# Patient Record
Sex: MALE | Race: WHITE | NOT HISPANIC OR LATINO | Employment: OTHER | URBAN - METROPOLITAN AREA
[De-identification: names, ages, dates, MRNs, and addresses within clinical notes are randomized per-mention and may not be internally consistent; named-entity substitution may affect disease eponyms.]

---

## 2017-02-03 ENCOUNTER — OFFICE VISIT (OUTPATIENT)
Dept: UROLOGY | Facility: CLINIC | Age: 62
End: 2017-02-03
Payer: MEDICARE

## 2017-02-03 ENCOUNTER — LAB VISIT (OUTPATIENT)
Dept: LAB | Facility: HOSPITAL | Age: 62
End: 2017-02-03
Attending: UROLOGY
Payer: MEDICARE

## 2017-02-03 VITALS — HEART RATE: 61 BPM | DIASTOLIC BLOOD PRESSURE: 78 MMHG | SYSTOLIC BLOOD PRESSURE: 122 MMHG

## 2017-02-03 DIAGNOSIS — N40.1 BENIGN PROSTATIC HYPERTROPHY WITH URINARY RETENTION: ICD-10-CM

## 2017-02-03 DIAGNOSIS — R33.8 BENIGN PROSTATIC HYPERTROPHY WITH URINARY RETENTION: ICD-10-CM

## 2017-02-03 DIAGNOSIS — N40.1 BENIGN NON-NODULAR PROSTATIC HYPERPLASIA WITH LOWER URINARY TRACT SYMPTOMS: ICD-10-CM

## 2017-02-03 DIAGNOSIS — N40.1 BENIGN NON-NODULAR PROSTATIC HYPERPLASIA WITH LOWER URINARY TRACT SYMPTOMS: Primary | ICD-10-CM

## 2017-02-03 LAB — COMPLEXED PSA SERPL-MCNC: 1.5 NG/ML

## 2017-02-03 PROCEDURE — 99212 OFFICE O/P EST SF 10 MIN: CPT | Mod: PBBFAC,PO | Performed by: UROLOGY

## 2017-02-03 PROCEDURE — 99999 PR PBB SHADOW E&M-EST. PATIENT-LVL II: CPT | Mod: PBBFAC,,, | Performed by: UROLOGY

## 2017-02-03 PROCEDURE — 84153 ASSAY OF PSA TOTAL: CPT

## 2017-02-03 PROCEDURE — 99213 OFFICE O/P EST LOW 20 MIN: CPT | Mod: S$PBB,,, | Performed by: UROLOGY

## 2017-02-03 PROCEDURE — 36415 COLL VENOUS BLD VENIPUNCTURE: CPT

## 2017-02-03 RX ORDER — ASPIRIN 325 MG
81 TABLET ORAL
COMMUNITY
End: 2017-03-21 | Stop reason: ALTCHOICE

## 2017-02-03 NOTE — MR AVS SNAPSHOT
[facility]   [address]  [place] [address]  Phone: [phone]                  Enmanuel Armenta   2/3/2017 9:15 AM   Office Visit    Description:  Male : 1955   Provider:  Hernan Lake MD   Department:  [facility]           Reason for Visit     Urinary Retention           Diagnoses this Visit        Comments    Benign non-nodular prostatic hyperplasia with lower urinary tract symptoms    -  Primary     Benign prostatic hypertrophy with urinary retention                To Do List           Future Appointments        Provider Department Dept Phone    2/3/2017 10:30 AM [facility] [phone]      Goals (5 Years of Data)     None      [facility]     [facility] Nurse Care [facility] -  Assistance  Registered nurses in the [facility] provide clinical advisement, health education, appointment booking, and other advisory services.  Call for this free service at [phone].             Medications           Message regarding Medications     Verify the changes and/or additions to your medication regime listed below are the same as discussed with your clinician today.  If any of these changes or additions are incorrect, please notify your healthcare provider.        STOP taking these medications     tamsulosin (FLOMAX) 0.4 mg Cp24 Take 1 capsule (0.4 mg total) by mouth once daily.           Verify that the below list of medications is an accurate representation of the medications you are currently taking.  If none reported, the list may be blank. If incorrect, please contact your healthcare provider. Carry this list with you in case of emergency.           Current Medications     aspirin 325 MG tablet Take 81 mg by mouth.    multivitamin (THERAGRAN) per tablet Take 1 tablet by mouth once daily.            Clinical Reference Information           Your Vitals Were     BP Pulse                122/78 61           Blood Pressure          Most Recent Value    BP  122/78      Allergies as of 2/3/2017     No Known Allergies      Immunizations Administered on Date of Encounter - 2/3/2017     None      Orders Placed During Today's Visit     Future Labs/Procedures Expected by Expires    PROSTATE SPECIFIC ANTIGEN, DIAGNOSTIC  2/3/2017 4/4/2018      Smoking Cessation     If you would like to quit smoking:   You may be eligible for free services if you are a [place] resident and started smoking cigarettes before September 1, 1988.  Call the Smoking Cessation Trust ([facility]) toll free at (355) 595-8069 or (349) 631-9024.   Call -960-QUIT-NOW if you do not meet the above criteria.            Language Assistance Services     ATTENTION: Language assistance services are available, free of charge. Please call [phone].      ATENCIÓN: Si julienne velasquez, tiene a ball disposición servicios gratuitos de asistencia lingüística. Llame al [phone].     CHÚ Ý: N?u b?n nói Ti?ng Vi?t, có các d?ch v? h? tr? ngôn ng? mi?n phí dành cho b?n. G?i s? [phone].         [facility] complies with applicable Federal civil rights laws and does not discriminate on the basis of race, color, national origin, age, disability, or sex.

## 2017-02-03 NOTE — PROGRESS NOTES
OFFICE VISIT NOTE    CHIEF COMPLAINT:  BPH, urinary retention.    HISTORY OF PRESENT ILLNESS:  This 61-year-old male returns for routine recheck.    He has a history of BPH with significant voiding difficulty and has failed all   alpha blockers and he underwent cystoscopy and was found to have an enlarged   prostate of approximately 123 mL and the plan was for him to undergo a TURP SLV   which we initially had planned on November 2016.  Subsequently, the patient   required cholecystectomy and also suffered a right broken arm and wrist for   which we had to cancel the surgery and he continues to have an indwelling Samson   catheter that he changed on a regular basis every three to four weeks.  He is   returning now to the office.  He states he is now ready to have the surgery   scheduled for his prostate.    MEDICAL HISTORY UPDATE:  As described above.    PHYSICAL EXAMINATION:  ABDOMEN:  Soft, benign, and nontender.  No masses.  No hernias or organomegaly.  EXTERNAL GENITAL:  Normal phallus with adequate meatus with indwelling Samson   catheter draining clear urine to a leg bag.  RECTAL EXAMINATION:  Enlarged, over 40 g, smooth, firm prostate.    His most recent PSA was 2.21 on 09/11/2015 for which he is due another PSA.    FINAL IMPRESSION:  Benign prostatic hypertrophy with urinary retention.    RECOMMENDATION:  We will obtain a PSA and subsequently contact the patient and   plan for TURP SLV.  This was explained to the patient he stated he understood   and agreed.      RAJINDER  dd: 02/03/2017 09:57:12 (CST)  td: 02/03/2017 12:36:24 (CST)  Doc ID   #[id number]  Job ID #[id number]    CC:

## 2017-02-14 ENCOUNTER — OFFICE VISIT (OUTPATIENT)
Dept: UROLOGY | Facility: CLINIC | Age: 62
End: 2017-02-14
Payer: MEDICARE

## 2017-02-14 VITALS — SYSTOLIC BLOOD PRESSURE: 132 MMHG | TEMPERATURE: 98 F | HEART RATE: 70 BPM | DIASTOLIC BLOOD PRESSURE: 82 MMHG

## 2017-02-14 DIAGNOSIS — N40.1 URINARY RETENTION DUE TO BENIGN PROSTATIC HYPERPLASIA: ICD-10-CM

## 2017-02-14 DIAGNOSIS — N40.1 BENIGN NON-NODULAR PROSTATIC HYPERPLASIA WITH LOWER URINARY TRACT SYMPTOMS: Primary | ICD-10-CM

## 2017-02-14 DIAGNOSIS — R33.8 URINARY RETENTION DUE TO BENIGN PROSTATIC HYPERPLASIA: ICD-10-CM

## 2017-02-14 PROCEDURE — 99999 PR PBB SHADOW E&M-EST. PATIENT-LVL II: CPT | Mod: PBBFAC,,, | Performed by: UROLOGY

## 2017-02-14 PROCEDURE — 99213 OFFICE O/P EST LOW 20 MIN: CPT | Mod: S$PBB,,, | Performed by: UROLOGY

## 2017-02-14 PROCEDURE — 99212 OFFICE O/P EST SF 10 MIN: CPT | Mod: PBBFAC,PO | Performed by: UROLOGY

## 2017-02-14 NOTE — PROGRESS NOTES
OFFICE NOTE    CHIEF COMPLAINT:  BPH with urinary retention and bladder outlet obstruction.    HISTORY OF PRESENT ILLNESS:  This 61-year-old male returns for a routine   recheck.  He has a history of BPH, which has resulted in difficulty voiding and   urinary retention and he has failed all alpha blockers and continues with indwelling Samson catheter and he is coming for his preop orders and registration to be scheduled for TURP and SLV that we are   planning on doing on 03/06/2017.  He was also noted on the prostate ultrasound   to have a prostatic volume of 123 mL, but it was decided that we will proceed   with the above-mentioned transurethral procedure.    The procedure with the risks, side effects, complications and expectation were   again discussed with the patient.  He said he understood and agreed.    His last PSA was 1.5 most recently on 02/03/2017.    FINAL IMPRESSION:  BPH with urinary retention and bladder outlet obstruction.    RECOMMENDATIONS:  TURP and SLV scheduled for 03/06/2017.      JONES  dd: 02/14/2017 11:47:03 (CST)  td: 02/14/2017 15:38:28 (CST)  Doc ID   #[id number]  Job ID #[id number]    CC:

## 2017-02-21 NOTE — H&P
Subjective:       Patient ID: Enmanuel Armenta is a 61 y.o. male.    Chief Complaint:  Urinary retention due to bladder outlet obstruction from BPH with a prostatic volume of approximately 123 mls.  Alpha blockers have been unsuccessful and patient has required an indwelling Samson catheter. His last PSA was 1.5 on 2/3/2017.    Past Medical History   Diagnosis Date    BPH (benign prostatic hyperplasia)     COPD (chronic obstructive pulmonary disease)     Elevated PSA     Hypokalemia     Muscular dystrophy     Urine retention     Wears dentures      upper     Past Surgical History   Procedure Laterality Date    Appendectomy      Cataracts       bilateral     Tonsillectomy      Cyst removal       from spine     Cystoscopy      Vasectomy      Cholecystectomy       History reviewed. No pertinent family history.  Social History     Social History    Marital status: Single     Spouse name: N/A    Number of children: N/A    Years of education: N/A     Social History Main Topics    Smoking status: Current Every Day Smoker     Packs/day: 1.00     Years: 48.00     Types: Cigarettes    Smokeless tobacco: Never Used    Alcohol use No    Drug use: No    Sexual activity: Not Asked     Other Topics Concern    None     Social History Narrative       Current Outpatient Prescriptions   Medication Sig Dispense Refill    aspirin 325 MG tablet Take 81 mg by mouth.       No current facility-administered medications for this visit.      Review of patient's allergies indicates:  No Known Allergies    Review of Systems   All other systems reviewed and are negative.      Objective:      Vitals:    02/14/17 0947   BP: 132/82   Pulse: 70   Temp: 97.5 °F (36.4 °C)   TempSrc: Oral     Physical Exam   Constitutional: He appears well-developed.   HENT:   Head: Normocephalic.   Eyes: Pupils are equal, round, and reactive to light.   Cardiovascular: Normal rate.    Pulmonary/Chest: Effort normal.   Abdominal: Soft.    Genitourinary:   Genitourinary Comments: Indwelling Samson catheter  Enlarged smooth firm prostate          Assessment:       1. Benign non-nodular prostatic hyperplasia with lower urinary tract symptoms    2. Urinary retention due to benign prostatic hyperplasia        Plan:       TURP/SLV

## 2017-03-02 ENCOUNTER — HOSPITAL ENCOUNTER (OUTPATIENT)
Dept: PREADMISSION TESTING | Facility: HOSPITAL | Age: 62
Discharge: HOME | End: 2017-03-02
Attending: UROLOGY
Payer: MEDICARE

## 2017-03-02 VITALS — WEIGHT: 140 LBS | BODY MASS INDEX: 21.97 KG/M2 | HEIGHT: 67 IN

## 2017-03-02 DIAGNOSIS — N40.2 PROSTATIC NODULE: ICD-10-CM

## 2017-03-02 DIAGNOSIS — N40.1 BPH (BENIGN PROSTATIC HYPERTROPHY) WITH URINARY RETENTION: ICD-10-CM

## 2017-03-02 DIAGNOSIS — R33.8 BPH (BENIGN PROSTATIC HYPERTROPHY) WITH URINARY RETENTION: ICD-10-CM

## 2017-03-02 DIAGNOSIS — R33.9 URINARY RETENTION: ICD-10-CM

## 2017-03-02 DIAGNOSIS — R31.0 GROSS HEMATURIA: ICD-10-CM

## 2017-03-02 PROCEDURE — 99900103 DSU ONLY-NO CHARGE-INITIAL HR (STAT)

## 2017-03-03 ENCOUNTER — ANESTHESIA EVENT (OUTPATIENT)
Dept: SURGERY | Facility: HOSPITAL | Age: 62
End: 2017-03-03
Payer: MEDICARE

## 2017-03-06 ENCOUNTER — ANESTHESIA (OUTPATIENT)
Dept: SURGERY | Facility: HOSPITAL | Age: 62
End: 2017-03-06
Payer: MEDICARE

## 2017-03-06 ENCOUNTER — HOSPITAL ENCOUNTER (OUTPATIENT)
Facility: HOSPITAL | Age: 62
Discharge: HOME | End: 2017-03-07
Attending: UROLOGY | Admitting: UROLOGY
Payer: MEDICARE

## 2017-03-06 DIAGNOSIS — N40.1 BENIGN NON-NODULAR PROSTATIC HYPERPLASIA WITH LOWER URINARY TRACT SYMPTOMS: ICD-10-CM

## 2017-03-06 DIAGNOSIS — N40.2 PROSTATE NODULE: ICD-10-CM

## 2017-03-06 DIAGNOSIS — Z90.79 S/P TURP (STATUS POST TRANSURETHRAL RESECTION OF PROSTATE): ICD-10-CM

## 2017-03-06 DIAGNOSIS — J44.9 CHRONIC OBSTRUCTIVE PULMONARY DISEASE, UNSPECIFIED COPD TYPE: ICD-10-CM

## 2017-03-06 DIAGNOSIS — N40.1 URINARY RETENTION DUE TO BENIGN PROSTATIC HYPERPLASIA: ICD-10-CM

## 2017-03-06 DIAGNOSIS — R33.8 URINARY RETENTION DUE TO BENIGN PROSTATIC HYPERPLASIA: ICD-10-CM

## 2017-03-06 DIAGNOSIS — R97.20 ELEVATED PSA: ICD-10-CM

## 2017-03-06 LAB
BASOPHILS # BLD AUTO: 0 K/UL
BASOPHILS NFR BLD: 0.5 %
DIFFERENTIAL METHOD: ABNORMAL
EOSINOPHIL # BLD AUTO: 0.1 K/UL
EOSINOPHIL NFR BLD: 1 %
ERYTHROCYTE [DISTWIDTH] IN BLOOD BY AUTOMATED COUNT: 14.4 %
HCT VFR BLD AUTO: 43 %
HGB BLD-MCNC: 14.2 G/DL
LYMPHOCYTES # BLD AUTO: 3 K/UL
LYMPHOCYTES NFR BLD: 41.9 %
MCH RBC QN AUTO: 30.9 PG
MCHC RBC AUTO-ENTMCNC: 33.1 %
MCV RBC AUTO: 93 FL
MONOCYTES # BLD AUTO: 0.4 K/UL
MONOCYTES NFR BLD: 5.3 %
NEUTROPHILS # BLD AUTO: 3.7 K/UL
NEUTROPHILS NFR BLD: 51.3 %
PLATELET # BLD AUTO: 251 K/UL
PMV BLD AUTO: 8.7 FL
RBC # BLD AUTO: 4.61 M/UL
WBC # BLD AUTO: 7.2 K/UL

## 2017-03-06 PROCEDURE — 27201423 OPTIME MED/SURG SUP & DEVICES STERILE SUPPLY: Performed by: UROLOGY

## 2017-03-06 PROCEDURE — 82370 X-RAY ASSAY CALCULUS: CPT

## 2017-03-06 PROCEDURE — D9220A PRA ANESTHESIA: Mod: CRNA,,, | Performed by: NURSE ANESTHETIST, CERTIFIED REGISTERED

## 2017-03-06 PROCEDURE — 25000003 PHARM REV CODE 250: Performed by: UROLOGY

## 2017-03-06 PROCEDURE — 36000706: Performed by: UROLOGY

## 2017-03-06 PROCEDURE — 37000008 HC ANESTHESIA 1ST 15 MINUTES: Performed by: UROLOGY

## 2017-03-06 PROCEDURE — 63600175 PHARM REV CODE 636 W HCPCS: Performed by: NURSE ANESTHETIST, CERTIFIED REGISTERED

## 2017-03-06 PROCEDURE — 63600175 PHARM REV CODE 636 W HCPCS: Performed by: ANESTHESIOLOGY

## 2017-03-06 PROCEDURE — D9220A PRA ANESTHESIA: Mod: ANES,,, | Performed by: ANESTHESIOLOGY

## 2017-03-06 PROCEDURE — 85025 COMPLETE CBC W/AUTO DIFF WBC: CPT

## 2017-03-06 PROCEDURE — 99900103 DSU ONLY-NO CHARGE-INITIAL HR (STAT): Performed by: UROLOGY

## 2017-03-06 PROCEDURE — 99900104 DSU ONLY-NO CHARGE-EA ADD'L HR (STAT): Performed by: UROLOGY

## 2017-03-06 PROCEDURE — 99219 PR INITIAL OBSERVATION CARE,LEVL II: CPT | Mod: ,,, | Performed by: INTERNAL MEDICINE

## 2017-03-06 PROCEDURE — 52318 REMOVE BLADDER STONE: CPT | Mod: 51,,, | Performed by: UROLOGY

## 2017-03-06 PROCEDURE — 63600175 PHARM REV CODE 636 W HCPCS: Performed by: UROLOGY

## 2017-03-06 PROCEDURE — 99900035 HC TECH TIME PER 15 MIN (STAT)

## 2017-03-06 PROCEDURE — 88305 TISSUE EXAM BY PATHOLOGIST: CPT | Performed by: PATHOLOGY

## 2017-03-06 PROCEDURE — 71000033 HC RECOVERY, INTIAL HOUR: Performed by: UROLOGY

## 2017-03-06 PROCEDURE — 25000003 PHARM REV CODE 250: Performed by: ANESTHESIOLOGY

## 2017-03-06 PROCEDURE — 37000009 HC ANESTHESIA EA ADD 15 MINS: Performed by: UROLOGY

## 2017-03-06 PROCEDURE — 36000707: Performed by: UROLOGY

## 2017-03-06 PROCEDURE — 52601 PROSTATECTOMY (TURP): CPT | Mod: ,,, | Performed by: UROLOGY

## 2017-03-06 PROCEDURE — 71000039 HC RECOVERY, EACH ADD'L HOUR: Performed by: UROLOGY

## 2017-03-06 PROCEDURE — 27200651 HC AIRWAY, LMA: Performed by: NURSE ANESTHETIST, CERTIFIED REGISTERED

## 2017-03-06 PROCEDURE — 27000221 HC OXYGEN, UP TO 24 HOURS

## 2017-03-06 PROCEDURE — 25000003 PHARM REV CODE 250: Performed by: NURSE ANESTHETIST, CERTIFIED REGISTERED

## 2017-03-06 PROCEDURE — 36415 COLL VENOUS BLD VENIPUNCTURE: CPT

## 2017-03-06 RX ORDER — ATROPA BELLADONNA AND OPIUM 16.2; 6 MG/1; MG/1
60 SUPPOSITORY RECTAL EVERY 12 HOURS PRN
Status: DISCONTINUED | OUTPATIENT
Start: 2017-03-06 | End: 2017-03-07 | Stop reason: HOSPADM

## 2017-03-06 RX ORDER — LIDOCAINE HCL/PF 100 MG/5ML
SYRINGE (ML) INTRAVENOUS
Status: DISCONTINUED | OUTPATIENT
Start: 2017-03-06 | End: 2017-03-06

## 2017-03-06 RX ORDER — CEFAZOLIN SODIUM 2 G/50ML
2 SOLUTION INTRAVENOUS
Status: COMPLETED | OUTPATIENT
Start: 2017-03-06 | End: 2017-03-07

## 2017-03-06 RX ORDER — PROPOFOL 10 MG/ML
VIAL (ML) INTRAVENOUS
Status: DISCONTINUED | OUTPATIENT
Start: 2017-03-06 | End: 2017-03-06

## 2017-03-06 RX ORDER — HYDROCODONE BITARTRATE AND ACETAMINOPHEN 5; 325 MG/1; MG/1
1 TABLET ORAL EVERY 4 HOURS PRN
Status: DISCONTINUED | OUTPATIENT
Start: 2017-03-06 | End: 2017-03-07 | Stop reason: HOSPADM

## 2017-03-06 RX ORDER — ONDANSETRON 2 MG/ML
4 INJECTION INTRAMUSCULAR; INTRAVENOUS EVERY 12 HOURS PRN
Status: DISCONTINUED | OUTPATIENT
Start: 2017-03-06 | End: 2017-03-07 | Stop reason: HOSPADM

## 2017-03-06 RX ORDER — DEXTROSE MONOHYDRATE AND SODIUM CHLORIDE 5; .45 G/100ML; G/100ML
INJECTION, SOLUTION INTRAVENOUS CONTINUOUS
Status: DISCONTINUED | OUTPATIENT
Start: 2017-03-06 | End: 2017-03-07 | Stop reason: HOSPADM

## 2017-03-06 RX ORDER — SODIUM CHLORIDE, SODIUM LACTATE, POTASSIUM CHLORIDE, CALCIUM CHLORIDE 600; 310; 30; 20 MG/100ML; MG/100ML; MG/100ML; MG/100ML
INJECTION, SOLUTION INTRAVENOUS CONTINUOUS
Status: DISCONTINUED | OUTPATIENT
Start: 2017-03-06 | End: 2017-03-06

## 2017-03-06 RX ORDER — FENTANYL CITRATE 50 UG/ML
25 INJECTION, SOLUTION INTRAMUSCULAR; INTRAVENOUS EVERY 5 MIN PRN
Status: DISCONTINUED | OUTPATIENT
Start: 2017-03-06 | End: 2017-03-06 | Stop reason: HOSPADM

## 2017-03-06 RX ORDER — CEFAZOLIN SODIUM 2 G/50ML
2 SOLUTION INTRAVENOUS
Status: COMPLETED | OUTPATIENT
Start: 2017-03-06 | End: 2017-03-06

## 2017-03-06 RX ORDER — IPRATROPIUM BROMIDE AND ALBUTEROL SULFATE 2.5; .5 MG/3ML; MG/3ML
3 SOLUTION RESPIRATORY (INHALATION) EVERY 6 HOURS PRN
Status: DISCONTINUED | OUTPATIENT
Start: 2017-03-06 | End: 2017-03-06 | Stop reason: SDUPTHER

## 2017-03-06 RX ORDER — FENTANYL CITRATE 50 UG/ML
INJECTION, SOLUTION INTRAMUSCULAR; INTRAVENOUS
Status: DISCONTINUED | OUTPATIENT
Start: 2017-03-06 | End: 2017-03-06

## 2017-03-06 RX ORDER — LIDOCAINE HYDROCHLORIDE 10 MG/ML
1 INJECTION, SOLUTION EPIDURAL; INFILTRATION; INTRACAUDAL; PERINEURAL ONCE
Status: COMPLETED | OUTPATIENT
Start: 2017-03-06 | End: 2017-03-06

## 2017-03-06 RX ORDER — ONDANSETRON 2 MG/ML
INJECTION INTRAMUSCULAR; INTRAVENOUS
Status: DISCONTINUED | OUTPATIENT
Start: 2017-03-06 | End: 2017-03-06

## 2017-03-06 RX ORDER — IPRATROPIUM BROMIDE AND ALBUTEROL SULFATE 2.5; .5 MG/3ML; MG/3ML
3 SOLUTION RESPIRATORY (INHALATION) EVERY 6 HOURS PRN
Status: DISCONTINUED | OUTPATIENT
Start: 2017-03-06 | End: 2017-03-07 | Stop reason: HOSPADM

## 2017-03-06 RX ORDER — SODIUM CHLORIDE 0.9 % (FLUSH) 0.9 %
3 SYRINGE (ML) INJECTION
Status: DISCONTINUED | OUTPATIENT
Start: 2017-03-06 | End: 2017-03-06 | Stop reason: HOSPADM

## 2017-03-06 RX ORDER — MIDAZOLAM HYDROCHLORIDE 1 MG/ML
INJECTION, SOLUTION INTRAMUSCULAR; INTRAVENOUS
Status: DISCONTINUED | OUTPATIENT
Start: 2017-03-06 | End: 2017-03-06

## 2017-03-06 RX ORDER — PHENAZOPYRIDINE HYDROCHLORIDE 200 MG/1
200 TABLET, FILM COATED ORAL
Status: DISCONTINUED | OUTPATIENT
Start: 2017-03-06 | End: 2017-03-07 | Stop reason: HOSPADM

## 2017-03-06 RX ORDER — GENTAMICIN SULFATE 80 MG/100ML
80 INJECTION, SOLUTION INTRAVENOUS
Status: COMPLETED | OUTPATIENT
Start: 2017-03-06 | End: 2017-03-06

## 2017-03-06 RX ORDER — LIDOCAINE HYDROCHLORIDE 20 MG/ML
JELLY TOPICAL
Status: DISCONTINUED | OUTPATIENT
Start: 2017-03-06 | End: 2017-03-06 | Stop reason: HOSPADM

## 2017-03-06 RX ADMIN — EPHEDRINE SULFATE 30 MG: 50 INJECTION, SOLUTION INTRAMUSCULAR; INTRAVENOUS; SUBCUTANEOUS at 08:03

## 2017-03-06 RX ADMIN — SODIUM CHLORIDE, SODIUM LACTATE, POTASSIUM CHLORIDE, AND CALCIUM CHLORIDE: .6; .31; .03; .02 INJECTION, SOLUTION INTRAVENOUS at 06:03

## 2017-03-06 RX ADMIN — PHENAZOPYRIDINE HYDROCHLORIDE 200 MG: 200 TABLET ORAL at 06:03

## 2017-03-06 RX ADMIN — LIDOCAINE HYDROCHLORIDE 10 MG: 10 INJECTION, SOLUTION EPIDURAL; INFILTRATION; INTRACAUDAL; PERINEURAL at 06:03

## 2017-03-06 RX ADMIN — CEFAZOLIN SODIUM 2 G: 2 SOLUTION INTRAVENOUS at 11:03

## 2017-03-06 RX ADMIN — ONDANSETRON 4 MG: 2 INJECTION, SOLUTION INTRAMUSCULAR; INTRAVENOUS at 08:03

## 2017-03-06 RX ADMIN — LIDOCAINE HYDROCHLORIDE 100 MG: 20 INJECTION, SOLUTION INTRAVENOUS at 08:03

## 2017-03-06 RX ADMIN — EPHEDRINE SULFATE 20 MG: 50 INJECTION, SOLUTION INTRAMUSCULAR; INTRAVENOUS; SUBCUTANEOUS at 08:03

## 2017-03-06 RX ADMIN — DEXTROSE AND SODIUM CHLORIDE: 5; .45 INJECTION, SOLUTION INTRAVENOUS at 11:03

## 2017-03-06 RX ADMIN — ATROPA BELLADONNA AND OPIUM 60 MG: 16.2; 6 SUPPOSITORY RECTAL at 10:03

## 2017-03-06 RX ADMIN — FENTANYL CITRATE 25 MCG: 50 INJECTION INTRAMUSCULAR; INTRAVENOUS at 10:03

## 2017-03-06 RX ADMIN — CEFAZOLIN SODIUM 2 G: 2 SOLUTION INTRAVENOUS at 07:03

## 2017-03-06 RX ADMIN — GENTAMICIN SULFATE 80 MG: 80 INJECTION, SOLUTION INTRAVENOUS at 06:03

## 2017-03-06 RX ADMIN — CEFAZOLIN SODIUM 2 G: 2 SOLUTION INTRAVENOUS at 03:03

## 2017-03-06 RX ADMIN — FENTANYL CITRATE 100 MCG: 50 INJECTION INTRAMUSCULAR; INTRAVENOUS at 08:03

## 2017-03-06 RX ADMIN — MIDAZOLAM 2 MG: 1 INJECTION INTRAMUSCULAR; INTRAVENOUS at 07:03

## 2017-03-06 RX ADMIN — PROPOFOL 130 MG: 10 INJECTION, EMULSION INTRAVENOUS at 08:03

## 2017-03-06 NOTE — TRANSFER OF CARE
"Anesthesia Transfer of Care Note    Patient: Enmanuel Armenta    Procedure(s) Performed: Procedure(s) (LRB):  PROSTATECTOMY-TRANSURETHRAL (N/A)  PROSTATECTOMY-TRANSURETHRAL WITH GREENLIGHT LASER (N/A)  CYSTOLITHOLOPAXY (REMOVE BLADDER STONE)    Patient location: PACU    Anesthesia Type: general    Transport from OR: Transported from OR on 2-3 L/min O2 by NC with adequate spontaneous ventilation    Post pain: adequate analgesia    Post assessment: no apparent anesthetic complications and tolerated procedure well    Post vital signs: stable    Level of consciousness: awake    Nausea/Vomiting: no nausea/vomiting    Complications: none          Last vitals:   Visit Vitals    /61 (BP Location: Left arm, Patient Position: Lying, BP Method: Automatic)    Pulse (!) 58    Temp 36.6 °C (97.8 °F) (Oral)    Resp 16    Ht 5' 7" (1.702 m)    Wt 63.5 kg (140 lb)    SpO2 96%    BMI 21.93 kg/m2     "

## 2017-03-06 NOTE — PROGRESS NOTES
Spoke with Dr. Thompson concerning pt's low grade temps. Forced air warmer applied throughout recovery process. Per Dr. Thompson pt meets criteria with an axillary temp of 94.1 to transfer to the floor.

## 2017-03-06 NOTE — IP AVS SNAPSHOT
[address] [place] [address]  Phone: [phone]           Patient Discharge Instructions     Our goal is to set you up for success. This packet includes information on your condition, medications, and your home care. It will help you to care for yourself so you don't get sicker and need to go back to the hospital.     Please ask your nurse if you have any questions.        There are many details to remember when preparing to leave the hospital. Here is what you will need to do:    1. Take your medicine. If you are prescribed medications, review your Medication List in the following pages. You may have new medications to  at the pharmacy and others that you'll need to stop taking. Review the instructions for how and when to take your medications. Talk with your doctor or nurses if you are unsure of what to do.     2. Go to your follow-up appointments. Specific follow-up information is listed in the following pages. Your may be contacted by a transition nurse or clinical provider about future appointments. Be sure we have all of the phone numbers to reach you, if needed. Please contact your provider's office if you are unable to make an appointment.     3. Watch for warning signs. Your doctor or nurse will give you detailed warning signs to watch for and when to call for assistance. These instructions may also include educational information about your condition. If you experience any of warning signs to your health, call your doctor.               [facility]  Unless otherwise directed by your provider, please contact [facility], our nurse care line that is available for 24/7 assistance.     [phone] (toll-free)    Registered nurses in the [facility] provide clinical advisement, health education, appointment booking, and other advisory services.                    ** Verify the list of medication(s) below is accurate and up to date.  Carry this with you in case of emergency. If your medications have changed, please notify your healthcare provider.             Medication List      START taking these medications        Additional Info                      phenazopyridine 200 MG tablet   Commonly known as:  PYRIDIUM   Quantity:  20 tablet   Refills:  0   Dose:  200 mg    Last time this was given:  200 mg on 3/7/2017 11:26 AM   Instructions:  Take 1 tablet (200 mg total) by mouth every 6 (six) hours as needed for Pain.     Begin Date    AM    Noon    PM    Bedtime       sulfamethoxazole-trimethoprim 800-160mg 800-160 mg Tab   Commonly known as:  BACTRIM DS   Quantity:  20 tablet   Refills:  0   Dose:  1 tablet    Instructions:  Take 1 tablet by mouth 2 (two) times daily.     Begin Date    AM    Noon    PM    Bedtime         CONTINUE taking these medications        Additional Info                      aspirin 325 MG tablet   Refills:  0   Dose:  81 mg    Instructions:  Take 81 mg by mouth.     Begin Date    AM    Noon    PM    Bedtime            Where to Get Your Medications      These medications were sent to [facility] [place], [place] - [address]  [address], [place] [address]     Phone:  [phone]     phenazopyridine 200 MG tablet    sulfamethoxazole-trimethoprim 800-160mg 800-160 mg Tab                  Please bring to all follow up appointments:    1. A copy of your discharge instructions.  2. All medicines you are currently taking in their original bottles.  3. Identification and insurance card.    Please arrive 15 minutes ahead of scheduled appointment time.    Please call 24 hours in advance if you must reschedule your appointment and/or time.        Your Scheduled Appointments     Mar 21, 2017  9:30 AM CDT   Post OP with MD [facility] ([facility])    [address]  [place] [id number] [phone]              Follow-up Information     Follow up In 1 week.        Follow  "up In 1 week.        Discharge Instructions     Future Orders    Diet general     Questions:    Total calories:      Fat restriction, if any:      Protein restriction, if any:      Na restriction, if any:      Fluid restriction:      Additional restrictions:        Discharge References/Attachments     URINARY SYMPTOMS, PROSTATE PROBLEMS AND RELATED (ENGLISH)    URINARY RETENTION, MALE (ENGLISH)        Primary Diagnosis     Your primary diagnosis was:  History Of Removal Of Prostate Tissue Through Scope      Admission Information     Date & Time Provider Department CSN    3/6/2017  5:53 AM Hernan Lake MD [facility] [id number]      Care Providers     Provider Role Specialty Primary office phone    Hernan Lake MD Attending Provider Urology [phone]    Hernan Lake MD Surgeon  Urology [phone]    Stephanie Tena MD Consulting Physician  Internal Medicine [phone]      Your Vitals Were     BP Pulse Temp Resp Height Weight    146/65 77 98.4 °F (36.9 °C) (Oral) 20 5' 7" (1.702 m) 63.5 kg (140 lb)    SpO2 BMI             93% 21.93 kg/m2         Recent Lab Values     No lab values to display.      Pending Labs     Order Current Status    Urinary Stone Analysis In process      Allergies as of 3/7/2017     No Known Allergies      Advance Directives     An advance directive is a document which, in the event you are no longer able to make decisions for yourself, tells your healthcare team what kind of treatment you do or do not want to receive, or who you would like to make those decisions for you.  If you do not currently have an advance directive, [facility] encourages you to create one.  For more information call:  [phone] ([phone]), [phone] ([phone]),  or log on to [email or web address].        Smoking Cessation     If you would like to quit smoking:   You may be eligible for free services if you are a [place] resident and started smoking cigarettes before " September 1, 1988.  Call the Smoking Cessation Trust ([facility]) toll free at (644) 269-7176 or (682) 046-9945.   Call 1-800-QUIT-NOW if you do not meet the above criteria.            Language Assistance Services     ATTENTION: Language assistance services are available, free of charge. Please call [phone].      ATENCIÓN: Si habla español, tiene a ball disposición servicios gratuitos de asistencia lingüística. Llame al [phone].     CHÚ Ý: N?u b?n nói Ti?ng Vi?t, có các d?ch v? h? tr? ngôn ng? mi?n phí dành cho b?n. G?i s? [phone].         [facility] complies with applicable Federal civil rights laws and does not discriminate on the basis of race, color, national origin, age, disability, or sex.

## 2017-03-06 NOTE — ANESTHESIA PREPROCEDURE EVALUATION
03/06/2017  Enmanuel Armenta is a 61 y.o., male.    [facility] Anesthesia Evaluation    I have reviewed the Patient Summary Reports.    I have reviewed the Nursing Notes.      Review of Systems  Anesthesia Hx:  No problems with previous Anesthesia    Social:  Smoker    Hematology/Oncology:  Hematology Normal   Oncology Normal     EENT/Dental:   Upper dentures   Cardiovascular:  Cardiovascular Normal     Pulmonary:   COPD, moderate    Renal/:   BPH    Musculoskeletal:  Musculoskeletal Normal    Neurological:   Neuromuscular Disease,    Dermatological:  Skin Normal    Psych:  Psychiatric Normal           Physical Exam  General:  Well nourished    Airway/Jaw/Neck:  Airway Findings: Mouth Opening: Normal Tongue: Normal  General Airway Assessment: Adult  Mallampati: I  TM Distance: Normal, at least 6 cm        Eyes/Ears/Nose:  EYES/EARS/NOSE FINDINGS: Normal   Dental:  DENTAL FINDINGS: Normal   Chest/Lungs:  Chest/Lungs Findings: Clear to auscultation     Heart/Vascular:  Heart Findings: Rate: Normal  Rhythm: Regular Rhythm  Sounds: Normal  Heart murmur: negative Vascular Findings: Normal    Abdomen:  Abdomen Findings: Normal    Musculoskeletal:  Musculoskeletal Findings: Normal   Skin:  Skin Findings: Normal    Mental Status:  Mental Status Findings: Normal        Anesthesia Plan  Type of Anesthesia, risks & benefits discussed:  Anesthesia Type:  general  Patient's Preference:   Intra-op Monitoring Plan:   Intra-op Monitoring Plan Comments:   Post Op Pain Control Plan:   Post Op Pain Control Plan Comments:   Induction:   IV  Beta Blocker:  Patient is not currently on a Beta-Blocker (No further documentation required).       Informed Consent: Patient understands risks and agrees with Anesthesia plan.  Questions answered. Anesthesia consent signed with patient.  ASA Score: 3     Day of Surgery Review of History &  Physical:    H&P update referred to the surgeon.         Ready For Surgery From Anesthesia Perspective.

## 2017-03-06 NOTE — PROGRESS NOTES
Postop check;  Pt resting comfortably but having some bladder spasms  Samson draining well, urine clearing.  CBC stable  Will order Pyridium for bladder relief

## 2017-03-06 NOTE — CONSULTS
PCP: Carissa Ko MD    Medical Consult    Reason for consult: Medical Management    History of Present Illness:  Patient is a 61 y.o. male s/p TURP + Cystolithopaxy for BPH with urinary retention and bladder outlet obstruction by Dr. Lake. Patient has PMH significant for BPH with urinary retention and bladder outlet obstruction, COPD and history of elevated PSA. Post-operatively, patient is doing well. Patient denied chest pain, shortness of breath, nausea, vomiting, headache, vision changes, focal neuro-deficits, cough or fever.    Past Medical History:   Diagnosis Date    BPH (benign prostatic hyperplasia)     COPD (chronic obstructive pulmonary disease)     Elevated PSA     Fx     RIGHT WRIST, LEFT FOOT    Hypokalemia     Muscular dystrophy     Urine retention     Wears dentures     upper     Past Surgical History:   Procedure Laterality Date    APPENDECTOMY      cataracts      bilateral     CHOLECYSTECTOMY      CYST REMOVAL      from spine     CYSTOSCOPY      EYE SURGERY      cataract surgery bilateral    PROSTATE BIOPSY      TONSILLECTOMY      VASECTOMY       History reviewed. No pertinent family history.  Social History   Substance Use Topics    Smoking status: Current Every Day Smoker     Packs/day: 1.00     Years: 48.00     Types: Cigarettes    Smokeless tobacco: Never Used    Alcohol use No      Review of patient's allergies indicates:  No Known Allergies  PTA Medications   Medication Sig    aspirin 325 MG tablet Take 81 mg by mouth.       Review of Systems:  Constitutional: no fever or chills  Eyes: no visual changes  Ears, nose, mouth, throat, and face: no nasal congestion or sore throat  Respiratory: no cough or shorness of breath  Cardiovascular: no chest pain or palpitations  Gastrointestinal: no nausea or vomiting, no abdominal pain or change in bowel habits  Genitourinary: see HPI  Integument/breast: no rash or pruritis  Hematologic/lymphatic: no easy bruising or  lymphadenopathy  Musculoskeletal: no arthralgias or myalgias  Neurological: no seizures or tremors.  Behavioral/Psych: no auditory or visual hallucinations  Endocrine: no heat or cold intolerance     OBJECTIVE:     Vital Signs (Most Recent)  Temp: (!) 94.5 °F (34.7 °C) (03/06/17 1150)  Pulse: 65 (03/06/17 1150)  Resp: 14 (03/06/17 1150)  BP: (!) 115/56 (03/06/17 1150)  SpO2: 96 % (03/06/17 1150)    Physical Exam:  General appearance: well developed, appears stated age  Head: normocephalic, atraumatic  Eyes:  conjunctivae/corneas clear. PERRL.  Nose: Nares normal. Septum midline.  Throat: lips, mucosa, and tongue normal; teeth and gums normal, no throat erythema.  Neck: supple, symmetrical, trachea midline, no JVD and thyroid not enlarged, symmetric, no tenderness/mass/nodules  Lungs:  clear to auscultation bilaterally and normal respiratory effort  Chest wall: no tenderness  Heart: regular rate and rhythm, S1, S2 normal, no murmur, click, rub or gallop  Abdomen: soft, non-tender non-distented; bowel sounds normal; no masses,  no organomegaly  Extremities: no cyanosis, clubbing or edema. Samson draining slight hematuria urine.  Pulses: 2+ and symmetric  Skin: Skin color, texture, turgor normal. No rashes or lesions.  Lymph nodes: Cervical, supraclavicular, and axillary nodes normal.  Neurologic: Normal strength and tone. No focal numbness or weakness. CNII-XII intact.      Laboratory:   CBC:   Recent Labs  Lab 03/06/17  1034   WBC 7.20   RBC 4.61   HGB 14.2   HCT 43.0      MCV 93   MCH 30.9   MCHC 33.1     CMP:   Recent Labs  Lab 03/02/17  0909   GLU 97   CALCIUM 10.0   ALBUMIN 3.4*   PROT 6.6      K 3.6   CO2 27      BUN 13   CREATININE 0.8   ALKPHOS 198*   ALT 45*   AST 33   BILITOT 0.3     Coagulation:   Recent Labs  Lab 03/02/17  0909   INR 0.9   APTT 25.9     Diagnostic Results:  None    ASSESSMENT/PLAN:     Active Hospital Problems    Diagnosis  POA    *S/P TURP (status post transurethral  resection of prostate) [Z90.79]  Not Applicable    Benign non-nodular prostatic hyperplasia with lower urinary tract symptoms [N40.1]  Yes    COPD (chronic obstructive pulmonary disease) [J44.9]  Yes    Elevated PSA [R97.20]  Yes    Prostate nodule [N40.2]  Yes      Resolved Hospital Problems    Diagnosis Date Resolved POA   No resolved problems to display.       Plan:   Tele-monitoring    Continue to follow urology recommendations.  Needs aggressive incentive spirometry.  Follow hemoglobin and hematocrit closely.  Pain control with IV narcotics and antiemetics as needed.  Continue IVF hydration.  Continue antiemetics as needed.  Use prn oxygen to keep PO2 above 92%.  Use beta-2 agonists breathing treatments as needed.  Gastric ulcer prophylaxis with gastric acid suppressant.   Deep venous thrombosis prophylaxis with SCD and TEDs. Anticoagulants are being avoided due to potential for worsening hematuria.   See Orders.    Thank you for allowing me to participate in the care of your patient. Will follow with you.

## 2017-03-06 NOTE — PLAN OF CARE
03/06/17 1423   Patient Assessment/Suction   All Lung Fields Breath Sounds clear   Cough Type none   PRE-TX-O2-ETCO2   O2 Device (Oxygen Therapy) nasal cannula   $ Is the patient on Oxygen? Yes   Flow (L/min) 2   Oxygen Concentration (%) 28   SpO2 96 %   Pulse Oximetry Type Intermittent   Pulse 65   Resp 16   Aerosol Therapy   $ Aerosol Therapy Charges PRN treatment not required       Aerosol treatments PRN. No treatment indicated at this time.

## 2017-03-06 NOTE — PLAN OF CARE
Pt awake alert and oriented, vs stable, see previous note about pt's temp, pain controlled with PRN pain meds, pt resting/sleeping comfortably in between care, tolerated clear liquids, armas draining freely with 550 ml output, armas to traction per Dr. Lake's order. Ok per Dr. Thompson for pt to be transferred to the floor. Pt transferred from PACU to room 404 via stretcher. Attempted to call pt's friend, Leonardo to give an update on pt's status but no answer. Report given to KARLENE Sánchez.

## 2017-03-06 NOTE — INTERVAL H&P NOTE
The patient has been examined and the H&P has been reviewed:    I concur with the findings and no changes have occurred since H&P was written.    Anesthesia/Surgery risks, benefits and alternative options discussed and understood by patient/family.          Active Hospital Problems    Diagnosis  POA    Benign non-nodular prostatic hyperplasia with lower urinary tract symptoms [N40.1]  Yes      Resolved Hospital Problems    Diagnosis Date Resolved POA   No resolved problems to display.

## 2017-03-06 NOTE — BRIEF OP NOTE
Operative Note     SUMMARY     Surgery Date: 3/6/2017     Surgeon(s) and Role:     * Hernan Lake MD - Primary    Pre-op Diagnosis:  Urinary retention due to benign prostatic hyperplasia [N28.89, R33.8]  Benign non-nodular prostatic hyperplasia with lower urinary tract symptoms [N40.1]    Post-op Diagnosis:  Urinary retention due to benign prostatic hyperplasia [N28.89, R33.8]  Benign non-nodular prostatic hyperplasia with lower urinary tract symptoms [N40.1]    Procedure(s) (LRB):  PROSTATECTOMY-TRANSURETHRAL (N/A)  PROSTATECTOMY-TRANSURETHRAL WITH GREENLIGHT LASER (N/A)  CYSTOLITHOLOPAXY (REMOVE BLADDER STONE)    Anesthesia: General    Findings/Key Components:  See Op Note      Estimated Blood Loss: * No values recorded between 3/6/2017  8:14 AM and 3/6/2017  9:51 AM *         Specimens     Start     Ordered    03/06/17 0938  Specimen to Pathology - Surgery  Once      03/06/17 0938    03/06/17 0906  Specimen to Pathology - Surgery  Once,   Status:  Canceled      03/06/17 0906            Discharge Note      SUMMARY     Admit Date: 3/6/2017    Attending Physician: Hernan Lake MD     Discharge Physician: Hernan Lake MD    Discharge Date: 3/6/2017     Final Diagnosis: Urinary retention due to benign prostatic hyperplasia [N28.89, R33.8]  Benign non-nodular prostatic hyperplasia with lower urinary tract symptoms [N40.1]    Hospital Course: uneventful, going home same day    Disposition: Home or Self Care    Patient Instructions:   Current Discharge Medication List      CONTINUE these medications which have NOT CHANGED    Details   aspirin 325 MG tablet Take 81 mg by mouth.             Discharge Procedure Orders (must include Diet, Follow-up, Activity)  Resume previous diet.  Follow up with PCP as needed.

## 2017-03-06 NOTE — ANESTHESIA POSTPROCEDURE EVALUATION
"Anesthesia Post Evaluation    Patient: Enmanuel Armenta    Procedure(s) Performed: Procedure(s) (LRB):  PROSTATECTOMY-TRANSURETHRAL (N/A)  PROSTATECTOMY-TRANSURETHRAL WITH GREENLIGHT LASER (N/A)  CYSTOLITHOLOPAXY (REMOVE BLADDER STONE)    Final Anesthesia Type: general  Patient location during evaluation: PACU  Patient participation: Yes- Able to Participate  Level of consciousness: awake and alert  Post-procedure vital signs: reviewed and stable  Pain management: adequate  Airway patency: patent  PONV status at discharge: No PONV  Anesthetic complications: no      Cardiovascular status: hemodynamically stable  Respiratory status: unassisted and room air  Hydration status: euvolemic  Follow-up not needed.        Visit Vitals    BP (!) 115/56 (BP Location: Left arm, Patient Position: Lying, BP Method: Automatic)    Pulse 65    Temp (!) 34.7 °C (94.5 °F) (Axillary)    Resp 14    Ht 5' 7" (1.702 m)    Wt 63.5 kg (140 lb)    SpO2 96%    BMI 21.93 kg/m2       Pain/Pedro Score: Pain Assessment Performed: Yes (3/6/2017 11:20 AM)  Presence of Pain: non-verbal indicators absent (pt sleeping/snoring, no distress noted) (3/6/2017 11:20 AM)  Pain Rating Prior to Med Admin: 8 (3/6/2017 10:45 AM)  Pedro Score: 9 (3/6/2017 11:20 AM)      "

## 2017-03-06 NOTE — OP NOTE
Transurethral Resection of the Prostate and Transurethral Greenlight Vaporization of the Prostate and Cystolitholopaxy Procedure Note    Indications: The patient has bladder outlet obstruction secondary to BPH and has elected to proceed with TURP as definitive management.     Pre-operative Diagnosis: BPH    Post-operative Diagnosis: same, bladder calculus    Date: 3/6/2017    Surgeon: LINDA ESPITIA     Assistants:     Anesthesia: General endotracheal anesthesia      Procedure Details   The patient was seen in the holding room. The risks, benefits, complications, treatment options, and expected outcomes were discussed with the patient. The possibilities of reaction to medication, pulmonary aspiration, perforation of viscus, bleeding, recurrent infection, the need for additional procedures, failure to diagnose a condition, and creating a complication requiring transfusion or operation were discussed with the patient. The patient concurred with the proposed plan, giving informed consent.   Prostate.     After the induction of satisfactory anesthesia the patient was placed in the dorsal lithotomy position and prepped and draped in the usual sterile fashion. Lubricating jelly was injected into the urethral lumen which was then calibrated to 26 Anguillan using [place] sounds. The 22 Anguillan cystoscope sheath with 30 degree lens was inserted into the and advanced to the prostate where the enlarged obstructing trilobar hyperplasia was encountered. Also within the bladder cavity a large calculus was encountered. An alligator forceps was used to fragment the stone into smaller pieces that were evacuated with the Ellik. The trigone and ureteral orifices were identified. The 26 Angu[place]an Greenlight laser cystoscope was into the urethra and bladder. The Greenlight laser fiber was introduced through the cystoscope. With an initial setting of 80 lu that was increased to 120 lu laser vaporization the lateral and median lobes of  the prostate were vaporized beginning proximally at the bladder neck and extended distally to the level of the verumontanum using sterile saline as the irrigant. Total energy used was 89.966 joules with a lasing time of 14 min 3 sec.Subsequently the 26 Welsh  resectoscope was introduced into the bladder under direct vision. The ureteral orifices were identified. The Saba working element was used for the resection and glycine was the irrigating fluid. The resection was begun with the median lobe tissue followed by the anterior and then lateral lobe tissue. The floor of the prostatic fossa and apical tissue were removed to complete the resection. Capsular fibers were identified and there were no capsular perforations. Hemostasis was achieved with coagulation current. The chips were recovered from the bladder using the Ellick evacuator. The ureteral orifices were noted to be uninjured after the resection. A 24 Welsh  Samson catheter was inserted at the conclusion of the procedure and placed on moderate traction.  Having tolerated the procedure well he was transferred to the recovery room in stable condition.  Findings:  Trilobar BPH with bladder outlet obstruction. Bladder calculus.    Estimate Blood Loss:  200 mL           Drains: 24 F Samson           Total IV Fluids:ml           Specimens: Specimen: prostate tissue. Bladder calculus.                      Complications:  None; patient tolerated the procedure well.           Disposition: PACU - hemodynamically stable.           Condition: stable

## 2017-03-07 ENCOUNTER — TELEPHONE (OUTPATIENT)
Dept: UROLOGY | Facility: CLINIC | Age: 62
End: 2017-03-07

## 2017-03-07 VITALS
TEMPERATURE: 98 F | HEART RATE: 77 BPM | HEIGHT: 67 IN | SYSTOLIC BLOOD PRESSURE: 146 MMHG | BODY MASS INDEX: 21.97 KG/M2 | DIASTOLIC BLOOD PRESSURE: 65 MMHG | OXYGEN SATURATION: 93 % | WEIGHT: 140 LBS | RESPIRATION RATE: 20 BRPM

## 2017-03-07 LAB
ANION GAP SERPL CALC-SCNC: 5 MMOL/L
ANION GAP SERPL CALC-SCNC: 6 MMOL/L
BASOPHILS # BLD AUTO: 0.1 K/UL
BASOPHILS NFR BLD: 0.5 %
BUN SERPL-MCNC: 8 MG/DL
BUN SERPL-MCNC: 8 MG/DL
CALCIUM SERPL-MCNC: 9 MG/DL
CALCIUM SERPL-MCNC: 9.3 MG/DL
CHLORIDE SERPL-SCNC: 108 MMOL/L
CHLORIDE SERPL-SCNC: 109 MMOL/L
CO2 SERPL-SCNC: 30 MMOL/L
CO2 SERPL-SCNC: 30 MMOL/L
CREAT SERPL-MCNC: 0.8 MG/DL
CREAT SERPL-MCNC: 0.8 MG/DL
DIFFERENTIAL METHOD: ABNORMAL
EOSINOPHIL # BLD AUTO: 0.1 K/UL
EOSINOPHIL NFR BLD: 0.5 %
ERYTHROCYTE [DISTWIDTH] IN BLOOD BY AUTOMATED COUNT: 14.2 %
EST. GFR  (AFRICAN AMERICAN): >60 ML/MIN/1.73 M^2
EST. GFR  (AFRICAN AMERICAN): >60 ML/MIN/1.73 M^2
EST. GFR  (NON AFRICAN AMERICAN): >60 ML/MIN/1.73 M^2
EST. GFR  (NON AFRICAN AMERICAN): >60 ML/MIN/1.73 M^2
GLUCOSE SERPL-MCNC: 114 MG/DL
GLUCOSE SERPL-MCNC: 115 MG/DL
HCT VFR BLD AUTO: 41.6 %
HGB BLD-MCNC: 13.7 G/DL
LYMPHOCYTES # BLD AUTO: 1.9 K/UL
LYMPHOCYTES NFR BLD: 14.9 %
MCH RBC QN AUTO: 30.7 PG
MCHC RBC AUTO-ENTMCNC: 32.8 %
MCV RBC AUTO: 94 FL
MONOCYTES # BLD AUTO: 0.8 K/UL
MONOCYTES NFR BLD: 6.4 %
NEUTROPHILS # BLD AUTO: 9.7 K/UL
NEUTROPHILS NFR BLD: 77.7 %
PLATELET # BLD AUTO: 238 K/UL
PMV BLD AUTO: 8.8 FL
POTASSIUM SERPL-SCNC: 3.9 MMOL/L
POTASSIUM SERPL-SCNC: 4 MMOL/L
RBC # BLD AUTO: 4.45 M/UL
SODIUM SERPL-SCNC: 143 MMOL/L
SODIUM SERPL-SCNC: 145 MMOL/L
WBC # BLD AUTO: 12.5 K/UL

## 2017-03-07 PROCEDURE — 85025 COMPLETE CBC W/AUTO DIFF WBC: CPT

## 2017-03-07 PROCEDURE — 27000221 HC OXYGEN, UP TO 24 HOURS

## 2017-03-07 PROCEDURE — 90472 IMMUNIZATION ADMIN EACH ADD: CPT | Performed by: INTERNAL MEDICINE

## 2017-03-07 PROCEDURE — G0009 ADMIN PNEUMOCOCCAL VACCINE: HCPCS | Performed by: INTERNAL MEDICINE

## 2017-03-07 PROCEDURE — 80048 BASIC METABOLIC PNL TOTAL CA: CPT | Mod: 91

## 2017-03-07 PROCEDURE — 99225 PR SUBSEQUENT OBSERVATION CARE,LEVEL II: CPT | Mod: ,,, | Performed by: INTERNAL MEDICINE

## 2017-03-07 PROCEDURE — 90471 IMMUNIZATION ADMIN: CPT | Performed by: INTERNAL MEDICINE

## 2017-03-07 PROCEDURE — 90670 PCV13 VACCINE IM: CPT | Performed by: INTERNAL MEDICINE

## 2017-03-07 PROCEDURE — 25000003 PHARM REV CODE 250: Performed by: UROLOGY

## 2017-03-07 PROCEDURE — 63600175 PHARM REV CODE 636 W HCPCS: Performed by: INTERNAL MEDICINE

## 2017-03-07 PROCEDURE — 36415 COLL VENOUS BLD VENIPUNCTURE: CPT

## 2017-03-07 PROCEDURE — 94761 N-INVAS EAR/PLS OXIMETRY MLT: CPT

## 2017-03-07 PROCEDURE — 25000003 PHARM REV CODE 250: Performed by: INTERNAL MEDICINE

## 2017-03-07 PROCEDURE — 99900035 HC TECH TIME PER 15 MIN (STAT)

## 2017-03-07 PROCEDURE — 90686 IIV4 VACC NO PRSV 0.5 ML IM: CPT | Performed by: INTERNAL MEDICINE

## 2017-03-07 PROCEDURE — G0008 ADMIN INFLUENZA VIRUS VAC: HCPCS | Performed by: INTERNAL MEDICINE

## 2017-03-07 PROCEDURE — 80048 BASIC METABOLIC PNL TOTAL CA: CPT

## 2017-03-07 RX ORDER — SULFAMETHOXAZOLE AND TRIMETHOPRIM 800; 160 MG/1; MG/1
1 TABLET ORAL 2 TIMES DAILY
Qty: 20 TABLET | Refills: 0 | Status: SHIPPED | OUTPATIENT
Start: 2017-03-07 | End: 2017-03-17

## 2017-03-07 RX ORDER — PHENAZOPYRIDINE HYDROCHLORIDE 200 MG/1
200 TABLET, FILM COATED ORAL EVERY 6 HOURS PRN
Qty: 20 TABLET | Refills: 0 | Status: SHIPPED | OUTPATIENT
Start: 2017-03-07 | End: 2017-03-21 | Stop reason: ALTCHOICE

## 2017-03-07 RX ORDER — DOCUSATE SODIUM 100 MG/1
100 CAPSULE, LIQUID FILLED ORAL 2 TIMES DAILY
Status: DISCONTINUED | OUTPATIENT
Start: 2017-03-07 | End: 2017-03-07 | Stop reason: HOSPADM

## 2017-03-07 RX ADMIN — DEXTROSE AND SODIUM CHLORIDE: 5; .45 INJECTION, SOLUTION INTRAVENOUS at 08:03

## 2017-03-07 RX ADMIN — PHENAZOPYRIDINE HYDROCHLORIDE 200 MG: 200 TABLET ORAL at 11:03

## 2017-03-07 RX ADMIN — PHENAZOPYRIDINE HYDROCHLORIDE 200 MG: 200 TABLET ORAL at 08:03

## 2017-03-07 RX ADMIN — PNEUMOCOCCAL 13-VALENT CONJUGATE VACCINE 0.5 ML: 2.2; 2.2; 2.2; 2.2; 2.2; 4.4; 2.2; 2.2; 2.2; 2.2; 2.2; 2.2; 2.2 INJECTION, SUSPENSION INTRAMUSCULAR at 02:03

## 2017-03-07 RX ADMIN — DOCUSATE SODIUM 100 MG: 100 CAPSULE, LIQUID FILLED ORAL at 11:03

## 2017-03-07 RX ADMIN — HYDROCODONE BITARTRATE AND ACETAMINOPHEN 1 TABLET: 5; 325 TABLET ORAL at 08:03

## 2017-03-07 RX ADMIN — INFLUENZA A VIRUS A/CALIFORNIA/7/2009 X-179A (H1N1) ANTIGEN (FORMALDEHYDE INACTIVATED), INFLUENZA A VIRUS A/HONG KONG/4801/2014 X-263B (H3N2) ANTIGEN (FORMALDEHYDE INACTIVATED), INFLUENZA B VIRUS B/PHUKET/3073/2013 ANTIGEN (FORMALDEHYDE INACTIVATED), AND INFLUENZA B VIRUS B/BRISBANE/60/2008 ANTIGEN (FORMALDEHYDE INACTIVATED) 0.5 ML: 15; 15; 15; 15 INJECTION, SUSPENSION INTRAMUSCULAR at 02:03

## 2017-03-07 RX ADMIN — ATROPA BELLADONNA AND OPIUM 60 MG: 16.2; 6 SUPPOSITORY RECTAL at 02:03

## 2017-03-07 RX ADMIN — HYDROCODONE BITARTRATE AND ACETAMINOPHEN 1 TABLET: 5; 325 TABLET ORAL at 12:03

## 2017-03-07 NOTE — PLAN OF CARE
"SW verified info w/ pt @ bedside.  Pt states he lives alone but has his friend Louis Moody assist him and will provide transportation @ d/c.  Pt does not have DME or HH.       03/07/17 1241   Discharge Assessment   Assessment Type Discharge Planning Assessment   Confirmed/corrected address and phone number on facesheet? Yes   Assessment information obtained from? Patient   Type of Healthcare Directive Received (None)   Prior to hospitilization cognitive status: Alert/Oriented   Prior to hospitalization functional status: Independent   Current cognitive status: Alert/Oriented   Current Functional Status: Independent   Arrived From home or self-care   Lives With alone   Able to Return to Prior Arrangements yes   Is patient able to care for self after discharge? Yes   How many people do you have in your home that can help with your care after discharge? 0   Who are your caregiver(s) and their phone number(s)? Friend:  Louis Moody  [phone]   Patient's perception of discharge disposition home or selfcare   Readmission Within The Last 30 Days no previous admission in last 30 days   Patient currently being followed by outpatient case management? No   Patient currently receives home health services? No   Does the patient currently use HME? No   Patient currently receives private duty nursing? No   Patient currently receives any other outside agency services? No   Equipment Currently Used at Home cane, straight   Do you have any problems affording any of your prescribed medications? No   Is the patient taking medications as prescribed? yes   Do you have any financial concerns preventing you from receiving the healthcare you need? No   Does the patient have transportation to healthcare appointments? Yes   Transportation Available family or friend will provide  (Pt does not drive, states he "pays people for rides")   On Dialysis? No   Does the patient receive services at the Coumadin Clinic? No   Are there any open " cases? No   Discharge Plan A Home   Discharge Plan B Home   Patient/Family In Agreement With Plan yes

## 2017-03-07 NOTE — PROGRESS NOTES
Progress Note  Hospital Medicine  Patient Name:Enmanuel Armenta  MRN:  [id number]  Patient Class: OP- Outpatient Recovery  Admit Date: 3/6/2017  Length of Stay: 0 days  Expected Discharge Date:   Attending Physician: Hernan Lake MD  Primary Care Provider:  Carissa Ko MD    SUBJECTIVE:     Principal Problem: S/P TURP (status post transurethral resection of prostate)  Initial history of present illness: Patient is a 61 y.o. male s/p TURP + Cystolithopaxy for BPH with urinary retention and bladder outlet obstruction by Dr. Lake. Patient has PMH significant for BPH with urinary retention and bladder outlet obstruction, COPD and history of elevated PSA. Post-operatively, patient is doing well. Patient denied chest pain, shortness of breath, nausea, vomiting, headache, vision changes, focal neuro-deficits, cough or fever.    PMH/PSH/SH/FH/Meds: reviewed.    Symptoms/Review of Systems: At bases line, poor historian, no acute complaints. Hematuria clearing. No shortness of breath, cough, chest pain or headache, fever or abdominal pain.     Diet:  Adequate intake.    Activity level: Normal.    Pain:  Patient reports no pain.       OBJECTIVE:   Vital Signs (Most Recent):      Temp: 97.8 °F (36.6 °C) (03/07/17 0745)  Pulse: 82 (03/07/17 0745)  Resp: 20 (03/07/17 0745)  BP: (!) 161/77 (03/07/17 0745)  SpO2: 95 % (03/07/17 0745)       Vital Signs Range (Last 24H):  Temp:  [94 °F (34.4 °C)-98.7 °F (37.1 °C)]   Pulse:  [54-96]   Resp:  [12-20]   BP: (113-161)/(56-77)   SpO2:  [94 %-98 %]     Weight: 63.5 kg (140 lb)  Body mass index is 21.93 kg/(m^2).    Intake/Output Summary (Last 24 hours) at 03/07/17 0838  Last data filed at 03/07/17 0500   Gross per 24 hour   Intake          4727.09 ml   Output             3050 ml   Net          1677.09 ml     Physical Examination:  General appearance: well developed, appears stated age  Head: normocephalic, atraumatic  Eyes: conjunctivae/corneas clear. PERRL.  Nose: Nares  normal. Septum midline.  Throat: lips, mucosa, and tongue normal; teeth and gums normal, no throat erythema.  Neck: supple, symmetrical, trachea midline, no JVD and thyroid not enlarged, symmetric, no tenderness/mass/nodules  Lungs: clear to auscultation bilaterally and normal respiratory effort  Chest wall: no tenderness  Heart: regular rate and rhythm, S1, S2 normal, no murmur, click, rub or gallop  Abdomen: soft, non-tender non-distented; bowel sounds normal; no masses, no organomegaly  Extremities: no cyanosis, clubbing or edema. Samson draining slight hematuria urine.  Pulses: 2+ and symmetric  Skin: Skin color, texture, turgor normal. No rashes or lesions.  Lymph nodes: Cervical, supraclavicular, and axillary nodes normal.  Neurologic: Normal strength and tone. No focal numbness or weakness. CNII-XII intact.     CBC:    Recent Labs  Lab 03/02/17  0909 03/06/17  1034 03/07/17  0549   WBC 9.80 7.20 12.50   RBC 5.10 4.61 4.45*   HGB 15.7 14.2 13.7*   HCT 48.1 43.0 41.6    251 238   MCV 94 93 94   MCH 30.9 30.9 30.7   MCHC 32.7 33.1 32.8   BMP    Recent Labs  Lab 03/02/17  0909 03/07/17  0549   GLU 97 114*  115*    145  143   K 3.6 3.9  4.0    109  108   CO2 27 30*  30*   BUN 13 8  8   CREATININE 0.8 0.8  0.8   CALCIUM 10.0 9.0  9.3      Diagnostic Results:  Microbiology Results (last 7 days)     ** No results found for the last 168 hours. **         Assessment/Plan:     Active Hospital Problems    Diagnosis  POA    *S/P TURP (status post transurethral resection of prostate) [Z90.79]  Not Applicable    Benign non-nodular prostatic hyperplasia with lower urinary tract symptoms [N40.1]  Yes    COPD (chronic obstructive pulmonary disease) [J44.9]  Yes    Elevated PSA [R97.20]  Yes    Prostate nodule [N40.2]  Yes      Resolved Hospital Problems    Diagnosis Date Resolved POA   No resolved problems to display.   Tele-monitoring   Continue to follow urology recommendations.  Needs  aggressive incentive spirometry.  Follow hemoglobin and hematocrit closely.  Pain control with PO narcotics and antiemetics as needed.  Continue IVF hydration.  Continue antiemetics as needed.  Use prn oxygen to keep PO2 above 92%.  Use beta-2 agonists breathing treatments as needed.  Gastric ulcer prophylaxis with gastric acid suppressant.   Deep venous thrombosis prophylaxis with SCD and TEDs. Anticoagulants are being avoided due to potential for worsening hematuria.   See Orders.  VTE Risk Mitigation         Ordered     Place RINA hose  Until discontinued      03/06/17 0478        Stephanie Tena MD  Department of Hospital Medicine   [facility]

## 2017-03-07 NOTE — PLAN OF CARE
Problem: Patient Care Overview  Goal: Plan of Care Review  Outcome: Ongoing (interventions implemented as appropriate)  Pt aaox4. Able to use call light for assistance. Patient up to restroom with assistance. 1 large formed stool. Samson to gravity and in traction. Draining blood tinged urine. Patient free from falls will continue to monitor.

## 2017-03-07 NOTE — PROGRESS NOTES
03/07/17 0700   Patient Assessment/Suction   Level of Consciousness (AVPU) alert   All Lung Fields Breath Sounds clear   PRE-TX-O2-ETCO2   O2 Device (Oxygen Therapy) nasal cannula   $ Is the patient on Oxygen? Yes   Flow (L/min) 2   SpO2 97 %   Pulse Oximetry Type Intermittent   $ Pulse Oximetry - Multiple Charge Pulse Oximetry - Multiple   Aerosol Therapy   $ Aerosol Therapy Charges PRN treatment not required

## 2017-03-07 NOTE — TELEPHONE ENCOUNTER
----- Message from Sanaz Ribeiro sent at 3/7/2017  2:33 PM CST -----  Contact: Suzan [phone]  Sanaz wants to speak with a nurse regarding the Rx for PYRIDIUM. Please call back at [phone]

## 2017-03-13 ENCOUNTER — TELEPHONE (OUTPATIENT)
Dept: UROLOGY | Facility: CLINIC | Age: 62
End: 2017-03-13

## 2017-03-13 NOTE — TELEPHONE ENCOUNTER
Spoke with Dr Lake who gave orders to assess urine and follow back up with him since patient is a PO patient. Patient seen in clinic leg bag noted to have old dried up blood in bag , bag also was noted to be hanging upside down and strapped incorrectly.Leg  Bag changed educated that urine must flow to gravity. Verbalized understanding. New bag placed with dark orange urine draining to gravity. Noted patient is currently taking Pyridium. Patient informed that he is to report to clinic on tomorrow to have armas taken out for 0830. Pt and family friend verbalized understanding.

## 2017-03-13 NOTE — TELEPHONE ENCOUNTER
----- Message from Mel García sent at 3/13/2017  9:40 AM CDT -----  Contact: Hernán friend   Calling to see what patient needs to do. Passing blood in catheter, hard time walking, and in sever pain. After surgery they were advised to bring him in on Monday to remove catheter. Needing to know where to take him? [phone] is a good contact number.

## 2017-03-14 ENCOUNTER — CLINICAL SUPPORT (OUTPATIENT)
Dept: UROLOGY | Facility: CLINIC | Age: 62
End: 2017-03-14
Payer: MEDICARE

## 2017-03-14 NOTE — PROGRESS NOTES
Patient arrived stating the MD told him to come have his catheter removed. Patient spoke to the MD who indeed ordered to remove it. Patient put on the nurse visit. Draped, 400 ml orange colored urine to leg bag. Deflated 30 ml balloon and removed 24 fr armas catheter without difficulty , patient tolerated well. Discharge teaching done with RTC for no urination by 4pm, patient verbally understood.

## 2017-03-21 ENCOUNTER — OFFICE VISIT (OUTPATIENT)
Dept: UROLOGY | Facility: CLINIC | Age: 62
End: 2017-03-21
Payer: MEDICARE

## 2017-03-21 VITALS
DIASTOLIC BLOOD PRESSURE: 64 MMHG | WEIGHT: 142 LBS | RESPIRATION RATE: 18 BRPM | HEART RATE: 78 BPM | TEMPERATURE: 98 F | SYSTOLIC BLOOD PRESSURE: 106 MMHG | BODY MASS INDEX: 22.29 KG/M2 | HEIGHT: 67 IN

## 2017-03-21 DIAGNOSIS — N40.1 BPH (BENIGN PROSTATIC HYPERTROPHY) WITH URINARY RETENTION: Primary | ICD-10-CM

## 2017-03-21 DIAGNOSIS — R33.8 BPH (BENIGN PROSTATIC HYPERTROPHY) WITH URINARY RETENTION: Primary | ICD-10-CM

## 2017-03-21 DIAGNOSIS — Z09 POSTOP CHECK: ICD-10-CM

## 2017-03-21 LAB — URINARY STONE ANALYSIS: NORMAL

## 2017-03-21 PROCEDURE — 99213 OFFICE O/P EST LOW 20 MIN: CPT | Mod: PBBFAC,PO | Performed by: UROLOGY

## 2017-03-21 PROCEDURE — 99024 POSTOP FOLLOW-UP VISIT: CPT | Mod: ,,, | Performed by: UROLOGY

## 2017-03-21 PROCEDURE — 99999 PR PBB SHADOW E&M-EST. PATIENT-LVL III: CPT | Mod: PBBFAC,,, | Performed by: UROLOGY

## 2017-03-21 RX ORDER — PHENAZOPYRIDINE HYDROCHLORIDE 200 MG/1
200 TABLET, FILM COATED ORAL EVERY 6 HOURS PRN
Qty: 20 TABLET | Refills: 0 | Status: SHIPPED | OUTPATIENT
Start: 2017-03-21 | End: 2017-11-21 | Stop reason: ALTCHOICE

## 2017-03-21 RX ORDER — TRAMADOL HYDROCHLORIDE 50 MG/1
50 TABLET ORAL EVERY 6 HOURS PRN
Qty: 20 TABLET | Refills: 0 | Status: SHIPPED | OUTPATIENT
Start: 2017-03-21 | End: 2017-03-31

## 2017-03-21 NOTE — PROGRESS NOTES
POST OPERATIVE UROLOGY NOTE    PATIENT NAME: Enmanuel Armenta  : 1955    PROCEDURE/DATE OF PROCEDURE:  TURP/SLV for BPH and 3/6/2017    COMPLAINTS/COMMENTS: Samson catheter was removed on 3/14/2017 but had to be replaced later the same day as the patient was unable to void    PHYSICAL EXAM: Samson catheter in place with clear urine    RECOMMENDATIONS: Repeat voiding trial next week

## 2017-03-21 NOTE — MR AVS SNAPSHOT
[facility]   [address]  [place] [address]  Phone: [phone]                  Enmanuel Armenta   3/21/2017 9:30 AM   Office Visit    Description:  Male : 1955   Provider:  Hernan Lake MD   Department:  [facility]           Reason for Visit     Post-op Evaluation                To Do List           Future Appointments        Provider Department Dept Phone    3/28/2017 9:15 AM UROLOGY, NURSE [facility] [phone]      Goals (5 Years of Data)     None       These Medications        Disp Refills Start End    phenazopyridine (PYRIDIUM) 200 MG tablet 20 tablet 0 3/21/2017     Take 1 tablet (200 mg total) by mouth every 6 (six) hours as needed for Pain. - Oral    Pharmacy: [facility] [address] [place] [address] #: [phone]       tramadol (ULTRAM) 50 mg tablet 20 tablet 0 3/21/2017 3/31/2017    Take 1 tablet (50 mg total) by mouth every 6 (six) hours as needed for Pain. - Oral    Pharmacy: [address] Ph #: [phone]         [facility] On Call     [facility] On Call Nurse Care Line -  Assistance  Registered nurses in the [facility] provide clinical advisement, health education, appointment booking, and other advisory services.  Call for this free service at [phone].             Medications           Message regarding Medications     Verify the changes and/or additions to your medication regime listed below are the same as discussed with your clinician today.  If any of these changes or additions are incorrect, please notify your healthcare provider.        START taking these NEW medications        Refills    tramadol (ULTRAM) 50 mg tablet 0    Sig: Take 1 tablet (50 mg total) by mouth every 6 (six) hours as needed for Pain.    Class: Print    Route: Oral      STOP taking these medications     aspirin 325 MG tablet Take 81 mg by mouth.          "  Verify that the below list of medications is an accurate representation of the medications you are currently taking.  If none reported, the list may be blank. If incorrect, please contact your healthcare provider. Carry this list with you in case of emergency.           Current Medications     phenazopyridine (PYRIDIUM) 200 MG tablet Take 1 tablet (200 mg total) by mouth every 6 (six) hours as needed for Pain.    tramadol (ULTRAM) 50 mg tablet Take 1 tablet (50 mg total) by mouth every 6 (six) hours as needed for Pain.           Clinical Reference Information           Your Vitals Were     BP Pulse Temp Resp Height Weight    106/64 78 98 °F (36.7 °C) (Oral) 18 5' 7" (1.702 m) 64.4 kg (141 lb 15.6 oz)    BMI                22.24 kg/m2          Blood Pressure          Most Recent Value    BP  106/64      Allergies as of 3/21/2017     No Known Allergies      Immunizations Administered on Date of Encounter - 3/21/2017     None      Smoking Cessation     If you would like to quit smoking:   You may be eligible for free services if you are a [place] resident and started smoking cigarettes before September 1, 1988.  Call the Smoking Cessation Trust ([facility] [[email or web address]]) toll free at (242) 212-0822 or (524) 548-6603.   Call 1-093-QUIT-NOW if you do not meet the above criteria.            Language Assistance Services     ATTENTION: Language assistance services are available, free of charge. Please call [phone].      ATENCIÓN: Si habla español, tiene a ball disposición servicios gratuitos de asistencia lingüística. Llame al [phone].     CHÚ Ý: N?u b?n nói Ti?ng Vi?t, có các d?ch v? h? tr? ngôn ng? mi?n phí dành cho b?n. G?i s? [phone].         [facility] complies with applicable Federal civil rights laws and does not discriminate on the basis of race, color, national origin, age, disability, or sex.        "

## 2017-03-28 ENCOUNTER — CLINICAL SUPPORT (OUTPATIENT)
Dept: UROLOGY | Facility: CLINIC | Age: 62
End: 2017-03-28
Payer: MEDICARE

## 2017-03-28 NOTE — PROGRESS NOTES
Patient ambulated into clinic for indwelling armas change. Identified patient using name and date of birth. 18 fr / 10ml coude  Armas removed without difficulty. 300 ml of yellow urine noted to leg bag. Instructed patient if he does not void by 3 p.m to return to clinic

## 2017-10-18 ENCOUNTER — TELEPHONE (OUTPATIENT)
Dept: UROLOGY | Facility: CLINIC | Age: 62
End: 2017-10-18

## 2017-10-18 NOTE — TELEPHONE ENCOUNTER
----- Message from Janna Zuluaga MD sent at 10/18/2017  1:15 PM CDT -----  Pt needs to f/u from outpt with

## 2017-10-20 ENCOUNTER — TELEPHONE (OUTPATIENT)
Dept: UROLOGY | Facility: CLINIC | Age: 62
End: 2017-10-20

## 2017-10-20 NOTE — TELEPHONE ENCOUNTER
Returned call and spoke to Judith. She states that the patient has been going to the ER at [facility] to have his catheter changed, and they would inform patient that he cannot keep doing this and he needs to follow up with his Urologist.Last week the patient was admitted to the hospital for a active heart attack, catheter changed 10/17/17. On discharge they gave orders for patient to follow up with us. She states that she knows he has a transportation issue and has to do things when able to get a ride. As for as an order to have his catheter change, informed her that he really needs to come in  and see the doctor, last seen 3/21/17, and then the catheter change orders can be done. She will consult with patient and care giver and call back to schedule appt. She verbally understood.

## 2017-10-20 NOTE — TELEPHONE ENCOUNTER
----- Message from Maricarmen Damon sent at 10/20/2017 11:12 AM CDT -----  Contact: Judith with [facility]  Judith with [facility] calling regarding orders to change catheter. Judith states he has been going to ER to have it changed. Please fax to [phone]. Please call Judith Juarez at [phone].

## 2017-10-25 ENCOUNTER — TELEPHONE (OUTPATIENT)
Dept: UROLOGY | Facility: CLINIC | Age: 62
End: 2017-10-25

## 2017-10-25 NOTE — TELEPHONE ENCOUNTER
----- Message from Kenisha Underwood sent at 10/25/2017 12:41 PM CDT -----  Contact: friend Hernán Hernadez [phone]  Patient needs to have a hospital follow in 1 week. He was D/C from [facility] on Oct 20 ,2017

## 2017-11-21 ENCOUNTER — OFFICE VISIT (OUTPATIENT)
Dept: UROLOGY | Facility: CLINIC | Age: 62
End: 2017-11-21
Payer: MEDICARE

## 2017-11-21 VITALS
HEART RATE: 79 BPM | SYSTOLIC BLOOD PRESSURE: 113 MMHG | HEIGHT: 67 IN | RESPIRATION RATE: 18 BRPM | TEMPERATURE: 98 F | DIASTOLIC BLOOD PRESSURE: 78 MMHG | WEIGHT: 140 LBS | BODY MASS INDEX: 21.97 KG/M2

## 2017-11-21 DIAGNOSIS — N40.1 BENIGN PROSTATIC HYPERPLASIA WITH URINARY RETENTION: Primary | ICD-10-CM

## 2017-11-21 DIAGNOSIS — R33.8 BENIGN PROSTATIC HYPERPLASIA WITH URINARY RETENTION: Primary | ICD-10-CM

## 2017-11-21 PROCEDURE — 99999 PR PBB SHADOW E&M-EST. PATIENT-LVL III: CPT | Mod: PBBFAC,,, | Performed by: UROLOGY

## 2017-11-21 PROCEDURE — 99213 OFFICE O/P EST LOW 20 MIN: CPT | Mod: PBBFAC,PN | Performed by: UROLOGY

## 2017-11-21 PROCEDURE — 99214 OFFICE O/P EST MOD 30 MIN: CPT | Mod: S$PBB,,, | Performed by: UROLOGY

## 2017-11-21 RX ORDER — POLYETHYLENE GLYCOL 3350 17 G/17G
17 POWDER, FOR SOLUTION ORAL DAILY
COMMUNITY

## 2017-11-21 RX ORDER — ASPIRIN 81 MG/1
TABLET ORAL
Refills: 2 | COMMUNITY
Start: 2017-10-18

## 2017-11-21 RX ORDER — CLOPIDOGREL BISULFATE 75 MG/1
TABLET ORAL
Refills: 3 | COMMUNITY
Start: 2017-10-13

## 2017-11-21 RX ORDER — LISINOPRIL 2.5 MG/1
2.5 TABLET ORAL DAILY
COMMUNITY
Start: 2017-11-10

## 2017-11-21 RX ORDER — AMOXICILLIN AND CLAVULANATE POTASSIUM 875; 125 MG/1; MG/1
TABLET, FILM COATED ORAL
Refills: 0 | COMMUNITY
Start: 2017-10-18 | End: 2018-08-03 | Stop reason: ALTCHOICE

## 2017-11-21 RX ORDER — METOPROLOL SUCCINATE 25 MG/1
25 TABLET, EXTENDED RELEASE ORAL DAILY
COMMUNITY
Start: 2017-11-10

## 2017-11-21 RX ORDER — ATORVASTATIN CALCIUM 20 MG/1
TABLET, FILM COATED ORAL
Refills: 3 | COMMUNITY
Start: 2017-10-13 | End: 2018-08-03 | Stop reason: ALTCHOICE

## 2017-11-21 NOTE — PROGRESS NOTES
OFFICE NOTE    CHIEF COMPLAINT:  BPH with urinary retention.    HISTORY OF PRESENT ILLNESS:  This 62-year-old male returns for routine recheck.    He had undergone a TURP SLV for BPH on 03/06/2017, but had to have the catheter   replaced due to problem with urinary retention and inability to void, but this   is his first followup appointment with us since we last saw him on 03/21/2017.    Most recently, he was hospitalized at [facility] a few weeks ago   with a myocardial infarction and he is currently under the care of Dr. Monroe,   and he continues to have the indwelling Samson catheter, which is changed every   three to four weeks by the home health nurse.    He continues on Plavix    His last PSA was 1.5 on 03/03/2017.    FINAL IMPRESSION:  BPH, urinary retention, status post myocardial infarction.    RECOMMENDATIONS:  Continue with the indwelling Samson catheter, which will be   changed by the home health nurse every three to four weeks.  It must be noted   that he does have a 16-Japanese catheter in at this time.  Otherwise, routine   recheck in three months.      RAJINDER  dd: 11/21/2017 13:59:55 (CST)  td: 11/22/2017 02:52:14 (CST)  Doc ID   #[id number]  Job ID #[id number]    CC:

## 2017-11-22 ENCOUNTER — TELEPHONE (OUTPATIENT)
Dept: UROLOGY | Facility: CLINIC | Age: 62
End: 2017-11-22

## 2017-11-22 NOTE — TELEPHONE ENCOUNTER
Patient's friend returned call, he states that the patient's pharmacy has medicine for the patient but they were not sure who ordered the med. Informed him that our MD did not order any meds for the patient at the office visit. He is glad that he got that cleared up for the patient and verbally understood.

## 2017-11-22 NOTE — TELEPHONE ENCOUNTER
----- Message from Janna Flores sent at 11/22/2017 12:15 PM CST -----  Contact: Mr Jim   Call placed to pod.  There is some confusion on which medication needs to be filled. One was called in from Dr Lake and and one was called in from [facility] and the pharmacy is not sure which one to fill . Please call back at

## 2017-11-22 NOTE — TELEPHONE ENCOUNTER
Returned call and spoke with patient, he has no idea what medication is being talked about, he states that I need to talk to Hernán, who is not there now. He will have him call us back when he gets home, patient verbally understood.

## 2017-11-22 NOTE — TELEPHONE ENCOUNTER
----- Message from Maricarmen Damon sent at 11/22/2017  2:15 PM CST -----  Contact: friend,Louis Moody  Patient's friend,Louis Moody is returning call regarding medication.  Please call patient's friend at [phone].  Thanks!

## 2018-03-01 NOTE — PROGRESS NOTES
"[facility] Note  Staff: MARIBELL Akbar    PCP: Carissa Ko    Chief Complaint: "Discuss armas catheter?"    Subjective:        HPI: Enmanuel Armenta is a 62 y.o. male presents today for follow-up related to chronic incomplete bladder emptying/urinary retention issues.     [facility] (Pt was unable to tell me his place of residence, I had to find out from transportation that brought him here today)    Patient was last seen by Dr. Lake on 11/21/2017 with BPH/urinary retention issues.  He had undergone a TURP SLV for BPH on 03/06/2017, but had to have the catheter replaced due to problem with urinary retention and inability to void.      Pt has history of being hospitalized at [facility] a few weeks prior to last office visit in November with a myocardial infarction and he is currently under the care of Dr. Monroe, and he continues to have the indwelling Armas catheter, which is changed every three to four weeks by the rehab/nursing home facility.     He is still on Plavix daily.     Last PSA Screening:   Lab Results   Component Value Date    PSADIAG 1.5 02/03/2017     REVIEW OF SYSTEMS:  Review of Systems   Constitutional: Negative for chills, diaphoresis, fever and weight loss.   HENT: Negative for congestion, hearing loss, nosebleeds and sore throat.    Eyes: Negative for blurred vision and pain.   Respiratory: Negative for cough and wheezing.         COPD   Cardiovascular: Negative for chest pain, palpitations and leg swelling.        Hx of MI--October/November 2017 @[facility].  On Plavix daily   Gastrointestinal: Negative for abdominal pain, heartburn, nausea and vomiting.   Genitourinary: Negative for dysuria, flank pain, frequency, hematuria and urgency.        Hx of BPH with TURP.  Chronic Urinary Retention-has indwelling armas catheter.   Musculoskeletal: Negative for back pain, joint pain, myalgias " and neck pain.   Skin: Negative for itching and rash.   Neurological: Negative for dizziness, tremors, sensory change, seizures, loss of consciousness, weakness and headaches.   Endo/Heme/Allergies: Does not bruise/bleed easily.   Psychiatric/Behavioral: Positive for memory loss. Negative for depression and suicidal ideas. The patient is not nervous/anxious.      Physical Exam    PMHx:  Past Medical History:   Diagnosis Date    BPH (benign prostatic hyperplasia)     COPD (chronic obstructive pulmonary disease)     Elevated PSA     Fx     RIGHT WRIST, LEFT FOOT    Hypokalemia     Muscular dystrophy     Urine retention     Wears dentures     upper     PSHx:  Past Surgical History:   Procedure Laterality Date    APPENDECTOMY      cataracts      bilateral     CHOLECYSTECTOMY      CYST REMOVAL      from spine     CYSTOSCOPY      EYE SURGERY      cataract surgery bilateral    PROSTATE BIOPSY      PROSTATE SURGERY      TONSILLECTOMY      VASECTOMY       Allergies:  Patient has no known allergies.    Medications: reviewed   Objective:     Vitals:    03/02/18 0913   BP: 97/63   Pulse: 61   Temp: 97.4 °F (36.3 °C)     General:WDWN in NAD  Eyes: PERRLA, normal conjunctiva  Respiratory: no increased work on breathing, clear to auscultation  Cardiovascular: regular rate and rhythm. No obvious extremity edema.  GI: palpation of masses. No tenderness. No hepatosplenomegaly to palpation.  Musculoskeletal: normal range of motion of bilateral upper extremities. Normal muscle strength and tone.  Skin: no obvious rashes or lesions. No tightening of skin noted.  Neurologic: CN grossly normal. Normal sensation.   Psychiatric: awake, alert and oriented x 3. Mood and affect normal. Cooperative.    LABS REVIEW:  Cr:   Lab Results   Component Value Date    CREATININE 0.8 03/07/2017    CREATININE 0.8 03/07/2017     Assessment:       1. Urinary retention with incomplete bladder emptying    2. Chronic indwelling Samson catheter           Plan:   I have spoken with Dr. Lake in regards to this patient today and we discussed only further POC options for pt would be either to continue the chronic changing out of armas catheter as is or scheduling pt to have suprapubic catheter procedure in replace of chronic armas.  The pt has chosen to think about the options and get back with us.    Princess Benavides, FNP-C

## 2018-03-02 ENCOUNTER — OFFICE VISIT (OUTPATIENT)
Dept: UROLOGY | Facility: CLINIC | Age: 63
End: 2018-03-02
Payer: MEDICARE

## 2018-03-02 VITALS
BODY MASS INDEX: 23.7 KG/M2 | DIASTOLIC BLOOD PRESSURE: 63 MMHG | TEMPERATURE: 97 F | SYSTOLIC BLOOD PRESSURE: 97 MMHG | HEART RATE: 61 BPM | HEIGHT: 67 IN | WEIGHT: 151 LBS

## 2018-03-02 DIAGNOSIS — Z97.8 CHRONIC INDWELLING FOLEY CATHETER: ICD-10-CM

## 2018-03-02 DIAGNOSIS — R33.9 URINARY RETENTION WITH INCOMPLETE BLADDER EMPTYING: Primary | ICD-10-CM

## 2018-03-02 PROCEDURE — 99999 PR PBB SHADOW E&M-EST. PATIENT-LVL III: CPT | Mod: PBBFAC,,, | Performed by: NURSE PRACTITIONER

## 2018-03-02 PROCEDURE — 99213 OFFICE O/P EST LOW 20 MIN: CPT | Mod: PBBFAC,PN | Performed by: NURSE PRACTITIONER

## 2018-03-02 PROCEDURE — 99213 OFFICE O/P EST LOW 20 MIN: CPT | Mod: S$PBB,,, | Performed by: NURSE PRACTITIONER

## 2018-07-30 ENCOUNTER — TELEPHONE (OUTPATIENT)
Dept: UROLOGY | Facility: CLINIC | Age: 63
End: 2018-07-30

## 2018-07-30 NOTE — TELEPHONE ENCOUNTER
----- Message from Kurtis Leonardo sent at 7/30/2018  3:50 PM CDT -----  Contact: Kellen Lang/[facility]   Kellen called in regarding the attached patient.  Kellen wanted to see if Dr. Chun would consider putting patient in hospital for 72 hours so patient after that can be put in nursing home.  Caregiver for patient is stating that patient is really hard to handle and she cannot take care of him anymore.  Patient has an appt with Dr. Chun, in [facility] on Friday 8/3/18 and Kellen was hoping once Dr. Chun sees the patient he would agree to this.    Kellen's call back number is [phone]
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
## 2018-07-31 NOTE — TELEPHONE ENCOUNTER
----- Message from Jonnie Pearce sent at 7/31/2018 10:53 AM CDT -----  Type: Needs Medical Advice    Who Called:  [facility]      Best Call Back Number: [phone]  Additional Information:  Caller has questions about patient's care.

## 2018-07-31 NOTE — TELEPHONE ENCOUNTER
Spoke with Kellen and she states that the caregiver is wanting the pt put in a rehab facility to see if he can get better. If not then they will see about putting him in a permanent living facility. Informed her that I will forward this to Dr. Chun.

## 2018-08-01 ENCOUNTER — TELEPHONE (OUTPATIENT)
Dept: UROLOGY | Facility: CLINIC | Age: 63
End: 2018-08-01

## 2018-08-01 NOTE — TELEPHONE ENCOUNTER
Spoke with Kellen and informed her that Dr. Chun states that he will have to go through is PCP to be admitted into a rehab facility. Kellen states that he was actually in the hospital and they were able to get him into a swing bed facility. States he will be at his appt on Friday.

## 2018-08-03 ENCOUNTER — OFFICE VISIT (OUTPATIENT)
Dept: UROLOGY | Facility: CLINIC | Age: 63
End: 2018-08-03
Payer: MEDICARE

## 2018-08-03 VITALS
WEIGHT: 150 LBS | SYSTOLIC BLOOD PRESSURE: 94 MMHG | BODY MASS INDEX: 23.54 KG/M2 | HEIGHT: 67 IN | DIASTOLIC BLOOD PRESSURE: 64 MMHG | HEART RATE: 72 BPM

## 2018-08-03 DIAGNOSIS — R33.9 URINE RETENTION: ICD-10-CM

## 2018-08-03 PROCEDURE — 99213 OFFICE O/P EST LOW 20 MIN: CPT | Mod: S$PBB,25,, | Performed by: UROLOGY

## 2018-08-03 PROCEDURE — 99212 OFFICE O/P EST SF 10 MIN: CPT | Mod: 25,PBBFAC | Performed by: UROLOGY

## 2018-08-03 PROCEDURE — 99999 PR PBB SHADOW E&M-EST. PATIENT-LVL II: CPT | Mod: 25,PBBFAC,, | Performed by: UROLOGY

## 2018-08-03 PROCEDURE — 51703 INSERT BLADDER CATH COMPLEX: CPT | Mod: PBBFAC | Performed by: UROLOGY

## 2018-08-03 PROCEDURE — 51702 INSERT TEMP BLADDER CATH: CPT | Mod: S$PBB,,, | Performed by: UROLOGY

## 2018-08-03 RX ORDER — CIPROFLOXACIN 500 MG/1
500 TABLET ORAL 2 TIMES DAILY
COMMUNITY
End: 2018-12-17 | Stop reason: CLARIF

## 2018-08-03 RX ORDER — FINASTERIDE 5 MG/1
5 TABLET, FILM COATED ORAL DAILY
COMMUNITY
End: 2018-12-17 | Stop reason: CLARIF

## 2018-08-03 RX ORDER — TAMSULOSIN HYDROCHLORIDE 0.4 MG/1
0.4 CAPSULE ORAL DAILY
COMMUNITY
End: 2018-08-06 | Stop reason: ALTCHOICE

## 2018-08-06 PROBLEM — R33.9 URINE RETENTION: Status: ACTIVE | Noted: 2018-08-06

## 2018-08-06 NOTE — PROGRESS NOTES
OFFICE NOTE    CHIEF COMPLAINT:  Urine retention.    HISTORY OF PRESENT ILLNESS:  Mr. Armenta is a 63-year-old male, nursing  home patient with muscular dystrophy and severe arteriosclerotic  cardiovascular disease.  The patient in the past has been seen by Dr. Lake for elevated PSA, BPH and underwent a transurethral resection of  the prostate in December 2017.  Despite that procedure, the patient still  is having significant difficulty emptying his bladder.  Since his general  medical condition is very significant, it has been decided that the patient  will have a permanent Samson catheter.  The patient was here today and the  Samson catheter has been replaced.    I went ahead and removed his 16-[place] Samson catheter and replaced it for a  16 coude catheter and I suggested that the patient should stop taking  Flomax since this is unnecessary medication at this point.  At the same  time, I suggested to the patient that he should have the Samson catheter  replaced every four weeks.  The patient has a very little communication  with me today.  The Samson catheter replacement was done properly and with  no difficulty and I am instructing the nursing home to perform this in  four-week intervals.  We are going to follow up the patient every 6 to 12  months depending on how his Samson catheter drainage have problems or not.   I spent approximately 30 minutes with Mr. Armenta.  The Samson catheter was  removed and he left the office in satisfactory condition.        EOR/IN dd: 08/06/2018 10:14:53 (CDT)   td: 08/06/2018 13:26:18 (CDT)  Doc ID #[id number]   Job ID #[id number]    CC:

## 2018-12-17 ENCOUNTER — ANESTHESIA EVENT (OUTPATIENT)
Dept: ENDOSCOPY | Facility: HOSPITAL | Age: 63
DRG: 870 | End: 2018-12-17
Payer: MEDICARE

## 2018-12-17 ENCOUNTER — ANESTHESIA (OUTPATIENT)
Dept: ENDOSCOPY | Facility: HOSPITAL | Age: 63
DRG: 870 | End: 2018-12-17
Payer: MEDICARE

## 2018-12-17 ENCOUNTER — HOSPITAL ENCOUNTER (INPATIENT)
Facility: HOSPITAL | Age: 63
LOS: 25 days | Discharge: NURSING, REHAB OR LONG-TERM CARE | DRG: 870 | End: 2019-01-11
Attending: EMERGENCY MEDICINE | Admitting: INTERNAL MEDICINE
Payer: MEDICARE

## 2018-12-17 DIAGNOSIS — J44.0 CHRONIC OBSTRUCTIVE PULMONARY DISEASE WITH ACUTE LOWER RESPIRATORY INFECTION: ICD-10-CM

## 2018-12-17 DIAGNOSIS — K92.0 HEMATEMESIS, PRESENCE OF NAUSEA NOT SPECIFIED: Primary | ICD-10-CM

## 2018-12-17 DIAGNOSIS — I48.91 NEW ONSET A-FIB: ICD-10-CM

## 2018-12-17 DIAGNOSIS — K92.0 HEMATEMESIS WITHOUT NAUSEA: ICD-10-CM

## 2018-12-17 DIAGNOSIS — F17.200 SMOKER: ICD-10-CM

## 2018-12-17 DIAGNOSIS — J96.01 ACUTE RESPIRATORY FAILURE WITH HYPOXIA AND HYPERCARBIA: ICD-10-CM

## 2018-12-17 DIAGNOSIS — R13.10 DYSPHAGIA, UNSPECIFIED TYPE: ICD-10-CM

## 2018-12-17 DIAGNOSIS — R63.30 FEEDING DIFFICULTY: ICD-10-CM

## 2018-12-17 DIAGNOSIS — J69.0 ASPIRATION PNEUMONIA OF BOTH LOWER LOBES, UNSPECIFIED ASPIRATION PNEUMONIA TYPE: ICD-10-CM

## 2018-12-17 DIAGNOSIS — J69.0 ASPIRATION PNEUMONIA OF BOTH LOWER LOBES: ICD-10-CM

## 2018-12-17 DIAGNOSIS — J69.0 ASPIRATION PNEUMONIA: ICD-10-CM

## 2018-12-17 DIAGNOSIS — N40.1 BENIGN NON-NODULAR PROSTATIC HYPERPLASIA WITH LOWER URINARY TRACT SYMPTOMS: ICD-10-CM

## 2018-12-17 DIAGNOSIS — E44.0 MALNUTRITION OF MODERATE DEGREE: ICD-10-CM

## 2018-12-17 DIAGNOSIS — J96.02 ACUTE RESPIRATORY FAILURE WITH HYPOXIA AND HYPERCARBIA: ICD-10-CM

## 2018-12-17 DIAGNOSIS — G71.00 MUSCULAR DYSTROPHY: ICD-10-CM

## 2018-12-17 DIAGNOSIS — K92.2 UPPER GI BLEEDING: ICD-10-CM

## 2018-12-17 DIAGNOSIS — R06.02 SOB (SHORTNESS OF BREATH): ICD-10-CM

## 2018-12-17 DIAGNOSIS — A41.9 SEPTIC SHOCK: ICD-10-CM

## 2018-12-17 DIAGNOSIS — A41.9 SEPSIS, DUE TO UNSPECIFIED ORGANISM: ICD-10-CM

## 2018-12-17 DIAGNOSIS — R65.21 SEPTIC SHOCK: ICD-10-CM

## 2018-12-17 LAB
ABO + RH BLD: NORMAL
ALBUMIN SERPL BCP-MCNC: 3.4 G/DL
ALP SERPL-CCNC: 297 U/L
ALT SERPL W/O P-5'-P-CCNC: 70 U/L
AMORPH CRY URNS QL MICRO: ABNORMAL
ANION GAP SERPL CALC-SCNC: 10 MMOL/L
AST SERPL-CCNC: 63 U/L
BACTERIA #/AREA URNS HPF: ABNORMAL /HPF
BASOPHILS # BLD AUTO: 0 K/UL
BASOPHILS NFR BLD: 0.2 %
BILIRUB SERPL-MCNC: 0.5 MG/DL
BILIRUB UR QL STRIP: NEGATIVE
BLD GP AB SCN CELLS X3 SERPL QL: NORMAL
BUN SERPL-MCNC: 11 MG/DL
CALCIUM SERPL-MCNC: 10.2 MG/DL
CHLORIDE SERPL-SCNC: 110 MMOL/L
CLARITY UR: ABNORMAL
CO2 SERPL-SCNC: 27 MMOL/L
COLOR UR: YELLOW
CREAT SERPL-MCNC: 0.8 MG/DL
DIFFERENTIAL METHOD: ABNORMAL
EOSINOPHIL # BLD AUTO: 0 K/UL
EOSINOPHIL NFR BLD: 0 %
ERYTHROCYTE [DISTWIDTH] IN BLOOD BY AUTOMATED COUNT: 14.6 %
EST. GFR  (AFRICAN AMERICAN): >60 ML/MIN/1.73 M^2
EST. GFR  (NON AFRICAN AMERICAN): >60 ML/MIN/1.73 M^2
GLUCOSE SERPL-MCNC: 126 MG/DL
GLUCOSE UR QL STRIP: NEGATIVE
HCT VFR BLD AUTO: 42.6 %
HGB BLD-MCNC: 13.7 G/DL
HGB UR QL STRIP: ABNORMAL
INR PPP: 0.9
KETONES UR QL STRIP: NEGATIVE
LACTATE SERPL-SCNC: 1.5 MMOL/L
LACTATE SERPL-SCNC: 1.8 MMOL/L
LEUKOCYTE ESTERASE UR QL STRIP: ABNORMAL
LYMPHOCYTES # BLD AUTO: 2.3 K/UL
LYMPHOCYTES NFR BLD: 12.3 %
MCH RBC QN AUTO: 29.5 PG
MCHC RBC AUTO-ENTMCNC: 32 G/DL
MCV RBC AUTO: 92 FL
MICROSCOPIC COMMENT: ABNORMAL
MONOCYTES # BLD AUTO: 1.1 K/UL
MONOCYTES NFR BLD: 5.8 %
NEUTROPHILS # BLD AUTO: 15.4 K/UL
NEUTROPHILS NFR BLD: 81.7 %
NITRITE UR QL STRIP: NEGATIVE
PH UR STRIP: 6 [PH] (ref 5–8)
PLATELET # BLD AUTO: 324 K/UL
PMV BLD AUTO: 8.5 FL
POTASSIUM SERPL-SCNC: 3.9 MMOL/L
PROT SERPL-MCNC: 7.1 G/DL
PROT UR QL STRIP: ABNORMAL
PROTHROMBIN TIME: 9.6 SEC
RBC # BLD AUTO: 4.62 M/UL
RBC #/AREA URNS HPF: 20 /HPF (ref 0–4)
SODIUM SERPL-SCNC: 147 MMOL/L
SP GR UR STRIP: 1.01 (ref 1–1.03)
SQUAMOUS #/AREA URNS HPF: 3 /HPF
URN SPEC COLLECT METH UR: ABNORMAL
UROBILINOGEN UR STRIP-ACNC: NEGATIVE EU/DL
WBC # BLD AUTO: 18.9 K/UL
WBC #/AREA URNS HPF: 25 /HPF (ref 0–5)

## 2018-12-17 PROCEDURE — 63600175 PHARM REV CODE 636 W HCPCS: Performed by: INTERNAL MEDICINE

## 2018-12-17 PROCEDURE — 94761 N-INVAS EAR/PLS OXIMETRY MLT: CPT

## 2018-12-17 PROCEDURE — 25000003 PHARM REV CODE 250: Performed by: INTERNAL MEDICINE

## 2018-12-17 PROCEDURE — 80074 ACUTE HEPATITIS PANEL: CPT

## 2018-12-17 PROCEDURE — 63600175 PHARM REV CODE 636 W HCPCS: Performed by: EMERGENCY MEDICINE

## 2018-12-17 PROCEDURE — 85610 PROTHROMBIN TIME: CPT

## 2018-12-17 PROCEDURE — 31720 CLEARANCE OF AIRWAYS: CPT

## 2018-12-17 PROCEDURE — 25000003 PHARM REV CODE 250: Performed by: EMERGENCY MEDICINE

## 2018-12-17 PROCEDURE — 86850 RBC ANTIBODY SCREEN: CPT

## 2018-12-17 PROCEDURE — 81000 URINALYSIS NONAUTO W/SCOPE: CPT

## 2018-12-17 PROCEDURE — D9220A PRA ANESTHESIA: Mod: CRNA,,, | Performed by: NURSE ANESTHETIST, CERTIFIED REGISTERED

## 2018-12-17 PROCEDURE — 94640 AIRWAY INHALATION TREATMENT: CPT

## 2018-12-17 PROCEDURE — 25500020 PHARM REV CODE 255

## 2018-12-17 PROCEDURE — 99900035 HC TECH TIME PER 15 MIN (STAT)

## 2018-12-17 PROCEDURE — 93005 ELECTROCARDIOGRAM TRACING: CPT

## 2018-12-17 PROCEDURE — 87040 BLOOD CULTURE FOR BACTERIA: CPT

## 2018-12-17 PROCEDURE — D9220A PRA ANESTHESIA: Mod: ANES,,, | Performed by: ANESTHESIOLOGY

## 2018-12-17 PROCEDURE — C9113 INJ PANTOPRAZOLE SODIUM, VIA: HCPCS | Performed by: INTERNAL MEDICINE

## 2018-12-17 PROCEDURE — 80053 COMPREHEN METABOLIC PANEL: CPT

## 2018-12-17 PROCEDURE — 99223 1ST HOSP IP/OBS HIGH 75: CPT | Mod: AI,,, | Performed by: INTERNAL MEDICINE

## 2018-12-17 PROCEDURE — C9113 INJ PANTOPRAZOLE SODIUM, VIA: HCPCS | Performed by: EMERGENCY MEDICINE

## 2018-12-17 PROCEDURE — 96361 HYDRATE IV INFUSION ADD-ON: CPT

## 2018-12-17 PROCEDURE — 87086 URINE CULTURE/COLONY COUNT: CPT

## 2018-12-17 PROCEDURE — 99223 PR INITIAL HOSPITAL CARE,LEVL III: ICD-10-PCS | Mod: AI,,, | Performed by: INTERNAL MEDICINE

## 2018-12-17 PROCEDURE — 99291 CRITICAL CARE FIRST HOUR: CPT | Mod: 25

## 2018-12-17 PROCEDURE — 25000242 PHARM REV CODE 250 ALT 637 W/ HCPCS: Performed by: EMERGENCY MEDICINE

## 2018-12-17 PROCEDURE — 37000008 HC ANESTHESIA 1ST 15 MINUTES: Performed by: INTERNAL MEDICINE

## 2018-12-17 PROCEDURE — 83605 ASSAY OF LACTIC ACID: CPT | Mod: 91

## 2018-12-17 PROCEDURE — 20000000 HC ICU ROOM

## 2018-12-17 PROCEDURE — 99222 PR INITIAL HOSPITAL CARE,LEVL II: ICD-10-PCS | Mod: 25,ICN,, | Performed by: INTERNAL MEDICINE

## 2018-12-17 PROCEDURE — 36415 COLL VENOUS BLD VENIPUNCTURE: CPT

## 2018-12-17 PROCEDURE — 27000221 HC OXYGEN, UP TO 24 HOURS

## 2018-12-17 PROCEDURE — 43235 EGD DIAGNOSTIC BRUSH WASH: CPT | Performed by: INTERNAL MEDICINE

## 2018-12-17 PROCEDURE — 96365 THER/PROPH/DIAG IV INF INIT: CPT

## 2018-12-17 PROCEDURE — 85025 COMPLETE CBC W/AUTO DIFF WBC: CPT

## 2018-12-17 PROCEDURE — 43235 EGD DIAGNOSTIC BRUSH WASH: CPT | Mod: ,,, | Performed by: INTERNAL MEDICINE

## 2018-12-17 PROCEDURE — 99222 1ST HOSP IP/OBS MODERATE 55: CPT | Mod: 25,ICN,, | Performed by: INTERNAL MEDICINE

## 2018-12-17 PROCEDURE — 63600175 PHARM REV CODE 636 W HCPCS: Performed by: NURSE ANESTHETIST, CERTIFIED REGISTERED

## 2018-12-17 PROCEDURE — 43235 PR EGD, FLEX, DIAGNOSTIC: ICD-10-PCS | Mod: ,,, | Performed by: INTERNAL MEDICINE

## 2018-12-17 PROCEDURE — 96375 TX/PRO/DX INJ NEW DRUG ADDON: CPT

## 2018-12-17 PROCEDURE — 37000009 HC ANESTHESIA EA ADD 15 MINS: Performed by: INTERNAL MEDICINE

## 2018-12-17 RX ORDER — IPRATROPIUM BROMIDE AND ALBUTEROL SULFATE 2.5; .5 MG/3ML; MG/3ML
3 SOLUTION RESPIRATORY (INHALATION) EVERY 6 HOURS PRN
Status: DISCONTINUED | OUTPATIENT
Start: 2018-12-17 | End: 2018-12-17

## 2018-12-17 RX ORDER — PANTOPRAZOLE SODIUM 40 MG/10ML
40 INJECTION, POWDER, LYOPHILIZED, FOR SOLUTION INTRAVENOUS DAILY
Status: DISCONTINUED | OUTPATIENT
Start: 2018-12-18 | End: 2018-12-18

## 2018-12-17 RX ORDER — EPHEDRINE SULFATE 50 MG/ML
INJECTION, SOLUTION INTRAVENOUS
Status: DISCONTINUED
Start: 2018-12-17 | End: 2018-12-17 | Stop reason: WASHOUT

## 2018-12-17 RX ORDER — ATORVASTATIN CALCIUM 80 MG/1
80 TABLET, FILM COATED ORAL NIGHTLY
COMMUNITY

## 2018-12-17 RX ORDER — PROPOFOL 10 MG/ML
INJECTION, EMULSION INTRAVENOUS
Status: COMPLETED
Start: 2018-12-17 | End: 2018-12-17

## 2018-12-17 RX ORDER — PROPOFOL 10 MG/ML
INJECTION, EMULSION INTRAVENOUS
Status: DISCONTINUED | OUTPATIENT
Start: 2018-12-17 | End: 2018-12-17

## 2018-12-17 RX ORDER — IPRATROPIUM BROMIDE AND ALBUTEROL SULFATE 2.5; .5 MG/3ML; MG/3ML
3 SOLUTION RESPIRATORY (INHALATION)
Status: COMPLETED | OUTPATIENT
Start: 2018-12-17 | End: 2018-12-17

## 2018-12-17 RX ORDER — LIDOCAINE HCL/PF 100 MG/5ML
SYRINGE (ML) INTRAVENOUS
Status: DISCONTINUED | OUTPATIENT
Start: 2018-12-17 | End: 2018-12-17

## 2018-12-17 RX ORDER — PANTOPRAZOLE SODIUM 40 MG/10ML
80 INJECTION, POWDER, LYOPHILIZED, FOR SOLUTION INTRAVENOUS
Status: COMPLETED | OUTPATIENT
Start: 2018-12-17 | End: 2018-12-17

## 2018-12-17 RX ORDER — ONDANSETRON 2 MG/ML
8 INJECTION INTRAMUSCULAR; INTRAVENOUS
Status: COMPLETED | OUTPATIENT
Start: 2018-12-17 | End: 2018-12-17

## 2018-12-17 RX ORDER — GUAIFENESIN 600 MG/1
1200 TABLET, EXTENDED RELEASE ORAL 2 TIMES DAILY
COMMUNITY
End: 2018-12-17 | Stop reason: CLARIF

## 2018-12-17 RX ORDER — VANCOMYCIN HCL IN 5 % DEXTROSE 1G/250ML
15 PLASTIC BAG, INJECTION (ML) INTRAVENOUS
Status: COMPLETED | OUTPATIENT
Start: 2018-12-17 | End: 2018-12-17

## 2018-12-17 RX ORDER — BENZONATATE 100 MG/1
100 CAPSULE ORAL 3 TIMES DAILY PRN
COMMUNITY

## 2018-12-17 RX ORDER — EPINEPHRINE 0.1 MG/ML
INJECTION INTRAVENOUS
Status: DISCONTINUED
Start: 2018-12-17 | End: 2018-12-17 | Stop reason: WASHOUT

## 2018-12-17 RX ORDER — IPRATROPIUM BROMIDE AND ALBUTEROL SULFATE 2.5; .5 MG/3ML; MG/3ML
3 SOLUTION RESPIRATORY (INHALATION) EVERY 6 HOURS PRN
Status: DISCONTINUED | OUTPATIENT
Start: 2018-12-17 | End: 2018-12-28

## 2018-12-17 RX ORDER — DEXTROMETHORPHAN/PSEUDOEPHED 2.5-7.5/.8
DROPS ORAL
Status: DISCONTINUED
Start: 2018-12-17 | End: 2018-12-17 | Stop reason: WASHOUT

## 2018-12-17 RX ORDER — SODIUM CHLORIDE 9 MG/ML
INJECTION, SOLUTION INTRAVENOUS CONTINUOUS
Status: DISCONTINUED | OUTPATIENT
Start: 2018-12-17 | End: 2018-12-20

## 2018-12-17 RX ORDER — VANCOMYCIN HCL IN 5 % DEXTROSE 1G/250ML
1000 PLASTIC BAG, INJECTION (ML) INTRAVENOUS
Status: DISCONTINUED | OUTPATIENT
Start: 2018-12-17 | End: 2018-12-18

## 2018-12-17 RX ADMIN — IPRATROPIUM BROMIDE AND ALBUTEROL SULFATE 3 ML: .5; 3 SOLUTION RESPIRATORY (INHALATION) at 09:12

## 2018-12-17 RX ADMIN — SODIUM CHLORIDE 1000 ML: 0.9 INJECTION, SOLUTION INTRAVENOUS at 10:12

## 2018-12-17 RX ADMIN — PANTOPRAZOLE SODIUM 80 MG: 40 INJECTION, POWDER, LYOPHILIZED, FOR SOLUTION INTRAVENOUS at 09:12

## 2018-12-17 RX ADMIN — IOHEXOL 75 ML: 350 INJECTION, SOLUTION INTRAVENOUS at 10:12

## 2018-12-17 RX ADMIN — PIPERACILLIN SODIUM AND TAZOBACTAM SODIUM 4.5 G: 4; .5 INJECTION, POWDER, LYOPHILIZED, FOR SOLUTION INTRAVENOUS at 05:12

## 2018-12-17 RX ADMIN — DEXTROSE 8 MG/HR: 50 INJECTION, SOLUTION INTRAVENOUS at 03:12

## 2018-12-17 RX ADMIN — SODIUM CHLORIDE 1000 ML: 0.9 INJECTION, SOLUTION INTRAVENOUS at 09:12

## 2018-12-17 RX ADMIN — PIPERACILLIN AND TAZOBACTAM 4.5 G: 4; .5 INJECTION, POWDER, FOR SOLUTION INTRAVENOUS at 10:12

## 2018-12-17 RX ADMIN — IOHEXOL: 350 INJECTION, SOLUTION INTRAVENOUS at 01:12

## 2018-12-17 RX ADMIN — SODIUM CHLORIDE: 0.9 INJECTION, SOLUTION INTRAVENOUS at 03:12

## 2018-12-17 RX ADMIN — PROPOFOL 20 MG: 10 INJECTION, EMULSION INTRAVENOUS at 04:12

## 2018-12-17 RX ADMIN — VANCOMYCIN HYDROCHLORIDE 1000 MG: 1 INJECTION, POWDER, LYOPHILIZED, FOR SOLUTION INTRAVENOUS at 11:12

## 2018-12-17 RX ADMIN — PROPOFOL 50 MG: 10 INJECTION, EMULSION INTRAVENOUS at 04:12

## 2018-12-17 RX ADMIN — ONDANSETRON 8 MG: 2 INJECTION INTRAMUSCULAR; INTRAVENOUS at 09:12

## 2018-12-17 RX ADMIN — LIDOCAINE HYDROCHLORIDE 50 MG: 20 INJECTION, SOLUTION INTRAVENOUS at 04:12

## 2018-12-17 NOTE — CONSULTS
[facility]     CC: Hematemesis      HPI 63 y.o. male with hematemesis, few episodes, bright red, small to moderate amount, associated with epigastric pain, with no alleviating/exacerbating factors.  Most of history obtained from ICU nurse as well as from phone conversation with ER physician Dr. Patton as patient was groggy and with AMS, mumbling and not cooperative with interview.  Two physician emergency consent had to be done for procedure.  No other acute GI issues at this time.    Past Medical History:   Diagnosis Date    BPH (benign prostatic hyperplasia)     COPD (chronic obstructive pulmonary disease)     Elevated PSA     Fx     RIGHT WRIST, LEFT FOOT    Hypokalemia     Muscular dystrophy     Urine retention     Wears dentures     upper       Past Surgical History:   Procedure Laterality Date    APPENDECTOMY      cataracts      bilateral     CHOLECYSTECTOMY      CYST REMOVAL      from spine     CYSTOLITHOLOPAXY (REMOVE BLADDER STONE)  3/6/2017    [facility]    CYSTOSCOPY      EYE SURGERY      cataract surgery bilateral    PROSTATE BIOPSY      PROSTATE SURGERY      PROSTATECTOMY-TRANSURETHRAL N/A 3/6/2017    [facility]    PROSTATECTOMY-TRANSURETHRAL WITH GREENLIGHT LASER N/A 3/6/2017    [facility] at [facility]    TONSILLECTOMY      TRANSRECTAL ULTRASOUND GUIDED PROSTATE BIOPSY N/A 3/28/2016    [facility]    VASECTOMY         Social History     Tobacco Use    Smoking status: Current Every Day Smoker     Packs/day: 1.00     Years: 48.00     Pack years: 48.00     Types: Cigarettes    Smokeless tobacco: Never Used   Substance Use Topics    Alcohol use: No     Alcohol/week: 0.0 oz    Drug use: No       History reviewed. No pertinent family history.    Review of Systems  Unable to obtain    Physical Examination  BP (!) 121/59 (BP Location: Left arm, Patient  "Position: Lying)   Pulse 76   Temp 99.9 °F (37.7 °C) (Axillary)   Resp 15   Ht 5' 7" (1.702 m)   Wt 68 kg (150 lb)   SpO2 (!) 92%   BMI 23.49 kg/m²   General appearance:Groggy but arousable, uncooperative, no obvious distress  HENT: Normocephalic, atraumatic, neck symmetrical, no nasal discharge + dried blood on lips  Eyes: conjunctivae/corneas clear, PERRL, EOM's intact, sclera anicteric  Lungs: coarse to auscultation bilaterally, no dullness to percussion bilaterally, symmetric expansion, breathing unlabored  Heart: regular rate and rhythm without rub; no displacement of the PMI   Abdomen: scaphoid, NT  Extremities: extremities symmetric; no clubbing, cyanosis, or edema  Integument: Skin color, texture, turgor normal; no rashes; hair distrubution normal, no jaundice  Neurologic: Arousable, no focal sensory or motor neurologic deficits  Psychiatric: Confused, mumbling, groggy     Labs:  Lab Results   Component Value Date    WBC 18.90 (H) 12/17/2018    HGB 13.7 (L) 12/17/2018    HCT 42.6 12/17/2018    MCV 92 12/17/2018     12/17/2018       CMP  Sodium   Date Value Ref Range Status   12/17/2018 147 (H) 136 - 145 mmol/L Final     Potassium   Date Value Ref Range Status   12/17/2018 3.9 3.5 - 5.1 mmol/L Final     Chloride   Date Value Ref Range Status   12/17/2018 110 95 - 110 mmol/L Final     CO2   Date Value Ref Range Status   12/17/2018 27 23 - 29 mmol/L Final     Glucose   Date Value Ref Range Status   12/17/2018 126 (H) 70 - 110 mg/dL Final     BUN, Bld   Date Value Ref Range Status   12/17/2018 11 8 - 23 mg/dL Final     Creatinine   Date Value Ref Range Status   12/17/2018 0.8 0.5 - 1.4 mg/dL Final     Calcium   Date Value Ref Range Status   12/17/2018 10.2 8.7 - 10.5 mg/dL Final     Total Protein   Date Value Ref Range Status   12/17/2018 7.1 6.0 - 8.4 g/dL Final     Albumin   Date Value Ref Range Status   12/17/2018 3.4 (L) 3.5 - 5.2 g/dL Final     Total Bilirubin   Date Value Ref Range Status "   12/17/2018 0.5 0.1 - 1.0 mg/dL Final     Comment:     For infants and newborns, interpretation of results should be based  on gestational age, weight and in agreement with clinical  observations.  Premature Infant recommended reference ranges:  Up to 24 hours.............<8.0 mg/dL  Up to 48 hours............<12.0 mg/dL  3-5 days..................<15.0 mg/dL  6-29 days.................<15.0 mg/dL       Alkaline Phosphatase   Date Value Ref Range Status   12/17/2018 297 (H) 55 - 135 U/L Final     AST   Date Value Ref Range Status   12/17/2018 63 (H) 10 - 40 U/L Final     ALT   Date Value Ref Range Status   12/17/2018 70 (H) 10 - 44 U/L Final     Anion Gap   Date Value Ref Range Status   12/17/2018 10 8 - 16 mmol/L Final     eGFR if    Date Value Ref Range Status   12/17/2018 >60 >60 mL/min/1.73 m^2 Final     eGFR if non    Date Value Ref Range Status   12/17/2018 >60 >60 mL/min/1.73 m^2 Final     Comment:     Calculation used to obtain the estimated glomerular filtration  rate (eGFR) is the CKD-EPI equation.            Imaging:  CT scan was independently visualized and reviewed by me and showed fluid in right colon.    I have personally reviewed these images    Case discussed with ER physician and ICU nurse.  See HPI.    Assessment:   1.  Hematemesis  2.  AMS  3.  Elevated LFTs      Plan:  1.  PPI  2.  NPO  3.  EGD today  4.  Will need colonoscopy at some point but possibly as outpatient  5.  Hepatitis panel pending for evaluation of elevated LFTs  6.  Further recommendations to follow after above.  7.  Communication will be sent to the referring MD, Dr. Tena regarding my assessment and plan on this patient via EPIC.      Evgeny Anthony MD  [facility]  [address], [address]  [place] [address]  Office: [phone]  Fax: [phone]

## 2018-12-17 NOTE — PLAN OF CARE
2lnc in use. Aerosol tx ordered q6prn     12/17/18 1553   PRE-TX-O2-ETCO2   O2 Device (Oxygen Therapy) nasal cannula   $ Is the patient on Low Flow Oxygen? Yes   Flow (L/min) 2   Oxygen Concentration (%) 28   SpO2 95 %   Pulse Oximetry Type Continuous   $ Pulse Oximetry - Multiple Charge Pulse Oximetry - Multiple   Pulse 79   Resp (!) 47   Ready to Wean/Extubation Screen   FIO2<=50 (chart decimal) 0.28

## 2018-12-17 NOTE — [employer] PATIENT INSTRUCTIONS
Discharge Summary/Instructions after an Endoscopic Procedure  Patient Name: Enmanuel Armenta  Patient MRN: [id number]  Patient YOB: 1955 Monday, December 17, 2018  Evgeny Anthony MD  RESTRICTIONS:  During your procedure today, you received medications for sedation.  These   medications may affect your judgment, balance and coordination.  Therefore,   for 24 hours, you have the following restrictions:   - DO NOT drive a car, operate machinery, make legal/financial decisions,   sign important papers or drink alcohol.    ACTIVITY:  Today: no heavy lifting, straining or running due to procedural   sedation/anesthesia.  The following day: return to full activity including work.  DIET:  Eat and drink normally unless instructed otherwise.     TREATMENT FOR COMMON SIDE EFFECTS:  - Mild abdominal pain, nausea, belching, bloating or excessive gas:  rest,   eat lightly and use a heating pad.  - Sore Throat: treat with throat lozenges and/or gargle with warm salt   water.  - Because air was used during the procedure, expelling large amounts of air   from your rectum or belching is normal.  - If a bowel prep was taken, you may not have a bowel movement for 1-3 days.    This is normal.  SYMPTOMS TO WATCH FOR AND REPORT TO YOUR PHYSICIAN:  1. Abdominal pain or bloating, other than gas cramps.  2. Chest pain.  3. Back pain.  4. Signs of infection such as: chills or fever occurring within 24 hours   after the procedure.  5. Rectal bleeding, which would show as bright red, maroon, or black stools.   (A tablespoon of blood from the rectum is not serious, especially if   hemorrhoids are present.)  6. Vomiting.  7. Weakness or dizziness.  GO DIRECTLY TO THE NEAREST EMERGENCY ROOM IF YOU HAVE ANY OF THE FOLLOWING:      Difficulty breathing              Chills and/or fever over 101 F   Persistent vomiting and/or vomiting blood   Severe abdominal pain   Severe chest pain   Black, tarry stools   Bleeding- more than one  tablespoon   Any other symptom or condition that you feel may need urgent attention  Your doctor recommends these additional instructions:  If any biopsies were taken, your doctors clinic will contact you in 1 to 2   weeks with any results.  - Return patient to ICU for ongoing care.   - Advance diet as tolerated.   - Continue present medications.   - No aspirin, ibuprofen, naproxen, or other non-steroidal anti-inflammatory   drugs.   - Repeat upper endoscopy in 8 weeks for retreatment.   - Discharge patient to home (ambulatory).   - Follow an antireflux regimen.   - Use Protonix (pantoprazole) 40 mg PO BID.   - Use sucralfate suspension 1 gram PO QID for 10 days.  For questions, problems or results please call your physician - Evgeny Anthony MD at Work:  [phone].  [facility], EMERGENCY ROOM PHONE NUMBER: [phone]  IF A COMPLICATION OR EMERGENCY SITUATION ARISES AND YOU ARE UNABLE TO REACH   YOUR PHYSICIAN - GO DIRECTLY TO THE EMERGENCY ROOM.  Evgeny Anthony MD  12/17/2018 5:03:28 PM  This report has been verified and signed electronically.  [employer]

## 2018-12-17 NOTE — ED PROVIDER NOTES
Encounter Date: 12/17/2018    SCRIBE #1 NOTE: I, Ayleen Little, am scribing for, and in the presence of, Dr. Cheo Patton.       History     Chief Complaint   Patient presents with    Abdominal Pain       Time seen by provider: 8:53 AM on 12/17/2018    Enmanuel Armenta is a 63 y.o. male with COPD, muscular dystrophy, elevated PSA, BPH, and hypokalemia, urine retention who presents to the ED via EMS with an onset of abdomen pain, abdomen distension, nausea, and vomiting. All his symptoms began one day ago. He lives at [facility]. The EMS reports is a vomiting a brown sticky substance with pale red streaks throughout. EMS gave him 4 mg of Zofran in route to the ED. The patient denies shortness of breath, or any other symptoms at this time. Pt's PSHx includes cholecystectomy. Pt smokes tobacco. No known drug allergies noted.      The history is provided by the patient and the EMS personnel.     Review of patient's allergies indicates:  No Known Allergies  Past Medical History:   Diagnosis Date    BPH (benign prostatic hyperplasia)     COPD (chronic obstructive pulmonary disease)     Elevated PSA     Fx     RIGHT WRIST, LEFT FOOT    Hypokalemia     Muscular dystrophy     Urine retention     Wears dentures     upper     Past Surgical History:   Procedure Laterality Date    APPENDECTOMY      cataracts      bilateral     CHOLECYSTECTOMY      CYST REMOVAL      from spine     CYSTOLITHOLOPAXY (REMOVE BLADDER STONE)  3/6/2017    [facility] OR    CYSTOSCOPY      EYE SURGERY      cataract surgery bilateral    PROSTATE BIOPSY      PROSTATE SURGERY      PROSTATECTOMY-TRANSURETHRAL N/A 3/6/2017    [facility]    PROSTATECTOMY-TRANSURETHRAL WITH GREENLIGHT LASER N/A 3/6/2017    [facility] OR    TONSILLECTOMY      TRANSRECTAL ULTRASOUND GUIDED PROSTATE BIOPSY N/A 3/28/2016    [facility]an  [facility] OR    VASECTOMY       History reviewed. No pertinent family history.  Social History     Tobacco Use    Smoking status: Current Every Day Smoker     Packs/day: 1.00     Years: 48.00     Pack years: 48.00     Types: Cigarettes    Smokeless tobacco: Never Used   Substance Use Topics    Alcohol use: No     Alcohol/week: 0.0 oz    Drug use: No     Review of Systems   Constitutional: Negative for fever.   HENT: Negative for sore throat.    Respiratory: Negative for shortness of breath.    Cardiovascular: Negative for chest pain.   Gastrointestinal: Positive for abdominal distention, abdominal pain, nausea and vomiting.        Positive for vomiting blood.   Genitourinary: Negative for dysuria.   Musculoskeletal: Negative for back pain.   Skin: Negative for rash.   Neurological: Negative for weakness.   Hematological: Does not bruise/bleed easily.       Physical Exam     Initial Vitals [12/17/18 0859]   BP Pulse Resp Temp SpO2   137/76 91 20 100 °F (37.8 °C) (!) 93 %      MAP       --         Physical Exam    Nursing note and vitals reviewed.  Constitutional: He appears well-developed. He appears ill. No distress.   Chronically ill appearing.   HENT:   Head: Normocephalic and atraumatic.   Nose: Nose normal.   Mouth/Throat: Dental caries present.   Dry blood emesis to posterior oropharynx. Endentalous.   Eyes: EOM are normal.   Neck: Neck supple. No tracheal deviation present. No JVD present.   Cardiovascular: Normal rate, regular rhythm, normal heart sounds and intact distal pulses. Exam reveals no gallop and no friction rub.    No murmur heard.  Pulmonary/Chest: No respiratory distress. He has no wheezes. He has no rhonchi. He has no rales.   Lungs coarse bilaterally.   Abdominal: Soft. Bowel sounds are normal. He exhibits distension. There is tenderness. There is no rebound and no guarding.   Diffuse mild abdominal tenderness.   Genitourinary:   Genitourinary Comments: Chaperone present. Chapin  catheter in place.   Musculoskeletal: Normal range of motion.   Neurological: He is alert. No cranial nerve deficit.   Alert. Oriented to person and place, but not with date.   Skin: Skin is warm and dry. No rash noted.   Psychiatric: He has a normal mood and affect.         ED Course   Procedures  Labs Reviewed   CBC W/ AUTO DIFFERENTIAL - Abnormal; Notable for the following components:       Result Value    WBC 18.90 (*)     Hemoglobin 13.7 (*)     RDW 14.6 (*)     MPV 8.5 (*)     Gran # (ANC) 15.4 (*)     Mono # 1.1 (*)     Gran% 81.7 (*)     Lymph% 12.3 (*)     All other components within normal limits   COMPREHENSIVE METABOLIC PANEL - Abnormal; Notable for the following components:    Sodium 147 (*)     Glucose 126 (*)     Albumin 3.4 (*)     Alkaline Phosphatase 297 (*)     AST 63 (*)     ALT 70 (*)     All other components within normal limits   URINALYSIS, REFLEX TO URINE CULTURE - Abnormal; Notable for the following components:    Appearance, UA Hazy (*)     Protein, UA Trace (*)     Occult Blood UA 3+ (*)     Leukocytes, UA 2+ (*)     All other components within normal limits    Narrative:     Preferred Collection Type->Urine, Clean Catch   URINALYSIS MICROSCOPIC - Abnormal; Notable for the following components:    RBC, UA 20 (*)     WBC, UA 25 (*)     Bacteria, UA Few (*)     All other components within normal limits    Narrative:     Preferred Collection Type->Urine, Clean Catch   CULTURE, BLOOD   CULTURE, BLOOD   CULTURE, URINE   PROTIME-INR   LACTIC ACID, PLASMA   LACTIC ACID, PLASMA   TYPE & SCREEN     EKG Readings: (Independently Interpreted)   Initial Reading: No STEMI. Rhythm: Sinus Tachycardia. Heart Rate: 105 bpm.   First degree AV bloc. Right bundle branch block. KY interval 214 ms. Non-specific ST chanes.       Imaging Results          CT Abdomen Pelvis With Contrast (Final result)  Result time 12/17/18 12:46:55    Final result by Macarena Lopez MD (12/17/18 12:46:55)                  Impression:      Enlarged prostate gland, air in the bladder likely secondary to catheterization, diffuse bladder wall thickening.    Prominent amount of fluid within right colon.  Appendix not identified and note is made of limitation with motion artifact in the appendiceal region.    Results were called to Dr. Patton      Electronically signed by: Macarena Lopez MD  Date:    12/17/2018  Time:    12:46             Narrative:    EXAMINATION:  CT ABDOMEN PELVIS WITH CONTRAST    CLINICAL HISTORY:  Diffuse abd pain;    TECHNIQUE:  Low dose axial images, sagittal and coronal reformations were obtained from the lung bases to the pubic symphysis following the IV administration of 75 mL of Omnipaque 350 and no p.o. contrast    COMPARISON:  None.    FINDINGS:  There are calcified granulomas, mild posterior dependent atelectasis bilaterally and right basilar airspace opacification suggesti which could represent atelectasis or pneumonia.  There is limitation from motion artifact.    Liver; unremarkable appearance    Spleen; unremarkable appearance    Adrenal glands; mild diffuse thickening.    Pancreas; atrophied with fat.    Gallbladder and bile ducts; prior cholecystectomy.  No biliary duct dilatation.    Abdominal aorta; mild dilatation distal abdominal aorta with AP and transverse diameters 2.6 cm.    Kidneys, ureters, bladder; bilateral renal masses consistent with cysts, 2 largest in the right kidney measuring 5.6 and 6 cm.  Largest in the left kidney measure 7.6 cm, 6.3 cm, 6.4 cm.  Renal enhancement is symmetrical and there is no hydronephrosis.  There is a Samson catheter in the bladder.  The prostate gland is enlarged.  There is diffuse bladder wall thickening likely secondary to chronic bladder outlet obstruction.  Air in the bladder is likely secondary to the catheterization    Stomach, bowel, mesentery; appendix not identified with certainty.  There is some motion artifact limiting evaluation of particularly the  appendiceal region.  Abnormal appendix is not identified.  No free intraperitoneal air or fluid.  There is prominent amount of fluid with air-fluid level in the ascending colon.    Osseous structures; degenerative changes                               X-Ray Chest AP Portable (Final result)  Result time 12/17/18 09:44:18    Final result by Chris Pickett MD (12/17/18 09:44:18)                 Impression:      T right basilar infiltrate.      Electronically signed by: Chris Pickett MD  Date:    12/17/2018  Time:    09:44             Narrative:    EXAMINATION:  XR CHEST AP PORTABLE    CLINICAL HISTORY:  Shortness of breath    TECHNIQUE:  Single frontal view of the chest was performed.    COMPARISON:  03/24/2016    FINDINGS:  The cardiomediastinal silhouette is with normal limits.  There is new patchy infiltrate at the right lung base.  No pleural effusion.  An old calcified granuloma is seen at the right lung apex.                                 Medical Decision Making:   History:   Old Medical Records: I decided to obtain old medical records.  Clinical Tests:   Lab Tests: Ordered and Reviewed  Radiological Study: Ordered and Reviewed  Medical Tests: Ordered and Reviewed            Scribe Attestation:   Scribe #1: I performed the above scribed service and the documentation accurately describes the services I performed. I attest to the accuracy of the note.    Attending Attestation:         Attending Critical Care:   Critical Care Times:   Direct Patient Care (initial evaluation, reassessments, and time considering the case)................................................................5 minutes.   Additional History from reviewing old medical records or taking additional history from the family, EMS, PCP, etc.......................5 minutes.   Ordering, Reviewing, and Interpreting Diagnostic  Studies...............................................................................................................5 minutes.   Documentation..................................................................................................................................................................................5 minutes.   Consultation with other Physicians. .................................................................................................................................................5 minutes.   Consultation with the patient's family directly relating to the patient's condition, care, and DNR status (when patient unable)......5 minutes.   Other..................................................................................................................................................................................................5 minutes.   ==============================================================  · Total Critical Care Time - exclusive of procedural time: 35 minutes.  ==============================================================  Critical care was necessary to treat or prevent imminent or life-threatening deterioration of the following conditions: sepsis and GI bleeding.   The following critical care procedures were done by me (see procedure notes): pulse oximetry.   Critical care was time spent personally by me on the following activities: obtaining history from patient or relative, examination of patient, review of x-rays / CT sent with the patient, review of old charts, ordering lab, x-rays, and/or EKG, development of treatment plan with patient or relative, ordering and performing treatments and interventions, evaluation of patient's response to treatment, discussions with primary provider, discussion with consultants, interpretation of cardiac measurements and re-evaluation of patient's conition.   Critical Care Condition: critical         Attending Attestation:      Physician Attestation for Scribe:    I, Dr. Cheo Patton, personally performed the services described in this documentation.   All medical record entries made by the scribe were at my direction and in my presence.   I have reviewed the chart and agree that the record is accurate and complete.   Cheo Patton MD  9:04 PM 12/17/2018           ED Course as of Dec 17 1327   Mon Dec 17, 2018   0907 63-year-old male past medical history of COPD, muscular dystrophy and BPH presents today from nursing home with hematemesis and abdominal pain x2 days.  Elderly chronically ill-appearing.  Afebrile.  Tachycardic.  Dry blood and emesis around mouth. Coarse breath sounds bilaterally.  Pulse ox 92 on room air.  Mildly distended abdomen with mild diffuse tenderness. No rebound no guarding no rigidity or point tenderness.  [BD]   1053 Will begin septic workup and give vanc and Zosyn.  [BD]   1053 Chest x-rayWith right lower lobe infiltrate likely aspiration versus pneumonia.  [BD]   1053 Patient is leukocytosis to 18.9.  Normal hematocrit.  CMP remarkable for elevated alk-phos and mild transaminitis.  [BD]   1217 Spoke to Dr. Martin who was in surgery and will call back to further discuss hematemesis.   [BD]   1250 CT abd/pel showed enlarged prostate gland, air in the bladder likely secondary to catheterization, diffuse bladder wall thickening.  Prominent amount of fluid within right colon.  Appendix not identified and note is made of limitation with motion artifact in the appendiceal region. Admit to ICU for sepsis and UGI bleed with GI to scope.  [BD]      ED Course User Index  [BD] Cheo Patton MD     Clinical Impression:   The primary encounter diagnosis was Hematemesis, presence of nausea not specified. Diagnoses of SOB (shortness of breath) and Sepsis, due to unspecified organism were also pertinent to this visit.      Disposition:   Disposition: Admitted                        Cheo Patton MD  12/17/18  2104

## 2018-12-17 NOTE — H&P
PCP: Primary Doctor No    History & Physical    Chief Complaint: Abdominal pain    History of Present Illness:  Patient is a 63 y.o. male admitted to Hospitalist Service from [facility] Room with complaint of abdominal pain. Patient is a resident of [facility] nursing home, [place]. Patient reportedly has past medical history significant for COPD, muscular dystrophy, l;imited mobility with walker and history of elevated PSA. Patient presented with 1 day history of abdominal pain associated with nausea and vomiting. The EMS reported, vomiting a brown sticky substance with pale red streaks throughout. EMS gave him 4 mg of Zofran in route to the ED. Patient denied chest pain, shortness of breath, headache, vision changes, focal neuro-deficits, cough. Patient noted to have 101.5F upon arrival.     Past Medical History:   Diagnosis Date    BPH (benign prostatic hyperplasia)     COPD (chronic obstructive pulmonary disease)     Elevated PSA     Fx     RIGHT WRIST, LEFT FOOT    Hypokalemia     Muscular dystrophy     Urine retention     Wears dentures     upper     Past Surgical History:   Procedure Laterality Date    APPENDECTOMY      cataracts      bilateral     CHOLECYSTECTOMY      CYST REMOVAL      from spine     CYSTOLITHOLOPAXY (REMOVE BLADDER STONE)  3/6/2017    [facility] OR    CYSTOSCOPY      EYE SURGERY      cataract surgery bilateral    PROSTATE BIOPSY      PROSTATE SURGERY      PROSTATECTOMY-TRANSURETHRAL N/A 3/6/2017    [facility] OR    PROSTATECTOMY-TRANSURETHRAL WITH GREENLIGHT LASER N/A 3/6/2017    [facility] by [facility] at [facility] OR    TONSILLECTOMY      TRANSRECTAL ULTRASOUND GUIDED PROSTATE BIOPSY N/A 3/28/2016    [facility] at [facility] OR    VASECTOMY       History reviewed. No pertinent family history.  Social History     Tobacco Use    Smoking status: Current Every  Day Smoker     Packs/day: 1.00     Years: 48.00     Pack years: 48.00     Types: Cigarettes    Smokeless tobacco: Never Used   Substance Use Topics    Alcohol use: No     Alcohol/week: 0.0 oz    Drug use: No      Review of patient's allergies indicates:  No Known Allergies    (Not in a hospital admission)  Review of Systems:  Constitutional: + Fever  Eyes: no visual changes  Ears, nose, mouth, throat, and face: no nasal congestion or sore throat  Respiratory: + cough but no shorness of breath  Cardiovascular: no chest pain or palpitations  Gastrointestinal: see HPI  Genitourinary: no hematuria or dysuria  Integument/breast: no rash or pruritis  Hematologic/lymphatic: no easy bruising or lymphadenopathy  Musculoskeletal: no arthralgias or myalgias  Neurological: no seizures or tremors.  Behavioral/Psych: no auditory or visual hallucinations  Endocrine: no heat or cold intolerance     OBJECTIVE:     Vital Signs (Most Recent)  Temp: (!) 101.5 °F (38.6 °C) (12/17/18 0918)  Pulse: 86 (12/17/18 1429)  Resp: 18 (12/17/18 0950)  BP: 128/78 (12/17/18 1431)  SpO2: (!) 92 % (12/17/18 1431)    Physical Exam:  General appearance: well developed, appears stated age, chronically ill-appearing  Head: normocephalic, atraumatic  Eyes:  conjunctivae/corneas clear. PERRL.  Nose: Nares normal. Septum midline.  Throat: lips, mucosa, and tongue normal; teeth and gums normal, no throat erythema. + Dry blood at angle of mouth  Neck: supple, symmetrical, trachea midline, no JVD and thyroid not enlarged, symmetric, no tenderness/mass/nodules  Lungs:  Scattered rhonchi B/L.  Chest wall: no tenderness  Heart: regular rate and rhythm, S1, S2 normal, no murmur, click, rub or gallop  Abdomen: soft, epigastric tenderness, non-distented; bowel sounds normal; no masses,  no organomegaly  Extremities: no cyanosis, clubbing or edema.   Pulses: 2+ and symmetric  Skin: Skin color, texture, turgor normal. No rashes or lesions.  Lymph nodes: Cervical,  supraclavicular, and axillary nodes normal.  Neurologic: Normal strength and tone. No focal numbness or weakness. CNII-XII intact.      Laboratory:   CBC:   Recent Labs   Lab 12/17/18 0943   WBC 18.90*   RBC 4.62   HGB 13.7*   HCT 42.6      MCV 92   MCH 29.5   MCHC 32.0     CMP:   Recent Labs   Lab 12/17/18 0943   *   CALCIUM 10.2   ALBUMIN 3.4*   PROT 7.1   *   K 3.9   CO2 27      BUN 11   CREATININE 0.8   ALKPHOS 297*   ALT 70*   AST 63*   BILITOT 0.5     Coagulation:   Recent Labs   Lab 12/17/18 0943   LABPROT 9.6   INR 0.9     Microbiology Results (last 7 days)     Procedure Component Value Units Date/Time    Urine culture [[id number]] Collected:  12/17/18 1029    Order Status:  No result Specimen:  Urine Updated:  12/17/18 1051    Blood culture x two cultures. Draw prior to antibiotics. [[id number]] Collected:  12/17/18 0958    Order Status:  Sent Specimen:  Blood Updated:  12/17/18 0958    Blood culture x two cultures. Draw prior to antibiotics. [[id number]] Collected:  12/17/18 0943    Order Status:  Sent Specimen:  Blood Updated:  12/17/18 0946        Recent Labs   Lab 12/17/18  1029   COLORU Yellow   SPECGRAV 1.010   PHUR 6.0   PROTEINUA Trace*   BACTERIA Few*   NITRITE Negative   LEUKOCYTESUR 2+*   UROBILINOGEN Negative     Microbiology Results (last 7 days)     Procedure Component Value Units Date/Time    Urine culture [[id number]] Collected:  12/17/18 1029    Order Status:  No result Specimen:  Urine Updated:  12/17/18 1051    Blood culture x two cultures. Draw prior to antibiotics. [[id number]] Collected:  12/17/18 0958    Order Status:  Sent Specimen:  Blood Updated:  12/17/18 0958    Blood culture x two cultures. Draw prior to antibiotics. [[id number]] Collected:  12/17/18 0943    Order Status:  Sent Specimen:  Blood Updated:  12/17/18 0946        Diagnostic Results:  Chest X-Ray: T right basilar infiltrate.    CT abdomen and pelvis with contrast:  Enlarged prostate gland, air  in the bladder likely secondary to catheterization, diffuse bladder wall thickening. Prominent amount of fluid within right colon.  Appendix not identified and note is made of limitation with motion artifact in the appendiceal region.    Assessment/Plan:     Active Hospital Problems    Diagnosis  POA    SOB (shortness of breath) [R06.02]  Yes    Aspiration pneumonia, RLL [J69.0]  Supplemental O2 via nasal canula; titrate O2 saturation to >92%.   SLP evaluation. NPO for now.  Continue beta 2 agonist bronchodilator treatments.   Continue IV antibiotics - Vancomycin and Zosyn. Pharmacist to dose Vancomycin.   Check sputum GS and Cx.   Continue routine medications as before.     Yes    Upper GI bleeding [K92.2]  Keep patient NPO.  Follow H/H closely. Type and screen blood and transfuse as needed.  Continue IV Protonix infusion 8 mg/hr.  Consult Gastronetrologist.   Check Iron, TIBC, B12 and folic acid.   Continue IVF hydration.   Use IV anti-emetics as needed.     Yes    Muscular dystrophy [G71.00]  Supportive care, fall precautions.     UTI  Urine C/S. Start IV antibiotics.    Yes    Chronic obstructive pulmonary disease [J44.9]  Patient's COPD is controlled currently. Continue scheduled inhalers and monitor respiratory status closely.    Yes     DVT prophylaxis: Use SCD and TEDs. No anticoagulation due to GI bleding.      Stephanie Tena MD  Department of Hospital Medicine   [facility]

## 2018-12-17 NOTE — ED NOTES
Pt comes from [facility] with emesis since last night and abd pain, pt awake alert oriented to self situation in no acute distress, denies chest pain currently, breath sound course, bilateral dp pulses + 2, abd tender to palpate, pt answers most questions appropriately,

## 2018-12-17 NOTE — CONSULTS
Date: 12/17/2018   Enmanuel Armenta [id number] is a 63 y.o. male who has been consulted for vancomycin dosing.    The patient has the following labs: 0.8 Crcl 86.1 WBC 18.9  Current weight is 68 kg (150 lb)    Pt being treated with vancomycin for PNA.    The patient received 1000 mg on 12/17/18 at 1111.    The patient will be started on vancomycin at a dose of 1000 mg every 12 hours (15 mg/kg/dose). The vancomycin trough has been ordered for 12/18/18 at 2230.  Target goal is 15-20.     Patient will be followed by pharmacy for changes in renal function, toxicity, and efficacy.      Thank you for allowing us to participate in this patient's care.     Jayme Blair, PharmD

## 2018-12-17 NOTE — TRANSFER OF CARE
"Anesthesia Transfer of Care Note    Patient: Enmanuel Armenta    Procedure(s) Performed: Procedure(s) (LRB):  EGD (ESOPHAGOGASTRODUODENOSCOPY) (N/A)    Patient location: ICU    Anesthesia Type: general    Transport from OR: Transported from OR on 2-3 L/min O2 by NC with adequate spontaneous ventilation    Post pain: adequate analgesia    Post assessment: no apparent anesthetic complications and tolerated procedure well    Post vital signs: stable    Level of consciousness: sedated and responds to stimulation    Nausea/Vomiting: no nausea/vomiting    Complications: none    Transfer of care protocol was followed      Last vitals:   Visit Vitals  BP (!) 110/50   Pulse 91   Temp 37.7 °C (99.9 °F) (Axillary)   Resp (!) 24   Ht 5' 7" (1.702 m)   Wt 68 kg (150 lb)   SpO2 (!) 85%   BMI 23.49 kg/m²     "

## 2018-12-17 NOTE — PROGRESS NOTES
EGD done.  No evidence of bleeding.  Inflammation/GERD and stomach polyp noted but no biopsies taken secondary to Plavix use.  Recommend antisecretory therapy and follow up as outpatient for repeat EGD in 8 weeks.  No evidence of biliary obstruction on imaging and bilirubin normal.  Acute hepatitis panel ordered as well as ultrasound per Dr. Tena but further workup of transaminitis can be undertaken as an outpatient if persistent elevation.  GI to sign off for now.  Call for questions.  Recommend resume diet and place on oral PPI and sucralfate.

## 2018-12-17 NOTE — ANESTHESIA PREPROCEDURE EVALUATION
12/17/2018  Enmanuel Armenta is a 63 y.o., male.    Pre-op Assessment    I have reviewed the Patient Summary Reports.     I have reviewed the Nursing Notes.   I have reviewed the Medications.     Review of Systems  Anesthesia Hx:  No problems with previous Anesthesia    Social:  Smoker    Hematology/Oncology:  Hematology Normal   Oncology Normal     EENT/Dental:   Upper dentures   Cardiovascular:  Cardiovascular Normal     Pulmonary:   COPD, moderate Shortness of breath    Renal/:   BPH    Hepatic/GI:   Hematemesis    Musculoskeletal:  Musculoskeletal Normal    Neurological:   Neuromuscular Disease,    Dermatological:  Skin Normal    Psych:  Psychiatric Normal           Physical Exam  General:  Well nourished    Airway/Jaw/Neck:  Airway Findings: Mouth Opening: Normal Tongue: Normal  General Airway Assessment: Adult  Mallampati: I  TM Distance: Normal, at least 6 cm     Eyes/Ears/Nose:  EYES/EARS/NOSE FINDINGS: Normal   Dental:  Several missing teeth. None loose per patient    Chest/Lungs:  Chest/Lungs Findings: Normal Respiratory Rate, Clear to auscultation     Heart/Vascular:  Heart Findings: Rate: Normal  Rhythm: Regular Rhythm        Mental Status:  Mental Status Findings:  Confusion, Cooperative         Anesthesia Plan  Type of Anesthesia, risks & benefits discussed:  Anesthesia Type:  general  Patient's Preference:   Intra-op Monitoring Plan: standard ASA monitors  Intra-op Monitoring Plan Comments:   Post Op Pain Control Plan:   Post Op Pain Control Plan Comments:   Induction:   IV  Beta Blocker:  Patient is on a Beta-Blocker and has received one dose within the past 24 hours (No further documentation required).       Informed Consent: Patient understands risks and agrees with Anesthesia plan.  Questions answered. Anesthesia consent signed with patient.  ASA Score: 3     Day of Surgery Review of  History & Physical: I have interviewed and examined the patient. I have reviewed the patient's H&P dated:  There are no significant changes.  H&P update referred to the provider.         Ready For Surgery From Anesthesia Perspective.

## 2018-12-18 PROBLEM — N39.0 UTI (URINARY TRACT INFECTION): Status: ACTIVE | Noted: 2018-12-18

## 2018-12-18 PROBLEM — K92.0 HEMATEMESIS: Status: ACTIVE | Noted: 2018-12-18

## 2018-12-18 PROBLEM — R79.89 ELEVATED LFTS: Status: ACTIVE | Noted: 2018-12-18

## 2018-12-18 PROBLEM — R06.02 SOB (SHORTNESS OF BREATH): Status: RESOLVED | Noted: 2018-12-17 | Resolved: 2018-12-18

## 2018-12-18 LAB
ALBUMIN SERPL BCP-MCNC: 2.4 G/DL
ALP SERPL-CCNC: 182 U/L
ALT SERPL W/O P-5'-P-CCNC: 40 U/L
ANION GAP SERPL CALC-SCNC: 10 MMOL/L
AST SERPL-CCNC: 28 U/L
BACTERIA UR CULT: NORMAL
BACTERIA UR CULT: NORMAL
BASOPHILS # BLD AUTO: 0.1 K/UL
BASOPHILS NFR BLD: 0.5 %
BILIRUB SERPL-MCNC: 0.6 MG/DL
BUN SERPL-MCNC: 5 MG/DL
CALCIUM SERPL-MCNC: 8.9 MG/DL
CHLORIDE SERPL-SCNC: 110 MMOL/L
CO2 SERPL-SCNC: 25 MMOL/L
CREAT SERPL-MCNC: 0.6 MG/DL
DIFFERENTIAL METHOD: ABNORMAL
EOSINOPHIL # BLD AUTO: 0 K/UL
EOSINOPHIL NFR BLD: 0.2 %
ERYTHROCYTE [DISTWIDTH] IN BLOOD BY AUTOMATED COUNT: 14.9 %
EST. GFR  (AFRICAN AMERICAN): >60 ML/MIN/1.73 M^2
EST. GFR  (NON AFRICAN AMERICAN): >60 ML/MIN/1.73 M^2
GLUCOSE SERPL-MCNC: 90 MG/DL
HAV IGM SERPL QL IA: NEGATIVE
HBV CORE IGM SERPL QL IA: NEGATIVE
HBV SURFACE AG SERPL QL IA: NEGATIVE
HCT VFR BLD AUTO: 39.1 %
HCV AB SERPL QL IA: NEGATIVE
HGB BLD-MCNC: 12.6 G/DL
LYMPHOCYTES # BLD AUTO: 2.4 K/UL
LYMPHOCYTES NFR BLD: 15.8 %
MCH RBC QN AUTO: 29.3 PG
MCHC RBC AUTO-ENTMCNC: 32.3 G/DL
MCV RBC AUTO: 91 FL
MONOCYTES # BLD AUTO: 1.3 K/UL
MONOCYTES NFR BLD: 8.5 %
NEUTROPHILS # BLD AUTO: 11.5 K/UL
NEUTROPHILS NFR BLD: 75 %
PLATELET # BLD AUTO: 360 K/UL
PMV BLD AUTO: 9 FL
POTASSIUM SERPL-SCNC: 3.1 MMOL/L
PROT SERPL-MCNC: 5.5 G/DL
RBC # BLD AUTO: 4.3 M/UL
SODIUM SERPL-SCNC: 145 MMOL/L
VANCOMYCIN TROUGH SERPL-MCNC: 14.9 UG/ML
WBC # BLD AUTO: 15.4 K/UL

## 2018-12-18 PROCEDURE — G8996 SWALLOW CURRENT STATUS: HCPCS | Mod: CN

## 2018-12-18 PROCEDURE — 92610 EVALUATE SWALLOWING FUNCTION: CPT

## 2018-12-18 PROCEDURE — 25000003 PHARM REV CODE 250: Performed by: INTERNAL MEDICINE

## 2018-12-18 PROCEDURE — 92611 MOTION FLUOROSCOPY/SWALLOW: CPT

## 2018-12-18 PROCEDURE — 36415 COLL VENOUS BLD VENIPUNCTURE: CPT

## 2018-12-18 PROCEDURE — 63600175 PHARM REV CODE 636 W HCPCS: Performed by: INTERNAL MEDICINE

## 2018-12-18 PROCEDURE — 85025 COMPLETE CBC W/AUTO DIFF WBC: CPT

## 2018-12-18 PROCEDURE — 97802 MEDICAL NUTRITION INDIV IN: CPT

## 2018-12-18 PROCEDURE — 27000221 HC OXYGEN, UP TO 24 HOURS

## 2018-12-18 PROCEDURE — 12000002 HC ACUTE/MED SURGE SEMI-PRIVATE ROOM

## 2018-12-18 PROCEDURE — 80053 COMPREHEN METABOLIC PANEL: CPT

## 2018-12-18 PROCEDURE — G8997 SWALLOW GOAL STATUS: HCPCS | Mod: CM

## 2018-12-18 PROCEDURE — 31720 CLEARANCE OF AIRWAYS: CPT

## 2018-12-18 PROCEDURE — C9113 INJ PANTOPRAZOLE SODIUM, VIA: HCPCS | Performed by: INTERNAL MEDICINE

## 2018-12-18 PROCEDURE — 94761 N-INVAS EAR/PLS OXIMETRY MLT: CPT

## 2018-12-18 PROCEDURE — 99900035 HC TECH TIME PER 15 MIN (STAT)

## 2018-12-18 PROCEDURE — 80202 ASSAY OF VANCOMYCIN: CPT

## 2018-12-18 RX ORDER — PANTOPRAZOLE SODIUM 40 MG/1
40 TABLET, DELAYED RELEASE ORAL DAILY
Status: DISCONTINUED | OUTPATIENT
Start: 2018-12-18 | End: 2018-12-18

## 2018-12-18 RX ORDER — ONDANSETRON 2 MG/ML
4 INJECTION INTRAMUSCULAR; INTRAVENOUS EVERY 6 HOURS PRN
Status: DISCONTINUED | OUTPATIENT
Start: 2018-12-18 | End: 2019-01-11 | Stop reason: HOSPADM

## 2018-12-18 RX ORDER — PANTOPRAZOLE SODIUM 40 MG/10ML
40 INJECTION, POWDER, LYOPHILIZED, FOR SOLUTION INTRAVENOUS DAILY
Status: DISCONTINUED | OUTPATIENT
Start: 2018-12-18 | End: 2019-01-03

## 2018-12-18 RX ADMIN — VANCOMYCIN HYDROCHLORIDE 1000 MG: 1 INJECTION, POWDER, LYOPHILIZED, FOR SOLUTION INTRAVENOUS at 12:12

## 2018-12-18 RX ADMIN — VANCOMYCIN HYDROCHLORIDE 1000 MG: 1 INJECTION, POWDER, LYOPHILIZED, FOR SOLUTION INTRAVENOUS at 11:12

## 2018-12-18 RX ADMIN — PANTOPRAZOLE SODIUM 40 MG: 40 INJECTION, POWDER, LYOPHILIZED, FOR SOLUTION INTRAVENOUS at 02:12

## 2018-12-18 RX ADMIN — PIPERACILLIN SODIUM AND TAZOBACTAM SODIUM 4.5 G: 4; .5 INJECTION, POWDER, LYOPHILIZED, FOR SOLUTION INTRAVENOUS at 03:12

## 2018-12-18 RX ADMIN — PIPERACILLIN SODIUM AND TAZOBACTAM SODIUM 4.5 G: 4; .5 INJECTION, POWDER, LYOPHILIZED, FOR SOLUTION INTRAVENOUS at 05:12

## 2018-12-18 RX ADMIN — PIPERACILLIN SODIUM AND TAZOBACTAM SODIUM 4.5 G: 4; .5 INJECTION, POWDER, LYOPHILIZED, FOR SOLUTION INTRAVENOUS at 11:12

## 2018-12-18 NOTE — PLAN OF CARE
Problem: Oral Intake Inadequate  Goal: Improved Oral Intake    Intervention: Promote and Optimize Oral Intake  Interventions: Texture modified diet OR enteral nutrition therapy      Recommendation:   1) Advance diet once medically able to regular, texture per SLP   2) If unable to advance diet in < 72 hours rec. initiate TF via NGT :      -Nutren 1.5 @ 15 ml/hr advancing to goal of 45 ml/hr + 30 ml flush q 4 hr for tube patency  until NS d/c'd (Provides 820 ml free water, 1620 kcal (92% EEN), 73 g protien (100% EPN))     Goals: 1) Advance PO diet or initiate TF in < 72 hours  Nutrition Goal Status: new  Communication of RD Recs: (POC, sticky note)

## 2018-12-18 NOTE — PLAN OF CARE
Problem: Adult Inpatient Plan of Care  Goal: Plan of Care Review  Shift goals discussed with patient with time given for questions and answers.    Comments: Safety maintained, rested well, productive cough noted. Bath completed.

## 2018-12-18 NOTE — PROGRESS NOTES
Pt taken down for MBSS, now back in room. According to ST pt failed and to keep him NPO. Dr. Salomon paged to notify her of this.    Wilman Krueger RN

## 2018-12-18 NOTE — PROGRESS NOTES
Pt's O2 sat 82% on 4L NC while asleep. Pt awoken and told to take some deep breaths. Also coughed and secretions suctioned. O2 increased to 6L NC. Pt's O2 sat 92%. Dr. Salmoon in unit, aware of above. MD estrada w/ sat 92% d/t COPD hx. She stated to keep pt here for now and re-eval later this evening.    Wilman Krueger RN

## 2018-12-18 NOTE — PLAN OF CARE
12/17/18 1924   Patient Assessment/Suction   Level of Consciousness (AVPU) responds to voice   Respiratory Effort Unlabored   Expansion/Accessory Muscles/Retractions no retractions;no use of accessory muscles   All Lung Fields Breath Sounds diminished   Rhythm/Pattern, Respiratory unlabored   Cough Frequency infrequent   Cough Type nonproductive   PRE-TX-O2-ETCO2   O2 Device (Oxygen Therapy) nasal cannula   Flow (L/min) 4   SpO2 (!) 92 %   Pulse Oximetry Type Continuous   Pulse 71   Resp 18   BP (!) 98/50   Aerosol Therapy   $ Aerosol Therapy Charges PRN treatment not required

## 2018-12-18 NOTE — PROGRESS NOTES
[facility] Hospital Medicine  Progress Note    Patient Name: Enmanuel Armenta  MRN: [id number]  Patient Class: IP- Inpatient   Admission Date: 12/17/2018  Length of Stay: 1 days  Attending Physician: Stephanie Tena MD  Primary Care Provider: Primary Doctor No        Subjective:     Principal Problem:Aspiration pneumonia    HPI:  No notes on file    Hospital Course:  No notes on file    Interval History: The pt is doing well. He's producing a lot of sputum. No hematemesis. He is very hungry    Review of Systems   Constitutional: Negative for activity change, appetite change, chills and fever.   HENT: Negative for congestion and mouth sores.    Respiratory: Positive for cough. Negative for chest tightness, shortness of breath and wheezing.    Cardiovascular: Negative.  Negative for chest pain, palpitations and leg swelling.   Gastrointestinal: Negative.  Negative for abdominal distention, constipation, diarrhea, nausea and vomiting.   Genitourinary: Negative for dysuria and frequency.   Musculoskeletal: Negative.  Negative for arthralgias, back pain and gait problem.   Skin: Negative.  Negative for rash and wound.   Neurological: Negative for dizziness, facial asymmetry and numbness.   Psychiatric/Behavioral: Negative for agitation, behavioral problems and confusion.     Objective:     Vital Signs (Most Recent):  Temp: 99.1 °F (37.3 °C) (12/18/18 0730)  Pulse: 77 (12/18/18 0730)  Resp: 20 (12/18/18 0730)  BP: 123/60 (12/18/18 0730)  SpO2: (!) 93 % (12/18/18 0730) Vital Signs (24h Range):  Temp:  [97.9 °F (36.6 °C)-101.5 °F (38.6 °C)] 99.1 °F (37.3 °C)  Pulse:  [] 77  Resp:  [14-62] 20  SpO2:  [83 %-99 %] 93 %  BP: ()/(50-97) 123/60     Weight: 71.1 kg (156 lb 12 oz)  Body mass index is 24.55 kg/m².    Intake/Output Summary (Last 24 hours) at 12/18/2018 0802  Last data filed at 12/18/2018 0600  Gross per 24 hour   Intake 233.33 ml   Output 2250 ml   Net -2016.67 ml      Physical Exam    Constitutional: He is oriented to person, place, and time. He appears well-developed and well-nourished.   Cardiovascular: Normal rate, regular rhythm, normal heart sounds and intact distal pulses. Exam reveals no gallop and no friction rub.   No murmur heard.  Pulmonary/Chest: Effort normal and breath sounds normal. No respiratory distress. He has no rales.   Right sided rales   Abdominal: Soft. Bowel sounds are normal. He exhibits no distension. There is no tenderness. There is no rebound.   Musculoskeletal: Normal range of motion. He exhibits no edema.   Neurological: He is alert and oriented to person, place, and time. No cranial nerve deficit or sensory deficit. He exhibits normal muscle tone. Coordination normal.   Skin: Skin is warm and dry.   Psychiatric: He has a normal mood and affect. His behavior is normal. Judgment and thought content normal.   Vitals reviewed.      Significant Labs:   CBC:   Recent Labs   Lab 12/17/18  0943 12/18/18  0337   WBC 18.90* 15.40*   HGB 13.7* 12.6*   HCT 42.6 39.1*    360*     CMP:   Recent Labs   Lab 12/17/18  0943   *   K 3.9      CO2 27   *   BUN 11   CREATININE 0.8   CALCIUM 10.2   PROT 7.1   ALBUMIN 3.4*   BILITOT 0.5   ALKPHOS 297*   AST 63*   ALT 70*   ANIONGAP 10   EGFRNONAA >60       Significant Imaging: I have reviewed all pertinent imaging results/findings within the past 24 hours.    Assessment/Plan:      * Aspiration pneumonia, RLL    Continue Zosyn and vanc  Transfer to floor  Speech eval       Upper GI bleeding    EGD showed inflammation and GERD. Change to po protonix and start a diet       UTI (urinary tract infection)    Abnormal U/A in a pt with an indwelling armas cath. Culture is pending. On Abx       Elevated LFTs    2/2 meds? Fatty liver? Viral hepatitis panel is pending       Muscular dystrophy    Stable       Chronic obstructive pulmonary disease    Continue O2 and nebs         VTE Risk Mitigation (From admission, onward)     None          Critical care time spent on the evaluation and treatment of severe organ dysfunction, review of pertinent labs and imaging studies, discussions with consulting providers and discussions with patient/family: 30 minutes.    Sunshine Salomon MD  Department of Hospital Medicine   [facility]

## 2018-12-18 NOTE — PROGRESS NOTES
Pt transferred to 3rd floor room 320 via bed, NAD noted. Report given to Marleny SOLER.    Wilman Krueger RN

## 2018-12-18 NOTE — UM SECONDARY REVIEW
Physician Advisor External    Level of Care Issue    Approved Inpatient for admit 12/17/18 per dr cooney at [facility]

## 2018-12-18 NOTE — PLAN OF CARE
12/18/18 0721   Patient Assessment/Suction   Level of Consciousness (AVPU) alert   Respiratory Effort Normal;Unlabored   Expansion/Accessory Muscles/Retractions no use of accessory muscles;no retractions;expansion symmetric   All Lung Fields Breath Sounds coarse   Rhythm/Pattern, Respiratory pattern regular;depth regular;unlabored   Cough Frequency infrequent   Cough Type good;nonproductive   PRE-TX-O2-ETCO2   O2 Device (Oxygen Therapy) nasal cannula   $ Is the patient on Low Flow Oxygen? Yes   Flow (L/min) 4   SpO2 99 %   Pulse Oximetry Type Continuous   $ Pulse Oximetry - Multiple Charge Pulse Oximetry - Multiple   Pulse 77   Resp (!) 23   /62   Aerosol Therapy   $ Aerosol Therapy Charges PRN treatment not required       Aerosol treatments PRN.

## 2018-12-18 NOTE — ANESTHESIA POSTPROCEDURE EVALUATION
"Anesthesia Post Evaluation    Patient: Enmanuel Armenta    Procedure(s) Performed: Procedure(s) (LRB):  EGD (ESOPHAGOGASTRODUODENOSCOPY) (N/A)    Final Anesthesia Type: general  Patient location during evaluation: PACU  Patient participation: Yes- Able to Participate  Level of consciousness: awake and alert  Post-procedure vital signs: reviewed and stable  Pain management: adequate  Airway patency: patent  PONV status at discharge: No PONV  Anesthetic complications: no      Cardiovascular status: hemodynamically stable  Respiratory status: unassisted and nasal cannula  Hydration status: euvolemic  Follow-up not needed.        Visit Vitals  BP (!) 123/58   Pulse 84   Temp 37.7 °C (99.9 °F) (Axillary)   Resp (!) 23   Ht 5' 7" (1.702 m)   Wt 71.1 kg (156 lb 12 oz)   SpO2 (!) 94%   BMI 24.55 kg/m²       Pain/Pedro Score: No Data Recorded      "

## 2018-12-18 NOTE — PROCEDURES
Modified Barium Swallow    Patient Name:  Enmanuel Armenta   MRN:  [id number]      Recommendations:     Recommendations:    Diet recommendations:  NPO, NPO(x ice chips following oral care)   Aspiration Precautions: Frequent oral care   General Precautions: Standard, aspiration, fall, vision impaired  Communication strategies:  cue patient to reduce rate of speech    Referral     Reason for Referral  Patient was referred for a Modified Barium Swallow Study to assess the efficiency of his/her swallow function, rule out aspiration and make recommendations regarding safe dietary consistencies, effective compensatory strategies, and safe eating environment.     Pt seen in ICU this AM for clinical swallow evaluation ([id number]). He is awake and mostly cooperative. Severe dysarthria with poor speech intelligibility (increased ROS and imprecise articulation). He is a poor historian with no family at bedside. He denies dysphagia. He appears to be having difficulty managing secretions. Yankauer at bedside with frequent coughing noted prior to PO trials. Pt with h/o muscular dystrophy and recurrent PNA. ST unable to locate diet at NH. He was given ice chips and dipped tsp of nectar thick liquids x2 with delayed cough and wet vocal quality noted. An MBSS was recommended for further evaluation prior to initiation of PO diet.     Diagnosis: Aspiration pneumonia       History:     Past Medical History:   Diagnosis Date    BPH (benign prostatic hyperplasia)     COPD (chronic obstructive pulmonary disease)     Elevated PSA     Fx     RIGHT WRIST, LEFT FOOT    Hypokalemia     Muscular dystrophy     Urine retention     Wears dentures     upper       Objective:     Current Respiratory Status: 12/18/18    Alert: yes    Cooperative: inconsistent. Presents with some degree of cognitive impairment. Unsure of patients baseline.     Follows Directions: Follows most simpel commands.     Visualization  · Patient was seen in the  lateral view    Oral Peripheral Examination  · Oral Musculature: general weakness(droopy eyelids (ptosis in pt with muscular dystrophy?))  · Dentition: (no upper dentition. few lower; poor condition)  · Secretion Management: coughing on secretions  · Mucosal Quality: good  · Mandibular Strength and Mobility: WFL  · Oral Labial Strength and Mobility: WFL  · Velar Elevation: impaired  · Volitional Cough: appears somewhat weak, however, productive with yankeur at bedside  · Volitional Swallow: able to palpate laryngeal movment; judged to be reduced  · Voice Prior to PO Intake: wet  · Oral Musculature Comments: blood noted to posterior pharyngeal wall           12/18/18 1338   Speech Time Calculation   Speech Start Time 1038  (1130)   Speech Stop Time 1052  (1200)   Speech Total (min) 14 min   General Information   SLP Treatment Date 12/18/18   Chest X-ray results right basilar infiltrate.   Current Respiratory Status nasal cannula   General Precautions aspiration;fall;vision impaired   Current Diet   Current Diet Textures NPO   Current Liquid Consistencies NPO   Subjective   Patient states Denies dysphagia   Family states No family at bedside.    Oral Musculature Evaluation   Oral Musculature general weakness  (droopy eyelids (ptosis in pt with muscular dystrophy?))   Dentition (no upper dentition. few lower; poor condition)   Secretion Management coughing on secretions   Mucosal Quality good   Mandibular Strength and Mobility WFL   Oral Labial Strength and Mobility WFL   Velar Elevation impaired   Volitional Cough appears somewhat weak, however, productive with Yankauer at bedside   Volitional Swallow able to palpate laryngeal movement; judged to be reduced   Voice Prior to PO Intake wet   Oral Musculature Comments blood noted to posterior pharyngeal wall       MBS Eval: Thin Liquid Trial   Mode of Presentation, Thin Liquid spoon   Oral Prep/Phase, Thin Liquid decreased bolus control;premature spillage   Oral Residue,  Thin Liquid back posterior tongue   Pharyngeal Phase, Thin Liquid decreased base of tongue retraction;decreased pharyngeal wall contraction;no epiglottic inversion;premature spillage;pyriform sinuses stasis;reduction in laryngeal elevation;reduced hyolaryngeal excursion;vallecular stasis;vallecular pooling   Rosenbeck's 8-Point Penetration-Aspiration Scale, Thin Liquids (5) Material enters the airway, contacts the vocal fold, and is not ejected from the airway.   Esophageal Phase, Thin decreased UES opening       MBS Eval: Nectar Thick Liquid Trial   Mode of Presentation, Nectar spoon   Volume of Nectar Presented dipped tsp, 1/2 tsp   Oral Prep/Phase, Nectar decreased bolus control;premature spillage   Oral Residue, Nectar back posterior tongue   Pharyngeal Phase, Nectar impaired;decreased base of tongue retraction;decreased pharyngeal wall contraction;no epiglottic inversion;pyriform sinuses stasis;reduction in laryngeal elevation;reduced hyolaryngeal excursion;vallecular stasis;vallecular pooling;wet vocal quality after swallow   Rosenbeck's 8-Point Penetration-Aspiration Scale, Nectar Thick Liquids (1) Material does not enter airway.   Esophageal Phase, Nectar decreased UES opening       MBS Eval: Pureed Trial   Mode of Presentation, Puree spoon   Oral Prep/Phase, Puree slow oral transit time   Oral Residue, Puree back posterior tongue   Pharyngeal Phase, Puree impaired;decreased base of tongue retraction;decreased pharyngeal wall contraction;no epiglottic inversion;pyriform sinuses stasis;reduction in laryngeal elevation;reduced hyolaryngeal excursion;vallecular stasis   Rosenbeck's 8-Point Penetration-Aspiration Scale, Pureed (1) Material does not enter airway.   Esophageal Phase, Pureed decreased UES opening   Recommendations   Solid Diet Level NPO   Liquid Diet Level NPO  (x ice chips following oral care)   SLP Follow-up   SLP Follow-up? Yes   SLP - Next Visit Date 12/19/18   Treatment/Billable Minutes   Eval  Swallow and Oral Function 14   Motion Fluoro Swallow, Cine/Vid 30   Total Time 44       Cervical Esophageal Phase  · Decreased UES opening most likely 2/2 reduced laryngeal elevation    Assessment:     Impressions  ·  Pt presents with mild oral and severe pharyngeal dysphagia c/b generalized weakness of pharyngeal musculature, poor laryngeal elevation, hyolaryngeal excursion, BOT retraction. Pt exhibited severe valleculae and pyriform sinus stasis with all consistencies with inability to clear despite cued subsequent swallows. Deep, silent, penetration of thin liquids via tsp was noted. He is judged to be at HIGH risk for aspiration after the swallow 2/2 BOT, valleculae and pyriform sinus residue. Limited PO trials 2/2 severity of deficits/residue. Excessive coughing noted upon completion of study. See foresheet above for further details.     Prognosis: Fair 2/2 muscular dystrophy and cognition    Barriers:  · Cognitive status    Plan  Will follow. Pt may benefit from full SLP evaluation; unknown baseline.     Education  Results discussed with Nsg and pt. Pt demonstrated poor understanding and would benefit from continued education.     Goals:   Multidisciplinary Problems     SLP Goals        Problem: SLP Goal    Goal Priority Disciplines Outcome   SLP Goal     SLP                    Plan:   · Patient to be seen:      · Plan of Care expires:     · Plan of Care reviewed with:  patient        Discharge recommendations:  nursing facility, skilled       Time Tracking:   SLP Treatment Date:   12/18/18  Speech Start Time:  1038(1130)  Speech Stop Time:  1052(1200)     Speech Total Time (min):  14 min    Savannah Fitzgerald CCC-SLP  12/18/2018

## 2018-12-18 NOTE — PLAN OF CARE
Problem: SLP Goal  Goal: SLP Goal    MBSS completed. Pt presents with mild oral/severe pharyngeal dysphagia. REC NPO, including meds, with alternative means of nutrition/hydration. See Procedures for further details.     Savannah Fitzgerald MS, CCC/SLP 12/18/2018

## 2018-12-18 NOTE — CONSULTS
"  [facility]  Adult Nutrition  Consult Note    SUMMARY   Interventions: Texture modified diet OR enteral nutrition therapy     Recommendation:   1) Advance diet once medically able to regular, texture per SLP   2) If unable to advance diet in < 72 hours rec. initiate TF via NGT :     -Nutren 1.5 @ 15 ml/hr advancing to goal of 45 ml/hr + 30 ml flush q 4 hr for tube patency  until NS d/c'd (Provides 820 ml free water, 1620 kcal (92% EEN), 73 g protien (100% EPN))    Goals: 1) Advance PO diet or initiate TF in < 72 hours  Nutrition Goal Status: new  Communication of RD Recs: (POC, sticky note)    Reason for Assessment    Reason For Assessment: consult  Diagnosis: (SOB)  Relevant Medical History: COPD, muscular dystrophy, PSA, BPH, limited mobility with walker, NH resident  Interdisciplinary Rounds: attended    General Information Comments: 62 y/o male with muscular dystrophy from nursing home, with SOB, UTI, and aspiration PNA. Per SLP, rec. to keep pt NPO for now. No GIB per GI, only GED, inflammation, and stomach polyp. At time of visit pt was very drowsy and hard to understand, confused per RN. Unable to get accurate diet history. Attempted diet education for GERD. NPO x 2 day. NFPE done, mild-moderate muscle/fat wasting seen.     Nutrition Discharge Planning: To be determined- ? need for nutrition support    Nutrition Risk Screen    Nutrition Risk Screen: dysphagia or difficulty swallowing    Nutrition/Diet History    Spiritual, Cultural Beliefs, Sikh Practices, Values that Affect Care: no  Food Allergies: NKFA(or intolerances)  Factors Affecting Nutritional Intake: NPO    Anthropometrics    Temp: 99.9 °F (37.7 °C)  Height Method: Measured  Height: 5' 7"(Office 7/13/18)  Height (inches): 67 in  Weight Method: Bed Scale  Weight: 71.1 kg (156 lb 12 oz)  Weight (lb): 156.75 lb  Ideal Body Weight (IBW), Male: 148 lb  % Ideal Body Weight, Male (lb): 105.91 lb  BMI (Calculated): 24.6  BMI Grade: " 25 - 29.9 - overweight  Usual Body Weight (UBW), k kg(3/21/17)  % Usual Body Weight: 111.33  % Weight Change From Usual Weight: 11.09 %       Lab/Procedures/Meds    Pertinent Labs Reviewed: reviewed  BMP  Lab Results   Component Value Date     (H) 2018    K 3.9 2018     2018    CO2 27 2018    BUN 11 2018    CREATININE 0.8 2018    CALCIUM 10.2 2018    ANIONGAP 10 2018    ESTGFRAFRICA >60 2018    EGFRNONAA >60 2018     Lab Results   Component Value Date    ALBUMIN 3.4 (L) 2018     LFTs elevated so Albumin likely inaccurate    Pertinent Medications Reviewed: reviewed  Pertinent Medications Comments: zofran, NS @ 100 ml/hr    Physical Findings/Assessment         Estimated/Assessed Needs    Weight Used For Calorie Calculations: 71.1 kg (156 lb 12 oz)  Energy Calorie Requirements (kcal): [place] Jeor x 1.2 = 1745 kcal  Energy Need Method: [place] Jeor  Protein Requirements: 1.0-1.2 g protein (muscle loss)= 71-85 g protein  Weight Used For Protein Calculations: 71.1 kg (156 lb 12 oz)  Fluid Requirements (mL): 1750 ml  Estimated Fluid Requirement Method: RDA Method  CHO Requirement: N/A      Nutrition Prescription Ordered    Current Diet Order: NPO  Nutrition Order Comments: Was cardiac this morning (did not eat anything/unsafe to eat per RN)    Evaluation of Received Nutrient/Fluid Intake    Energy Calories Required: not meeting needs  Protein Required: not meeting needs  Fluid Required: meeting needs  Comments: NS @ 100 ml/hr  Tolerance: other (see comments)(NPO)  % Intake of Estimated Energy Needs: 0%  % Meal Intake: 0%    Nutrition Risk    Level of Risk/Frequency of Follow-up: moderate - high 2 x weekly     Assessment and Plan    Inadequate energy intake  R/t NPO and difficulty chewing/swallowing  As evidenced by NPO x 2 days and moderate muscle/fat loss @ temples and pattellar area/mild muscle/fat loss @ orbital area, bicep,  clavicle, and calves  New        Monitor and Evaluation    Food and Nutrient Intake: energy intake  Food and Nutrient Adminstration: diet order, enteral and parenteral nutrition administration  Physical Activity and Function: nutrition-related ADLs and IADLs  Anthropometric Measurements: weight  Biochemical Data, Medical Tests and Procedures: electrolyte and renal panel  Nutrition-Focused Physical Findings: overall appearance     Malnutrition Assessment     Skin (Micronutrient): (Man = 15, no PI noted)  Nails (Micronutrient): none  Hair/Scalp (Micronutrient): none  Eyes (Micronutrient): none  Gums (Micronutrient): none  Lips/Mucous Membranes (Micronutrient): none  Teeth (Micronutrient): (Missing teeth, has dentures at home)  Tongue (Micronutrient): none  Neck/Chest (Micronutrient): none, other (see comments)  Musculoskeletal/Lower Extremities: none   Micronutrient Evaluation: no deficiencies   Subcutaneous Fat (Malnutrition): mild depletion  Muscle Mass (Malnutrition): moderate depletion   Orbital Region (Subcutaneous Fat Loss): mild depletion  Upper Arm Region (Subcutaneous Fat Loss): mild depletion  Thoracic and Lumbar Region: well nourished   Pentecostalism Region (Muscle Loss): moderate depletion  Clavicle Bone Region (Muscle Loss): mild depletion  Scapular Bone Region (Muscle Loss): well nourished  Patellar Region (Muscle Loss): moderate depletion  Posterior Calf Region (Muscle Loss): mild depletion   Edema (Fluid Accumulation): 0-->no edema present   Subcutaneous Fat Loss (Final Summary): mild protein-calorie malnutrition  Muscle Loss Evaluation (Final Summary): moderate protein-calorie malnutrition         Nutrition Follow-Up    RD Follow-up?: Yes

## 2018-12-19 LAB
ALBUMIN SERPL BCP-MCNC: 2.3 G/DL
ALP SERPL-CCNC: 174 U/L
ALT SERPL W/O P-5'-P-CCNC: 36 U/L
ANION GAP SERPL CALC-SCNC: 8 MMOL/L
AST SERPL-CCNC: 27 U/L
BASOPHILS NFR BLD: 2 %
BILIRUB SERPL-MCNC: 0.5 MG/DL
BUN SERPL-MCNC: 4 MG/DL
CALCIUM SERPL-MCNC: 8.9 MG/DL
CHLORIDE SERPL-SCNC: 109 MMOL/L
CO2 SERPL-SCNC: 26 MMOL/L
CREAT SERPL-MCNC: 0.6 MG/DL
DIFFERENTIAL METHOD: ABNORMAL
EOSINOPHIL NFR BLD: 0 %
ERYTHROCYTE [DISTWIDTH] IN BLOOD BY AUTOMATED COUNT: 14.3 %
EST. GFR  (AFRICAN AMERICAN): >60 ML/MIN/1.73 M^2
EST. GFR  (NON AFRICAN AMERICAN): >60 ML/MIN/1.73 M^2
GLUCOSE SERPL-MCNC: 92 MG/DL
HCT VFR BLD AUTO: 39.1 %
HGB BLD-MCNC: 12.6 G/DL
LYMPHOCYTES NFR BLD: 14 %
MCH RBC QN AUTO: 29.4 PG
MCHC RBC AUTO-ENTMCNC: 32.2 G/DL
MCV RBC AUTO: 91 FL
MONOCYTES NFR BLD: 5 %
NEUTROPHILS NFR BLD: 79 %
PLATELET # BLD AUTO: 250 K/UL
PLATELET BLD QL SMEAR: ABNORMAL
PMV BLD AUTO: 8.8 FL
POTASSIUM SERPL-SCNC: 2.9 MMOL/L
PROT SERPL-MCNC: 5.5 G/DL
RBC # BLD AUTO: 4.29 M/UL
SODIUM SERPL-SCNC: 143 MMOL/L
WBC # BLD AUTO: 13.1 K/UL

## 2018-12-19 PROCEDURE — 63600175 PHARM REV CODE 636 W HCPCS: Performed by: INTERNAL MEDICINE

## 2018-12-19 PROCEDURE — C9113 INJ PANTOPRAZOLE SODIUM, VIA: HCPCS | Performed by: INTERNAL MEDICINE

## 2018-12-19 PROCEDURE — 25000003 PHARM REV CODE 250: Performed by: INTERNAL MEDICINE

## 2018-12-19 PROCEDURE — 94760 N-INVAS EAR/PLS OXIMETRY 1: CPT

## 2018-12-19 PROCEDURE — 85007 BL SMEAR W/DIFF WBC COUNT: CPT

## 2018-12-19 PROCEDURE — 36415 COLL VENOUS BLD VENIPUNCTURE: CPT

## 2018-12-19 PROCEDURE — 99232 SBSQ HOSP IP/OBS MODERATE 35: CPT | Mod: ,,, | Performed by: INTERNAL MEDICINE

## 2018-12-19 PROCEDURE — 27000221 HC OXYGEN, UP TO 24 HOURS

## 2018-12-19 PROCEDURE — 80053 COMPREHEN METABOLIC PANEL: CPT

## 2018-12-19 PROCEDURE — 99232 PR SUBSEQUENT HOSPITAL CARE,LEVL II: ICD-10-PCS | Mod: ,,, | Performed by: INTERNAL MEDICINE

## 2018-12-19 PROCEDURE — 12000002 HC ACUTE/MED SURGE SEMI-PRIVATE ROOM

## 2018-12-19 PROCEDURE — 85027 COMPLETE CBC AUTOMATED: CPT

## 2018-12-19 PROCEDURE — 99900035 HC TECH TIME PER 15 MIN (STAT)

## 2018-12-19 RX ORDER — POTASSIUM CHLORIDE 7.45 MG/ML
10 INJECTION INTRAVENOUS
Status: COMPLETED | OUTPATIENT
Start: 2018-12-19 | End: 2018-12-19

## 2018-12-19 RX ADMIN — PIPERACILLIN SODIUM AND TAZOBACTAM SODIUM 4.5 G: 4; .5 INJECTION, POWDER, LYOPHILIZED, FOR SOLUTION INTRAVENOUS at 02:12

## 2018-12-19 RX ADMIN — PIPERACILLIN SODIUM AND TAZOBACTAM SODIUM 4.5 G: 4; .5 INJECTION, POWDER, LYOPHILIZED, FOR SOLUTION INTRAVENOUS at 09:12

## 2018-12-19 RX ADMIN — VANCOMYCIN HYDROCHLORIDE 1250 MG: 750 INJECTION, POWDER, LYOPHILIZED, FOR SOLUTION INTRAVENOUS at 12:12

## 2018-12-19 RX ADMIN — POTASSIUM CHLORIDE 10 MEQ: 7.46 INJECTION, SOLUTION INTRAVENOUS at 11:12

## 2018-12-19 RX ADMIN — VANCOMYCIN HYDROCHLORIDE 1250 MG: 750 INJECTION, POWDER, LYOPHILIZED, FOR SOLUTION INTRAVENOUS at 01:12

## 2018-12-19 RX ADMIN — POTASSIUM CHLORIDE 10 MEQ: 7.46 INJECTION, SOLUTION INTRAVENOUS at 01:12

## 2018-12-19 RX ADMIN — PANTOPRAZOLE SODIUM 40 MG: 40 INJECTION, POWDER, LYOPHILIZED, FOR SOLUTION INTRAVENOUS at 09:12

## 2018-12-19 RX ADMIN — POTASSIUM CHLORIDE 10 MEQ: 7.46 INJECTION, SOLUTION INTRAVENOUS at 04:12

## 2018-12-19 RX ADMIN — POTASSIUM CHLORIDE 10 MEQ: 7.46 INJECTION, SOLUTION INTRAVENOUS at 03:12

## 2018-12-19 RX ADMIN — PIPERACILLIN SODIUM AND TAZOBACTAM SODIUM 4.5 G: 4; .5 INJECTION, POWDER, LYOPHILIZED, FOR SOLUTION INTRAVENOUS at 06:12

## 2018-12-19 NOTE — NURSING TRANSFER
Nursing Transfer Note      12/18/2018     Transfer from ICU to 320    Transfer via bed    Transfer with armas catheter, PIV, fall risk    Transported by [facility]; [facility]    Medicines sent: n/a    Chart send with patient: YES    Upon arrival to floor:   Oriented to room; suction set up at bedside. PIV CDI and SL. Armas catheter draining clear yellow urine. Pain 2/10; voices no other complaints. VSS. Bed low, brakes locked, SRupx3, call light placed in patient's hand. Will continue to monitor.

## 2018-12-19 NOTE — PROGRESS NOTES
Progress Note  Hospital Medicine  Patient Name:Enmanuel Armenta  MRN:  [id number]  Patient Class: IP- Inpatient  Admit Date: 12/17/2018  Length of Stay: 2 days  Expected Discharge Date:   Attending Physician: Stephanie Tena MD  Primary Care Provider:  Primary Doctor No    SUBJECTIVE:     Principal Problem: Aspiration pneumonia  Initial history of present illness: Patient is a 63 y.o. male admitted to Hospitalist Service from [facility] Emergency Room with complaint of abdominal pain. Patient is a resident of [facility] nursing home, [place]. Patient reportedly has past medical history significant for COPD, muscular dystrophy, l;imited mobility with walker and history of elevated PSA. Patient presented with 1 day history of abdominal pain associated with nausea and vomiting. The EMS reported, vomiting a brown sticky substance with pale red streaks throughout. EMS gave him 4 mg of Zofran in route to the ED. Patient denied chest pain, shortness of breath, headache, vision changes, focal neuro-deficits, cough. Patient noted to have 101.5F upon arrival.     PMH/PSH/SH/FH/Meds: reviewed.    Symptoms/Review of Systems: Sleeping, not wanting to wake up and talk, failing swallow evaluation. No shortness of breath, cough, chest pain or headache, fever or abdominal pain.     Diet:  NPO  Activity level: Normal.    Pain:  Patient reports no pain.       OBJECTIVE:   Vital Signs (Most Recent):      Temp: 99.2 °F (37.3 °C) (12/19/18 0755)  Pulse: 75 (12/19/18 0755)  Resp: 16 (12/19/18 0755)  BP: (!) 151/68 (12/19/18 0755)  SpO2: (!) 94 % (12/19/18 0755)       Vital Signs Range (Last 24H):  Temp:  [97.6 °F (36.4 °C)-99.9 °F (37.7 °C)]   Pulse:  [66-92]   Resp:  [14-28]   BP: (115-151)/(58-84)   SpO2:  [87 %-95 %]     Weight: 71.1 kg (156 lb 12 oz)  Body mass index is 24.55 kg/m².    Intake/Output Summary (Last 24 hours) at 12/19/2018 1106  Last data filed at 12/19/2018 0600  Gross per 24 hour   Intake 2526.67 ml    Output 1250 ml   Net 1276.67 ml     Physical Examination:  General appearance: well developed, appears stated age, chronically ill-appearing  Head: normocephalic, atraumatic  Eyes:  conjunctivae/corneas clear. PERRL.  Nose: Nares normal. Septum midline.  Throat: lips, mucosa, and tongue normal; teeth and gums normal, no throat erythema. + Dry blood at angle of mouth  Neck: supple, symmetrical, trachea midline, no JVD and thyroid not enlarged, symmetric, no tenderness/mass/nodules  Lungs:  Scattered rhonchi B/L.  Chest wall: no tenderness  Heart: regular rate and rhythm, S1, S2 normal, no murmur, click, rub or gallop  Abdomen: soft, epigastric tenderness, non-distented; bowel sounds normal; no masses,  no organomegaly  Extremities: no cyanosis, clubbing or edema.   Pulses: 2+ and symmetric  Skin: Skin color, texture, turgor normal. No rashes or lesions.  Lymph nodes: Cervical, supraclavicular, and axillary nodes normal.  Neurologic: Normal strength and tone. No focal numbness or weakness. CNII-XII intact.      CBC:  Recent Labs   Lab 12/17/18  0943 12/18/18  0337 12/19/18  0451   WBC 18.90* 15.40* 13.10*   RBC 4.62 4.30* 4.29*   HGB 13.7* 12.6* 12.6*   HCT 42.6 39.1* 39.1*    360* 250   MCV 92 91 91   MCH 29.5 29.3 29.4   MCHC 32.0 32.3 32.2   BMP  Recent Labs   Lab 12/17/18  0943 12/18/18  2308 12/19/18  0451   * 90 92   * 145 143   K 3.9 3.1* 2.9*    110 109   CO2 27 25 26   BUN 11 5* 4*   CREATININE 0.8 0.6 0.6   CALCIUM 10.2 8.9 8.9      Diagnostic Results:  Microbiology Results (last 7 days)     Procedure Component Value Units Date/Time    Urine culture [[id number]] Collected:  12/17/18 1029    Order Status:  Completed Specimen:  Urine Updated:  12/18/18 2937     Urine Culture, Routine Multiple organisms isolated. None in predominance.  Repeat if     Urine Culture, Routine clinically necessary.    Narrative:       Preferred Collection Type->Urine, Clean Catch    Blood culture x two  cultures. Draw prior to antibiotics. [[id number]] Collected:  12/17/18 0943    Order Status:  Completed Specimen:  Blood from Peripheral, Left  Hand Updated:  12/18/18 2012     Blood Culture, Routine No Growth to date     Blood Culture, Routine No Growth to date    Narrative:       Aerobic and anaerobic    Blood culture x two cultures. Draw prior to antibiotics. [[id number]] Collected:  12/17/18 0958    Order Status:  Completed Specimen:  Blood from Antecubital, Right  Arm Updated:  12/18/18 2012     Blood Culture, Routine No Growth to date     Blood Culture, Routine No Growth to date    Narrative:       Aerobic and anaerobic    Urine culture [[id number]] Collected:  12/17/18 1542    Order Status:  Canceled Specimen:  Urine, Catheterized Updated:  12/17/18 1542         Chest X-Ray: T right basilar infiltrate.     CT abdomen and pelvis with contrast:  Enlarged prostate gland, air in the bladder likely secondary to catheterization, diffuse bladder wall thickening. Prominent amount of fluid within right colon.  Appendix not identified and note is made of limitation with motion artifact in the appendiceal region.    EGD:   - Moderately severe reflux esophagitis.                       - Medium-sized hiatal hernia.                       - Gastritis.                       - A single gastric polyp.                       - Normal examined duodenum.                       - No specimens collected.    Assessment/Plan:      SOB (shortness of breath) [R06.02]   Yes    Aspiration pneumonia, RLL [J69.0]  Swallow dysfunction  Supplemental O2 via nasal canula; titrate O2 saturation to >92%.   High aspiration risk. NPO as per ST recommendations.  Continue beta 2 agonist bronchodilator treatments.   Continue IV antibiotics - Vancomycin and Zosyn. Pharmacist to dose Vancomycin.   Check sputum GS and Cx.   Continue routine medications as before.   Failed swallow evaluation. Discussed with speech therapist. Attempting to reach to patient's  family to discuss alternate therapy.      Yes    Upper GI bleeding [K92.2]  Keep patient NPO.  Follow H/H closely. Type and screen blood and transfuse as needed.  Continue IV Protonix.  Continue IVF hydration.   Use IV anti-emetics as needed.       Yes    Muscular dystrophy [G71.00]  Supportive care, fall precautions.      UTI  Urine C/S. Continue IV antibiotics.      Yes    Chronic obstructive pulmonary disease [J44.9]  Patient's COPD is controlled currently. Continue scheduled inhalers and monitor respiratory status closely.    Hypokalemia  Replete KCl. Follow BMP.    Yes      DVT prophylaxis: Use SCD and TEDs. No anticoagulation due to GI bleding.          Stephanie Tena MD  Department of Hospital Medicine   [facility]

## 2018-12-19 NOTE — CONSULTS
Enmanuel Armenta [id number] is a 63 y.o. male who has been consulted for vancomycin dosing.    The patient has the following labs:     Date Creatinine (mg/dl)    BUN WBC Count   12/19/2018 Estimated Creatinine Clearance: 114.8 mL/min (based on SCr of 0.6 mg/dL). Lab Results   Component Value Date    BUN 5 (L) 12/18/2018     Lab Results   Component Value Date    WBC 15.40 (H) 12/18/2018        Current weight is 71.1 kg (156 lb 12 oz)    Pt is receiving vancomycin 1000 mg every 12 hours.  Vancomycin trough from 12/18 at 2308 was 14.9 mg/dL.  Target trough range is 15-20 mg/dL.   Trough was drawn /on time and anticipate it is subtherapeutic. Pharmacy will increase current dose.   The patient will be changed to a vancomycin dose of 1250 mg every 12 hours. A vancomycin trough has been ordered prior to 4th dose due 12/20 at 1230.      Patient will be followed by pharmacy for changes in renal function, toxicity, and efficacy.  Thank you for allowing us to participate in this patient's care.     Trisha Holman

## 2018-12-19 NOTE — PLAN OF CARE
I spoke to Stella with [facility] [phone]- the pt is FDC care with them however she checked his records and he also does not have any emergency contact information listed with them. Ivy Hernandez LMSW     12/19/18 1203   Discharge Assessment   Assessment Type Discharge Planning Reassessment

## 2018-12-19 NOTE — PLAN OF CARE
Problem: Adult Inpatient Plan of Care  Goal: Plan of Care Review  Outcome: Ongoing (interventions implemented as appropriate)  Plan of care reviewed with patient. IV fluids/ IV antibiotics given as per orders. SR on telemetry. Patient remains NPO during night. O2-4L per NC in use. Samson catheter intact & patent with clear yellow urine. Remains free from falls/injury. Instructed patient to call for assistance as needed during night. Verbalized understanding. Call light in reach. Bed in low & locked position. Bed alarm in use.

## 2018-12-20 LAB
ALBUMIN SERPL BCP-MCNC: 2.2 G/DL
ALLENS TEST: ABNORMAL
ALP SERPL-CCNC: 145 U/L
ALT SERPL W/O P-5'-P-CCNC: 27 U/L
ANION GAP SERPL CALC-SCNC: 6 MMOL/L
AST SERPL-CCNC: 23 U/L
BASOPHILS # BLD AUTO: 0 K/UL
BASOPHILS NFR BLD: 0.4 %
BILIRUB SERPL-MCNC: 0.5 MG/DL
BUN SERPL-MCNC: 5 MG/DL
CALCIUM SERPL-MCNC: 9 MG/DL
CHLORIDE SERPL-SCNC: 109 MMOL/L
CO2 SERPL-SCNC: 29 MMOL/L
CREAT SERPL-MCNC: 0.8 MG/DL
DELSYS: ABNORMAL
DIFFERENTIAL METHOD: ABNORMAL
EOSINOPHIL # BLD AUTO: 0.2 K/UL
EOSINOPHIL NFR BLD: 2 %
ERYTHROCYTE [DISTWIDTH] IN BLOOD BY AUTOMATED COUNT: 14.3 %
EST. GFR  (AFRICAN AMERICAN): >60 ML/MIN/1.73 M^2
EST. GFR  (NON AFRICAN AMERICAN): >60 ML/MIN/1.73 M^2
FIO2: 32
FLOW: 3
GLUCOSE SERPL-MCNC: 94 MG/DL
HCO3 UR-SCNC: 26.1 MMOL/L (ref 24–28)
HCT VFR BLD AUTO: 35.2 %
HGB BLD-MCNC: 11.5 G/DL
LYMPHOCYTES # BLD AUTO: 1.5 K/UL
LYMPHOCYTES NFR BLD: 18 %
MCH RBC QN AUTO: 29.7 PG
MCHC RBC AUTO-ENTMCNC: 32.7 G/DL
MCV RBC AUTO: 91 FL
MODE: ABNORMAL
MONOCYTES # BLD AUTO: 0.7 K/UL
MONOCYTES NFR BLD: 8.1 %
NEUTROPHILS # BLD AUTO: 6.1 K/UL
NEUTROPHILS NFR BLD: 71.5 %
PCO2 BLDA: 47.9 MMHG (ref 35–45)
PH SMN: 7.34 [PH] (ref 7.35–7.45)
PLATELET # BLD AUTO: 303 K/UL
PMV BLD AUTO: 8.6 FL
PO2 BLDA: 64 MMHG (ref 80–100)
POC BE: 0 MMOL/L
POC SATURATED O2: 91 % (ref 95–100)
POC TCO2: 28 MMOL/L (ref 23–27)
POTASSIUM SERPL-SCNC: 3.1 MMOL/L
PROT SERPL-MCNC: 5.3 G/DL
RBC # BLD AUTO: 3.88 M/UL
SAMPLE: ABNORMAL
SITE: ABNORMAL
SODIUM SERPL-SCNC: 144 MMOL/L
VANCOMYCIN TROUGH SERPL-MCNC: 28.7 UG/ML
WBC # BLD AUTO: 8.6 K/UL

## 2018-12-20 PROCEDURE — 25000242 PHARM REV CODE 250 ALT 637 W/ HCPCS: Performed by: INTERNAL MEDICINE

## 2018-12-20 PROCEDURE — 12000002 HC ACUTE/MED SURGE SEMI-PRIVATE ROOM

## 2018-12-20 PROCEDURE — 85025 COMPLETE CBC W/AUTO DIFF WBC: CPT

## 2018-12-20 PROCEDURE — G8979 MOBILITY GOAL STATUS: HCPCS | Mod: CI | Performed by: PHYSICAL THERAPIST

## 2018-12-20 PROCEDURE — 63600175 PHARM REV CODE 636 W HCPCS: Performed by: INTERNAL MEDICINE

## 2018-12-20 PROCEDURE — 94640 AIRWAY INHALATION TREATMENT: CPT

## 2018-12-20 PROCEDURE — 36415 COLL VENOUS BLD VENIPUNCTURE: CPT

## 2018-12-20 PROCEDURE — 25000003 PHARM REV CODE 250: Performed by: INTERNAL MEDICINE

## 2018-12-20 PROCEDURE — 99232 SBSQ HOSP IP/OBS MODERATE 35: CPT | Mod: ,,, | Performed by: INTERNAL MEDICINE

## 2018-12-20 PROCEDURE — 36600 WITHDRAWAL OF ARTERIAL BLOOD: CPT

## 2018-12-20 PROCEDURE — 99232 PR SUBSEQUENT HOSPITAL CARE,LEVL II: ICD-10-PCS | Mod: ,,, | Performed by: INTERNAL MEDICINE

## 2018-12-20 PROCEDURE — 80202 ASSAY OF VANCOMYCIN: CPT

## 2018-12-20 PROCEDURE — 97161 PT EVAL LOW COMPLEX 20 MIN: CPT | Performed by: PHYSICAL THERAPIST

## 2018-12-20 PROCEDURE — 82803 BLOOD GASES ANY COMBINATION: CPT

## 2018-12-20 PROCEDURE — 99900035 HC TECH TIME PER 15 MIN (STAT)

## 2018-12-20 PROCEDURE — C9113 INJ PANTOPRAZOLE SODIUM, VIA: HCPCS | Performed by: INTERNAL MEDICINE

## 2018-12-20 PROCEDURE — 80053 COMPREHEN METABOLIC PANEL: CPT

## 2018-12-20 PROCEDURE — G8978 MOBILITY CURRENT STATUS: HCPCS | Mod: CJ | Performed by: PHYSICAL THERAPIST

## 2018-12-20 RX ORDER — POTASSIUM CHLORIDE 7.45 MG/ML
10 INJECTION INTRAVENOUS
Status: COMPLETED | OUTPATIENT
Start: 2018-12-20 | End: 2018-12-20

## 2018-12-20 RX ADMIN — PANTOPRAZOLE SODIUM 40 MG: 40 INJECTION, POWDER, LYOPHILIZED, FOR SOLUTION INTRAVENOUS at 08:12

## 2018-12-20 RX ADMIN — LEUCINE, PHENYLALANINE, LYSINE, METHIONINE, ISOLEUCINE, VALINE, HISTIDINE, THREONINE, TRYPTOPHAN, ALANINE, GLYCINE, ARGININE, PROLINE, SERINE, TYROSINE, SODIUM ACETATE, DIBASIC POTASSIUM PHOSPHATE, MAGNESIUM CHLORIDE, SODIUM CHLORIDE, CALCIUM CHLORIDE, DEXTROSE
201; 154; 159; 110; 165; 160; 132; 116; 50; 570; 283; 316; 187; 138; 11; 217; 261; 51; 112; 33; 5 INJECTION INTRAVENOUS at 06:12

## 2018-12-20 RX ADMIN — POTASSIUM CHLORIDE 10 MEQ: 7.46 INJECTION, SOLUTION INTRAVENOUS at 01:12

## 2018-12-20 RX ADMIN — PIPERACILLIN SODIUM AND TAZOBACTAM SODIUM 4.5 G: 4; .5 INJECTION, POWDER, LYOPHILIZED, FOR SOLUTION INTRAVENOUS at 10:12

## 2018-12-20 RX ADMIN — POTASSIUM CHLORIDE 10 MEQ: 7.46 INJECTION, SOLUTION INTRAVENOUS at 12:12

## 2018-12-20 RX ADMIN — IPRATROPIUM BROMIDE AND ALBUTEROL SULFATE 3 ML: .5; 3 SOLUTION RESPIRATORY (INHALATION) at 09:12

## 2018-12-20 RX ADMIN — PIPERACILLIN SODIUM AND TAZOBACTAM SODIUM 4.5 G: 4; .5 INJECTION, POWDER, LYOPHILIZED, FOR SOLUTION INTRAVENOUS at 05:12

## 2018-12-20 RX ADMIN — POTASSIUM CHLORIDE 10 MEQ: 7.46 INJECTION, SOLUTION INTRAVENOUS at 11:12

## 2018-12-20 RX ADMIN — POTASSIUM CHLORIDE 10 MEQ: 7.46 INJECTION, SOLUTION INTRAVENOUS at 02:12

## 2018-12-20 RX ADMIN — VANCOMYCIN HYDROCHLORIDE 1250 MG: 750 INJECTION, POWDER, LYOPHILIZED, FOR SOLUTION INTRAVENOUS at 12:12

## 2018-12-20 RX ADMIN — PIPERACILLIN SODIUM AND TAZOBACTAM SODIUM 4.5 G: 4; .5 INJECTION, POWDER, LYOPHILIZED, FOR SOLUTION INTRAVENOUS at 03:12

## 2018-12-20 NOTE — NURSING
O2 sat 91% on 3 liters pt has PRN resp treatments, Sandra was called she said to increase his o2 to 4 liters and she would come up ASAP.  IV fluids were paused for now and reassessment after the O2 increase is 96% pt is resting with eyes closed, he has a productive frothy cough noted.

## 2018-12-20 NOTE — PT/OT/SLP EVAL
Physical Therapy Evaluation    Patient Name:  Enmanuel Armenta   MRN:  [id number]    Recommendations:     Discharge Recommendations:  nursing facility, skilled   Discharge Equipment Recommendations: none   Barriers to discharge: None    Assessment:     Enmanuel Armenta is a 63 y.o. male admitted with a medical diagnosis of Aspiration pneumonia.  He presents with the following impairments/functional limitations:  weakness, impaired self care skills, impaired balance, decreased safety awareness, impaired endurance, impaired functional mobilty, decreased upper extremity function, gait instability, decreased lower extremity function, impaired cardiopulmonary response to activity.  During PT evaluation, he demonstrated supine<>sit with supervision.  Upon sitting on the EOB pt was able to cough up mucous and used Yanker to suction it out of his mouth.  He required CGA for sit<>stand with RW due to poor safety awareness.  He ambulated 100ft with RW and CGA, but demonstrates extremely poor safety awareness - going at a quick pace and running into objects in the hallway.  He is recommended to return to [facility].      Rehab Prognosis: Good; patient would benefit from acute skilled PT services to address these deficits and reach maximum level of function.    Recent Surgery: Procedure(s) (LRB):  EGD (ESOPHAGOGASTRODUODENOSCOPY) (N/A) 3 Days Post-Op    Plan:     During this hospitalization, patient to be seen 6 x/week to address the identified rehab impairments via gait training, therapeutic activities, therapeutic exercises and progress toward the following goals:    GOALS:   Multidisciplinary Problems     Physical Therapy Goals        Problem: Physical Therapy Goal    Goal Priority Disciplines Outcome Goal Variances Interventions   Physical Therapy Goal     PT, PT/OT Ongoing (interventions implemented as appropriate)     Description:  Goals to be met by: 1/3/2019     Patient will increase functional  independence with mobility by performin. Supine to sit with Modified [place]  2. Sit to supine with Modified [place]  3. Sit to stand transfer with Modified [place]  4. Bed to chair transfer with Supervision using LRAD  5. Gait  x 250 feet with Supervision using LRAD  6. Lower extremity exercise program x10-20 reps per handout, with supervision                      · Plan of Care Expires:  19    Subjective     Chief Complaint: Pt does not like using the walker  Patient/Family Comments/goals: none stated  Pain/Comfort:  · Pain Rating 1: 0/10    Patients cultural, spiritual, Muslim conflicts given the current situation: no    Living Environment:  Pt has been staying at [facility] for the past 3 months.   Prior to admission, patient states he was ambulatory with CGA, but no device.  He states that his PT there says he goes too fast. .  Equipment used at home: shower chair, hospital bed.  DME owned (not currently used): none.  Upon discharge, patient will have assistance from SNF staff.    Objective:     Communicated with RN Laila prior to session.  Patient found bed alarm on and supine in bed peripheral IV, oxygen, telemetry  upon PT entry to room.    General Precautions: Standard, aspiration, fall, vision impaired   Orthopedic Precautions:N/A   Braces: N/A     Exams:  · Cognitive Exam:  Patient is oriented to Person, Place, Time and Situation  · Postural Exam:  Patient presented with the following abnormalities:    · -       Rounded shoulders  · -       Forward head  · RLE ROM: WFL  · RLE Strength: WFL  · LLE ROM: WFL  · LLE Strength: WFL    Functional Mobility:  · Bed Mobility:     · Supine to Sit: supervision  · Transfers:     · Sit to Stand:  contact guard assistance and poor safety awareness with rolling walker  · Gait: 100ft with RW and CGA, but demonstrates extremely poor safety awareness - going at a quick pace and running into objects in the hallway  · Balance:  fair        AM-PAC 6 CLICK MOBILITY  Total Score:20     Patient left up in chair with all lines intact, call button in reach, chair alarm on and RN Laila present.    GOALS:   Multidisciplinary Problems     Physical Therapy Goals        Problem: Physical Therapy Goal    Goal Priority Disciplines Outcome Goal Variances Interventions   Physical Therapy Goal     PT, PT/OT Ongoing (interventions implemented as appropriate)     Description:  Goals to be met by: 1/3/2019     Patient will increase functional independence with mobility by performin. Supine to sit with Modified [place]  2. Sit to supine with Modified [place]  3. Sit to stand transfer with Modified [place]  4. Bed to chair transfer with Supervision using LRAD  5. Gait  x 250 feet with Supervision using LRAD  6. Lower extremity exercise program x10-20 reps per handout, with supervision                      History:     Past Medical History:   Diagnosis Date    BPH (benign prostatic hyperplasia)     COPD (chronic obstructive pulmonary disease)     Elevated PSA     Fx     RIGHT WRIST, LEFT FOOT    Hypokalemia     Muscular dystrophy     Urine retention     Wears dentures     upper       Past Surgical History:   Procedure Laterality Date    APPENDECTOMY      cataracts      bilateral     CHOLECYSTECTOMY      CYST REMOVAL      from spine     CYSTOLITHOLOPAXY (REMOVE BLADDER STONE)  3/6/2017    [facility]    CYSTOSCOPY      EGD (ESOPHAGOGASTRODUODENOSCOPY) N/A 2018    [facility] Martin [facility] at [facility] ENDO    ESOPHAGOGASTRODUODENOSCOPY N/A 2018    Procedure: EGD (ESOPHAGOGASTRODUODENOSCOPY);  Surgeon: Evgeny Martin MD;  Location: [facility];  Service: Endoscopy;  Laterality: N/A;    EYE SURGERY      cataract surgery bilateral    PROSTATE BIOPSY      PROSTATE SURGERY      PROSTATECTOMY-TRANSURETHRAL N/A 3/6/2017    [facility] OR     PROSTATECTOMY-TRANSURETHRAL WITH GREENLIGHT LASER N/A 3/6/2017    [facility] OR    TONSILLECTOMY      TRANSRECTAL ULTRASOUND GUIDED PROSTATE BIOPSY N/A 3/28/2016    [facility] OR    VASECTOMY         Clinical Decision Making:     History  Co-morbidities and personal factors that may impact the plan of care Examination  Body Structures and Functions, activity limitations and participation restrictions that may impact the plan of care Clinical Presentation   Decision Making/ Complexity Score   Co-morbidities:   [] Time since onset of injury / illness / exacerbation  [] Status of current condition  []Patient's cognitive status and safety concerns    [] Multiple Medical Problems (see med hx)  Personal Factors:   [] Patient's age  [] Prior Level of function   [] Patient's home situation (environment and family support)  [] Patient's level of motivation  [] Expected progression of patient      HISTORY:(criteria)    [] 11656 - no personal factors/history    [] 31655 - has 1-2 personal factor/comorbidity     [] 55726 - has >3 personal factor/comorbidity     Body Regions:  [] Objective examination findings  [] Head     []  Neck  [] Trunk   [] Upper Extremity  [] Lower Extremity    Body Systems:  [] For communication ability, affect, cognition, language, and learning style: the assessment of the ability to make needs known, consciousness, orientation (person, place, and time), expected emotional /behavioral responses, and learning preferences (eg, learning barriers, education  needs)  [] For the neuromuscular system: a general assessment of gross coordinated movement (eg, balance, gait, locomotion, transfers, and transitions) and motor function  (motor control and motor learning)  [] For the musculoskeletal system: the assessment of gross symmetry, gross range of motion, gross strength, height, and weight  [] For the integumentary system: the assessment of  pliability(texture), presence of scar formation, skin color, and skin integrity  [] For cardiovascular/pulmonary system: the assessment of heart rate, respiratory rate, blood pressure, and edema     Activity limitations:    [] Patient's cognitive status and saf ety concerns          [] Status of current condition      [] Weight bearing restriction  [] Cardiopulmunary Restriction    Participation Restrictions:   [] Goals and goal agreement with the patient     [] Rehab potential (prognosis) and probable outcome      Examination of Body System: (criteria)    [] 27323 - addressing 1-2 elements    [] 83313 - addressing a total of 3 or more elements     [] 77732 -  Addressing a total of 4 or more elements         Clinical Presentation: (criteria)  Choose one     On examination of body system using standardized tests and measures patient presents with (CHOOSE ONE) elements from any of the following: body structures and functions, activity limitations, and/or participation restrictions.  Leading to a clinical presentation that is considered (CHOOSE ONE)                              Clinical Decision Making  (Eval Complexity):  Choose One     Time Tracking:     PT Received On: 12/20/18  PT Start Time: 1122     PT Stop Time: 1143  PT Total Time (min): 21 min     Billable Minutes: Evaluation 21      Cynthia Campbell, PT  12/20/2018

## 2018-12-20 NOTE — PLAN OF CARE
Problem: Adult Inpatient Plan of Care  Goal: Plan of Care Review  Outcome: Ongoing (interventions implemented as appropriate)  Pt has remained free from injury this shift, he is in chair at bedside resting with eyes closed.  No elevated temp today and lab is assessed daily for changes.

## 2018-12-20 NOTE — PLAN OF CARE
Problem: Adult Inpatient Plan of Care  Goal: Plan of Care Review  Outcome: Ongoing (interventions implemented as appropriate)  Plan of care reviewed with patient. SR on telemetry. Samson catheter intact & patent with clear yellow urine. IV fluids infusing as per orders, NPO maintained as per orders. Remains free from falls/injury. Instructed to call for assistance as needed during night. Frequent rounds made for safety. Bed alarm in use . Bed in low & locked position. Call light in reach.        16

## 2018-12-20 NOTE — NURSING
Pt states he does not want any tube feeding and that he would like to eat by mouth. Dr. Tena requested this nurse to contact the nursing home in [place] to inquire if pt has any family on file there or if he has a power of . Spoke with Lila the  at the nursing home who state pt friend Mauricio Baker at [phone] and that Mauricio was just a friend that this patient used to live with along with other people in a home setting prior to him being admitted to the nursing home. Pt is a full code at the nursing home and the  states this pt is responsible for himself at the nursing home and to their knowledge pt does not have any family. Informed Dr. Tena and Janna  regarding the same.

## 2018-12-20 NOTE — PROGRESS NOTES
Progress Note  Hospital Medicine  Patient Name:Enmanuel Armenta  MRN:  [id number]  Patient Class: IP- Inpatient  Admit Date: 12/17/2018  Length of Stay: 3 days  Expected Discharge Date:   Attending Physician: Stephanie Tena MD  Primary Care Provider:  Primary Doctor No    SUBJECTIVE:     Principal Problem: Aspiration pneumonia  Initial history of present illness: Patient is a 63 y.o. male admitted to Hospitalist Service from [facility] Emergency Room with complaint of abdominal pain. Patient is a resident of [facility] nursing home, [place]. Patient reportedly has past medical history significant for COPD, muscular dystrophy, l;imited mobility with walker and history of elevated PSA. Patient presented with 1 day history of abdominal pain associated with nausea and vomiting. The EMS reported, vomiting a brown sticky substance with pale red streaks throughout. EMS gave him 4 mg of Zofran in route to the ED. Patient denied chest pain, shortness of breath, headache, vision changes, focal neuro-deficits, cough. Patient noted to have 101.5F upon arrival.     PMH/PSH/SH/FH/Meds: reviewed.    Symptoms/Review of Systems: Sleeping, not wanting to wake up and talk, failing swallow evaluation. No shortness of breath, cough, chest pain or headache, fever or abdominal pain.     Diet: Clinimix  Activity level: Normal.    Pain:  Patient reports no pain.       OBJECTIVE:   Vital Signs (Most Recent):      Temp: 97.5 °F (36.4 °C) (12/20/18 0734)  Pulse: 68 (12/20/18 0919)  Resp: 18 (12/20/18 0919)  BP: (!) 118/59 (12/20/18 0822)  SpO2: (!) 93 % (12/20/18 0919)       Vital Signs Range (Last 24H):  Temp:  [96.9 °F (36.1 °C)-98 °F (36.7 °C)]   Pulse:  [61-80]   Resp:  [15-20]   BP: (110-136)/()   SpO2:  [89 %-99 %]     Weight: 71.1 kg (156 lb 12 oz)  Body mass index is 24.55 kg/m².    Intake/Output Summary (Last 24 hours) at 12/20/2018 1023  Last data filed at 12/20/2018 0600  Gross per 24 hour   Intake 1200 ml    Output 1050 ml   Net 150 ml     Physical Examination:  General appearance: well developed, appears stated age, chronically ill-appearing  Head: normocephalic, atraumatic  Eyes:  conjunctivae/corneas clear. PERRL.  Nose: Nares normal. Septum midline.  Throat: lips, mucosa, and tongue normal; teeth and gums normal, no throat erythema. + Dry blood at angle of mouth  Neck: supple, symmetrical, trachea midline, no JVD and thyroid not enlarged, symmetric, no tenderness/mass/nodules  Lungs:  Scattered rhonchi B/L.  Chest wall: no tenderness  Heart: regular rate and rhythm, S1, S2 normal, no murmur, click, rub or gallop  Abdomen: soft, epigastric tenderness, non-distented; bowel sounds normal; no masses,  no organomegaly  Extremities: no cyanosis, clubbing or edema.   Pulses: 2+ and symmetric  Skin: Skin color, texture, turgor normal. No rashes or lesions.  Lymph nodes: Cervical, supraclavicular, and axillary nodes normal.  Neurologic: Normal strength and tone. No focal numbness or weakness. CNII-XII intact.      CBC:  Recent Labs   Lab 12/18/18  0337 12/19/18  0451 12/20/18  0522   WBC 15.40* 13.10* 8.60   RBC 4.30* 4.29* 3.88*   HGB 12.6* 12.6* 11.5*   HCT 39.1* 39.1* 35.2*   * 250 303   MCV 91 91 91   MCH 29.3 29.4 29.7   MCHC 32.3 32.2 32.7   BMP  Recent Labs   Lab 12/18/18  2308 12/19/18  0451 12/20/18  0522   GLU 90 92 94    143 144   K 3.1* 2.9* 3.1*    109 109   CO2 25 26 29   BUN 5* 4* 5*   CREATININE 0.6 0.6 0.8   CALCIUM 8.9 8.9 9.0      Diagnostic Results:  Microbiology Results (last 7 days)     Procedure Component Value Units Date/Time    Blood culture x two cultures. Draw prior to antibiotics. [[id number]] Collected:  12/17/18 0958    Order Status:  Completed Specimen:  Blood from Antecubital, Right  Arm Updated:  12/19/18 2012     Blood Culture, Routine No Growth to date     Blood Culture, Routine No Growth to date     Blood Culture, Routine No Growth to date    Narrative:       Aerobic  and anaerobic    Blood culture x two cultures. Draw prior to antibiotics. [[id number]] Collected:  12/17/18 0943    Order Status:  Completed Specimen:  Blood from Peripheral, Left  Hand Updated:  12/19/18 2012     Blood Culture, Routine No Growth to date     Blood Culture, Routine No Growth to date     Blood Culture, Routine No Growth to date    Narrative:       Aerobic and anaerobic    Urine culture [[id number]] Collected:  12/17/18 1029    Order Status:  Completed Specimen:  Urine Updated:  12/18/18 2254     Urine Culture, Routine Multiple organisms isolated. None in predominance.  Repeat if     Urine Culture, Routine clinically necessary.    Narrative:       Preferred Collection Type->Urine, Clean Catch    Urine culture [[id number]] Collected:  12/17/18 1542    Order Status:  Canceled Specimen:  Urine, Catheterized Updated:  12/17/18 1542         Chest X-Ray: T right basilar infiltrate.     CT abdomen and pelvis with contrast:  Enlarged prostate gland, air in the bladder likely secondary to catheterization, diffuse bladder wall thickening. Prominent amount of fluid within right colon.  Appendix not identified and note is made of limitation with motion artifact in the appendiceal region.    EGD:   - Moderately severe reflux esophagitis.                       - Medium-sized hiatal hernia.                       - Gastritis.                       - A single gastric polyp.                       - Normal examined duodenum.                       - No specimens collected.    Assessment/Plan:      SOB (shortness of breath) [R06.02]   Yes    Aspiration pneumonia, RLL [J69.0]  Swallow dysfunction  Supplemental O2 via nasal canula; titrate O2 saturation to >92%.   High aspiration risk. NPO as per ST recommendations.  Continue beta 2 agonist bronchodilator treatments.   Continue IV antibiotics - Vancomycin and Zosyn. Pharmacist to dose Vancomycin.   Check sputum GS and Cx.   Continue routine medications as before.   Failed  swallow evaluation. Discussed with speech therapist. Attempting to reach to patient's family to discuss alternate therapy. So far unable to reach any family members.  Continue Clinimix.      Yes    Upper GI bleeding [K92.2]  Follow H/H closely. Type and screen blood and transfuse as needed.  Continue IV Protonix.  Continue IVF hydration.   Use IV anti-emetics as needed.       Yes    Muscular dystrophy [G71.00]  Supportive care, fall precautions.      UTI  Urine C/S. Continue IV antibiotics.      Yes    Chronic obstructive pulmonary disease [J44.9]  Patient's COPD is controlled currently. Continue scheduled inhalers and monitor respiratory status closely.    Hypokalemia  Replete KCl. Follow BMP.    Yes      DVT prophylaxis: Use SCD and TEDs. No anticoagulation due to GI bleeding.      Stephanie Tena MD  Department of Hospital Medicine   [facility]

## 2018-12-20 NOTE — PLAN OF CARE
Problem: Physical Therapy Goal  Goal: Physical Therapy Goal  Goals to be met by: 1/3/2019     Patient will increase functional independence with mobility by performin. Supine to sit with Modified [place]  2. Sit to supine with Modified [place]  3. Sit to stand transfer with Modified [place]  4. Bed to chair transfer with Supervision using LRAD  5. Gait  x 250 feet with Supervision using LRAD  6. Lower extremity exercise program x10-20 reps per handout, with supervision    Outcome: Ongoing (interventions implemented as appropriate)  Goals established and pt progressing with gait

## 2018-12-20 NOTE — PHYSICIAN QUERY
"PT Name: Enmanuel Armenta  MR #: [id number]    Physician Query Form - Nutrition Clarification     CDS/: Zainab Suh RN CDI            Contact information:  [email or web address]    This form is a permanent document in the medical record.     Query Date: December 20, 2018    By submitting this query, we are merely seeking further clarification of documentation.. Please utilize your independent clinical judgment when addressing the question(s) below.    The Medical record contains the following:   Indicators  Supporting Clinical Findings Location in Medical Record   X % of Estimated Energy Intake over a time frame from p.o., TF, or TPN NPO)  % Intake of Estimated Energy Needs: 0%     Nutrition consult 12/18 126 pm    Weight Status over a time frame     X Subcutaneous Fat and/or Muscle Loss Subcutaneous Fat (Malnutrition): mild depletion  Muscle Mass (Malnutrition): moderate depletion   Orbital Region (Subcutaneous Fat Loss): mild depletion  Upper Arm Region (Subcutaneous Fat Loss): mild depletion  Sabianism Region (Muscle Loss): moderate depletion  Clavicle Bone Region (Muscle Loss): mild depletion  Patellar Region (Muscle Loss): moderate depletion  Posterior Calf Region (Muscle Loss): mild depletion     Subcutaneous Fat Loss (Final Summary): mild protein-calorie malnutrition  Muscle Loss Evaluation (Final Summary): moderate protein-calorie malnutrition     Nutrition consult 12/18 126 pm    Fluid Accumulation or Edema      Reduced  Strength     X Wt / BMI / Usual Body Weight Height - 5'7"  Weight - 68 kg  150 lbs.  BMI - 23.5 Vital signs 12/17    Delayed Wound Healing / Failure to Thrive     X Acute or Chronic Illness COPD, muscular dystrophy, elevated PSA, BPH, and hypokalemia, urine retention     SOB, UTI, and aspiration PNA. ER MD Notes      Hospital medicine H&P    Medication      Treatment     X Other presents to the ED via EMS with an onset of abdomen pain, abdomen distension, nausea, and vomiting. All " his symptoms began one day ago. He lives at [facility]. The EMS reports is a vomiting a brown sticky substance with pale red streaks throughout.     ER MD Notes     AND / ASPEN Clinical Characteristics (October 2011)  A minimum of two characteristics is recommended for diagnosing either moderate or severe malnutrition   Mild Malnutrition Moderate Malnutrition Severe Malnutrition   Energy Intake from p.o., TF or TPN. < 75% intake of estimated energy needs for less than 7 days < 75% intake of estimated energy needs for greater than 7 days < 50% intake of estimated energy needs for > 5 days   Weight Loss 1-2% in 1 month  5% in 3 months  7.5% in 6 months  10% in 1 year 1-2 % in 1 week  5% in 1 month  7.5% in 3 months  10% in 6 months  20% in 1 year > 2% in 1 week  > 5% in 1 month  > 7.5% in 3 months  > 10% in 6 months  > 20% in 1 year   Physical Findings     None *Mild subcutaneous fat and/or muscle loss  *Mild fluid accumulation  *Stage II decubitus  *Surgical wound or non-healing wound *Mod/severe subcutaneous fat and/or muscle loss  *Mod/severe fluid accumulation  *Stage III or IV decubitus  *Non-healing surgical wound     Provider, please specify diagnosis or diagnoses associated with above clinical findings.    [  ] Mild Protein-Calorie Malnutrition   [ x ] Moderate Protein-Calorie Malnutrition   [  ] Abnormal Weight Loss   [  ] Underweight   [  ] Other Nutritional Diagnosis (please specify):    [  ] Other:    [  ] Clinically Undetermined       Please document in your progress notes daily for the duration of treatment until resolved and include in your discharge summary.

## 2018-12-20 NOTE — CONSULTS
Date: 12/20/2018   Enmanuel Armenta [id number] is a 63 y.o. male who has been consulted for vancomycin dosing.    The patient has the following labs: Scr 0.8 Crcl 86.1 WBC 8.6   Current weight is 71.1 kg (156 lb 12 oz)    Pt is receiving vancomycin 1250 mg every 12 hours.  Vancomycin trough from 12/20/2018 at 1205 was 28.7 mg/dL.  Target goal is 15-20 mg/dL. Trough was drawn on time and anticipate it is  Supratherapeutic. Patient has accumulated at current dose. Pharmacy hold doses until level < 20. The vancomycin trough has been ordered for 12/21/18 at 1200.  Patient will be followed by pharmacy for changes in renal function, toxicity, and efficacy.    Thank you for allowing us to participate in this patient's care.     Jayme Blair, PharmD

## 2018-12-20 NOTE — PLAN OF CARE
Problem: Adult Inpatient Plan of Care  Goal: Plan of Care Review  Outcome: Ongoing (interventions implemented as appropriate)  Pt received on 5 lpm NC. No PRN Tx needed @this time.

## 2018-12-20 NOTE — PLAN OF CARE
Problem: Adult Inpatient Plan of Care  Goal: Plan of Care Review  Outcome: Ongoing (interventions implemented as appropriate)  A&Ox4 with intermittent confusion. Samson draining clear, yellow urine. IVF infusing per order. IV Potassium infused today; IV antibiotics infused per order. New IV today 20g to left hand. Telemetry monitoring continued, NSR. VSS. Remains afebrile throughout shift. Remains fall-free throughout shift. Comfort level established. Denies pain during my shift. Slept most of my shift. O2 at 4LPM via nasal cannula. Yaunker suction at bedside for patient. Bed alarm set.  Bed low, brakes locked, SR up x2, call light within reach. Verbalized understanding of poc. Open communication facilitated. Will continue to monitor.

## 2018-12-21 ENCOUNTER — TELEPHONE (OUTPATIENT)
Dept: GASTROENTEROLOGY | Facility: CLINIC | Age: 63
End: 2018-12-21

## 2018-12-21 PROBLEM — E44.0 MALNUTRITION OF MODERATE DEGREE: Status: ACTIVE | Noted: 2018-12-21

## 2018-12-21 LAB
ALBUMIN SERPL BCP-MCNC: 2.2 G/DL
ALP SERPL-CCNC: 130 U/L
ALT SERPL W/O P-5'-P-CCNC: 24 U/L
ANION GAP SERPL CALC-SCNC: 7 MMOL/L
AST SERPL-CCNC: 23 U/L
BASOPHILS # BLD AUTO: 0 K/UL
BASOPHILS NFR BLD: 0.4 %
BILIRUB SERPL-MCNC: 0.3 MG/DL
BUN SERPL-MCNC: 10 MG/DL
CALCIUM SERPL-MCNC: 9.5 MG/DL
CHLORIDE SERPL-SCNC: 108 MMOL/L
CO2 SERPL-SCNC: 29 MMOL/L
CREAT SERPL-MCNC: 0.9 MG/DL
DIFFERENTIAL METHOD: ABNORMAL
EOSINOPHIL # BLD AUTO: 0.2 K/UL
EOSINOPHIL NFR BLD: 2.1 %
ERYTHROCYTE [DISTWIDTH] IN BLOOD BY AUTOMATED COUNT: 14.1 %
EST. GFR  (AFRICAN AMERICAN): >60 ML/MIN/1.73 M^2
EST. GFR  (NON AFRICAN AMERICAN): >60 ML/MIN/1.73 M^2
GLUCOSE SERPL-MCNC: 142 MG/DL
HCT VFR BLD AUTO: 37.7 %
HGB BLD-MCNC: 12.3 G/DL
LYMPHOCYTES # BLD AUTO: 1.5 K/UL
LYMPHOCYTES NFR BLD: 18.9 %
MCH RBC QN AUTO: 29.5 PG
MCHC RBC AUTO-ENTMCNC: 32.6 G/DL
MCV RBC AUTO: 91 FL
MONOCYTES # BLD AUTO: 0.7 K/UL
MONOCYTES NFR BLD: 9.1 %
NEUTROPHILS # BLD AUTO: 5.5 K/UL
NEUTROPHILS NFR BLD: 69.5 %
PLATELET # BLD AUTO: 311 K/UL
PMV BLD AUTO: 8.2 FL
POTASSIUM SERPL-SCNC: 3.2 MMOL/L
PROT SERPL-MCNC: 5.8 G/DL
RBC # BLD AUTO: 4.16 M/UL
SODIUM SERPL-SCNC: 144 MMOL/L
VANCOMYCIN SERPL-MCNC: 12.4 UG/ML
WBC # BLD AUTO: 8 K/UL

## 2018-12-21 PROCEDURE — 99232 SBSQ HOSP IP/OBS MODERATE 35: CPT | Mod: ,,, | Performed by: INTERNAL MEDICINE

## 2018-12-21 PROCEDURE — 85025 COMPLETE CBC W/AUTO DIFF WBC: CPT

## 2018-12-21 PROCEDURE — 80202 ASSAY OF VANCOMYCIN: CPT

## 2018-12-21 PROCEDURE — B4185 PARENTERAL SOL 10 GM LIPIDS: HCPCS | Performed by: INTERNAL MEDICINE

## 2018-12-21 PROCEDURE — 63600175 PHARM REV CODE 636 W HCPCS: Performed by: INTERNAL MEDICINE

## 2018-12-21 PROCEDURE — 99232 PR SUBSEQUENT HOSPITAL CARE,LEVL II: ICD-10-PCS | Mod: ,,, | Performed by: INTERNAL MEDICINE

## 2018-12-21 PROCEDURE — 92526 ORAL FUNCTION THERAPY: CPT

## 2018-12-21 PROCEDURE — 97116 GAIT TRAINING THERAPY: CPT | Performed by: PHYSICAL THERAPIST

## 2018-12-21 PROCEDURE — 36415 COLL VENOUS BLD VENIPUNCTURE: CPT

## 2018-12-21 PROCEDURE — 25000003 PHARM REV CODE 250: Performed by: INTERNAL MEDICINE

## 2018-12-21 PROCEDURE — 27000221 HC OXYGEN, UP TO 24 HOURS

## 2018-12-21 PROCEDURE — 99900035 HC TECH TIME PER 15 MIN (STAT)

## 2018-12-21 PROCEDURE — C9113 INJ PANTOPRAZOLE SODIUM, VIA: HCPCS | Performed by: INTERNAL MEDICINE

## 2018-12-21 PROCEDURE — 97803 MED NUTRITION INDIV SUBSEQ: CPT

## 2018-12-21 PROCEDURE — 80053 COMPREHEN METABOLIC PANEL: CPT

## 2018-12-21 PROCEDURE — 12000002 HC ACUTE/MED SURGE SEMI-PRIVATE ROOM

## 2018-12-21 RX ORDER — POTASSIUM CHLORIDE 7.45 MG/ML
10 INJECTION INTRAVENOUS
Status: DISPENSED | OUTPATIENT
Start: 2018-12-21 | End: 2018-12-21

## 2018-12-21 RX ADMIN — POTASSIUM CHLORIDE 10 MEQ: 7.46 INJECTION, SOLUTION INTRAVENOUS at 03:12

## 2018-12-21 RX ADMIN — PANTOPRAZOLE SODIUM 40 MG: 40 INJECTION, POWDER, LYOPHILIZED, FOR SOLUTION INTRAVENOUS at 09:12

## 2018-12-21 RX ADMIN — ONDANSETRON 4 MG: 2 INJECTION, SOLUTION INTRAMUSCULAR; INTRAVENOUS at 09:12

## 2018-12-21 RX ADMIN — VANCOMYCIN HYDROCHLORIDE 1250 MG: 1 INJECTION, POWDER, LYOPHILIZED, FOR SOLUTION INTRAVENOUS at 03:12

## 2018-12-21 RX ADMIN — POTASSIUM CHLORIDE 10 MEQ: 7.46 INJECTION, SOLUTION INTRAVENOUS at 01:12

## 2018-12-21 RX ADMIN — I.V. FAT EMULSION 250 ML: 20 EMULSION INTRAVENOUS at 09:12

## 2018-12-21 RX ADMIN — LEUCINE, PHENYLALANINE, LYSINE, METHIONINE, ISOLEUCINE, VALINE, HISTIDINE, THREONINE, TRYPTOPHAN, ALANINE, GLYCINE, ARGININE, PROLINE, SERINE, TYROSINE, SODIUM ACETATE, DIBASIC POTASSIUM PHOSPHATE, MAGNESIUM CHLORIDE, SODIUM CHLORIDE, CALCIUM CHLORIDE, DEXTROSE
201; 154; 159; 110; 165; 160; 132; 116; 50; 570; 283; 316; 187; 138; 11; 217; 261; 51; 112; 33; 5 INJECTION INTRAVENOUS at 06:12

## 2018-12-21 RX ADMIN — PIPERACILLIN SODIUM AND TAZOBACTAM SODIUM 4.5 G: 4; .5 INJECTION, POWDER, LYOPHILIZED, FOR SOLUTION INTRAVENOUS at 09:12

## 2018-12-21 RX ADMIN — POTASSIUM CHLORIDE 10 MEQ: 7.46 INJECTION, SOLUTION INTRAVENOUS at 05:12

## 2018-12-21 RX ADMIN — PIPERACILLIN SODIUM AND TAZOBACTAM SODIUM 4.5 G: 4; .5 INJECTION, POWDER, LYOPHILIZED, FOR SOLUTION INTRAVENOUS at 02:12

## 2018-12-21 NOTE — PT/OT/SLP PROGRESS
"Speech Language Pathology Treatment    Patient Name:  Enmanuel Armenta   MRN:  [id number]  Admitting Diagnosis: Aspiration pneumonia    Recommendations:                 General Recommendations:  Continued education   Diet recommendations:  NPO, Liquid Diet Level: NPO   Aspiration Precautions: Frequent oral care and Ice chips sparingly   General Precautions: Standard, aspiration, NPO  Communication strategies:  encourage pt to slow rate of speech    Subjective     "When can they do it?"    Objective:   Pt asleep upon entrance into room. Awakens to repeated verbal/tactile stimulation. He exhibits wet vocal quality and frequent coughing/throat clearing. Yankauer at bedside. More alert today. Educated in depth re: results/recs of MBSS, risks with continued PO intake, aspiration, aspiration PNA, choking, s/s penetration/aspiration. Endorses recurrent PNA. Pt demonstrated fair understanding of information presented. Now agrees to PEG placement.     Has the patient been evaluated by SLP for swallowing?   Yes  Keep patient NPO? Yes   Current Respiratory Status: nasal cannula      Assessment:     Enmanuel Armenta is a 63 y.o. male with an SLP diagnosis of Dysphagia and Dysarthria.  He presents with fair understanding of information presented today. Agrees to PEG. Continues with poor management of secretions (wet vocal quality, frequent cough/throat clearing). Keep Yan[place]ur at bedside. May require nasotracheal suctioning. Will follow for continued education.     Goals:   Multidisciplinary Problems     SLP Goals        Problem: SLP Goal    Goal Priority Disciplines Outcome   SLP Goal     SLP                    Plan:     · Patient to be seen:      · Plan of Care expires:     · Plan of Care reviewed with:  patient   · SLP Follow-Up:  Yes       Discharge recommendations:  nursing facility, skilled   Barriers to Discharge:  None    Time Tracking:     SLP Treatment Date:   12/21/18  Speech Start Time:  1232  Speech Stop Time:  " 1245     Speech Total Time (min):  13 min    Billable Minutes: Treatment Swallowing Dysfunction 13 and Total Time 13    Savannah Fitzgerald CCC-SLP  12/21/2018

## 2018-12-21 NOTE — PROGRESS NOTES
[facility]  Adult Nutrition  Progress Note    SUMMARY   Interventions: Parenteral nutrition therapy and PLAN FOR enteral nutrition therapy      Recommendation:   1) Continue PPN and add lipids until PEG placed  D5 AA 2.75 + lipids (Provides 1172 kcal (67% EEN), 66 g protein (92%EPN)    2) Once PEG placed initiate   -Isosource 1.5 @ 15 ml/hr advancing to goal of 45 ml/hr + 30 ml flush q 4 hr for tube patency  until NS d/c'd (Provides 820 ml free water, 1620 kcal (92% EEN), 73 g protien (100% EPN))     Goals: 1) Advance PO diet or initiate TF in < 72 hours 2) TPN or TF to meet > 75% estimated needs by f/u   Nutrition Goal Status: Not met/ New  Communication of RD Recs: (POC, sticky note, second sign )     Reason for Assessment     Reason For Assessment: Follow-up  Diagnosis: (SOB)  Relevant Medical History: COPD, muscular dystrophy, PSA, BPH, limited mobility with walker, NH resident  Interdisciplinary Rounds: attended     General Information Comments: 62 y/o male with muscular dystrophy from nursing home, with SOB, UTI, and aspiration PNA. Per SLP, rec. to keep pt NPO for now. No GIB per GI, only GED, inflammation, and stomach polyp. At time of visit pt was very drowsy and hard to understand, confused per RN. Unable to get accurate diet history. Attempted diet education for GERD. NPO x 2 day. NFPE done, mild-moderate muscle/fat wasting seen.   12/21/18 Pt failed swallow eval today rec. longterm NPO. Pt now agreeable to PEG. Now qualifies for acute malnutrition and PPN initiated to meet 67% EEN.      Nutrition Discharge Planning: To be determined- PEG Isosource 1.5 @ 15 ml/hr advancing to goal of 45 ml/hr + min 30 ml flush q 4 hr for tube patency  until NS d/c'd     Nutrition Risk Screen     Nutrition Risk Screen: dysphagia or difficulty swallowing     Nutrition/Diet History     Spiritual, Cultural Beliefs, Jain Practices, Values that Affect Care: no  Food Allergies: NKFA(or  "intolerances)  Factors Affecting Nutritional Intake: NPO     Anthropometrics     Height Method: Measured  Height: 5' 7"(Office 18)  Height (inches): 67 in  Weight Method: Bed Scale  Weight: 71.1 kg ( no new)  Weight (lb): 156.75 lb  Ideal Body Weight (IBW), Male: 148 lb  % Ideal Body Weight, Male (lb): 105.91 lb  BMI (Calculated): 24.6 Admission  BMI Grade: 25 - 29.9 - overweight  Usual Body Weight (UBW), k kg(3/21/17)       Lab/Procedures/Meds     Pertinent Labs Reviewed: reviewed  BMP  Lab Results   Component Value Date     2018    K 3.2 (L) 2018     2018    CO2 29 2018    BUN 10 2018    CREATININE 0.9 2018    CALCIUM 9.5 2018    ANIONGAP 7 (L) 2018    ESTGFRAFRICA >60 2018    EGFRNONAA >60 2018     Lab Results   Component Value Date    ALBUMIN 2.2 (L) 2018       Pertinent Medications Reviewed: reviewed  Pertinent Medications Comments: zofran, KCL, PPN @ 100 ml/hr     Estimated/Assessed Needs   Admission  Weight Used For Calorie Calculations: 71.1 kg (156 lb 12 oz)  Energy Calorie Requirements (kcal): [place] St Jeor x 1.2 = 1745 kcal  Energy Need Method: [place]-St Jeor  Protein Requirements: 1.0-1.2 g protein (muscle loss)= 71-85 g protein  Weight Used For Protein Calculations: 71.1 kg (156 lb 12 oz)  Fluid Requirements (mL): 1750 ml  Estimated Fluid Requirement Method: RDA Method  CHO Requirement: N/A        Nutrition Prescription Ordered     Current Diet Order: NPO + PPN (39% EEN (672 kcal))     Evaluation of Received Nutrient/Fluid Intake     Energy Calories Required: not meeting needs  Protein Required: not meeting needs  Fluid Required: meeting needs  Comments: PPN @ 100 ml/hr  Tolerance: other (see comments)(NPO)  % Intake of Estimated Energy Needs: 39%  % Meal Intake: 0% + PPN     Nutrition Risk     Level of Risk/Frequency of Follow-up: moderate - high 2 x weekly      Assessment and Plan     Malnutrition of " moderate degree    Contributing Nutrition Diagnosis  Moderate acute illness related malnutrition    Related to (etiology):   Swallowing/Chewing Difficulty    Signs and Symptoms (as evidenced by):   1) PO intakes < 50% of estimated needs x 5 days 2) Moderate muscle/fat loss @ temples and pattellar area/mild muscle/fat loss @ orbital area, bicep, clavicle, and calves    Interventions/Recommendations (treatment strategy):  1) TF or TPN to meet > 75% estimated needs @ f/u    Nutrition Diagnosis Status:   New              Monitor and Evaluation     Food and Nutrient Intake: energy intake  Food and Nutrient Adminstration: diet order, enteral and parenteral nutrition administration  Physical Activity and Function: nutrition-related ADLs and IADLs  Anthropometric Measurements: weight  Biochemical Data, Medical Tests and Procedures: electrolyte and renal panel  Nutrition-Focused Physical Findings: overall appearance      Malnutrition Assessment  Skin (Micronutrient): (Man = 15, no PI noted)  Nails (Micronutrient): none  Hair/Scalp (Micronutrient): none  Eyes (Micronutrient): none  Gums (Micronutrient): none  Lips/Mucous Membranes (Micronutrient): none  Teeth (Micronutrient): (Missing teeth, has dentures at home)  Tongue (Micronutrient): none  Neck/Chest (Micronutrient): none, other (see comments)  Musculoskeletal/Lower Extremities: none   Micronutrient Evaluation: no deficiencies   Subcutaneous Fat (Malnutrition): mild depletion  Muscle Mass (Malnutrition): moderate depletion   Orbital Region (Subcutaneous Fat Loss): mild depletion  Upper Arm Region (Subcutaneous Fat Loss): mild depletion  Thoracic and Lumbar Region: well nourished   Hoahaoism Region (Muscle Loss): moderate depletion  Clavicle Bone Region (Muscle Loss): mild depletion  Scapular Bone Region (Muscle Loss): well nourished  Patellar Region (Muscle Loss): moderate depletion  Posterior Calf Region (Muscle Loss): mild depletion   Edema (Fluid Accumulation): 0-->no  edema present   Subcutaneous Fat Loss (Final Summary): mild protein-calorie malnutrition  Muscle Loss Evaluation (Final Summary): moderate protein-calorie malnutrition       Nutrition Follow-Up     RD Follow-up?: Yes

## 2018-12-21 NOTE — PLAN OF CARE
12/21/18 1117   PRE-TX-O2-ETCO2   O2 Device (Oxygen Therapy) nasal cannula   $ Is the patient on Low Flow Oxygen? Yes   Flow (L/min) 3   Oxygen Concentration (%) 32   SpO2 (!) 93 %   Pulse Oximetry Type Intermittent   Aerosol Therapy   $ Aerosol Therapy Charges PRN treatment not required   Ready to Wean/Extubation Screen   FIO2<=50 (chart decimal) 0.32

## 2018-12-21 NOTE — PT/OT/SLP PROGRESS
Physical Therapy Treatment    Patient Name:  Emnanuel Armenta   MRN:  [id number]    Recommendations:     Discharge Recommendations:  nursing facility, skilled   Discharge Equipment Recommendations: none   Barriers to discharge: None    Assessment:     Enmanuel Armenta is a 63 y.o. male admitted with a medical diagnosis of Aspiration pneumonia.  He presents with the following impairments/functional limitations:  weakness, impaired self care skills, impaired balance, decreased safety awareness, impaired endurance, impaired functional mobilty, decreased upper extremity function, gait instability, decreased lower extremity function, impaired cardiopulmonary response to activity.  During PT tx, he is able to perform supine<>sit with supervision, and sit<>Stand with supervision with RW.  He ambulated 250ft with RW and 3L O2, however he gets frustrated with the RW because he is used to a rollator.  He is recommended to return to SNF.     Rehab Prognosis: Good; patient would benefit from acute skilled PT services to address these deficits and reach maximum level of function.    Recent Surgery: Procedure(s) (LRB):  EGD (ESOPHAGOGASTRODUODENOSCOPY) (N/A) 4 Days Post-Op    Plan:     During this hospitalization, patient to be seen 6 x/week to address the identified rehab impairments via gait training, therapeutic activities, therapeutic exercises and progress toward the following goals:    · Plan of Care Expires:  01/03/19    Subjective     Chief Complaint: Pt does not like the RW  Patient/Family Comments/goals: Pt wants to get back to SNF  Pain/Comfort:  ·        Objective:     Communicated with RN Laila prior to session.  Patient found all lines intact, call button in reach and bed alarm on peripheral IV, telemetry, oxygen  upon PT entry to room.     General Precautions: Standard, aspiration, NPO   Orthopedic Precautions:N/A   Braces: N/A     Functional Mobility:  · Bed Mobility:     · Supine to Sit: supervision  · Sit to  Supine: supervision  · Transfers:     · Sit to Stand:  supervision with rolling walker  · Gait: 250ft with RW, 3L and CGA.  Pt is impulsive with RW and does not like that it is not a rollator.   · Balance: fair      AM-PAC 6 CLICK MOBILITY        Patient left supine with all lines intact, call button in reach and bed alarm on..    GOALS:   Multidisciplinary Problems     Physical Therapy Goals        Problem: Physical Therapy Goal    Goal Priority Disciplines Outcome Goal Variances Interventions   Physical Therapy Goal     PT, PT/OT Ongoing (interventions implemented as appropriate)     Description:  Goals to be met by: 1/3/2019     Patient will increase functional independence with mobility by performin. Supine to sit with Modified [place]  2. Sit to supine with Modified [place]  3. Sit to stand transfer with Modified [place]  4. Bed to chair transfer with Supervision using LRAD  5. Gait  x 250 feet with Supervision using LRAD  6. Lower extremity exercise program x10-20 reps per handout, with supervision                      Time Tracking:     PT Received On: 18  PT Start Time: 1416     PT Stop Time: 1436  PT Total Time (min): 20 min     Billable Minutes: Gait Training 20    Treatment Type: Treatment  PT/PTA: PT           Cynthia Campbell, PT  2018

## 2018-12-21 NOTE — PLAN OF CARE
Problem: Physical Therapy Goal  Goal: Physical Therapy Goal  Goals to be met by: 1/3/2019     Patient will increase functional independence with mobility by performin. Supine to sit with Modified [place]  2. Sit to supine with Modified [place]  3. Sit to stand transfer with Modified [place]  4. Bed to chair transfer with Supervision using LRAD  5. Gait  x 250 feet with Supervision using LRAD  6. Lower extremity exercise program x10-20 reps per handout, with supervision     Outcome: Ongoing (interventions implemented as appropriate)  Progressing with gait

## 2018-12-21 NOTE — PROGRESS NOTES
"[facility]    CC: Feeding difficulty/dysphagia    HPI 63 y.o. male with feeding difficulty, recent onset, severe, worsening, with inability to tolerate PO, currently on Clinimix.  No alleviating/exacerbating factors.  Case discussed with nursing who states that last dose of Plavix was 4 days ago.  No other acute GI issues.    Past Medical History:   Diagnosis Date    BPH (benign prostatic hyperplasia)     COPD (chronic obstructive pulmonary disease)     Elevated PSA     Fx     RIGHT WRIST, LEFT FOOT    Hypokalemia     Muscular dystrophy     Urine retention     Wears dentures     upper         Review of Systems  General ROS: negative for - chills, fever or weight loss  Cardiovascular ROS: no chest pain or dyspnea on exertion  Gastrointestinal ROS: No abdominal pain    Physical Examination  /66   Pulse 81   Temp 96.1 °F (35.6 °C)   Resp 16   Ht 5' 7" (1.702 m) Comment: Office 7/13/18  Wt 71.1 kg (156 lb 12 oz)   SpO2 (!) 92%   BMI 24.55 kg/m²   General appearance: alert, cooperative, no distress  HENT: Normocephalic, atraumatic, neck symmetrical, no nasal discharge, sclera anicteric   Lungs: clear to auscultation bilaterally, symmetric chest wall expansion bilaterally  Heart: regular rate and rhythm without rub; no displacement of the PMI   Abdomen: soft, NT  Extremities: extremities symmetric; no clubbing, cyanosis, or edema  Neurologic: Alert and oriented X 3, no sensory or motor neurologic deficits      Labs:  Lab Results   Component Value Date    WBC 8.00 12/21/2018    HGB 12.3 (L) 12/21/2018    HCT 37.7 (L) 12/21/2018    MCV 91 12/21/2018     12/21/2018       Lab Results   Component Value Date    INR 0.9 12/17/2018    INR 0.9 03/02/2017    INR 0.9 10/26/2016           Assessment:   1.  Feeding difficulty  2.  Antiplatelet therapy, held x 4 days    Plan:  1.  Continue IVFs with nutritional support  2.  Continue to hold Plavix  3.  EGD with PEG placement on " Monday.    Evgeny Anthony MD  [facility]  [address], [address]  [place] [address]  Office: [phone]  Fax: [phone]

## 2018-12-21 NOTE — PLAN OF CARE
Problem: SLP Goal  Goal: SLP Goal    More alert today. He presents with fair understanding of information presented today. Agrees to PEG. Continues with poor management of secretions (wet vocal quality, frequent cough/throat clearing). Keep [place] at bedside. May require nasotracheal suctioning. Will follow for continued education.     Savannah Fitzgerald MS, CCC/SLP 12/21/2018

## 2018-12-21 NOTE — CONSULTS
Enmanuel Armenta [id number] is a 63 y.o. male who has been consulted for vancomycin dosing.    The patient has the following labs:     Date Creatinine (mg/dl)    BUN WBC Count   12/21/2018 Estimated Creatinine Clearance: 76.5 mL/min (based on SCr of 0.9 mg/dL). Lab Results   Component Value Date    BUN 10 12/21/2018     Lab Results   Component Value Date    WBC 8.00 12/21/2018        Current weight is 71.1 kg (156 lb 12 oz)    Vancomycin level from 12/21 at 1205 was 12.4 mg/dL.  Target trough range is 15-20 mg/dL.  Pharmacy will schedule vancomycin 1250 mg every 24 hours.  A vancomycin trough has been ordered prior to 3rd dose due 12/23 at 1400.      Patient will be followed by pharmacy for changes in renal function, toxicity, and efficacy.  Thank you for allowing us to participate in this patient's care.     Andrew Murray

## 2018-12-21 NOTE — PLAN OF CARE
Problem: Adult Inpatient Plan of Care  Goal: Plan of Care Review  Outcome: Ongoing (interventions implemented as appropriate)  Plan of care reviewed with pt at beginning of shift, needs reinforcment. on telemetry, remains NPO during night. O2-4L per NC in use. Samson catheter intact & patent with clear yellow urine.  Hourly and Q2h rounds completed on this pt throughout shift.  PAIN MED, PRN MED adminstered as needed.  Call light kept within reach, bed in locked and lowest position, side rails up x2.  Needs attended to, will continue to monitor and update as needed.

## 2018-12-21 NOTE — PROGRESS NOTES
Progress Note  Hospital Medicine  Patient Name:Enmanuel Armenta  MRN:  [id number]  Patient Class: IP- Inpatient  Admit Date: 12/17/2018  Length of Stay: 4 days  Expected Discharge Date:   Attending Physician: Stephanie Tena MD  Primary Care Provider:  Primary Doctor No    SUBJECTIVE:     Principal Problem: Aspiration pneumonia  Initial history of present illness: Patient is a 63 y.o. male admitted to Hospitalist Service from [facility] Emergency Room with complaint of abdominal pain. Patient is a resident of [facility], [place]. Patient reportedly has past medical history significant for COPD, muscular dystrophy, l;imited mobility with walker and history of elevated PSA. Patient presented with 1 day history of abdominal pain associated with nausea and vomiting. The EMS reported, vomiting a brown sticky substance with pale red streaks throughout. EMS gave him 4 mg of Zofran in route to the ED. Patient denied chest pain, shortness of breath, headache, vision changes, focal neuro-deficits, cough. Patient noted to have 101.5F upon arrival.     PMH/PSH/SH/FH/Meds: reviewed.    Symptoms/Review of Systems: Mental status improved, patient agreeable to have PEG placed. He knows, his swallowing dysfunction contributing to recurrent aspiration. No shortness of breath, cough, chest pain or headache, fever or abdominal pain.     Diet: Clinimix  Activity level: Normal.    Pain:  Patient reports no pain.       OBJECTIVE:   Vital Signs (Most Recent):      Temp: 97.4 °F (36.3 °C) (12/21/18 0754)  Pulse: 65 (12/21/18 0754)  Resp: 18 (12/21/18 0754)  BP: (!) 122/59 (12/21/18 0754)  SpO2: (!) 93 % (12/21/18 0754)       Vital Signs Range (Last 24H):  Temp:  [96.4 °F (35.8 °C)-98.9 °F (37.2 °C)]   Pulse:  [65-81]   Resp:  [16-20]   BP: (115-148)/(59-69)   SpO2:  [92 %-96 %]     Weight: 71.1 kg (156 lb 12 oz)  Body mass index is 24.55 kg/m².    Intake/Output Summary (Last 24 hours) at 12/21/2018  0927  Last data filed at 12/21/2018 0600  Gross per 24 hour   Intake 1470 ml   Output 2950 ml   Net -1480 ml     Physical Examination:  General appearance: well developed, appears stated age, chronically ill-appearing  Head: normocephalic, atraumatic  Eyes:  conjunctivae/corneas clear. PERRL.  Nose: Nares normal. Septum midline.  Throat: lips, mucosa, and tongue normal; teeth and gums normal, no throat erythema. + Dry blood at angle of mouth  Neck: supple, symmetrical, trachea midline, no JVD and thyroid not enlarged, symmetric, no tenderness/mass/nodules  Lungs:  Scattered rhonchi B/L.  Chest wall: no tenderness  Heart: regular rate and rhythm, S1, S2 normal, no murmur, click, rub or gallop  Abdomen: soft, epigastric tenderness, non-distented; bowel sounds normal; no masses,  no organomegaly  Extremities: no cyanosis, clubbing or edema.   Pulses: 2+ and symmetric  Skin: Skin color, texture, turgor normal. No rashes or lesions.  Lymph nodes: Cervical, supraclavicular, and axillary nodes normal.  Neurologic: Normal strength and tone. No focal numbness or weakness. CNII-XII intact.      CBC:  Recent Labs   Lab 12/19/18  0451 12/20/18  0522 12/21/18  0530   WBC 13.10* 8.60 8.00   RBC 4.29* 3.88* 4.16*   HGB 12.6* 11.5* 12.3*   HCT 39.1* 35.2* 37.7*    303 311   MCV 91 91 91   MCH 29.4 29.7 29.5   MCHC 32.2 32.7 32.6   BMP  Recent Labs   Lab 12/19/18  0451 12/20/18  0522 12/21/18  0530   GLU 92 94 142*    144 144   K 2.9* 3.1* 3.2*    109 108   CO2 26 29 29   BUN 4* 5* 10   CREATININE 0.6 0.8 0.9   CALCIUM 8.9 9.0 9.5      Diagnostic Results:  Microbiology Results (last 7 days)     Procedure Component Value Units Date/Time    Blood culture x two cultures. Draw prior to antibiotics. [[id number]] Collected:  12/17/18 0943    Order Status:  Completed Specimen:  Blood from Peripheral, Left  Hand Updated:  12/20/18 2012     Blood Culture, Routine No Growth to date     Blood Culture, Routine No Growth to  date     Blood Culture, Routine No Growth to date     Blood Culture, Routine No Growth to date    Narrative:       Aerobic and anaerobic    Blood culture x two cultures. Draw prior to antibiotics. [[id number]] Collected:  12/17/18 0958    Order Status:  Completed Specimen:  Blood from Antecubital, Right  Arm Updated:  12/20/18 2012     Blood Culture, Routine No Growth to date     Blood Culture, Routine No Growth to date     Blood Culture, Routine No Growth to date     Blood Culture, Routine No Growth to date    Narrative:       Aerobic and anaerobic    Urine culture [[id number]] Collected:  12/17/18 1029    Order Status:  Completed Specimen:  Urine Updated:  12/18/18 2254     Urine Culture, Routine Multiple organisms isolated. None in predominance.  Repeat if     Urine Culture, Routine clinically necessary.    Narrative:       Preferred Collection Type->Urine, Clean Catch    Urine culture [[id number]] Collected:  12/17/18 1542    Order Status:  Canceled Specimen:  Urine, Catheterized Updated:  12/17/18 1542         Chest X-Ray: T right basilar infiltrate.     CT abdomen and pelvis with contrast:  Enlarged prostate gland, air in the bladder likely secondary to catheterization, diffuse bladder wall thickening. Prominent amount of fluid within right colon.  Appendix not identified and note is made of limitation with motion artifact in the appendiceal region.    EGD:   - Moderately severe reflux esophagitis.                       - Medium-sized hiatal hernia.                       - Gastritis.                       - A single gastric polyp.                       - Normal examined duodenum.                       - No specimens collected.    CXR: New moderate left pleural effusion and left lung infiltrate.  Leftward mediastinal shift raising consideration for left lung atelectasis as contributory to the findings.  Close follow-up recommended.    Assessment/Plan:      SOB (shortness of breath) [R06.02]   Yes    Aspiration  pneumonia, RLL [J69.0]  Swallow dysfunction  Supplemental O2 via nasal canula; titrate O2 saturation to >92%.   High aspiration risk. NPO as per ST recommendations. Patient agreeable to have PEG placed. Will consult GI specialist.  Continue beta 2 agonist bronchodilator treatments.   Continue IV antibiotics - Vancomycin and Zosyn. Pharmacist to dose Vancomycin.   Check sputum GS and Cx.   Continue routine medications as before.   Failed swallow evaluation.  Continue Clinimix.      Yes    Upper GI bleeding [K92.2]  Follow H/H closely. Type and screen blood and transfuse as needed.  Continue IV Protonix.  Continue IVF hydration.   Use IV anti-emetics as needed.       Yes    Muscular dystrophy [G71.00]  Supportive care, fall precautions.      UTI  Urine C/S. Continue IV antibiotics.      Yes    Chronic obstructive pulmonary disease [J44.9]  Patient's COPD is controlled currently. Continue scheduled inhalers and monitor respiratory status closely.    Hypokalemia  Replete KCl. Follow BMP.    Yes      DVT prophylaxis: Use SCD and TEDs. No anticoagulation due to GI bleeding.      Stephanie Tena MD  Department of Hospital Medicine   [facility]

## 2018-12-21 NOTE — PLAN OF CARE
12/20/18 2030   Patient Assessment/Suction   Level of Consciousness (AVPU) responds to voice   Respiratory Effort Normal;Unlabored   Expansion/Accessory Muscles/Retractions expansion symmetric;no retractions;no use of accessory muscles   All Lung Fields Breath Sounds diminished   PRE-TX-O2-ETCO2   O2 Device (Oxygen Therapy) nasal cannula   Flow (L/min) 3   Oxygen Concentration (%) 32   SpO2 96 %   Pulse Oximetry Type Intermittent   Pulse 72   Resp 18   Aerosol Therapy   $ Aerosol Therapy Charges PRN treatment not required   Respiratory Treatment Status (SVN) PRN treatment not required   Ready to Wean/Extubation Screen   FIO2<=50 (chart decimal) 0.32

## 2018-12-21 NOTE — TELEPHONE ENCOUNTER
----- Message from Anand Orellana sent at 12/21/2018  3:06 PM CST -----  Contact: Ijeoma with [facility] Amy with [facility] called with a hospital consult. Please call back at [phone].

## 2018-12-21 NOTE — PLAN OF CARE
Problem: Malnutrition  Goal: Improved Nutritional Intake    Intervention: Promote and Optimize Nutrition Support  Interventions: Parenteral nutrition therapy and PLAN FOR enteral nutrition therapy      Recommendation:   1) Continue PPN and add lipids until PEG placed  D5 AA 2.75 + lipids (Provides 1172 kcal (67% EEN), 66 g protein (92%EPN)     2) Once PEG placed initiate   -Isosource 1.5 @ 15 ml/hr advancing to goal of 45 ml/hr + 30 ml flush q 4 hr for tube patency  until NS d/c'd (Provides 820 ml free water, 1620 kcal (92% EEN), 73 g protien (100% EPN))     Goals: 1) Advance PO diet or initiate TF in < 72 hours 2) TPN or TF to meet > 75% estimated needs by f/u   Nutrition Goal Status: Not met/ New  Communication of RD Recs: (POC, sticky note, second sign )

## 2018-12-21 NOTE — ASSESSMENT & PLAN NOTE
Contributing Nutrition Diagnosis  Moderate acute illness related malnutrition    Related to (etiology):   Swallowing/Chewing Difficulty    Signs and Symptoms (as evidenced by):   1) PO intakes < 50% of estimated needs x 5 days 2) Moderate muscle/fat loss @ temples and pattellar area/mild muscle/fat loss @ orbital area, bicep, clavicle, and calves    Interventions/Recommendations (treatment strategy):  1) TF or TPN to meet > 75% estimated needs @ f/u    Nutrition Diagnosis Status:   New

## 2018-12-22 PROBLEM — R13.10 DYSPHAGIA: Status: ACTIVE | Noted: 2018-12-22

## 2018-12-22 LAB
ALBUMIN SERPL BCP-MCNC: 2.2 G/DL
ALP SERPL-CCNC: 129 U/L
ALT SERPL W/O P-5'-P-CCNC: 19 U/L
ANION GAP SERPL CALC-SCNC: 5 MMOL/L
AST SERPL-CCNC: 21 U/L
BACTERIA BLD CULT: NORMAL
BACTERIA BLD CULT: NORMAL
BASOPHILS # BLD AUTO: 0 K/UL
BASOPHILS NFR BLD: 0 %
BILIRUB SERPL-MCNC: 0.2 MG/DL
BUN SERPL-MCNC: 12 MG/DL
CALCIUM SERPL-MCNC: 9.6 MG/DL
CHLORIDE SERPL-SCNC: 109 MMOL/L
CO2 SERPL-SCNC: 32 MMOL/L
CREAT SERPL-MCNC: 0.9 MG/DL
DIFFERENTIAL METHOD: ABNORMAL
EOSINOPHIL # BLD AUTO: 0.2 K/UL
EOSINOPHIL NFR BLD: 1.8 %
ERYTHROCYTE [DISTWIDTH] IN BLOOD BY AUTOMATED COUNT: 14.5 %
EST. GFR  (AFRICAN AMERICAN): >60 ML/MIN/1.73 M^2
EST. GFR  (NON AFRICAN AMERICAN): >60 ML/MIN/1.73 M^2
GLUCOSE SERPL-MCNC: 145 MG/DL
HCT VFR BLD AUTO: 38.4 %
HGB BLD-MCNC: 12.4 G/DL
LYMPHOCYTES # BLD AUTO: 1.4 K/UL
LYMPHOCYTES NFR BLD: 13.4 %
MCH RBC QN AUTO: 29.4 PG
MCHC RBC AUTO-ENTMCNC: 32.3 G/DL
MCV RBC AUTO: 91 FL
MONOCYTES # BLD AUTO: 0.7 K/UL
MONOCYTES NFR BLD: 7.1 %
NEUTROPHILS # BLD AUTO: 7.9 K/UL
NEUTROPHILS NFR BLD: 77.7 %
PLATELET # BLD AUTO: 355 K/UL
PMV BLD AUTO: 8.3 FL
POTASSIUM SERPL-SCNC: 3.7 MMOL/L
PROT SERPL-MCNC: 5.9 G/DL
RBC # BLD AUTO: 4.22 M/UL
SODIUM SERPL-SCNC: 146 MMOL/L
WBC # BLD AUTO: 10.2 K/UL

## 2018-12-22 PROCEDURE — 25000003 PHARM REV CODE 250: Performed by: INTERNAL MEDICINE

## 2018-12-22 PROCEDURE — 27000221 HC OXYGEN, UP TO 24 HOURS

## 2018-12-22 PROCEDURE — 85025 COMPLETE CBC W/AUTO DIFF WBC: CPT

## 2018-12-22 PROCEDURE — 63600175 PHARM REV CODE 636 W HCPCS: Performed by: INTERNAL MEDICINE

## 2018-12-22 PROCEDURE — 80053 COMPREHEN METABOLIC PANEL: CPT

## 2018-12-22 PROCEDURE — 94761 N-INVAS EAR/PLS OXIMETRY MLT: CPT

## 2018-12-22 PROCEDURE — 97116 GAIT TRAINING THERAPY: CPT

## 2018-12-22 PROCEDURE — C9113 INJ PANTOPRAZOLE SODIUM, VIA: HCPCS | Performed by: INTERNAL MEDICINE

## 2018-12-22 PROCEDURE — 36415 COLL VENOUS BLD VENIPUNCTURE: CPT

## 2018-12-22 PROCEDURE — 12000002 HC ACUTE/MED SURGE SEMI-PRIVATE ROOM

## 2018-12-22 PROCEDURE — 99900035 HC TECH TIME PER 15 MIN (STAT)

## 2018-12-22 RX ADMIN — PIPERACILLIN SODIUM AND TAZOBACTAM SODIUM 4.5 G: 4; .5 INJECTION, POWDER, LYOPHILIZED, FOR SOLUTION INTRAVENOUS at 02:12

## 2018-12-22 RX ADMIN — PIPERACILLIN SODIUM AND TAZOBACTAM SODIUM 4.5 G: 4; .5 INJECTION, POWDER, LYOPHILIZED, FOR SOLUTION INTRAVENOUS at 09:12

## 2018-12-22 RX ADMIN — VANCOMYCIN HYDROCHLORIDE 1250 MG: 1 INJECTION, POWDER, LYOPHILIZED, FOR SOLUTION INTRAVENOUS at 02:12

## 2018-12-22 RX ADMIN — PANTOPRAZOLE SODIUM 40 MG: 40 INJECTION, POWDER, LYOPHILIZED, FOR SOLUTION INTRAVENOUS at 09:12

## 2018-12-22 RX ADMIN — LEUCINE, PHENYLALANINE, LYSINE, METHIONINE, ISOLEUCINE, VALINE, HISTIDINE, THREONINE, TRYPTOPHAN, ALANINE, GLYCINE, ARGININE, PROLINE, SERINE, TYROSINE, SODIUM ACETATE, DIBASIC POTASSIUM PHOSPHATE, MAGNESIUM CHLORIDE, SODIUM CHLORIDE, CALCIUM CHLORIDE, DEXTROSE
201; 154; 159; 110; 165; 160; 132; 116; 50; 570; 283; 316; 187; 138; 11; 217; 261; 51; 112; 33; 5 INJECTION INTRAVENOUS at 04:12

## 2018-12-22 NOTE — PLAN OF CARE
12/22/18 1515   PRE-TX-O2-ETCO2   O2 Device (Oxygen Therapy) nasal cannula   $ Is the patient on Low Flow Oxygen? Yes   Flow (L/min) 3   Oxygen Concentration (%) 32   SpO2 (!) 94 %   Pulse Oximetry Type Intermittent   $ Pulse Oximetry - Multiple Charge Pulse Oximetry - Multiple   Ready to Wean/Extubation Screen   FIO2<=50 (chart decimal) 0.32

## 2018-12-22 NOTE — PROGRESS NOTES
[facility] Hospital Medicine  Progress Note    Patient Name: Enmanuel Armenta  MRN: [id number]  Patient Class: IP- Inpatient   Admission Date: 12/17/2018  Length of Stay: 5 days  Attending Physician: Stephanie Tena MD  Primary Care Provider: Primary Doctor No        Subjective:     Principal Problem:Aspiration pneumonia    HPI:  No notes on file    Hospital Course:  No notes on file    Interval History: The pt is doing well, no complaints    Review of Systems   Constitutional: Negative for activity change, appetite change, chills and fever.   HENT: Negative for congestion and mouth sores.    Respiratory: Positive for cough. Negative for chest tightness, shortness of breath and wheezing.    Cardiovascular: Negative.  Negative for chest pain, palpitations and leg swelling.   Gastrointestinal: Negative.  Negative for abdominal distention, constipation, diarrhea, nausea and vomiting.   Genitourinary: Negative for dysuria and frequency.   Musculoskeletal: Negative.  Negative for arthralgias, back pain and gait problem.   Skin: Negative.  Negative for rash and wound.   Neurological: Negative for dizziness, facial asymmetry and numbness.   Psychiatric/Behavioral: Negative for agitation, behavioral problems and confusion.     Objective:     Vital Signs (Most Recent):  Temp: 97.8 °F (36.6 °C) (12/22/18 1206)  Pulse: 71 (12/22/18 1206)  Resp: 20 (12/22/18 1206)  BP: (!) 100/58 (12/22/18 1206)  SpO2: (!) 91 % (12/22/18 1206) Vital Signs (24h Range):  Temp:  [96.1 °F (35.6 °C)-98.4 °F (36.9 °C)] 97.8 °F (36.6 °C)  Pulse:  [65-81] 71  Resp:  [16-20] 20  SpO2:  [91 %-94 %] 91 %  BP: (100-149)/(58-76) 100/58     Weight: 71.1 kg (156 lb 12 oz)  Body mass index is 24.55 kg/m².    Intake/Output Summary (Last 24 hours) at 12/22/2018 1511  Last data filed at 12/22/2018 0500  Gross per 24 hour   Intake 1829.76 ml   Output 2900 ml   Net -1070.24 ml      Physical Exam   Constitutional: He is oriented to person, place,  and time. He appears well-developed and well-nourished.   Cardiovascular: Normal rate, regular rhythm, normal heart sounds and intact distal pulses. Exam reveals no gallop and no friction rub.   No murmur heard.  Pulmonary/Chest: Effort normal and breath sounds normal. No respiratory distress. He has no rales.   Right sided rales   Abdominal: Soft. Bowel sounds are normal. He exhibits no distension. There is no tenderness. There is no rebound.   Musculoskeletal: Normal range of motion. He exhibits no edema.   Neurological: He is alert and oriented to person, place, and time. No cranial nerve deficit or sensory deficit. He exhibits normal muscle tone. Coordination normal.   Skin: Skin is warm and dry.   Psychiatric: He has a normal mood and affect. His behavior is normal. Judgment and thought content normal.   Vitals reviewed.      Significant Labs:   BMP:   Recent Labs   Lab 12/22/18  0621   *   *   K 3.7      CO2 32*   BUN 12   CREATININE 0.9   CALCIUM 9.6     CBC:   Recent Labs   Lab 12/21/18  0530 12/22/18  0621   WBC 8.00 10.20   HGB 12.3* 12.4*   HCT 37.7* 38.4*    355*       Significant Imaging: I have reviewed all pertinent imaging results/findings within the past 24 hours.    Assessment/Plan:      * Aspiration pneumonia, RLL    Improving  De-escalate Abx       Upper GI bleeding    EGD showed inflammation and GERD.        Dysphagia    PEG placement Monday       UTI (urinary tract infection)    Treated       Elevated LFTs    2/2 meds? Fatty liver?      Muscular dystrophy    Stable       Chronic obstructive pulmonary disease    Continue O2 and nebs         VTE Risk Mitigation (From admission, onward)    None              Sunshine Salomon MD  Department of Hospital Medicine   [facility]

## 2018-12-22 NOTE — PLAN OF CARE
"Problem: Adult Inpatient Plan of Care  Goal: Plan of Care Review  Outcome: Ongoing (interventions implemented as appropriate)  Plan of care review with pt, pt states "understanding," clinimax infusing without difficulty, no redness/swelling noted to IV site, armas catheter draining clear yellow urine to urimeter bag, 02 3L NC in progress, less than 3 seconds capillary refills , MD aware of K+ 3.7, speech is garble, pt exhibit agitation AEB pt is thrusting back and forth and bed, fidgeting with covers and tubing,  no new orders at this time, will continue to monitor, observe and note any changes, safety maintain      1800-clinimax will be expiring in 45min, notified dr echevarria, states "leave pt off clinimax until tomorrow  "

## 2018-12-22 NOTE — PT/OT/SLP PROGRESS
"Physical Therapy Treatment    Patient Name:  Enmanuel Armenta   MRN:  [id number]    Recommendations:     Discharge Recommendations:  nursing facility, skilled   Discharge Equipment Recommendations: none   Barriers to discharge:      Assessment:     Enmanuel Armenta is a 63 y.o. male admitted with a medical diagnosis of Aspiration pneumonia.  He presents with the following impairments/functional limitations:  weakness, impaired endurance, impaired sensation, gait instability, impaired functional mobilty, impaired self care skills, impaired balance, impaired cognition, decreased lower extremity function, decreased safety awareness.  Patient gait trained 250ft with RW and 3LO2 with mod A for guidance and control of RW and patients steering.    Rehab Prognosis: Fair; patient would benefit from acute skilled PT services to address these deficits and reach maximum level of function.    Recent Surgery: Procedure(s) (LRB):  EGD (ESOPHAGOGASTRODUODENOSCOPY) (N/A) 5 Days Post-Op    Plan:     During this hospitalization, patient to be seen 6 x/week to address the identified rehab impairments via gait training, therapeutic activities, therapeutic exercises and progress toward the following goals:    · Plan of Care Expires:  01/03/19    Subjective     Chief Complaint: "I wanna use one with 4 wheels" as patient is used to a rollator  Patient/Family Comments/goals: "Just let me walk" (Patient unsteady to ambulate I  Pain/Comfort:  · Pain Rating 1: 0/10  · Pain Rating Post-Intervention 1: 0/10      Objective:     Communicated with patient and patients nurse Becca prior to session.  Patient found all lines intact, call button in reach and bed alarm on oxygen, peripheral IV, telemetry  upon PT entry to room.     General Precautions: Standard, aspiration, NPO, fall   Orthopedic Precautions:N/A   Braces:       Functional Mobility:  · Bed Mobility:  Rolling Right: contact guard assistance  · Supine to Sit: contact guard " assistance  · Transfers:  Sit to Stand:  contact guard assistance with rolling walker  · Gait: 250ft with RW and Min A for steering RW and pulling O2  · Balance: Fair in standing, Good in sitting      AM-PAC 6 CLICK MOBILITY  Turning over in bed (including adjusting bedclothes, sheets and blankets)?: 4  Sitting down on and standing up from a chair with arms (e.g., wheelchair, bedside commode, etc.): 3  Moving from lying on back to sitting on the side of the bed?: 3  Moving to and from a bed to a chair (including a wheelchair)?: 3  Need to walk in hospital room?: 3  Climbing 3-5 steps with a railing?: 2  Basic Mobility Total Score: 18       Therapeutic Activities and Exercises:   AP x 20    Patient left up in chair with all lines intact, call button in reach, chair alarm on and nursing notified..    GOALS:   Multidisciplinary Problems     Physical Therapy Goals        Problem: Physical Therapy Goal    Goal Priority Disciplines Outcome Goal Variances Interventions   Physical Therapy Goal     PT, PT/OT Ongoing (interventions implemented as appropriate)     Description:  Goals to be met by: 1/3/2019     Patient will increase functional independence with mobility by performin. Supine to sit with Modified [facility]  2. Sit to supine with Modified [facility]  3. Sit to stand transfer with Modified [facility]  4. Bed to chair transfer with Supervision using LRAD  5. Gait  x 250 feet with Supervision using LRAD  6. Lower extremity exercise program x10-20 reps per handout, with supervision                      Time Tracking:     PT Received On: 18  PT Start Time: 904     PT Stop Time: 923  PT Total Time (min): 19 min     Billable Minutes: Gait Training 10 and Therapeutic Activity 9    Treatment Type: Treatment  PT/PTA: PTA     PTA Visit Number: 1     Janna Marin, GERALDO  2018

## 2018-12-22 NOTE — PLAN OF CARE
Problem: Adult Inpatient Plan of Care  Goal: Plan of Care Review  Outcome: Ongoing (interventions implemented as appropriate)  Pt has remained free from injury this shift, he is npo with swallowing precaution in progress.  sucton set up at bedside  Pt is able to suction mouth himself.  He is able to turn and reposition himself in the bed.  He sat in chair for several hours today.  He denies pain or discomfort during this shift.  Call light is in reach he was encouraged to call for assistance or needs, bed alarm is on for added safety.

## 2018-12-22 NOTE — CONSULTS
Enmanuel Armenta [id number] is a 63 y.o. male who has been consulted for vancomycin dosing.    Pharmacy consult for vancomycin dosing in no longer required.  Vancomycin was discontinued.    Thank you for allowing us to participate in this patient's care.     Morgan An, Pharmacist

## 2018-12-22 NOTE — NURSING
Situation Principle Problem:  Aspiration pneumonia      Reason for Calling:Pt ran first degree block and BBB last night.Last EKG showed bradycardia.    Provider Calling:Dr. Salomno   Background Vitals:    12/21/18 1935 12/21/18 2040 12/21/18 2346 12/22/18 0550   BP: 127/65  137/65 (!) 149/76   BP Location:   Left arm Right arm   Patient Position: Lying  Lying Lying   Pulse: 65 72 66 76   Resp: 20 16 18 20   Temp: 96.7 °F (35.9 °C)  97.4 °F (36.3 °C) 98.4 °F (36.9 °C)   TempSrc: Oral  Oral Oral   SpO2: (!) 92% (!) 94% (!) 94% (!) 94%   Weight:       Height:         Past Medical History:   Diagnosis Date    BPH (benign prostatic hyperplasia)     COPD (chronic obstructive pulmonary disease)     Elevated PSA     Fx     RIGHT WRIST, LEFT FOOT    Hypokalemia     Muscular dystrophy     Urine retention     Wears dentures     upper         No results found for: POCTGLUCOSE    Intake/Output:    Intake/Output Summary (Last 24 hours) at 12/22/2018 0740  Last data filed at 12/22/2018 0500  Gross per 24 hour   Intake 1829.76 ml   Output 2900 ml   Net -1070.24 ml        Assessment What is happening:Pt. Not complaining of any pain or discomfort but had change in rhythm last night. 1st degree block and BBB.    Response Provider Response: ***

## 2018-12-22 NOTE — PLAN OF CARE
Problem: Physical Therapy Goal  Goal: Physical Therapy Goal  Goals to be met by: 1/3/2019     Patient will increase functional independence with mobility by performin. Supine to sit with Modified [place]  2. Sit to supine with Modified [place]  3. Sit to stand transfer with Modified [place]  4. Bed to chair transfer with Supervision using LRAD  5. Gait  x 250 feet with Supervision using LRAD  6. Lower extremity exercise program x10-20 reps per handout, with supervision     Outcome: Ongoing (interventions implemented as appropriate)  Patient performed transfers, gait training, toilet transfers, TE to meet goals set in POC

## 2018-12-22 NOTE — PLAN OF CARE
Problem: Adult Inpatient Plan of Care  Goal: Plan of Care Review  H.O.H. Lung sounds coarse and wet. Pt. Using yankeur frequently. Reminding pt. To rinse yankeur out after use. Bed alarm intact. Assisted up to bedside commode to try  to have bowel movement. Explained I&O to pt. Samson patent, putting out moderate amount of light yellow urine. Slept well.

## 2018-12-22 NOTE — PLAN OF CARE
12/21/18 2040   Patient Assessment/Suction   Level of Consciousness (AVPU) responds to voice   Respiratory Effort Normal;Unlabored   Expansion/Accessory Muscles/Retractions expansion symmetric;no retractions;no use of accessory muscles   All Lung Fields Breath Sounds diminished   PRE-TX-O2-ETCO2   O2 Device (Oxygen Therapy) nasal cannula   Flow (L/min) 3   Oxygen Concentration (%) 32   SpO2 (!) 94 %   Pulse Oximetry Type Intermittent   Pulse 72   Resp 16   Aerosol Therapy   $ Aerosol Therapy Charges PRN treatment not required   Respiratory Treatment Status (SVN) PRN treatment not required   Ready to Wean/Extubation Screen   FIO2<=50 (chart decimal) 0.32

## 2018-12-22 NOTE — SUBJECTIVE & OBJECTIVE
Interval History: The pt is doing well, no complaints    Review of Systems   Constitutional: Negative for activity change, appetite change, chills and fever.   HENT: Negative for congestion and mouth sores.    Respiratory: Positive for cough. Negative for chest tightness, shortness of breath and wheezing.    Cardiovascular: Negative.  Negative for chest pain, palpitations and leg swelling.   Gastrointestinal: Negative.  Negative for abdominal distention, constipation, diarrhea, nausea and vomiting.   Genitourinary: Negative for dysuria and frequency.   Musculoskeletal: Negative.  Negative for arthralgias, back pain and gait problem.   Skin: Negative.  Negative for rash and wound.   Neurological: Negative for dizziness, facial asymmetry and numbness.   Psychiatric/Behavioral: Negative for agitation, behavioral problems and confusion.     Objective:     Vital Signs (Most Recent):  Temp: 97.8 °F (36.6 °C) (12/22/18 1206)  Pulse: 71 (12/22/18 1206)  Resp: 20 (12/22/18 1206)  BP: (!) 100/58 (12/22/18 1206)  SpO2: (!) 91 % (12/22/18 1206) Vital Signs (24h Range):  Temp:  [96.1 °F (35.6 °C)-98.4 °F (36.9 °C)] 97.8 °F (36.6 °C)  Pulse:  [65-81] 71  Resp:  [16-20] 20  SpO2:  [91 %-94 %] 91 %  BP: (100-149)/(58-76) 100/58     Weight: 71.1 kg (156 lb 12 oz)  Body mass index is 24.55 kg/m².    Intake/Output Summary (Last 24 hours) at 12/22/2018 1511  Last data filed at 12/22/2018 0500  Gross per 24 hour   Intake 1829.76 ml   Output 2900 ml   Net -1070.24 ml      Physical Exam   Constitutional: He is oriented to person, place, and time. He appears well-developed and well-nourished.   Cardiovascular: Normal rate, regular rhythm, normal heart sounds and intact distal pulses. Exam reveals no gallop and no friction rub.   No murmur heard.  Pulmonary/Chest: Effort normal and breath sounds normal. No respiratory distress. He has no rales.   Right sided rales   Abdominal: Soft. Bowel sounds are normal. He exhibits no distension. There  is no tenderness. There is no rebound.   Musculoskeletal: Normal range of motion. He exhibits no edema.   Neurological: He is alert and oriented to person, place, and time. No cranial nerve deficit or sensory deficit. He exhibits normal muscle tone. Coordination normal.   Skin: Skin is warm and dry.   Psychiatric: He has a normal mood and affect. His behavior is normal. Judgment and thought content normal.   Vitals reviewed.      Significant Labs:   BMP:   Recent Labs   Lab 12/22/18  0621   *   *   K 3.7      CO2 32*   BUN 12   CREATININE 0.9   CALCIUM 9.6     CBC:   Recent Labs   Lab 12/21/18  0530 12/22/18  0621   WBC 8.00 10.20   HGB 12.3* 12.4*   HCT 37.7* 38.4*    355*       Significant Imaging: I have reviewed all pertinent imaging results/findings within the past 24 hours.

## 2018-12-23 PROBLEM — K92.0 HEMATEMESIS: Status: RESOLVED | Noted: 2018-12-18 | Resolved: 2018-12-23

## 2018-12-23 PROBLEM — N39.0 UTI (URINARY TRACT INFECTION): Status: RESOLVED | Noted: 2018-12-18 | Resolved: 2018-12-23

## 2018-12-23 PROCEDURE — 63600175 PHARM REV CODE 636 W HCPCS: Performed by: INTERNAL MEDICINE

## 2018-12-23 PROCEDURE — 12000002 HC ACUTE/MED SURGE SEMI-PRIVATE ROOM

## 2018-12-23 PROCEDURE — C9113 INJ PANTOPRAZOLE SODIUM, VIA: HCPCS | Performed by: INTERNAL MEDICINE

## 2018-12-23 PROCEDURE — 25000003 PHARM REV CODE 250: Performed by: INTERNAL MEDICINE

## 2018-12-23 PROCEDURE — 94761 N-INVAS EAR/PLS OXIMETRY MLT: CPT

## 2018-12-23 PROCEDURE — 99900035 HC TECH TIME PER 15 MIN (STAT)

## 2018-12-23 PROCEDURE — 27000221 HC OXYGEN, UP TO 24 HOURS

## 2018-12-23 RX ADMIN — PANTOPRAZOLE SODIUM 40 MG: 40 INJECTION, POWDER, LYOPHILIZED, FOR SOLUTION INTRAVENOUS at 08:12

## 2018-12-23 RX ADMIN — LEUCINE, PHENYLALANINE, LYSINE, METHIONINE, ISOLEUCINE, VALINE, HISTIDINE, THREONINE, TRYPTOPHAN, ALANINE, GLYCINE, ARGININE, PROLINE, SERINE, TYROSINE, SODIUM ACETATE, DIBASIC POTASSIUM PHOSPHATE, MAGNESIUM CHLORIDE, SODIUM CHLORIDE, CALCIUM CHLORIDE, DEXTROSE
201; 154; 159; 110; 165; 160; 132; 116; 50; 570; 283; 316; 187; 138; 11; 217; 261; 51; 112; 33; 5 INJECTION INTRAVENOUS at 01:12

## 2018-12-23 NOTE — PROGRESS NOTES
[facility] Hospital Medicine  Progress Note    Patient Name: Enmanuel Armenta  MRN: [id number]  Patient Class: IP- Inpatient   Admission Date: 12/17/2018  Length of Stay: 6 days  Attending Physician: Stephanie Tena MD  Primary Care Provider: Primary Doctor No        Subjective:     Principal Problem:Aspiration pneumonia    HPI:  No notes on file    Hospital Course:  No notes on file    Interval History: The pt is doing well.No complaints. Asking if he can eat    Review of Systems   Constitutional: Negative for activity change, appetite change, chills and fever.   HENT: Negative for congestion and mouth sores.    Respiratory: Negative for cough, chest tightness, shortness of breath and wheezing.    Cardiovascular: Negative.  Negative for chest pain, palpitations and leg swelling.   Gastrointestinal: Negative.  Negative for abdominal distention, constipation, diarrhea, nausea and vomiting.   Genitourinary: Negative for dysuria and frequency.   Musculoskeletal: Negative.  Negative for arthralgias, back pain and gait problem.   Skin: Negative.  Negative for rash and wound.   Neurological: Negative for dizziness, facial asymmetry and numbness.   Psychiatric/Behavioral: Negative for agitation, behavioral problems and confusion.     Objective:     Vital Signs (Most Recent):  Temp: 98.5 °F (36.9 °C) (12/23/18 0743)  Pulse: 78 (12/23/18 0743)  Resp: 18 (12/23/18 0743)  BP: 114/62 (12/23/18 0743)  SpO2: (!) 93 % (12/23/18 0743) Vital Signs (24h Range):  Temp:  [97.3 °F (36.3 °C)-98.5 °F (36.9 °C)] 98.5 °F (36.9 °C)  Pulse:  [71-82] 78  Resp:  [16-20] 18  SpO2:  [92 %-95 %] 93 %  BP: (100-136)/(58-74) 114/62     Weight: 71.1 kg (156 lb 12 oz)  Body mass index is 24.55 kg/m².    Intake/Output Summary (Last 24 hours) at 12/23/2018 1035  Last data filed at 12/22/2018 1800  Gross per 24 hour   Intake 1650 ml   Output 700 ml   Net 950 ml      Physical Exam   Constitutional: He is oriented to person, place, and  time. He appears well-developed and well-nourished.   Cardiovascular: Normal rate, regular rhythm, normal heart sounds and intact distal pulses. Exam reveals no gallop and no friction rub.   No murmur heard.  Pulmonary/Chest: Effort normal and breath sounds normal. No respiratory distress. He has no rales.   Right sided rales   Abdominal: Soft. Bowel sounds are normal. He exhibits no distension. There is no tenderness. There is no rebound.   Musculoskeletal: Normal range of motion. He exhibits no edema.   Neurological: He is alert and oriented to person, place, and time. No cranial nerve deficit or sensory deficit. He exhibits normal muscle tone. Coordination normal.   Skin: Skin is warm and dry.   Psychiatric: He has a normal mood and affect. His behavior is normal. Judgment and thought content normal.   Vitals reviewed.      Significant Labs:   BMP:   Recent Labs   Lab 12/22/18  0621   *   *   K 3.7      CO2 32*   BUN 12   CREATININE 0.9   CALCIUM 9.6     CBC:   Recent Labs   Lab 12/22/18  0621   WBC 10.20   HGB 12.4*   HCT 38.4*   *       Significant Imaging: I have reviewed all pertinent imaging results/findings within the past 24 hours.    Assessment/Plan:      * Aspiration pneumonia, RLL    Treated       Upper GI bleeding    EGD showed inflammation and GERD.   No further bleeding       Dysphagia    PEG placement Monday       Elevated LFTs    Resolved     Muscular dystrophy    Stable       Chronic obstructive pulmonary disease    Continue O2 and nebs         VTE Risk Mitigation (From admission, onward)    None              Sunshine Salomon MD  Department of Hospital Medicine   [facility]

## 2018-12-23 NOTE — PLAN OF CARE
12/23/18 1119   PRE-TX-O2-ETCO2   O2 Device (Oxygen Therapy) nasal cannula   $ Is the patient on Low Flow Oxygen? Yes   Flow (L/min) 3   Oxygen Concentration (%) 32   SpO2 (!) 94 %   Pulse Oximetry Type Intermittent   $ Pulse Oximetry - Multiple Charge Pulse Oximetry - Multiple   Aerosol Therapy   $ Aerosol Therapy Charges PRN treatment not required   Ready to Wean/Extubation Screen   FIO2<=50 (chart decimal) 0.32

## 2018-12-23 NOTE — SUBJECTIVE & OBJECTIVE
Interval History: The pt is doing well.No complaints. Asking if he can eat    Review of Systems   Constitutional: Negative for activity change, appetite change, chills and fever.   HENT: Negative for congestion and mouth sores.    Respiratory: Negative for cough, chest tightness, shortness of breath and wheezing.    Cardiovascular: Negative.  Negative for chest pain, palpitations and leg swelling.   Gastrointestinal: Negative.  Negative for abdominal distention, constipation, diarrhea, nausea and vomiting.   Genitourinary: Negative for dysuria and frequency.   Musculoskeletal: Negative.  Negative for arthralgias, back pain and gait problem.   Skin: Negative.  Negative for rash and wound.   Neurological: Negative for dizziness, facial asymmetry and numbness.   Psychiatric/Behavioral: Negative for agitation, behavioral problems and confusion.     Objective:     Vital Signs (Most Recent):  Temp: 98.5 °F (36.9 °C) (12/23/18 0743)  Pulse: 78 (12/23/18 0743)  Resp: 18 (12/23/18 0743)  BP: 114/62 (12/23/18 0743)  SpO2: (!) 93 % (12/23/18 0743) Vital Signs (24h Range):  Temp:  [97.3 °F (36.3 °C)-98.5 °F (36.9 °C)] 98.5 °F (36.9 °C)  Pulse:  [71-82] 78  Resp:  [16-20] 18  SpO2:  [92 %-95 %] 93 %  BP: (100-136)/(58-74) 114/62     Weight: 71.1 kg (156 lb 12 oz)  Body mass index is 24.55 kg/m².    Intake/Output Summary (Last 24 hours) at 12/23/2018 1035  Last data filed at 12/22/2018 1800  Gross per 24 hour   Intake 1650 ml   Output 700 ml   Net 950 ml      Physical Exam   Constitutional: He is oriented to person, place, and time. He appears well-developed and well-nourished.   Cardiovascular: Normal rate, regular rhythm, normal heart sounds and intact distal pulses. Exam reveals no gallop and no friction rub.   No murmur heard.  Pulmonary/Chest: Effort normal and breath sounds normal. No respiratory distress. He has no rales.   Right sided rales   Abdominal: Soft. Bowel sounds are normal. He exhibits no distension. There is no  tenderness. There is no rebound.   Musculoskeletal: Normal range of motion. He exhibits no edema.   Neurological: He is alert and oriented to person, place, and time. No cranial nerve deficit or sensory deficit. He exhibits normal muscle tone. Coordination normal.   Skin: Skin is warm and dry.   Psychiatric: He has a normal mood and affect. His behavior is normal. Judgment and thought content normal.   Vitals reviewed.      Significant Labs:   BMP:   Recent Labs   Lab 12/22/18 0621   *   *   K 3.7      CO2 32*   BUN 12   CREATININE 0.9   CALCIUM 9.6     CBC:   Recent Labs   Lab 12/22/18 0621   WBC 10.20   HGB 12.4*   HCT 38.4*   *       Significant Imaging: I have reviewed all pertinent imaging results/findings within the past 24 hours.

## 2018-12-23 NOTE — PLAN OF CARE
12/22/18 2103   Patient Assessment/Suction   Level of Consciousness (AVPU) alert   Respiratory Effort Normal;Unlabored   Expansion/Accessory Muscles/Retractions expansion symmetric;no retractions;no use of accessory muscles   All Lung Fields Breath Sounds diminished   PRE-TX-O2-ETCO2   O2 Device (Oxygen Therapy) nasal cannula   Flow (L/min) 3   Oxygen Concentration (%) 32   SpO2 95 %   Pulse Oximetry Type Intermittent   Pulse 79   Resp 18   Aerosol Therapy   $ Aerosol Therapy Charges PRN treatment not required   Respiratory Treatment Status (SVN) PRN treatment not required   Ready to Wean/Extubation Screen   FIO2<=50 (chart decimal) 0.32

## 2018-12-23 NOTE — PLAN OF CARE
Problem: Adult Inpatient Plan of Care  Goal: Plan of Care Review  Outcome: Ongoing (interventions implemented as appropriate)  Pt has remained free from injury this shift, bed alarm is on and he is close to nurse station for easy access to view of his room.  He is easily assisted to chair and has been up today as requested.  He is AAO but can become intermittently confused, he will take his oxygen off during his sleep so he needs frequent checks.  Speech is slurred and slow, he has a difficult time clearing secretions for his mouth and throat but he can use mouth suction as needed.  He is NPO,  No elevated temp this shift and daily labs are assessed for changes.  He has clear yellow urine noted in armas to gravity.  He is able to turn and he does reposition himself frequently in the bed.  He has call light but normal he just yells out for the nurse.

## 2018-12-23 NOTE — PLAN OF CARE
Problem: Adult Inpatient Plan of Care  Goal: Plan of Care Review  Outcome: Ongoing (interventions implemented as appropriate)  Reviewed POC with pt, he verb complete understanding. Pt speech is garble, but can understand. Samson to gravity draining clear/yellow urine. Suction at bedside that pt is able to use himself. IVF infusing per orders-clinamax is to be continued by new order per shift change nurse. VSS. Remains afebrile. Safety has been maintained this shift. Comfort level established, pain controlled with prn medications. Bed low, brakes locked, side rails x2, call light within reach, will continue to monitor.

## 2018-12-24 ENCOUNTER — ANESTHESIA (OUTPATIENT)
Dept: ENDOSCOPY | Facility: HOSPITAL | Age: 63
DRG: 870 | End: 2018-12-24
Payer: MEDICARE

## 2018-12-24 ENCOUNTER — ANESTHESIA EVENT (OUTPATIENT)
Dept: ENDOSCOPY | Facility: HOSPITAL | Age: 63
DRG: 870 | End: 2018-12-24
Payer: MEDICARE

## 2018-12-24 PROBLEM — K92.0 HEMATEMESIS: Status: ACTIVE | Noted: 2018-12-24

## 2018-12-24 PROCEDURE — 25000003 PHARM REV CODE 250: Performed by: INTERNAL MEDICINE

## 2018-12-24 PROCEDURE — 43239 EGD BIOPSY SINGLE/MULTIPLE: CPT | Mod: 59,,, | Performed by: INTERNAL MEDICINE

## 2018-12-24 PROCEDURE — 43251 EGD REMOVE LESION SNARE: CPT | Mod: 59,,, | Performed by: INTERNAL MEDICINE

## 2018-12-24 PROCEDURE — 63600175 PHARM REV CODE 636 W HCPCS: Performed by: NURSE ANESTHETIST, CERTIFIED REGISTERED

## 2018-12-24 PROCEDURE — 43246 EGD PLACE GASTROSTOMY TUBE: CPT | Mod: ,,, | Performed by: INTERNAL MEDICINE

## 2018-12-24 PROCEDURE — 43246 PR EGD, FLEX, W/PLCMT, GASTROSTOMY TUBE: ICD-10-PCS | Mod: ,,, | Performed by: INTERNAL MEDICINE

## 2018-12-24 PROCEDURE — B4185 PARENTERAL SOL 10 GM LIPIDS: HCPCS | Performed by: INTERNAL MEDICINE

## 2018-12-24 PROCEDURE — 99900035 HC TECH TIME PER 15 MIN (STAT)

## 2018-12-24 PROCEDURE — 94761 N-INVAS EAR/PLS OXIMETRY MLT: CPT

## 2018-12-24 PROCEDURE — 12000002 HC ACUTE/MED SURGE SEMI-PRIVATE ROOM

## 2018-12-24 PROCEDURE — C9113 INJ PANTOPRAZOLE SODIUM, VIA: HCPCS | Performed by: INTERNAL MEDICINE

## 2018-12-24 PROCEDURE — 27201018 HC KIT, PEG (ANY): Performed by: INTERNAL MEDICINE

## 2018-12-24 PROCEDURE — 97803 MED NUTRITION INDIV SUBSEQ: CPT

## 2018-12-24 PROCEDURE — 99232 PR SUBSEQUENT HOSPITAL CARE,LEVL II: ICD-10-PCS | Mod: ,,, | Performed by: INTERNAL MEDICINE

## 2018-12-24 PROCEDURE — 37000009 HC ANESTHESIA EA ADD 15 MINS: Performed by: INTERNAL MEDICINE

## 2018-12-24 PROCEDURE — 88305 TISSUE SPECIMEN TO PATHOLOGY - SURGERY: ICD-10-PCS | Mod: 26,,, | Performed by: PATHOLOGY

## 2018-12-24 PROCEDURE — 27000221 HC OXYGEN, UP TO 24 HOURS

## 2018-12-24 PROCEDURE — 37000008 HC ANESTHESIA 1ST 15 MINUTES: Performed by: INTERNAL MEDICINE

## 2018-12-24 PROCEDURE — 43251 PR EGD, FLEX, W/REMOVAL, TUMOR/POLYP/LESION(S), SNARE: ICD-10-PCS | Mod: 59,,, | Performed by: INTERNAL MEDICINE

## 2018-12-24 PROCEDURE — 43239 PR EGD, FLEX, W/BIOPSY, SGL/MULTI: ICD-10-PCS | Mod: 59,,, | Performed by: INTERNAL MEDICINE

## 2018-12-24 PROCEDURE — 27201089 HC SNARE, DISP (ANY): Performed by: INTERNAL MEDICINE

## 2018-12-24 PROCEDURE — D9220A PRA ANESTHESIA: Mod: CRNA,,, | Performed by: NURSE ANESTHETIST, CERTIFIED REGISTERED

## 2018-12-24 PROCEDURE — 27201012 HC FORCEPS, HOT/COLD, DISP: Performed by: INTERNAL MEDICINE

## 2018-12-24 PROCEDURE — 43246 EGD PLACE GASTROSTOMY TUBE: CPT | Performed by: INTERNAL MEDICINE

## 2018-12-24 PROCEDURE — 63600175 PHARM REV CODE 636 W HCPCS: Performed by: INTERNAL MEDICINE

## 2018-12-24 PROCEDURE — 88305 TISSUE EXAM BY PATHOLOGIST: CPT | Performed by: PATHOLOGY

## 2018-12-24 PROCEDURE — D9220A PRA ANESTHESIA: Mod: ANES,,, | Performed by: ANESTHESIOLOGY

## 2018-12-24 PROCEDURE — 43251 EGD REMOVE LESION SNARE: CPT | Performed by: INTERNAL MEDICINE

## 2018-12-24 PROCEDURE — 43239 EGD BIOPSY SINGLE/MULTIPLE: CPT | Performed by: INTERNAL MEDICINE

## 2018-12-24 PROCEDURE — 99232 SBSQ HOSP IP/OBS MODERATE 35: CPT | Mod: ,,, | Performed by: INTERNAL MEDICINE

## 2018-12-24 RX ORDER — LIDOCAINE HCL/PF 100 MG/5ML
SYRINGE (ML) INTRAVENOUS
Status: DISCONTINUED | OUTPATIENT
Start: 2018-12-24 | End: 2018-12-24

## 2018-12-24 RX ORDER — PROPOFOL 10 MG/ML
VIAL (ML) INTRAVENOUS
Status: DISCONTINUED | OUTPATIENT
Start: 2018-12-24 | End: 2018-12-24

## 2018-12-24 RX ORDER — CEFAZOLIN SODIUM 1 G/50ML
1 SOLUTION INTRAVENOUS ONCE
Status: COMPLETED | OUTPATIENT
Start: 2018-12-24 | End: 2018-12-24

## 2018-12-24 RX ORDER — SODIUM CHLORIDE 9 MG/ML
INJECTION, SOLUTION INTRAVENOUS CONTINUOUS
Status: DISCONTINUED | OUTPATIENT
Start: 2018-12-24 | End: 2018-12-25

## 2018-12-24 RX ADMIN — ASCORBIC ACID, VITAMIN A PALMITATE, CHOLECALCIFEROL, THIAMINE HYDROCHLORIDE, RIBOFLAVIN-5 PHOSPHATE SODIUM, PYRIDOXINE HYDROCHLORIDE, NIACINAMIDE, DEXPANTHENOL, ALPHA-TOCOPHEROL ACETATE, VITAMIN K1, FOLIC ACID, BIOTIN, CYANOCOBALAMIN: 200; 3300; 200; 6; 3.6; 6; 40; 15; 10; 150; 600; 60; 5 INJECTION, SOLUTION INTRAVENOUS at 02:12

## 2018-12-24 RX ADMIN — I.V. FAT EMULSION 250 ML: 20 EMULSION INTRAVENOUS at 10:12

## 2018-12-24 RX ADMIN — LIDOCAINE HYDROCHLORIDE 100 MG: 20 INJECTION, SOLUTION INTRAVENOUS at 11:12

## 2018-12-24 RX ADMIN — PANTOPRAZOLE SODIUM 40 MG: 40 INJECTION, POWDER, LYOPHILIZED, FOR SOLUTION INTRAVENOUS at 08:12

## 2018-12-24 RX ADMIN — PROPOFOL 80 MG: 10 INJECTION, EMULSION INTRAVENOUS at 11:12

## 2018-12-24 RX ADMIN — CEFAZOLIN SODIUM 1 G: 1 SOLUTION INTRAVENOUS at 11:12

## 2018-12-24 RX ADMIN — SODIUM CHLORIDE: 0.9 INJECTION, SOLUTION INTRAVENOUS at 10:12

## 2018-12-24 RX ADMIN — PROPOFOL 20 MG: 10 INJECTION, EMULSION INTRAVENOUS at 11:12

## 2018-12-24 NOTE — PLAN OF CARE
Problem: Malnutrition  Goal: Improved Nutritional Intake    Intervention: Promote and Optimize Nutrition Support  Interventions: Parenteral nutrition therapy and PLAN FOR enteral nutrition therapy    Current Intake : PPN D5 AA 2.75 @ 100 ml/hr + lipids - Provides 1580 kcal (90% EEN), and 66 g protein (92% EPN)  Was running earlier this morning without lipids- held not for PEG placement     Recommendation:   1) Continue PPN as ordered and add lipids until TF intiated     2) Initiate TF in < 24 hours:   -Isosource 1.5 @ 15 ml/hr advancing to goal of 45 ml/hr + 30 ml flush q 4 hr for tube patency  until NS d/c'd   (Provides 820 ml free water, 1620 kcal (92% EEN), 73 g protien (100% EPN))     Goals: 1) TPN or TF to meet > 75% estimated needs by f/u 2) TF initiated in < 24 hours  Nutrition Goal Status: Met/ New  Communication of RD Recs: (POC, sticky note, second sign )

## 2018-12-24 NOTE — PT/OT/SLP PROGRESS
Speech Language Pathology      Enmanuel Armenta  MRN: [id number]    Patient not seen today secondary to PEG placement  . Will follow-up 12/26/2018.    Savannah Fitzgerald, FRANCISCO-SLP

## 2018-12-24 NOTE — PT/OT/SLP PROGRESS
Physical Therapy      Patient Name:  Enmanuel Armenta   MRN:  [id number]    Patient not seen today secondary to Nursing care (getting bath), Other (Comment) pt getting ready to get up with PT and transport arrived to bring pt to [facility]. Will follow-up .    Shanna Garland, PTA

## 2018-12-24 NOTE — PLAN OF CARE
12/24/18 0729   Patient Assessment/Suction   Level of Consciousness (AVPU) alert   Respiratory Effort Unlabored;Normal   Expansion/Accessory Muscles/Retractions no use of accessory muscles;no retractions;expansion symmetric   All Lung Fields Breath Sounds clear;diminished   Rhythm/Pattern, Respiratory unlabored;pattern regular;depth regular   Cough Frequency infrequent   Cough Type good;nonproductive   PRE-TX-O2-ETCO2   O2 Device (Oxygen Therapy) nasal cannula   $ Is the patient on Low Flow Oxygen? Yes   Flow (L/min) 3   SpO2 (!) 94 %   Pulse Oximetry Type Intermittent   $ Pulse Oximetry - Multiple Charge Pulse Oximetry - Multiple   Pulse 70   Resp 18   Aerosol Therapy   $ Aerosol Therapy Charges PRN treatment not required       Aerosol treatments PRN.

## 2018-12-24 NOTE — ANESTHESIA PREPROCEDURE EVALUATION
12/24/2018  Enmanuel Armenta is a 63 y.o., male.    Pre-op Assessment    I have reviewed the Patient Summary Reports.     I have reviewed the Nursing Notes.   I have reviewed the Medications.     Review of Systems  Anesthesia Hx:  No problems with previous Anesthesia    Social:  Smoker    Hematology/Oncology:  Hematology Normal   Oncology Normal     EENT/Dental:   Upper dentures   Cardiovascular:  Cardiovascular Normal     Pulmonary:   Pneumonia COPD, moderate Shortness of breath    Renal/:   BPH    Hepatic/GI:   Difficulty feeding    Musculoskeletal:  Musculoskeletal Normal    Neurological:   Neuromuscular Disease,    Dermatological:  Skin Normal    Psych:  Psychiatric Normal           Physical Exam  General:  Well nourished    Airway/Jaw/Neck:  Airway Findings: Mouth Opening: Normal Tongue: Normal  General Airway Assessment: Adult  Mallampati: I  TM Distance: Normal, at least 6 cm     Eyes/Ears/Nose:  EYES/EARS/NOSE FINDINGS: Normal   Dental:  Several missing teeth. None loose per patient    Chest/Lungs:  Chest/Lungs Findings: Normal Respiratory Rate, Clear to auscultation     Heart/Vascular:  Heart Findings: Rate: Normal  Rhythm: Regular Rhythm        Mental Status:  Mental Status Findings:  Confusion, Cooperative         Anesthesia Plan  Type of Anesthesia, risks & benefits discussed:  Anesthesia Type:  general  Patient's Preference:   Intra-op Monitoring Plan: standard ASA monitors  Intra-op Monitoring Plan Comments:   Post Op Pain Control Plan:   Post Op Pain Control Plan Comments:   Induction:   IV  Beta Blocker:  Patient is on a Beta-Blocker and has received one dose within the past 24 hours (No further documentation required).       Informed Consent: Patient understands risks and agrees with Anesthesia plan.  Questions answered. Anesthesia consent signed with patient.  ASA Score: 3     Day of  Surgery Review of History & Physical: I have interviewed and examined the patient. I have reviewed the patient's H&P dated:  There are no significant changes.  H&P update referred to the provider.         Ready For Surgery From Anesthesia Perspective.

## 2018-12-24 NOTE — PROGRESS NOTES
Progress Note  Hospital Medicine  Patient Name:Enmanuel Armenta  MRN:  [id number]  Patient Class: IP- Inpatient  Admit Date: 12/17/2018  Length of Stay: 7 days  Expected Discharge Date:   Attending Physician: Stephanie Tena MD  Primary Care Provider:  Primary Doctor No    SUBJECTIVE:     Principal Problem: Aspiration pneumonia  Initial history of present illness: Patient is a 63 y.o. male admitted to Hospitalist Service from [facility] Emergency Room with complaint of abdominal pain. Patient is a resident of [facility] nursing home, [place]. Patient reportedly has past medical history significant for COPD, muscular dystrophy, l;imited mobility with walker and history of elevated PSA. Patient presented with 1 day history of abdominal pain associated with nausea and vomiting. The EMS reported, vomiting a brown sticky substance with pale red streaks throughout. EMS gave him 4 mg of Zofran in route to the ED. Patient denied chest pain, shortness of breath, headache, vision changes, focal neuro-deficits, cough. Patient noted to have 101.5F upon arrival.     PMH/PSH/SH/FH/Meds: reviewed.    Symptoms/Review of Systems: Patient is scheduled for PEG placement. No shortness of breath, cough, chest pain or headache, fever or abdominal pain.     Diet: Clinimix, NPO  Activity level: Normal.    Pain:  Patient reports no pain.       OBJECTIVE:   Vital Signs (Most Recent):      Temp: 98.2 °F (36.8 °C) (12/24/18 1041)  Pulse: 63 (12/24/18 1041)  Resp: 16 (12/24/18 1041)  BP: (!) 144/69 (12/24/18 1052)  SpO2: 100 % (12/24/18 1041)       Vital Signs Range (Last 24H):  Temp:  [97.6 °F (36.4 °C)-98.9 °F (37.2 °C)]   Pulse:  [63-90]   Resp:  [16-20]   BP: (111-151)/(65-85)   SpO2:  [91 %-100 %]     Weight: 71.1 kg (156 lb 12 oz)  Body mass index is 24.55 kg/m².    Intake/Output Summary (Last 24 hours) at 12/24/2018 1056  Last data filed at 12/24/2018 0700  Gross per 24 hour   Intake 411.67 ml   Output 950 ml   Net  -538.33 ml     Physical Examination:  General appearance: well developed, appears stated age, chronically ill-appearing  Head: normocephalic, atraumatic  Eyes:  conjunctivae/corneas clear. PERRL.  Nose: Nares normal. Septum midline.  Throat: lips, mucosa, and tongue normal; teeth and gums normal, no throat erythema. + Dry blood at angle of mouth  Neck: supple, symmetrical, trachea midline, no JVD and thyroid not enlarged, symmetric, no tenderness/mass/nodules  Lungs:  Scattered rhonchi B/L.  Chest wall: no tenderness  Heart: regular rate and rhythm, S1, S2 normal, no murmur, click, rub or gallop  Abdomen: soft, epigastric tenderness, non-distented; bowel sounds normal; no masses,  no organomegaly  Extremities: no cyanosis, clubbing or edema.   Pulses: 2+ and symmetric  Skin: Skin color, texture, turgor normal. No rashes or lesions.  Lymph nodes: Cervical, supraclavicular, and axillary nodes normal.  Neurologic: Normal strength and tone. No focal numbness or weakness. CNII-XII intact.      CBC:  Recent Labs   Lab 12/20/18  0522 12/21/18  0530 12/22/18  0621   WBC 8.60 8.00 10.20   RBC 3.88* 4.16* 4.22*   HGB 11.5* 12.3* 12.4*   HCT 35.2* 37.7* 38.4*    311 355*   MCV 91 91 91   MCH 29.7 29.5 29.4   MCHC 32.7 32.6 32.3   BMP  Recent Labs   Lab 12/20/18  0522 12/21/18  0530 12/22/18  0621   GLU 94 142* 145*    144 146*   K 3.1* 3.2* 3.7    108 109   CO2 29 29 32*   BUN 5* 10 12   CREATININE 0.8 0.9 0.9   CALCIUM 9.0 9.5 9.6      Diagnostic Results:  Microbiology Results (last 7 days)     Procedure Component Value Units Date/Time    Blood culture x two cultures. Draw prior to antibiotics. [[id number]] Collected:  12/17/18 0958    Order Status:  Completed Specimen:  Blood from Antecubital, Right  Arm Updated:  12/22/18 2012     Blood Culture, Routine No growth after 5 days.    Narrative:       Aerobic and anaerobic    Blood culture x two cultures. Draw prior to antibiotics. [[id number]] Collected:   12/17/18 0943    Order Status:  Completed Specimen:  Blood from Peripheral, Left  Hand Updated:  12/22/18 2012     Blood Culture, Routine No growth after 5 days.    Narrative:       Aerobic and anaerobic    Urine culture [[id number]] Collected:  12/17/18 1029    Order Status:  Completed Specimen:  Urine Updated:  12/18/18 2254     Urine Culture, Routine Multiple organisms isolated. None in predominance.  Repeat if     Urine Culture, Routine clinically necessary.    Narrative:       Preferred Collection Type->Urine, Clean Catch    Urine culture [[id number]] Collected:  12/17/18 1542    Order Status:  Canceled Specimen:  Urine, Catheterized Updated:  12/17/18 1542         Chest X-Ray: T right basilar infiltrate.     CT abdomen and pelvis with contrast:  Enlarged prostate gland, air in the bladder likely secondary to catheterization, diffuse bladder wall thickening. Prominent amount of fluid within right colon.  Appendix not identified and note is made of limitation with motion artifact in the appendiceal region.    EGD:   - Moderately severe reflux esophagitis.                       - Medium-sized hiatal hernia.                       - Gastritis.                       - A single gastric polyp.                       - Normal examined duodenum.                       - No specimens collected.    CXR: New moderate left pleural effusion and left lung infiltrate.  Leftward mediastinal shift raising consideration for left lung atelectasis as contributory to the findings.  Close follow-up recommended.    Assessment/Plan:      SOB (shortness of breath) [R06.02]   Yes    Aspiration pneumonia, RLL [J69.0]  Swallow dysfunction  Supplemental O2 via nasal canula; titrate O2 saturation to >92%.   High aspiration risk. PEG placement today. Follow GI recommendations.  Continue beta 2 agonist bronchodilator treatments.   Treated.  Continue Clinimix.      Yes    Upper GI bleeding [K92.2]  Follow H/H closely. Type and screen blood and  transfuse as needed.  Continue IV Protonix.  Use IV anti-emetics as needed.       Yes    Muscular dystrophy [G71.00]  Supportive care, fall precautions.       Yes    Chronic obstructive pulmonary disease [J44.9]  Patient's COPD is controlled currently. Continue scheduled inhalers and monitor respiratory status closely.   Yes      DVT prophylaxis: Use SCD and TEDs. No anticoagulation due to GI bleeding.      Stephanie Tena MD  Department of Hospital Medicine   [facility]

## 2018-12-24 NOTE — ASSESSMENT & PLAN NOTE
Contributing Nutrition Diagnosis  Moderate acute illness related malnutrition    Related to (etiology):   Swallowing/Chewing Difficulty    Signs and Symptoms (as evidenced by):   1) PO intakes < 50% of estimated needs x 5 days 2) Moderate muscle/fat loss @ temples and pattellar area/mild muscle/fat loss @ orbital area, bicep, clavicle, and calves    Interventions/Recommendations (treatment strategy):  1) TF or TPN to meet > 75% estimated needs @ f/u 2) Po diet advancement as able    Nutrition Diagnosis Status:   Continues

## 2018-12-24 NOTE — PROGRESS NOTES
OK to use PEG tube for meds and flushes now.  OK to use PEG tube for feeds in 8 hours from now.  Consult nutrition for tube feed recommendations.  No gauze between PEG bumper and skin.  OK to place gauze over entire tube and tape down to prevent dislodging.  OK to resume ASA and Plavix tomorrow.

## 2018-12-24 NOTE — TRANSFER OF CARE
"Anesthesia Transfer of Care Note    Patient: Enmanuel Armenta    Procedure(s) Performed: Procedure(s) (LRB):  EGD (ESOPHAGOGASTRODUODENOSCOPY) (N/A)  INSERTION, PEG TUBE (N/A)    Patient location: GI    Anesthesia Type: general    Transport from OR: Transported from OR on 2-3 L/min O2 by NC with adequate spontaneous ventilation    Post pain: adequate analgesia    Post assessment: no apparent anesthetic complications    Post vital signs: stable    Level of consciousness: awake    Nausea/Vomiting: no nausea/vomiting    Complications: none    Transfer of care protocol was followed      Last vitals:   Visit Vitals  BP (!) 144/69   Pulse 63   Temp 36.8 °C (98.2 °F) (Temporal)   Resp 16   Ht 5' 7" (1.702 m)   Wt 71.1 kg (156 lb 12 oz)   SpO2 100%   BMI 24.55 kg/m²     "

## 2018-12-24 NOTE — [employer] PATIENT INSTRUCTIONS
Discharge Summary/Instructions after an Endoscopic Procedure  Patient Name: Enmanuel Armenta  Patient MRN: [id number]  Patient YOB: 1955 Monday, December 24, 2018  Evgeny Anthony MD  RESTRICTIONS:  During your procedure today, you received medications for sedation.  These   medications may affect your judgment, balance and coordination.  Therefore,   for 24 hours, you have the following restrictions:   - DO NOT drive a car, operate machinery, make legal/financial decisions,   sign important papers or drink alcohol.    ACTIVITY:  Today: no heavy lifting, straining or running due to procedural   sedation/anesthesia.  The following day: return to full activity including work.  DIET:  Eat and drink normally unless instructed otherwise.     TREATMENT FOR COMMON SIDE EFFECTS:  - Mild abdominal pain, nausea, belching, bloating or excessive gas:  rest,   eat lightly and use a heating pad.  - Sore Throat: treat with throat lozenges and/or gargle with warm salt   water.  - Because air was used during the procedure, expelling large amounts of air   from your rectum or belching is normal.  - If a bowel prep was taken, you may not have a bowel movement for 1-3 days.    This is normal.  SYMPTOMS TO WATCH FOR AND REPORT TO YOUR PHYSICIAN:  1. Abdominal pain or bloating, other than gas cramps.  2. Chest pain.  3. Back pain.  4. Signs of infection such as: chills or fever occurring within 24 hours   after the procedure.  5. Rectal bleeding, which would show as bright red, maroon, or black stools.   (A tablespoon of blood from the rectum is not serious, especially if   hemorrhoids are present.)  6. Vomiting.  7. Weakness or dizziness.  GO DIRECTLY TO THE NEAREST EMERGENCY ROOM IF YOU HAVE ANY OF THE FOLLOWING:      Difficulty breathing              Chills and/or fever over 101 F   Persistent vomiting and/or vomiting blood   Severe abdominal pain   Severe chest pain   Black, tarry stools   Bleeding- more than one  tablespoon   Any other symptom or condition that you feel may need urgent attention  Your doctor recommends these additional instructions:  If any biopsies were taken, your doctors clinic will contact you in 1 to 2   weeks with any results.  - [facility] to [facility] for [facility].   - Resume previous diet.   - Continue present medications.   - No aspirin, ibuprofen, naproxen, or other non-steroidal anti-inflammatory   drugs.   - Resume aspirin tomorrow and Plavix (clopidogrel) tomorrow at prior doses.     - Await pathology results.   - Follow an antireflux regimen.   - Please follow the post-PEG recommendations including: Nutrition consult   for formula and volume, advance food and medications per primary care   provider, external bolster snug to abdominal wall, change dressing once per   day, start using PEG today and clean site with soap and water daily and dry   thoroughly.  For questions, problems or results please call your physician - Evgeny Anthony MD at Work:  [phone].  [facility], EMERGENCY ROOM PHONE NUMBER: [phone]  IF A COMPLICATION OR EMERGENCY SITUATION ARISES AND YOU ARE UNABLE TO REACH   YOUR PHYSICIAN - GO DIRECTLY TO THE EMERGENCY ROOM.  Evgeny Anthony MD  12/24/2018 11:35:07 AM  This report has been verified and signed electronically.  [employer]

## 2018-12-24 NOTE — ANESTHESIA POSTPROCEDURE EVALUATION
"Anesthesia Post Evaluation    Patient: Enmanuel Armenta    Procedure(s) Performed: Procedure(s) (LRB):  EGD (ESOPHAGOGASTRODUODENOSCOPY) (N/A)  INSERTION, PEG TUBE (N/A)    Final Anesthesia Type: general  Patient location during evaluation: PACU  Patient participation: Yes- Able to Participate  Level of consciousness: awake and alert  Post-procedure vital signs: reviewed and stable  Pain management: adequate  Airway patency: patent  PONV status at discharge: No PONV  Anesthetic complications: no      Cardiovascular status: hemodynamically stable  Respiratory status: unassisted and nasal cannula  Hydration status: euvolemic  Follow-up not needed.        Visit Vitals  /62 (Patient Position: Lying)   Pulse 63   Temp 36.7 °C (98 °F) (Oral)   Resp 16   Ht 5' 7" (1.702 m)   Wt 71.1 kg (156 lb 12 oz)   SpO2 (!) 94%   BMI 24.55 kg/m²       Pain/Pedro Score: Pedro Score: 10 (12/24/2018 12:15 PM)        "

## 2018-12-24 NOTE — ASSESSMENT & PLAN NOTE
Contributing Nutrition Diagnosis  Moderate acute illness related malnutrition    Related to (etiology):   Swallowing/Chewing Difficulty    Signs and Symptoms (as evidenced by):   1) PO intakes < 50% of estimated needs x 5 days 2) Moderate muscle/fat loss @ temples and pattellar area/mild muscle/fat loss @ orbital area, bicep, clavicle, and calves    Interventions/Recommendations (treatment strategy):  1) TF or TPN to meet > 75% estimated needs @ f/u     Nutrition Diagnosis Status:   Improving

## 2018-12-24 NOTE — PLAN OF CARE
Problem: Adult Inpatient Plan of Care  Goal: Plan of Care Review  Outcome: Ongoing (interventions implemented as appropriate)  Plan of care reviewed, patient verbalized understanding. Alert to self and place, disoriented to time and situation at times. Up ab brodie to chair with assist and walker. PT. PEG tube placement today. PIV infusing clindimix per orders. Samson in place, draining yellow urine freely. O2@2L NC. Suctions at bedside per orders. VSS. Remain afebrile throughout shift. No complaints of pain throughout shift. Bed in lowest position, SRx2, brakes locked, call light within reach. Patient remains free from fall and injury. Will continue to monitor.

## 2018-12-24 NOTE — PLAN OF CARE
Problem: Adult Inpatient Plan of Care  Goal: Plan of Care Review  Outcome: Ongoing (interventions implemented as appropriate)  Reviewed POC with pt, he verb complete understanding. According to MD notes pt is going for PEG placement tomorrow. Pt remains NPO. Suction at bedside used independently. Intermittent confusion and uncooperative of staying in bed, bed alarm in place and room near unit station. IVF infusing per orders. VSS. Remains afebrile. Safety has been maintained this shift. Comfort level established, pain controlled with prn medications. Bed low, brakes locked, side rails x2, call light within reach, will continue to monitor.

## 2018-12-24 NOTE — PROGRESS NOTES
[facility]  Adult Nutrition  Progress Note    SUMMARY   Interventions: Parenteral nutrition therapy and PLAN FOR enteral nutrition therapy    Current Intake : PPN D5 AA 2.75 @ 100 ml/hr + lipids - Provides 1580 kcal (90% EEN), and 66 g protein (92% EPN)  Was running earlier this morning without lipids- held not for PEG placement    Recommendation:   1) Continue PPN as ordered and add lipids until TF intiated     2) Initiate TF in < 24 hours:   -Isosource 1.5 @ 15 ml/hr advancing to goal of 45 ml/hr + 30 ml flush q 4 hr for tube patency  until NS d/c'd   (Provides 820 ml free water, 1620 kcal (92% EEN), 73 g protien (100% EPN))     Goals: 1) TPN or TF to meet > 75% estimated needs by f/u 2) TF initiated in < 24 hours  Nutrition Goal Status: Met/ New  Communication of RD Recs: (POC, sticky note, second sign )     Reason for Assessment     Reason For Assessment: Follow-up  Diagnosis: (SOB)  Relevant Medical History: COPD, muscular dystrophy, PSA, BPH, limited mobility with walker, NH resident  Interdisciplinary Rounds: attended     General Information Comments: 62 y/o male with muscular dystrophy from nursing home, with SOB, UTI, and aspiration PNA. Per SLP, rec. to keep pt NPO for now. No GIB per GI, only GED, inflammation, and stomach polyp. At time of visit pt was very drowsy and hard to understand, confused per RN. Unable to get accurate diet history. Attempted diet education for GERD. NPO x 2 day. NFPE done, mild-moderate muscle/fat wasting seen.   12/21/18 Pt failed swallow eval today rec. longterm NPO. Pt now agreeable to PEG. Now qualifies for acute malnutrition and PPN initiated to meet 67% EEN.   12/24/18 Pt receiving PPN x 3 days but was not infusing at time of visit, sent second sign to add lipids to better meet pt's needs today until TF initiated. PEG placed today, per GI OK to initiate TF in 8 hours. No further bleeding,      Nutrition Discharge Planning: To be determined- PEG  "Isosource 1.5 @ 15 ml/hr advancing to goal of 45 ml/hr + min 30 ml flush q 4 hr for tube patency  until NS d/c'd     Nutrition Risk Screen     Nutrition Risk Screen: dysphagia or difficulty swallowing     Nutrition/Diet History     Spiritual, Cultural Beliefs, Gnosticism Practices, Values that Affect Care: no  Food Allergies: NKFA(or intolerances)  Factors Affecting Nutritional Intake: NPO     Anthropometrics     Height Method: Measured  Height: 5' 7"(Office 18)  Height (inches): 67 in  Weight Method: Bed Scale  Weight: 71.1 kg ( no new)  Weight (lb): 156.75 lb  Ideal Body Weight (IBW), Male: 148 lb  % Ideal Body Weight, Male (lb): 105.91 lb  BMI (Calculated): 24.6 Admission  BMI Grade: 25 - 29.9 - overweight  Usual Body Weight (UBW), k kg(3/21/17)        Lab/Procedures/Meds     Pertinent Labs Reviewed: reviewed  BMP  Lab Results   Component Value Date     (H) 2018    K 3.7 2018     2018    CO2 32 (H) 2018    BUN 12 2018    CREATININE 0.9 2018    CALCIUM 9.6 2018    ANIONGAP 5 (L) 2018    ESTGFRAFRICA >60 2018    EGFRNONAA >60 2018     Lab Results   Component Value Date    ALBUMIN 2.2 (L) 2018       Pertinent Medications Reviewed: reviewed  Pertinent Medications Comments: zofran,NS @ 150 ml/hr, TPN @ 100 ml/hr + lipid (help for PEG placement)     Estimated/Assessed Needs   Admission  Weight Used For Calorie Calculations: 71.1 kg (156 lb 12 oz)  Energy Calorie Requirements (kcal): [place] x 1.2 = 1745 kcal  Energy Need Method: [place]  Protein Requirements: 1.0-1.2 g protein (muscle loss)= 71-85 g protein  Weight Used For Protein Calculations: 71.1 kg (156 lb 12 oz)  Fluid Requirements (mL): 1750 ml  Estimated Fluid Requirement Method: RDA Method  CHO Requirement: N/A        Nutrition Prescription Ordered     Current Diet Order: NPO +PPN (on hold)     Evaluation of Received Nutrient/Fluid Intake     Energy " Calories Required: not meeting needs  Protein Required: not meeting needs  Fluid Required: meeting needs  Comments: PPN held  Tolerance: other (see comments)(NPO)  % Intake of Estimated Energy Needs: 0%  % Meal Intake: 0% + PPN  (was meeting > 75% estimated needs x 3 days)     Nutrition Risk     Level of Risk/Frequency of Follow-up: moderate - high 2 x weekly      Assessment and Plan     Malnutrition of moderate degree    Contributing Nutrition Diagnosis  Moderate acute illness related malnutrition    Related to (etiology):   Swallowing/Chewing Difficulty    Signs and Symptoms (as evidenced by):   1) PO intakes < 50% of estimated needs x 5 days 2) Moderate muscle/fat loss @ temples and pattellar area/mild muscle/fat loss @ orbital area, bicep, clavicle, and calves    Interventions/Recommendations (treatment strategy):  1) TF or TPN to meet > 75% estimated needs @ f/u     Nutrition Diagnosis Status:   Continues                 Monitor and Evaluation     Food and Nutrient Intake: energy intake  Food and Nutrient Adminstration: diet order, enteral and parenteral nutrition administration  Physical Activity and Function: nutrition-related ADLs and IADLs  Anthropometric Measurements: weight  Biochemical Data, Medical Tests and Procedures: electrolyte and renal panel  Nutrition-Focused Physical Findings: overall appearance      Malnutrition Assessment  Skin (Micronutrient): (Man = 15, no PI noted)  Nails (Micronutrient): none  Hair/Scalp (Micronutrient): none  Eyes (Micronutrient): none  Gums (Micronutrient): none  Lips/Mucous Membranes (Micronutrient): none  Teeth (Micronutrient): (Missing teeth, has dentures at home)  Tongue (Micronutrient): none  Neck/Chest (Micronutrient): none, other (see comments)  Musculoskeletal/Lower Extremities: none   Micronutrient Evaluation: no deficiencies   Subcutaneous Fat (Malnutrition): mild depletion  Muscle Mass (Malnutrition): moderate depletion   Orbital Region (Subcutaneous Fat  Loss): mild depletion  Upper Arm Region (Subcutaneous Fat Loss): mild depletion  Thoracic and Lumbar Region: well nourished   [place] Region (Muscle Loss): moderate depletion  Clavicle Bone Region (Muscle Loss): mild depletion  Scapular Bone Region (Muscle Loss): well nourished  Patellar Region (Muscle Loss): moderate depletion  Posterior Calf Region (Muscle Loss): mild depletion   Edema (Fluid Accumulation): 0-->no edema present   Subcutaneous Fat Loss (Final Summary): mild protein-calorie malnutrition  Muscle Loss Evaluation (Final Summary): moderate protein-calorie malnutrition       Nutrition Follow-Up     RD Follow-up?: Yes

## 2018-12-25 LAB
ALBUMIN SERPL BCP-MCNC: 2.6 G/DL
ALP SERPL-CCNC: 126 U/L
ALT SERPL W/O P-5'-P-CCNC: 14 U/L
ANION GAP SERPL CALC-SCNC: 9 MMOL/L
AST SERPL-CCNC: 17 U/L
BASOPHILS # BLD AUTO: 0.7 K/UL
BASOPHILS NFR BLD: 6 %
BILIRUB SERPL-MCNC: 0.2 MG/DL
BUN SERPL-MCNC: 15 MG/DL
CALCIUM SERPL-MCNC: 10.3 MG/DL
CHLORIDE SERPL-SCNC: 110 MMOL/L
CO2 SERPL-SCNC: 34 MMOL/L
CREAT SERPL-MCNC: 1 MG/DL
DIFFERENTIAL METHOD: ABNORMAL
EOSINOPHIL # BLD AUTO: 0.1 K/UL
EOSINOPHIL NFR BLD: 1 %
ERYTHROCYTE [DISTWIDTH] IN BLOOD BY AUTOMATED COUNT: 14.7 %
EST. GFR  (AFRICAN AMERICAN): >60 ML/MIN/1.73 M^2
EST. GFR  (NON AFRICAN AMERICAN): >60 ML/MIN/1.73 M^2
GLUCOSE SERPL-MCNC: 134 MG/DL
HCT VFR BLD AUTO: 39.8 %
HGB BLD-MCNC: 12.8 G/DL
LYMPHOCYTES # BLD AUTO: 1.5 K/UL
LYMPHOCYTES NFR BLD: 12.5 %
MAGNESIUM SERPL-MCNC: 2.6 MG/DL
MCH RBC QN AUTO: 29.4 PG
MCHC RBC AUTO-ENTMCNC: 32 G/DL
MCV RBC AUTO: 92 FL
MONOCYTES # BLD AUTO: 0.8 K/UL
MONOCYTES NFR BLD: 7.2 %
NEUTROPHILS # BLD AUTO: 8.6 K/UL
NEUTROPHILS NFR BLD: 73.3 %
PHOSPHATE SERPL-MCNC: 1.9 MG/DL
PLATELET # BLD AUTO: 395 K/UL
PMV BLD AUTO: 8.4 FL
POTASSIUM SERPL-SCNC: 3.1 MMOL/L
PROT SERPL-MCNC: 6.6 G/DL
RBC # BLD AUTO: 4.34 M/UL
SODIUM SERPL-SCNC: 153 MMOL/L
WBC # BLD AUTO: 11.7 K/UL

## 2018-12-25 PROCEDURE — 99232 SBSQ HOSP IP/OBS MODERATE 35: CPT | Mod: ,,, | Performed by: INTERNAL MEDICINE

## 2018-12-25 PROCEDURE — 63600175 PHARM REV CODE 636 W HCPCS: Performed by: INTERNAL MEDICINE

## 2018-12-25 PROCEDURE — 99900035 HC TECH TIME PER 15 MIN (STAT)

## 2018-12-25 PROCEDURE — 80053 COMPREHEN METABOLIC PANEL: CPT

## 2018-12-25 PROCEDURE — 84100 ASSAY OF PHOSPHORUS: CPT

## 2018-12-25 PROCEDURE — 99232 PR SUBSEQUENT HOSPITAL CARE,LEVL II: ICD-10-PCS | Mod: ,,, | Performed by: INTERNAL MEDICINE

## 2018-12-25 PROCEDURE — 85025 COMPLETE CBC W/AUTO DIFF WBC: CPT

## 2018-12-25 PROCEDURE — 25000003 PHARM REV CODE 250: Performed by: INTERNAL MEDICINE

## 2018-12-25 PROCEDURE — 27000221 HC OXYGEN, UP TO 24 HOURS

## 2018-12-25 PROCEDURE — 83735 ASSAY OF MAGNESIUM: CPT

## 2018-12-25 PROCEDURE — C9113 INJ PANTOPRAZOLE SODIUM, VIA: HCPCS | Performed by: INTERNAL MEDICINE

## 2018-12-25 PROCEDURE — 12000002 HC ACUTE/MED SURGE SEMI-PRIVATE ROOM

## 2018-12-25 PROCEDURE — 94761 N-INVAS EAR/PLS OXIMETRY MLT: CPT

## 2018-12-25 PROCEDURE — 36415 COLL VENOUS BLD VENIPUNCTURE: CPT

## 2018-12-25 RX ORDER — LISINOPRIL 2.5 MG/1
2.5 TABLET ORAL DAILY
Status: DISCONTINUED | OUTPATIENT
Start: 2018-12-25 | End: 2018-12-29

## 2018-12-25 RX ORDER — ATORVASTATIN CALCIUM 40 MG/1
80 TABLET, FILM COATED ORAL NIGHTLY
Status: DISCONTINUED | OUTPATIENT
Start: 2018-12-25 | End: 2019-01-11 | Stop reason: HOSPADM

## 2018-12-25 RX ORDER — POLYETHYLENE GLYCOL 3350 17 G/17G
17 POWDER, FOR SOLUTION ORAL DAILY
Status: DISCONTINUED | OUTPATIENT
Start: 2018-12-25 | End: 2019-01-11 | Stop reason: HOSPADM

## 2018-12-25 RX ORDER — LORAZEPAM 2 MG/ML
1 INJECTION INTRAMUSCULAR ONCE
Status: COMPLETED | OUTPATIENT
Start: 2018-12-25 | End: 2018-12-25

## 2018-12-25 RX ORDER — NAPROXEN SODIUM 220 MG/1
81 TABLET, FILM COATED ORAL DAILY
Status: DISCONTINUED | OUTPATIENT
Start: 2018-12-25 | End: 2019-01-03

## 2018-12-25 RX ORDER — ASPIRIN 81 MG/1
81 TABLET ORAL DAILY
Status: DISCONTINUED | OUTPATIENT
Start: 2018-12-25 | End: 2018-12-25

## 2018-12-25 RX ORDER — METOPROLOL TARTRATE 25 MG/1
25 TABLET, FILM COATED ORAL 2 TIMES DAILY
Status: DISCONTINUED | OUTPATIENT
Start: 2018-12-25 | End: 2018-12-29

## 2018-12-25 RX ORDER — CLOPIDOGREL BISULFATE 75 MG/1
75 TABLET ORAL DAILY
Status: DISCONTINUED | OUTPATIENT
Start: 2018-12-25 | End: 2019-01-03

## 2018-12-25 RX ORDER — POTASSIUM CHLORIDE 20 MEQ/15ML
40 SOLUTION ORAL ONCE
Status: DISCONTINUED | OUTPATIENT
Start: 2018-12-25 | End: 2018-12-25

## 2018-12-25 RX ORDER — SODIUM,POTASSIUM PHOSPHATES 280-250MG
2 POWDER IN PACKET (EA) ORAL
Status: COMPLETED | OUTPATIENT
Start: 2018-12-25 | End: 2018-12-26

## 2018-12-25 RX ORDER — METOPROLOL SUCCINATE 25 MG/1
25 TABLET, EXTENDED RELEASE ORAL DAILY
Status: DISCONTINUED | OUTPATIENT
Start: 2018-12-25 | End: 2018-12-25

## 2018-12-25 RX ADMIN — POTASSIUM & SODIUM PHOSPHATES POWDER PACK 280-160-250 MG 2 PACKET: 280-160-250 PACK at 03:12

## 2018-12-25 RX ADMIN — CLOPIDOGREL BISULFATE 75 MG: 75 TABLET ORAL at 11:12

## 2018-12-25 RX ADMIN — POLYETHYLENE GLYCOL 3350 17 G: 17 POWDER, FOR SOLUTION ORAL at 11:12

## 2018-12-25 RX ADMIN — ATORVASTATIN CALCIUM 80 MG: 40 TABLET, FILM COATED ORAL at 09:12

## 2018-12-25 RX ADMIN — POTASSIUM & SODIUM PHOSPHATES POWDER PACK 280-160-250 MG 2 PACKET: 280-160-250 PACK at 10:12

## 2018-12-25 RX ADMIN — LISINOPRIL 2.5 MG: 2.5 TABLET ORAL at 11:12

## 2018-12-25 RX ADMIN — METOPROLOL TARTRATE 25 MG: 25 TABLET, FILM COATED ORAL at 11:12

## 2018-12-25 RX ADMIN — PANTOPRAZOLE SODIUM 40 MG: 40 INJECTION, POWDER, LYOPHILIZED, FOR SOLUTION INTRAVENOUS at 08:12

## 2018-12-25 RX ADMIN — LORAZEPAM 1 MG: 2 INJECTION, SOLUTION INTRAMUSCULAR; INTRAVENOUS at 04:12

## 2018-12-25 RX ADMIN — ASPIRIN 81 MG CHEWABLE TABLET 81 MG: 81 TABLET CHEWABLE at 11:12

## 2018-12-25 RX ADMIN — METOPROLOL TARTRATE 25 MG: 25 TABLET, FILM COATED ORAL at 09:12

## 2018-12-25 NOTE — PROGRESS NOTES
"[facility]    CC: Feeding difficulty/dysphagia    HPI 63 y.o. male with feeding difficulty, recent onset, severe, worsening, with inability to tolerate PO, currently on Clinimix.  No alleviating/exacerbating factors.  Case discussed with nursing who states that last dose of Plavix was 4 days ago.  No other acute GI issues.    INTERVAL HISTORY:  Patient had PEG placed.  Currently in use with no difficulties per nursing.  No site pain.  No other GI issues.  Patient somnolent when seen and not interested in cooperating with interview.    Past Medical History:   Diagnosis Date    Anticoagulant long-term use     BPH (benign prostatic hyperplasia)     COPD (chronic obstructive pulmonary disease)     Elevated PSA     Fx     RIGHT WRIST, LEFT FOOT    Hypokalemia     Muscular dystrophy     Urine retention     Wears dentures     upper         Review of Systems  Unable to obtain secondary to uncooperative patient.    Physical Examination  BP (!) 147/78 (BP Location: Right arm, Patient Position: Lying)   Pulse 66   Temp 97.8 °F (36.6 °C) (Oral)   Resp 20   Ht 5' 7" (1.702 m) Comment: Office 7/13/18  Wt 71.1 kg (156 lb 12 oz)   SpO2 (!) 92%   BMI 24.55 kg/m²   General appearance: somnolent, uncooperative, no distress  HENT: Normocephalic, atraumatic, neck symmetrical, no nasal discharge, sclera anicteric   Lungs: coarse to auscultation bilaterally, symmetric chest wall expansion bilaterally  Heart: regular rate and rhythm without rub; no displacement of the PMI   Abdomen: PEG site C/D/I  Extremities: extremities symmetric; no clubbing, cyanosis, or edema  Neurologic: Unable to assess secondary to uncooperative patient.      Labs:  Lab Results   Component Value Date    WBC 11.70 12/25/2018    HGB 12.8 (L) 12/25/2018    HCT 39.8 (L) 12/25/2018    MCV 92 12/25/2018     (H) 12/25/2018       CMP  Sodium   Date Value Ref Range Status   12/25/2018 153 (H) 136 - 145 mmol/L Final     Potassium "   Date Value Ref Range Status   12/25/2018 3.1 (L) 3.5 - 5.1 mmol/L Final     Chloride   Date Value Ref Range Status   12/25/2018 110 95 - 110 mmol/L Final     CO2   Date Value Ref Range Status   12/25/2018 34 (H) 23 - 29 mmol/L Final     Glucose   Date Value Ref Range Status   12/25/2018 134 (H) 70 - 110 mg/dL Final     BUN, Bld   Date Value Ref Range Status   12/25/2018 15 8 - 23 mg/dL Final     Creatinine   Date Value Ref Range Status   12/25/2018 1.0 0.5 - 1.4 mg/dL Final     Calcium   Date Value Ref Range Status   12/25/2018 10.3 8.7 - 10.5 mg/dL Final     Total Protein   Date Value Ref Range Status   12/25/2018 6.6 6.0 - 8.4 g/dL Final     Albumin   Date Value Ref Range Status   12/25/2018 2.6 (L) 3.5 - 5.2 g/dL Final     Total Bilirubin   Date Value Ref Range Status   12/25/2018 0.2 0.1 - 1.0 mg/dL Final     Comment:     For infants and newborns, interpretation of results should be based  on gestational age, weight and in agreement with clinical  observations.  Premature Infant recommended reference ranges:  Up to 24 hours.............<8.0 mg/dL  Up to 48 hours............<12.0 mg/dL  3-5 days..................<15.0 mg/dL  6-29 days.................<15.0 mg/dL       Alkaline Phosphatase   Date Value Ref Range Status   12/25/2018 126 55 - 135 U/L Final     AST   Date Value Ref Range Status   12/25/2018 17 10 - 40 U/L Final     ALT   Date Value Ref Range Status   12/25/2018 14 10 - 44 U/L Final     Anion Gap   Date Value Ref Range Status   12/25/2018 9 8 - 16 mmol/L Final     eGFR if    Date Value Ref Range Status   12/25/2018 >60 >60 mL/min/1.73 m^2 Final     eGFR if non    Date Value Ref Range Status   12/25/2018 >60 >60 mL/min/1.73 m^2 Final     Comment:     Calculation used to obtain the estimated glomerular filtration  rate (eGFR) is the CKD-EPI equation.          Assessment:   1.  Feeding difficulty  2.  S/p PEG        Plan:  1.  Continue current care  2.  Routine PEG site  care  3.  Continue feeds  4.  Resume antiplatelet therapy.  GI to sign off.  Call for questions.    Evgeny Anthony MD  [facility]  [address], [address]  [place] [address]  Office: [phone]  Fax: [phone]

## 2018-12-25 NOTE — PLAN OF CARE
12/24/18 1957   Patient Assessment/Suction   Level of Consciousness (AVPU) alert   Respiratory Effort Unlabored;Normal   Expansion/Accessory Muscles/Retractions no retractions;no use of accessory muscles   All Lung Fields Breath Sounds diminished;clear   Rhythm/Pattern, Respiratory unlabored   Cough Frequency infrequent   Cough Type nonproductive;good   PRE-TX-O2-ETCO2   O2 Device (Oxygen Therapy) nasal cannula   Flow (L/min) 3   Oxygen Concentration (%) 32   SpO2 96 %   Pulse Oximetry Type Continuous   Aerosol Therapy   $ Aerosol Therapy Charges PRN treatment not required   Ready to Wean/Extubation Screen   FIO2<=50 (chart decimal) 0.32

## 2018-12-25 NOTE — PLAN OF CARE
Problem: Adult Inpatient Plan of Care  Goal: Plan of Care Review  Outcome: Ongoing (interventions implemented as appropriate)  Plan of care reviewed with pt at beginning of shift, needs reinforcement . Tube feeding per order started in this shift : -Isosource 1.5 @ 15 ml/hr advancing to goal of 45 ml/hr + 30 ml flush q 4 hr . Hourly and Q2h rounds completed on this pt throughout shift. Call light kept within reach, bed in locked and lowest position, side rails up x2.  Needs attended to, will continue to monitor and update as needed.

## 2018-12-25 NOTE — PROGRESS NOTES
Progress Note  Hospital Medicine  Patient Name:Enmanuel Armenta  MRN:  [id number]  Patient Class: IP- Inpatient  Admit Date: 12/17/2018  Length of Stay: 8 days  Expected Discharge Date:   Attending Physician: Stephanie Tena MD  Primary Care Provider:  Primary Doctor No    SUBJECTIVE:     Principal Problem: Aspiration pneumonia  Initial history of present illness: Patient is a 63 y.o. male admitted to Hospitalist Service from [facility] Room with complaint of abdominal pain. Patient is a resident of [facility] nursing [facility], [place]. Patient reportedly has past medical history significant for COPD, muscular dystrophy, l;imited mobility with walker and history of elevated PSA. Patient presented with 1 day history of abdominal pain associated with nausea and vomiting. The EMS reported, vomiting a brown sticky substance with pale red streaks throughout. EMS gave him 4 mg of Zofran in route to the ED. Patient denied chest pain, shortness of breath, headache, vision changes, focal neuro-deficits, cough. Patient noted to have 101.5F upon arrival.     PMH/PSH/SH/FH/Meds: reviewed.    Symptoms/Review of Systems: s/p PEG placement yesterday. Intermittent confusion noted. No shortness of breath, cough, chest pain or headache, fever or abdominal pain.     Diet: Advance PEG feeding.  Activity level: Up with assistance    Pain:  Patient reports no pain.       OBJECTIVE:   Vital Signs (Most Recent):      Temp: 97.8 °F (36.6 °C) (12/25/18 0758)  Pulse: 78 (12/25/18 0807)  Resp: 18 (12/25/18 0807)  BP: 138/78 (12/25/18 0758)  SpO2: 95 % (12/25/18 0807)       Vital Signs Range (Last 24H):  Temp:  [97.2 °F (36.2 °C)-98.2 °F (36.8 °C)]   Pulse:  [60-81]   Resp:  [16-18]   BP: (114-158)/(58-78)   SpO2:  [94 %-100 %]     Weight: 71.1 kg (156 lb 12 oz)  Body mass index is 24.55 kg/m².    Intake/Output Summary (Last 24 hours) at 12/25/2018 0859  Last data filed at 12/25/2018 0600  Gross per 24 hour   Intake  234 ml   Output 700 ml   Net -466 ml     Physical Examination:  General appearance: well developed, appears stated age, chronically ill-appearing  Head: normocephalic, atraumatic  Eyes:  conjunctivae/corneas clear. PERRL.  Nose: Nares normal. Septum midline.  Throat: lips, mucosa, and tongue normal; teeth and gums normal, no throat erythema. + Dry blood at angle of mouth  Neck: supple, symmetrical, trachea midline, no JVD and thyroid not enlarged, symmetric, no tenderness/mass/nodules  Lungs:  Scattered rhonchi B/L.  Chest wall: no tenderness  Heart: regular rate and rhythm, S1, S2 normal, no murmur, click, rub or gallop  Abdomen: soft, epigastric tenderness, non-distented; bowel sounds normal; no masses,  no organomegaly. PEG site okay.  Extremities: no cyanosis, clubbing or edema.   Pulses: 2+ and symmetric  Skin: Skin color, texture, turgor normal. No rashes or lesions.  Lymph nodes: Cervical, supraclavicular, and axillary nodes normal.  Neurologic: Normal strength and tone. No focal numbness or weakness. CNII-XII intact.      CBC:  Recent Labs   Lab 12/20/18  0522 12/21/18  0530 12/22/18  0621   WBC 8.60 8.00 10.20   RBC 3.88* 4.16* 4.22*   HGB 11.5* 12.3* 12.4*   HCT 35.2* 37.7* 38.4*    311 355*   MCV 91 91 91   MCH 29.7 29.5 29.4   MCHC 32.7 32.6 32.3   BMP  Recent Labs   Lab 12/20/18  0522 12/21/18  0530 12/22/18  0621   GLU 94 142* 145*    144 146*   K 3.1* 3.2* 3.7    108 109   CO2 29 29 32*   BUN 5* 10 12   CREATININE 0.8 0.9 0.9   CALCIUM 9.0 9.5 9.6      Diagnostic Results:  Microbiology Results (last 7 days)     Procedure Component Value Units Date/Time    Blood culture x two cultures. Draw prior to antibiotics. [[id number]] Collected:  12/17/18 0958    Order Status:  Completed Specimen:  Blood from Antecubital, Right  Arm Updated:  12/22/18 2012     Blood Culture, Routine No growth after 5 days.    Narrative:       Aerobic and anaerobic    Blood culture x two cultures. Draw prior to  antibiotics. [[id number]] Collected:  12/17/18 0943    Order Status:  Completed Specimen:  Blood from Peripheral, Left  Hand Updated:  12/22/18 2012     Blood Culture, Routine No growth after 5 days.    Narrative:       Aerobic and anaerobic    Urine culture [[id number]] Collected:  12/17/18 1029    Order Status:  Completed Specimen:  Urine Updated:  12/18/18 2254     Urine Culture, Routine Multiple organisms isolated. None in predominance.  Repeat if     Urine Culture, Routine clinically necessary.    Narrative:       Preferred Collection Type->Urine, Clean Catch         Chest X-Ray: T right basilar infiltrate.     CT abdomen and pelvis with contrast:  Enlarged prostate gland, air in the bladder likely secondary to catheterization, diffuse bladder wall thickening. Prominent amount of fluid within right colon.  Appendix not identified and note is made of limitation with motion artifact in the appendiceal region.    EGD:   - Moderately severe reflux esophagitis.                       - Medium-sized hiatal hernia.                       - Gastritis.                       - A single gastric polyp.                       - Normal examined duodenum.                       - No specimens collected.    CXR: New moderate left pleural effusion and left lung infiltrate.  Leftward mediastinal shift raising consideration for left lung atelectasis as contributory to the findings.  Close follow-up recommended.    Assessment/Plan:      SOB (shortness of breath) [R06.02]   Yes    Aspiration pneumonia, RLL [J69.0]  Swallow dysfunction s/p PEG placement  Supplemental O2 via nasal canula; titrate O2 saturation to >92%.   High aspiration risk. Advance PEG feeding. Follow GI recommendations.  Continue beta 2 agonist bronchodilator treatments.   Treated.  Keep at 30 degrees HOB.      Yes    Upper GI bleeding [K92.2]  Follow H/H closely. Type and screen blood and transfuse as needed.  Continue IV Protonix.  Use IV anti-emetics as needed.        Yes    Muscular dystrophy [G71.00]  Supportive care, fall precautions.       Yes    Chronic obstructive pulmonary disease [J44.9]  Patient's COPD is controlled currently. Continue scheduled inhalers and monitor respiratory status closely.   Yes      DVT prophylaxis: Use SCD and TEDs. No anticoagulation due to GI bleeding.      Stephanie Tena MD  Department of Hospital Medicine   [facility]

## 2018-12-25 NOTE — PLAN OF CARE
Problem: Adult Inpatient Plan of Care  Goal: Plan of Care Review  Outcome: Ongoing (interventions implemented as appropriate)  Pt alert. Suctioned prn. Peg tube running at goal rate of 45/hr. Tolerating well. No new symptoms. HOB locked @ 30 degrees. Plan of care reviewed with patient. Safety maintained this shift. Will continue to monitor.

## 2018-12-25 NOTE — PLAN OF CARE
12/25/18 0807   Patient Assessment/Suction   Level of Consciousness (AVPU) alert   All Lung Fields Breath Sounds clear;diminished   PRE-TX-O2-ETCO2   O2 Device (Oxygen Therapy) nasal cannula   $ Is the patient on Low Flow Oxygen? Yes   Flow (L/min) 3   Oxygen Concentration (%) 32   SpO2 95 %   Pulse Oximetry Type Continuous   $ Pulse Oximetry - Multiple Charge Pulse Oximetry - Multiple   Pulse 78   Resp 18   Aerosol Therapy   $ Aerosol Therapy Charges PRN treatment not required;Refused   Ready to Wean/Extubation Screen   FIO2<=50 (chart decimal) 0.32

## 2018-12-26 LAB
ALBUMIN SERPL BCP-MCNC: 2.5 G/DL
ALP SERPL-CCNC: 129 U/L
ALT SERPL W/O P-5'-P-CCNC: 14 U/L
ANION GAP SERPL CALC-SCNC: 6 MMOL/L
AST SERPL-CCNC: 20 U/L
BASOPHILS # BLD AUTO: 0 K/UL
BASOPHILS NFR BLD: 0.3 %
BILIRUB SERPL-MCNC: 0.2 MG/DL
BUN SERPL-MCNC: 20 MG/DL
CALCIUM SERPL-MCNC: 10.1 MG/DL
CHLORIDE SERPL-SCNC: 109 MMOL/L
CO2 SERPL-SCNC: 35 MMOL/L
CREAT SERPL-MCNC: 0.9 MG/DL
DIFFERENTIAL METHOD: ABNORMAL
EOSINOPHIL # BLD AUTO: 0.1 K/UL
EOSINOPHIL NFR BLD: 0.8 %
ERYTHROCYTE [DISTWIDTH] IN BLOOD BY AUTOMATED COUNT: 14.5 %
EST. GFR  (AFRICAN AMERICAN): >60 ML/MIN/1.73 M^2
EST. GFR  (NON AFRICAN AMERICAN): >60 ML/MIN/1.73 M^2
GLUCOSE SERPL-MCNC: 144 MG/DL
HCT VFR BLD AUTO: 37.9 %
HGB BLD-MCNC: 12.2 G/DL
LYMPHOCYTES # BLD AUTO: 1.4 K/UL
LYMPHOCYTES NFR BLD: 12.5 %
MAGNESIUM SERPL-MCNC: 2.5 MG/DL
MCH RBC QN AUTO: 29.5 PG
MCHC RBC AUTO-ENTMCNC: 32.2 G/DL
MCV RBC AUTO: 92 FL
MONOCYTES # BLD AUTO: 0.8 K/UL
MONOCYTES NFR BLD: 7 %
NEUTROPHILS # BLD AUTO: 8.7 K/UL
NEUTROPHILS NFR BLD: 79.4 %
PHOSPHATE SERPL-MCNC: 2.7 MG/DL
PLATELET # BLD AUTO: 395 K/UL
PMV BLD AUTO: 8.4 FL
POTASSIUM SERPL-SCNC: 3.7 MMOL/L
PROT SERPL-MCNC: 6.6 G/DL
RBC # BLD AUTO: 4.13 M/UL
SODIUM SERPL-SCNC: 150 MMOL/L
WBC # BLD AUTO: 11 K/UL

## 2018-12-26 PROCEDURE — 27000221 HC OXYGEN, UP TO 24 HOURS

## 2018-12-26 PROCEDURE — 63600175 PHARM REV CODE 636 W HCPCS: Performed by: INTERNAL MEDICINE

## 2018-12-26 PROCEDURE — C9113 INJ PANTOPRAZOLE SODIUM, VIA: HCPCS | Performed by: INTERNAL MEDICINE

## 2018-12-26 PROCEDURE — 36415 COLL VENOUS BLD VENIPUNCTURE: CPT

## 2018-12-26 PROCEDURE — G8998 SWALLOW D/C STATUS: HCPCS | Mod: CN

## 2018-12-26 PROCEDURE — 25000003 PHARM REV CODE 250: Performed by: INTERNAL MEDICINE

## 2018-12-26 PROCEDURE — 85025 COMPLETE CBC W/AUTO DIFF WBC: CPT

## 2018-12-26 PROCEDURE — 25000003 PHARM REV CODE 250: Performed by: NURSE PRACTITIONER

## 2018-12-26 PROCEDURE — 84100 ASSAY OF PHOSPHORUS: CPT

## 2018-12-26 PROCEDURE — 80053 COMPREHEN METABOLIC PANEL: CPT

## 2018-12-26 PROCEDURE — 99232 PR SUBSEQUENT HOSPITAL CARE,LEVL II: ICD-10-PCS | Mod: ,,, | Performed by: INTERNAL MEDICINE

## 2018-12-26 PROCEDURE — 94761 N-INVAS EAR/PLS OXIMETRY MLT: CPT

## 2018-12-26 PROCEDURE — 99232 SBSQ HOSP IP/OBS MODERATE 35: CPT | Mod: ,,, | Performed by: INTERNAL MEDICINE

## 2018-12-26 PROCEDURE — 12000002 HC ACUTE/MED SURGE SEMI-PRIVATE ROOM

## 2018-12-26 PROCEDURE — 83735 ASSAY OF MAGNESIUM: CPT

## 2018-12-26 RX ORDER — GUAIFENESIN/DEXTROMETHORPHAN 100-10MG/5
10 SYRUP ORAL EVERY 4 HOURS PRN
Status: DISCONTINUED | OUTPATIENT
Start: 2018-12-26 | End: 2018-12-28

## 2018-12-26 RX ORDER — ACETAMINOPHEN 325 MG/1
650 TABLET ORAL EVERY 6 HOURS PRN
Status: DISCONTINUED | OUTPATIENT
Start: 2018-12-26 | End: 2019-01-11 | Stop reason: HOSPADM

## 2018-12-26 RX ORDER — BENZONATATE 100 MG/1
100 CAPSULE ORAL 3 TIMES DAILY PRN
Status: DISCONTINUED | OUTPATIENT
Start: 2018-12-26 | End: 2018-12-26

## 2018-12-26 RX ORDER — NYSTATIN 100000 [USP'U]/ML
500000 SUSPENSION ORAL
Status: DISCONTINUED | OUTPATIENT
Start: 2018-12-26 | End: 2018-12-30

## 2018-12-26 RX ADMIN — POTASSIUM & SODIUM PHOSPHATES POWDER PACK 280-160-250 MG 2 PACKET: 280-160-250 PACK at 11:12

## 2018-12-26 RX ADMIN — POTASSIUM & SODIUM PHOSPHATES POWDER PACK 280-160-250 MG 2 PACKET: 280-160-250 PACK at 06:12

## 2018-12-26 RX ADMIN — CLOPIDOGREL BISULFATE 75 MG: 75 TABLET ORAL at 10:12

## 2018-12-26 RX ADMIN — NYSTATIN 500000 UNITS: 500000 SUSPENSION ORAL at 09:12

## 2018-12-26 RX ADMIN — POLYETHYLENE GLYCOL 3350 17 G: 17 POWDER, FOR SOLUTION ORAL at 10:12

## 2018-12-26 RX ADMIN — ASPIRIN 81 MG CHEWABLE TABLET 81 MG: 81 TABLET CHEWABLE at 10:12

## 2018-12-26 RX ADMIN — ACETAMINOPHEN 650 MG: 325 TABLET, FILM COATED ORAL at 09:12

## 2018-12-26 RX ADMIN — ATORVASTATIN CALCIUM 80 MG: 40 TABLET, FILM COATED ORAL at 09:12

## 2018-12-26 RX ADMIN — LISINOPRIL 2.5 MG: 2.5 TABLET ORAL at 10:12

## 2018-12-26 RX ADMIN — ONDANSETRON 4 MG: 2 INJECTION, SOLUTION INTRAMUSCULAR; INTRAVENOUS at 09:12

## 2018-12-26 RX ADMIN — METOPROLOL TARTRATE 25 MG: 25 TABLET, FILM COATED ORAL at 10:12

## 2018-12-26 RX ADMIN — METOPROLOL TARTRATE 25 MG: 25 TABLET, FILM COATED ORAL at 09:12

## 2018-12-26 RX ADMIN — PANTOPRAZOLE SODIUM 40 MG: 40 INJECTION, POWDER, LYOPHILIZED, FOR SOLUTION INTRAVENOUS at 10:12

## 2018-12-26 RX ADMIN — GUAIFENESIN AND DEXTROMETHORPHAN 10 ML: 100; 10 SYRUP ORAL at 01:12

## 2018-12-26 RX ADMIN — NYSTATIN 500000 UNITS: 500000 SUSPENSION ORAL at 06:12

## 2018-12-26 NOTE — PROGRESS NOTES
Per RN, pt curling up in all positions refusing to sit up and causing himself to regurgitate TF. Prefer bolus recs.    Nutrition Recommendation:  Isosource 1.5 250ml Q 3h from 7am til 7pm.   Flush 130ml Q 4h water around the clock. (or 95 ml flush before and after each bolus)

## 2018-12-26 NOTE — PLAN OF CARE
Problem: Adult Inpatient Plan of Care  Goal: Plan of Care Review  Outcome: Ongoing (interventions implemented as appropriate)  Plan of care reviewed with pt at beginning of shift. Peg tube running at goal rate of 45/h.  Hourly and Q2h rounds completed on this pt throughout shift.  PAIN MED, PRN MED adminstered as needed.  Call light kept within reach, bed in locked and lowest position, side rails up x2.  Needs attended to, will continue to monitor and update as needed.  Pt alert. Suctioned prn. Peg tube running at goal rate of 45/hr. Tolerating well.

## 2018-12-26 NOTE — PLAN OF CARE
Patient not medically cleared to return to [facility] at this time....KARLENE Mendoza       12/26/18 1026   Discharge Reassessment   Assessment Type Discharge Planning Reassessment

## 2018-12-26 NOTE — NURSING
Situation Principle Problem:  Aspiration pneumonia      Reason for Calling: Pt is agitated, keep coughing with loud voice, doesn't sleep     Provider Calling: Bettye    Background Vitals:    12/25/18 0807 12/25/18 1319 12/25/18 1620 12/25/18 1935   BP:  (!) 147/78 (!) 145/70 (!) 144/65   BP Location:  Right arm  Left arm   Patient Position:  Lying     Pulse: 78 66 75 79   Resp: 18 20 20 19   Temp:  97.8 °F (36.6 °C) 97.9 °F (36.6 °C) 98.6 °F (37 °C)   TempSrc:  Oral  Oral   SpO2: 95% (!) 92% (!) 93% 97%   Weight:       Height:         Past Medical History:   Diagnosis Date    Anticoagulant long-term use     BPH (benign prostatic hyperplasia)     COPD (chronic obstructive pulmonary disease)     Elevated PSA     Fx     RIGHT WRIST, LEFT FOOT    Hypokalemia     Muscular dystrophy     Urine retention     Wears dentures     upper         No results found for: POCTGLUCOSE    Intake/Output:    Intake/Output Summary (Last 24 hours) at 12/26/2018 0024  Last data filed at 12/25/2018 1600  Gross per 24 hour   Intake 144 ml   Output 1250 ml   Net -1106 ml        Assessment What is happening: Pt is agitated and coughs a lot, he cant sleep.   Response Provider Response: cough medication ordered.

## 2018-12-26 NOTE — PLAN OF CARE
Problem: SLP Goal  Goal: SLP Goal    Education completed. No further questions. REC keep Karlee at bedside. Aspiration precautions. F/U not indicated at this time.     Savannah Fitzgerald MS, CCC/SLP 12/26/2018

## 2018-12-26 NOTE — PROGRESS NOTES
Progress Note  Hospital Medicine  Patient Name:Enmanuel Armenta  MRN:  [id number]  Patient Class: IP- Inpatient  Admit Date: 12/17/2018  Length of Stay: 9 days  Expected Discharge Date:   Attending Physician: Stephanie Tena MD  Primary Care Provider:  Primary Doctor No    SUBJECTIVE:     Principal Problem: Aspiration pneumonia  Initial history of present illness: Patient is a 63 y.o. male admitted to Hospitalist Service from [facility] Room with complaint of abdominal pain. Patient is a resident of [facility] nursing home, [place]. Patient reportedly has past medical history significant for COPD, muscular dystrophy, l;imited mobility with walker and history of elevated PSA. Patient presented with 1 day history of abdominal pain associated with nausea and vomiting. The EMS reported, vomiting a brown sticky substance with pale red streaks throughout. EMS gave him 4 mg of Zofran in route to the ED. Patient denied chest pain, shortness of breath, headache, vision changes, focal neuro-deficits, cough. Patient noted to have 101.5F upon arrival.     PMH/PSH/SH/FH/Meds: reviewed.    Symptoms/Review of Systems: s/p PEG placement yesterday. Intermittent confusion noted. No shortness of breath, cough, chest pain or headache, fever or abdominal pain.     Diet: Advance PEG feeding.  Activity level: Up with assistance    Pain:  Patient reports no pain.       OBJECTIVE:   Vital Signs (Most Recent):      Temp: 96.2 °F (35.7 °C) (12/26/18 0834)  Pulse: 69 (12/26/18 0834)  Resp: 18 (12/26/18 0834)  BP: (!) 148/70 (12/26/18 0834)  SpO2: 100 % (12/26/18 0834)       Vital Signs Range (Last 24H):  Temp:  [96.2 °F (35.7 °C)-99.4 °F (37.4 °C)]   Pulse:  [66-79]   Resp:  [18-20]   BP: (128-148)/(65-78)   SpO2:  [92 %-100 %]     Weight: 71.1 kg (156 lb 12 oz)  Body mass index is 24.55 kg/m².    Intake/Output Summary (Last 24 hours) at 12/26/2018 0927  Last data filed at 12/26/2018 0600  Gross per 24 hour    Intake 1220 ml   Output 550 ml   Net 670 ml     Physical Examination:  General appearance: well developed, appears stated age, chronically ill-appearing  Head: normocephalic, atraumatic  Eyes:  conjunctivae/corneas clear. PERRL.  Nose: Nares normal. Septum midline.  Throat: lips, mucosa, and tongue normal; teeth and gums normal, no throat erythema. + Dry blood at angle of mouth  Neck: supple, symmetrical, trachea midline, no JVD and thyroid not enlarged, symmetric, no tenderness/mass/nodules  Lungs:  Scattered rhonchi B/L.  Chest wall: no tenderness  Heart: regular rate and rhythm, S1, S2 normal, no murmur, click, rub or gallop  Abdomen: soft, epigastric tenderness, non-distented; bowel sounds normal; no masses,  no organomegaly. PEG site okay.  Extremities: no cyanosis, clubbing or edema.   Pulses: 2+ and symmetric  Skin: Skin color, texture, turgor normal. No rashes or lesions.  Lymph nodes: Cervical, supraclavicular, and axillary nodes normal.  Neurologic: Normal strength and tone. No focal numbness or weakness. CNII-XII intact.      CBC:  Recent Labs   Lab 12/22/18  0621 12/25/18  0913 12/26/18  0534   WBC 10.20 11.70 11.00   RBC 4.22* 4.34* 4.13*   HGB 12.4* 12.8* 12.2*   HCT 38.4* 39.8* 37.9*   * 395* 395*   MCV 91 92 92   MCH 29.4 29.4 29.5   MCHC 32.3 32.0 32.2   BMP  Recent Labs   Lab 12/22/18  0621 12/25/18  0913 12/26/18  0534   * 134* 144*   * 153* 150*   K 3.7 3.1* 3.7    110 109   CO2 32* 34* 35*   BUN 12 15 20   CREATININE 0.9 1.0 0.9   CALCIUM 9.6 10.3 10.1   MG  --  2.6 2.5      Diagnostic Results:  Microbiology Results (last 7 days)     Procedure Component Value Units Date/Time    Blood culture x two cultures. Draw prior to antibiotics. [[id number]] Collected:  12/17/18 0958    Order Status:  Completed Specimen:  Blood from Antecubital, Right  Arm Updated:  12/22/18 2012     Blood Culture, Routine No growth after 5 days.    Narrative:       Aerobic and anaerobic    Blood  culture x two cultures. Draw prior to antibiotics. [[id number]] Collected:  12/17/18 0943    Order Status:  Completed Specimen:  Blood from Peripheral, Left  Hand Updated:  12/22/18 2012     Blood Culture, Routine No growth after 5 days.    Narrative:       Aerobic and anaerobic         Chest X-Ray: T right basilar infiltrate.     CT abdomen and pelvis with contrast:  Enlarged prostate gland, air in the bladder likely secondary to catheterization, diffuse bladder wall thickening. Prominent amount of fluid within right colon.  Appendix not identified and note is made of limitation with motion artifact in the appendiceal region.    EGD:   - Moderately severe reflux esophagitis.                       - Medium-sized hiatal hernia.                       - Gastritis.                       - A single gastric polyp.                       - Normal examined duodenum.                       - No specimens collected.    CXR: New moderate left pleural effusion and left lung infiltrate.  Leftward mediastinal shift raising consideration for left lung atelectasis as contributory to the findings.  Close follow-up recommended.    Assessment/Plan:      SOB (shortness of breath) [R06.02]   Yes    Aspiration pneumonia, RLL [J69.0]  Swallow dysfunction s/p PEG placement  Supplemental O2 via nasal canula; titrate O2 saturation to >92%.   High aspiration risk. Advance PEG feeding. Follow GI recommendations.  Continue beta 2 agonist bronchodilator treatments.   Treated.  Keep at 30 degrees HOB.     Hypernatremia  Add free water via PEG tube. Follow BMP.     Yes    Upper GI bleeding [K92.2]  Follow H/H closely. Type and screen blood and transfuse as needed.  Continue IV Protonix.  Use IV anti-emetics as needed.       Yes    Muscular dystrophy [G71.00]  Supportive care, fall precautions.       Yes    Chronic obstructive pulmonary disease [J44.9]  Patient's COPD is controlled currently. Continue scheduled inhalers and monitor respiratory  status closely.   Yes      DVT prophylaxis: Use SCD and TEDs. No anticoagulation due to GI bleeding.      Stephanie Tena MD  Department of Hospital Medicine   [facility]

## 2018-12-27 LAB
ALBUMIN SERPL BCP-MCNC: 2.5 G/DL
ALP SERPL-CCNC: 137 U/L
ALT SERPL W/O P-5'-P-CCNC: 16 U/L
ANION GAP SERPL CALC-SCNC: 6 MMOL/L
ANION GAP SERPL CALC-SCNC: 7 MMOL/L
AST SERPL-CCNC: 21 U/L
BASOPHILS # BLD AUTO: 0 K/UL
BASOPHILS NFR BLD: 0.3 %
BILIRUB SERPL-MCNC: 0.4 MG/DL
BUN SERPL-MCNC: 22 MG/DL
BUN SERPL-MCNC: 28 MG/DL
CALCIUM SERPL-MCNC: 9.6 MG/DL
CALCIUM SERPL-MCNC: 9.8 MG/DL
CHLORIDE SERPL-SCNC: 107 MMOL/L
CHLORIDE SERPL-SCNC: 109 MMOL/L
CO2 SERPL-SCNC: 35 MMOL/L
CO2 SERPL-SCNC: 35 MMOL/L
CREAT SERPL-MCNC: 0.9 MG/DL
CREAT SERPL-MCNC: 1 MG/DL
DIFFERENTIAL METHOD: ABNORMAL
EOSINOPHIL # BLD AUTO: 0.1 K/UL
EOSINOPHIL NFR BLD: 1.1 %
ERYTHROCYTE [DISTWIDTH] IN BLOOD BY AUTOMATED COUNT: 14.9 %
EST. GFR  (AFRICAN AMERICAN): >60 ML/MIN/1.73 M^2
EST. GFR  (AFRICAN AMERICAN): >60 ML/MIN/1.73 M^2
EST. GFR  (NON AFRICAN AMERICAN): >60 ML/MIN/1.73 M^2
EST. GFR  (NON AFRICAN AMERICAN): >60 ML/MIN/1.73 M^2
GLUCOSE SERPL-MCNC: 111 MG/DL
GLUCOSE SERPL-MCNC: 212 MG/DL
HCT VFR BLD AUTO: 37.9 %
HGB BLD-MCNC: 12 G/DL
LYMPHOCYTES # BLD AUTO: 1.4 K/UL
LYMPHOCYTES NFR BLD: 12.2 %
MAGNESIUM SERPL-MCNC: 2.4 MG/DL
MCH RBC QN AUTO: 29.3 PG
MCHC RBC AUTO-ENTMCNC: 31.7 G/DL
MCV RBC AUTO: 92 FL
MONOCYTES # BLD AUTO: 0.7 K/UL
MONOCYTES NFR BLD: 6.3 %
NEUTROPHILS # BLD AUTO: 9.3 K/UL
NEUTROPHILS NFR BLD: 80.1 %
PHOSPHATE SERPL-MCNC: 2.5 MG/DL
PLATELET # BLD AUTO: 398 K/UL
PMV BLD AUTO: 9.1 FL
POTASSIUM SERPL-SCNC: 3.9 MMOL/L
POTASSIUM SERPL-SCNC: 4 MMOL/L
PROT SERPL-MCNC: 6.4 G/DL
RBC # BLD AUTO: 4.1 M/UL
SODIUM SERPL-SCNC: 148 MMOL/L
SODIUM SERPL-SCNC: 151 MMOL/L
WBC # BLD AUTO: 11.7 K/UL

## 2018-12-27 PROCEDURE — 25000003 PHARM REV CODE 250: Performed by: INTERNAL MEDICINE

## 2018-12-27 PROCEDURE — 27000221 HC OXYGEN, UP TO 24 HOURS

## 2018-12-27 PROCEDURE — 83735 ASSAY OF MAGNESIUM: CPT

## 2018-12-27 PROCEDURE — 99232 SBSQ HOSP IP/OBS MODERATE 35: CPT | Mod: ,,, | Performed by: INTERNAL MEDICINE

## 2018-12-27 PROCEDURE — 99900035 HC TECH TIME PER 15 MIN (STAT)

## 2018-12-27 PROCEDURE — 63600175 PHARM REV CODE 636 W HCPCS: Performed by: INTERNAL MEDICINE

## 2018-12-27 PROCEDURE — 36415 COLL VENOUS BLD VENIPUNCTURE: CPT

## 2018-12-27 PROCEDURE — 97116 GAIT TRAINING THERAPY: CPT

## 2018-12-27 PROCEDURE — 97803 MED NUTRITION INDIV SUBSEQ: CPT

## 2018-12-27 PROCEDURE — 97530 THERAPEUTIC ACTIVITIES: CPT

## 2018-12-27 PROCEDURE — 85025 COMPLETE CBC W/AUTO DIFF WBC: CPT

## 2018-12-27 PROCEDURE — 80048 BASIC METABOLIC PNL TOTAL CA: CPT

## 2018-12-27 PROCEDURE — 84100 ASSAY OF PHOSPHORUS: CPT

## 2018-12-27 PROCEDURE — 12000002 HC ACUTE/MED SURGE SEMI-PRIVATE ROOM

## 2018-12-27 PROCEDURE — 80053 COMPREHEN METABOLIC PANEL: CPT

## 2018-12-27 PROCEDURE — 99232 PR SUBSEQUENT HOSPITAL CARE,LEVL II: ICD-10-PCS | Mod: ,,, | Performed by: INTERNAL MEDICINE

## 2018-12-27 PROCEDURE — 94761 N-INVAS EAR/PLS OXIMETRY MLT: CPT

## 2018-12-27 PROCEDURE — C9113 INJ PANTOPRAZOLE SODIUM, VIA: HCPCS | Performed by: INTERNAL MEDICINE

## 2018-12-27 PROCEDURE — 25000003 PHARM REV CODE 250: Performed by: NURSE PRACTITIONER

## 2018-12-27 RX ORDER — DEXTROSE MONOHYDRATE 50 MG/ML
INJECTION, SOLUTION INTRAVENOUS CONTINUOUS
Status: DISCONTINUED | OUTPATIENT
Start: 2018-12-27 | End: 2018-12-28

## 2018-12-27 RX ADMIN — ONDANSETRON 4 MG: 2 INJECTION, SOLUTION INTRAMUSCULAR; INTRAVENOUS at 11:12

## 2018-12-27 RX ADMIN — NYSTATIN 500000 UNITS: 500000 SUSPENSION ORAL at 08:12

## 2018-12-27 RX ADMIN — DEXTROSE: 5 SOLUTION INTRAVENOUS at 12:12

## 2018-12-27 RX ADMIN — LISINOPRIL 2.5 MG: 2.5 TABLET ORAL at 09:12

## 2018-12-27 RX ADMIN — NYSTATIN 500000 UNITS: 500000 SUSPENSION ORAL at 05:12

## 2018-12-27 RX ADMIN — METOPROLOL TARTRATE 25 MG: 25 TABLET, FILM COATED ORAL at 08:12

## 2018-12-27 RX ADMIN — NYSTATIN 500000 UNITS: 500000 SUSPENSION ORAL at 09:12

## 2018-12-27 RX ADMIN — NYSTATIN 500000 UNITS: 500000 SUSPENSION ORAL at 12:12

## 2018-12-27 RX ADMIN — POLYETHYLENE GLYCOL 3350 17 G: 17 POWDER, FOR SOLUTION ORAL at 09:12

## 2018-12-27 RX ADMIN — PANTOPRAZOLE SODIUM 40 MG: 40 INJECTION, POWDER, LYOPHILIZED, FOR SOLUTION INTRAVENOUS at 10:12

## 2018-12-27 RX ADMIN — ASPIRIN 81 MG CHEWABLE TABLET 81 MG: 81 TABLET CHEWABLE at 09:12

## 2018-12-27 RX ADMIN — CLOPIDOGREL BISULFATE 75 MG: 75 TABLET ORAL at 09:12

## 2018-12-27 RX ADMIN — ATORVASTATIN CALCIUM 80 MG: 40 TABLET, FILM COATED ORAL at 08:12

## 2018-12-27 RX ADMIN — METOPROLOL TARTRATE 25 MG: 25 TABLET, FILM COATED ORAL at 09:12

## 2018-12-27 RX ADMIN — DEXTROSE: 5 SOLUTION INTRAVENOUS at 09:12

## 2018-12-27 RX ADMIN — ACETAMINOPHEN 650 MG: 325 TABLET, FILM COATED ORAL at 08:12

## 2018-12-27 NOTE — PLAN OF CARE
Problem: Adult Inpatient Plan of Care  Goal: Plan of Care Review  Outcome: Ongoing (interventions implemented as appropriate)  Plan of care reviewed with patient. Samson catheter intact & patent. Patient remains NPO. Isosource 1.5 tube feeding bolus started this am per pump.HOB up 45 degrees.  Remains free from falls/injury. Instructed to call for assistance as needed during night. Verbalized understanding. Call light in reach. Bed alarm in use. Bed in low & locked position.

## 2018-12-27 NOTE — PLAN OF CARE
"Problem: Adult Inpatient Plan of Care  Goal: Plan of Care Review  Outcome: Ongoing (interventions implemented as appropriate)  Plan of care review with pt, pt states "understanding," Isosource 1.5 infusing 250ml bolus with 250 free water every 4hrs,IVF infusing without difficulty, no redness/swelling noted to IV site, armas catheter draining clear yellow urine to urimeter bag, 02 3L NC in progress, less than 3 seconds capillary refills, clear BS auscultated, speech is garble, extensive redirecting to encourage pt to annunciate words clear, pt suction himself with yanker, will continue to monitor, observe and note any changes, safety maintain      "

## 2018-12-27 NOTE — PROGRESS NOTES
Progress Note  Hospital Medicine  Patient Name:Enmanuel Armenta  MRN:  [id number]  Patient Class: IP- Inpatient  Admit Date: 12/17/2018  Length of Stay: 10 days  Expected Discharge Date:   Attending Physician: Stephanie Tena MD  Primary Care Provider:  Primary Doctor No    SUBJECTIVE:     Principal Problem: Aspiration pneumonia  Initial history of present illness: Patient is a 63 y.o. male admitted to Hospitalist Service from [facility] Room with complaint of abdominal pain. Patient is a resident of [facility] nursing home, [place]. Patient reportedly has past medical history significant for COPD, muscular dystrophy, l;imited mobility with walker and history of elevated PSA. Patient presented with 1 day history of abdominal pain associated with nausea and vomiting. The EMS reported, vomiting a brown sticky substance with pale red streaks throughout. EMS gave him 4 mg of Zofran in route to the ED. Patient denied chest pain, shortness of breath, headache, vision changes, focal neuro-deficits, cough. Patient noted to have 101.5F upon arrival.     PMH/PSH/SH/FH/Meds: reviewed.    Symptoms/Review of Systems: s/p PEG placement. Serum Sodium is high again. Intermittent confusion noted. No shortness of breath, cough, chest pain or headache, fever or abdominal pain.     Diet: Advance PEG feeding.  Activity level: Up with assistance    Pain:  Patient reports no pain.       OBJECTIVE:   Vital Signs (Most Recent):      Temp: 98 °F (36.7 °C) (12/27/18 1607)  Pulse: 70 (12/27/18 1607)  Resp: 14 (12/27/18 1607)  BP: 130/76 (12/27/18 1607)  SpO2: 99 % (12/27/18 1607)       Vital Signs Range (Last 24H):  Temp:  [96.3 °F (35.7 °C)-98.6 °F (37 °C)]   Pulse:  [56-80]   Resp:  [14-20]   BP: (113-130)/(56-76)   SpO2:  [93 %-99 %]     Weight: 71.1 kg (156 lb 12 oz)  Body mass index is 24.55 kg/m².    Intake/Output Summary (Last 24 hours) at 12/27/2018 1618  Last data filed at 12/27/2018 0800  Gross per 24  hour   Intake 1210 ml   Output 1700 ml   Net -490 ml     Physical Examination:  General appearance: well developed, appears stated age, chronically ill-appearing  Head: normocephalic, atraumatic  Eyes:  conjunctivae/corneas clear. PERRL.  Nose: Nares normal. Septum midline.  Throat: lips, mucosa, and tongue normal; teeth and gums normal, no throat erythema. + Dry blood at angle of mouth  Neck: supple, symmetrical, trachea midline, no JVD and thyroid not enlarged, symmetric, no tenderness/mass/nodules  Lungs:  Scattered rhonchi B/L.  Chest wall: no tenderness  Heart: regular rate and rhythm, S1, S2 normal, no murmur, click, rub or gallop  Abdomen: soft, epigastric tenderness, non-distented; bowel sounds normal; no masses,  no organomegaly. PEG site okay.  Extremities: no cyanosis, clubbing or edema.   Pulses: 2+ and symmetric  Skin: Skin color, texture, turgor normal. No rashes or lesions.  Lymph nodes: Cervical, supraclavicular, and axillary nodes normal.  Neurologic: Normal strength and tone. No focal numbness or weakness. CNII-XII intact.      CBC:  Recent Labs   Lab 12/25/18  0913 12/26/18  0534 12/27/18  0526   WBC 11.70 11.00 11.70   RBC 4.34* 4.13* 4.10*   HGB 12.8* 12.2* 12.0*   HCT 39.8* 37.9* 37.9*   * 395* 398*   MCV 92 92 92   MCH 29.4 29.5 29.3   MCHC 32.0 32.2 31.7*   BMP  Recent Labs   Lab 12/25/18  0913 12/26/18  0534 12/27/18  0526   * 144* 111*   * 150* 151*   K 3.1* 3.7 3.9    109 109   CO2 34* 35* 35*   BUN 15 20 22   CREATININE 1.0 0.9 0.9   CALCIUM 10.3 10.1 9.8   MG 2.6 2.5 2.4      Diagnostic Results:  Microbiology Results (last 7 days)     Procedure Component Value Units Date/Time    Blood culture x two cultures. Draw prior to antibiotics. [[id number]] Collected:  12/17/18 0958    Order Status:  Completed Specimen:  Blood from Antecubital, Right  Arm Updated:  12/22/18 2012     Blood Culture, Routine No growth after 5 days.    Narrative:       Aerobic and anaerobic     Blood culture x two cultures. Draw prior to antibiotics. [[id number]] Collected:  12/17/18 0943    Order Status:  Completed Specimen:  Blood from Peripheral, Left  Hand Updated:  12/22/18 2012     Blood Culture, Routine No growth after 5 days.    Narrative:       Aerobic and anaerobic         Chest X-Ray: T right basilar infiltrate.     CT abdomen and pelvis with contrast:  Enlarged prostate gland, air in the bladder likely secondary to catheterization, diffuse bladder wall thickening. Prominent amount of fluid within right colon.  Appendix not identified and note is made of limitation with motion artifact in the appendiceal region.    EGD:   - Moderately severe reflux esophagitis.                       - Medium-sized hiatal hernia.                       - Gastritis.                       - A single gastric polyp.                       - Normal examined duodenum.                       - No specimens collected.    CXR: New moderate left pleural effusion and left lung infiltrate.  Leftward mediastinal shift raising consideration for left lung atelectasis as contributory to the findings.  Close follow-up recommended.    Assessment/Plan:      SOB (shortness of breath) [R06.02]   Yes    Aspiration pneumonia, RLL [J69.0]  Swallow dysfunction s/p PEG placement  Supplemental O2 via nasal canula; titrate O2 saturation to >92%.   High aspiration risk. Advance PEG feeding. Follow GI recommendations.  Continue beta 2 agonist bronchodilator treatments.   Treated.  Keep at 30 degrees HOB.     Hypernatremia  Increase free water via PEG tube, add IV Dextrose. Follow BMP.     Yes    Upper GI bleeding [K92.2]  Follow H/H closely. Type and screen blood and transfuse as needed.  Continue IV Protonix.  Use IV anti-emetics as needed.       Yes    Muscular dystrophy [G71.00]  Supportive care, fall precautions.       Yes    Chronic obstructive pulmonary disease [J44.9]  Patient's COPD is controlled currently. Continue scheduled  inhalers and monitor respiratory status closely.   Yes      DVT prophylaxis: Use SCD and TEDs. No anticoagulation due to GI bleeding.      Stephanie Tena MD  Department of Hospital Medicine   [facility]

## 2018-12-27 NOTE — PT/OT/SLP PROGRESS
Physical Therapy Treatment    Patient Name:  Enmanuel Armenta   MRN:  [id number]    Recommendations:     Discharge Recommendations:  nursing facility, skilled       Assessment:     Enmanuel Armenta is a 63 y.o. male admitted with a medical diagnosis of Aspiration pneumonia.  He presents with the following impairments/functional limitations:  weakness, impaired endurance, impaired self care skills, impaired functional mobilty, gait instability, impaired balance, impaired cognition, decreased coordination, decreased upper extremity function, decreased lower extremity function, decreased safety awareness, pain, impaired cardiopulmonary response to activity .    Rehab Prognosis: Good; patient would benefit from acute skilled PT services to address these deficits and reach maximum level of function.    Recent Surgery: Procedure(s) (LRB):  EGD (ESOPHAGOGASTRODUODENOSCOPY) (N/A)  INSERTION, PEG TUBE (N/A) 3 Days Post-Op    Plan:     During this hospitalization, patient to be seen 6 x/week to address the identified rehab impairments via gait training, therapeutic activities, therapeutic exercises and progress toward the following goals:    · Plan of Care Expires:  01/03/19    Subjective     Chief Complaint: pain/discomfort to L elbow area (redness noted, nurse aware.) dizziness with gait  Patient/Family Comments/goals: eager to walk  Pain/Comfort:  · Pain Rating 1: (did not rate)  · Location - Side 1: Left  · Location 1: elbow  · Pain Addressed 1: Reposition, Distraction, Nurse notified(elevated on pillow post session and CP applied with nurse)      Objective:     Communicated with nurse Magana prior to session.  Patient found supine telemetry, armas catheter, oxygen, peripheral IV(PEG)  upon PT entry to room.     General Precautions: Standard, fall   Orthopedic Precautions:N/A   Braces: N/A     Functional Mobility:  · Bed Mobility:     · Scooting: minimum assistance  · Bridging: A of 2 to HOB utilizing draw sheet    · Supine to Sit: moderate assistance  · Sit to Supine: minimum assistance    · Transfers:     · Sit to Stand:  minimum assistance with rolling walker    · Gait: 200' with CGA-Min A for RW management. IV and O2 in tow      Therapeutic Activities and Exercises:   pt assisted back to bed post gait    Patient left HOB elevated with all lines intact, call button in reach and nurse Izzy present notified..    GOALS:   Multidisciplinary Problems     Physical Therapy Goals        Problem: Physical Therapy Goal    Goal Priority Disciplines Outcome Goal Variances Interventions   Physical Therapy Goal     PT, PT/OT Ongoing (interventions implemented as appropriate)     Description:  Goals to be met by: 1/3/2019     Patient will increase functional independence with mobility by performin. Supine to sit with Modified [place]  2. Sit to supine with Modified [place]  3. Sit to stand transfer with Modified [place]  4. Bed to chair transfer with Supervision using LRAD  5. Gait  x 250 feet with Supervision using LRAD  6. Lower extremity exercise program x10-20 reps per handout, with supervision                      Time Tracking:     PT Received On: 18  PT Start Time: 1420     PT Stop Time: 1443  PT Total Time (min): 23 min     Billable Minutes: Gait Training 10 and Therapeutic Activity 13    Treatment Type: Treatment  PT/PTA: PTA     PTA Visit Number: 2     Shanna Garland, PTA  2018

## 2018-12-27 NOTE — PLAN OF CARE
12/27/18 1018   Patient Assessment/Suction   Level of Consciousness (AVPU) alert   Respiratory Effort Unlabored   Expansion/Accessory Muscles/Retractions expansion symmetric   All Lung Fields Breath Sounds diminished   PRE-TX-O2-ETCO2   O2 Device (Oxygen Therapy) nasal cannula w/ humidification   $ Is the patient on Low Flow Oxygen? Yes   Flow (L/min) 4   Oxygen Concentration (%) 36   SpO2 97 %   Pulse Oximetry Type Intermittent   $ Pulse Oximetry - Multiple Charge Pulse Oximetry - Multiple   Aerosol Therapy   $ Aerosol Therapy Charges PRN treatment not required   Ready to Wean/Extubation Screen   FIO2<=50 (chart decimal) 0.36

## 2018-12-27 NOTE — PLAN OF CARE
Patient not medically cleared to return to [facility] at this time. CM will continue to follow....KARLENE Mendoza CM      12/27/18 1112   Discharge Reassessment   Assessment Type Discharge Planning Reassessment

## 2018-12-27 NOTE — PLAN OF CARE
Problem: Adult Inpatient Plan of Care  Goal: Plan of Care Review  Outcome: Ongoing (interventions implemented as appropriate)  Pt started on bolus feedings this shift at 12pm, 3pm, 6pm with water flushes as ordered. Pt tolerated well. Continue to monitor pt closely. Call light in easy reach.

## 2018-12-27 NOTE — PLAN OF CARE
Problem: Physical Therapy Goal  Goal: Physical Therapy Goal  Goals to be met by: 1/3/2019     Patient will increase functional independence with mobility by performin. Supine to sit with Modified [place]  2. Sit to supine with Modified [place]  3. Sit to stand transfer with Modified [place]  4. Bed to chair transfer with Supervision using LRAD  5. Gait  x 250 feet with Supervision using LRAD  6. Lower extremity exercise program x10-20 reps per handout, with supervision     Outcome: Ongoing (interventions implemented as appropriate)  PT for bed mobility, gait training

## 2018-12-27 NOTE — PLAN OF CARE
Problem: Malnutrition  Goal: Improved Nutritional Intake    Intervention: Promote and Optimize Nutrition Support  Interventions: Enteral nutrition therapy       Current Intake : Isosource 1.5 250ml bolus every 3hr from 7am til 7pm.   Flush 130ml Q 4h water around the clock. (or 95 ml flush before and after each bolus)  (Provides 1500 kcal ( 86% EEN) and 68 g protein (96% EPN), and 760 ml free water     Recommendation:   1)  1.5 250ml bolus every 3hr from 7am til 7pm.   Flush 130ml Q 4h water around the clock. (or 95 ml flush before and after each bolus)     2) Consider extra 1/2 carton bolus if pt starts losing wt  3) Request new bedscale wt     Goals: 1) TPN or TF to meet > 75% estimated needs by f/u 2) TF initiated in < 24 hours  Nutrition Goal Status: Met/ Met  Communication of RD Recs: (POC, sticky note, reviewed with RN )

## 2018-12-27 NOTE — PROGRESS NOTES
[facility]  Adult Nutrition  Progress Note    SUMMARY   Interventions: Enteral nutrition therapy      Current Intake : Isosource 1.5 250ml bolus every 3hr from 7am til 7pm.   Flush 130ml Q 4h water around the clock. (or 95 ml flush before and after each bolus)  (Provides 1500 kcal ( 86% EEN) and 68 g protein (96% EPN), and 760 ml free water    Recommendation:   1)  1.5 250ml bolus every 3hr from 7am til 7pm.   Flush 130ml Q 4h water around the clock. (or 95 ml flush before and after each bolus)    2) Consider extra 1/2 carton bolus if pt starts losing wt  3) Request new bedscale wt    Goals: 1) TPN or TF to meet > 75% estimated needs by f/u 2) TF initiated in < 24 hours  Nutrition Goal Status: Met/ Met  Communication of RD Recs: (POC, sticky note, reviewed with RN )     Reason for Assessment     Reason For Assessment: Follow-up  Diagnosis: (SOB)  Relevant Medical History: COPD, muscular dystrophy, PSA, BPH, limited mobility with walker, [facility] resident  Interdisciplinary Rounds: attended     General Information Comments: 62 y/o male with muscular dystrophy from nursing home, with SOB, UTI, and aspiration PNA. Per SLP, rec. to keep pt NPO for now. No GIB per GI, only GED, inflammation, and stomach polyp. At time of visit pt was very drowsy and hard to understand, confused per RN. Unable to get accurate diet history. Attempted diet education for GERD. NPO x 2 day. NFPE done, mild-moderate muscle/fat wasting seen.   12/21/18 Pt failed swallow eval today rec. longterm NPO. Pt now agreeable to PEG. Now qualifies for acute malnutrition and PPN initiated to meet 67% EEN.   12/24/18 Pt receiving PPN x 3 days but was not infusing at time of visit, sent second sign to add lipids to better meet pt's needs today until TF initiated. PEG placed today, per GI OK to initiate TF in 8 hours. No further bleeding.  12/27/18 Isosource started continuous but pt was curling up in a ball and causing himself to vomit so per  "discussion with RN, switched to bolus feedings yesterday. Pt tolerating x 24 hr.      Nutrition Discharge Planning: To be determined- PEG Isosource 1.5 250ml bolus every 3hr from 7am til 7pm.   Flush 130ml Q 4h water around the clock. (or 95 ml flush before and after each bolus)     Nutrition Risk Screen     Nutrition Risk Screen: dysphagia or difficulty swallowing     Nutrition/Diet History     Spiritual, Cultural Beliefs, Mandaeism Practices, Values that Affect Care: no  Food Allergies: NKFA(or intolerances)  Factors Affecting Nutritional Intake: NPO     Anthropometrics     Height Method: Measured  Height: 5' 7"(Office 18)  Height (inches): 67 in  Weight Method: Bed Scale  Weight: 71.1 kg ( no new)  Weight (lb): 156 lb  Ideal Body Weight (IBW), Male: 148 lb  % Ideal Body Weight, Male (lb): 105.91 lb  BMI (Calculated): 24.6 Admission  BMI Grade: 25 - 29.9 - overweight  Usual Body Weight (UBW), k kg(3/21/17)        Lab/Procedures/Meds     Pertinent Labs Reviewed: reviewed  BMP  Lab Results   Component Value Date     (H) 2018    K 3.9 2018     2018    CO2 35 (H) 2018    BUN 22 2018    CREATININE 0.9 2018    CALCIUM 9.8 2018    ANIONGAP 7 (L) 2018    ESTGFRAFRICA >60 2018    EGFRNONAA >60 2018     Lab Results   Component Value Date    ALBUMIN 2.5 (L) 2018      Pertinent Medications Reviewed: reviewed  Pertinent Medications Comments: KNaPhos, zofran    Estimated/Assessed Needs   Admission  Weight Used For Calorie Calculations: 71.1 kg (156 lb 12 oz)  Energy Calorie Requirements (kcal): [place] St Jeor x 1.2 = 1745 kcal  Energy Need Method: [place]-St Jeor  Protein Requirements: 1.0-1.2 g protein (muscle loss)= 71-85 g protein  Weight Used For Protein Calculations: 71.1 kg (156 lb 12 oz)  Fluid Requirements (mL): 1750 ml  Estimated Fluid Requirement Method: RDA Method  CHO Requirement: N/A        Nutrition Prescription " Ordered     Current Diet Order: NPO +TF Isosource 1.5  (250 ml boluses 4 x daily)      Evaluation of Received Nutrient/Fluid Intake     Energy Calories Required: Meeting needs  Protein Required: Meeting needs  Fluid Required: Meeting needs  Tolerance: Tolerating  % Intake of Estimated Energy Needs: 86%  % Meal Intake: 0% = TF     Nutrition Risk     Level of Risk/Frequency of Follow-up: moderate - high 2 x weekly      Assessment and Plan     Malnutrition of moderate degree    Contributing Nutrition Diagnosis  Moderate acute illness related malnutrition    Related to (etiology):   Swallowing/Chewing Difficulty    Signs and Symptoms (as evidenced by):   1) PO intakes < 50% of estimated needs x 5 days 2) Moderate muscle/fat loss @ temples and pattellar area/mild muscle/fat loss @ orbital area, bicep, clavicle, and calves    Interventions/Recommendations (treatment strategy):  1) TF or TPN to meet > 75% estimated needs @ f/u     Nutrition Diagnosis Status:   Improving             Monitor and Evaluation     Food and Nutrient Intake: energy intake  Food and Nutrient Adminstration: diet order, enteral and parenteral nutrition administration  Physical Activity and Function: nutrition-related ADLs and IADLs  Anthropometric Measurements: weight  Biochemical Data, Medical Tests and Procedures: electrolyte and renal panel  Nutrition-Focused Physical Findings: overall appearance      Malnutrition Assessment  Skin (Micronutrient): (Man = 15, no PI noted)  Nails (Micronutrient): none  Hair/Scalp (Micronutrient): none  Eyes (Micronutrient): none  Gums (Micronutrient): none  Lips/Mucous Membranes (Micronutrient): none  Teeth (Micronutrient): (Missing teeth, has dentures at home)  Tongue (Micronutrient): none  Neck/Chest (Micronutrient): none, other (see comments)  Musculoskeletal/Lower Extremities: none   Micronutrient Evaluation: no deficiencies   Subcutaneous Fat (Malnutrition): mild depletion  Muscle Mass (Malnutrition):  moderate depletion   Orbital Region (Subcutaneous Fat Loss): mild depletion  Upper Arm Region (Subcutaneous Fat Loss): mild depletion  Thoracic and Lumbar Region: well nourished   Gnosticist Region (Muscle Loss): moderate depletion  Clavicle Bone Region (Muscle Loss): mild depletion  Scapular Bone Region (Muscle Loss): well nourished  Patellar Region (Muscle Loss): moderate depletion  Posterior Calf Region (Muscle Loss): mild depletion   Edema (Fluid Accumulation): 0-->no edema present   Subcutaneous Fat Loss (Final Summary): mild protein-calorie malnutrition  Muscle Loss Evaluation (Final Summary): moderate protein-calorie malnutrition       Nutrition Follow-Up     RD Follow-up?: Yes

## 2018-12-27 NOTE — NURSING
Situation Principle Problem:  Aspiration pneumonia      Reason for Calling:  Abdominal pain. Complaining of pain where peg tube was placed.   Provider Calling: GINNA Frazier   Background Vitals:    12/26/18 1115 12/26/18 1520 12/26/18 1809 12/26/18 2024   BP: 119/68 119/88  123/64   BP Location:       Patient Position:    Lying   Pulse: 65 66  80   Resp: 18 18  16   Temp: 98.3 °F (36.8 °C) 98.4 °F (36.9 °C)  98.2 °F (36.8 °C)   TempSrc:    Oral   SpO2: 96% 97% 99% 97%   Weight:       Height:           No results found for: POCTGLUCOSE    Intake/Output:    Intake/Output Summary (Last 24 hours) at 12/26/2018 2102  Last data filed at 12/26/2018 1900  Gross per 24 hour   Intake 2020 ml   Output 1000 ml   Net 1020 ml        Assessment What is happening:    Response Provider Response: tylenol ordered

## 2018-12-28 ENCOUNTER — ANESTHESIA EVENT (OUTPATIENT)
Dept: INTENSIVE CARE | Facility: HOSPITAL | Age: 63
DRG: 870 | End: 2018-12-28
Payer: MEDICARE

## 2018-12-28 ENCOUNTER — ANESTHESIA (OUTPATIENT)
Dept: INTENSIVE CARE | Facility: HOSPITAL | Age: 63
DRG: 870 | End: 2018-12-28
Payer: MEDICARE

## 2018-12-28 PROBLEM — A41.9 SEPTIC SHOCK: Status: ACTIVE | Noted: 2018-12-28

## 2018-12-28 PROBLEM — F17.200 SMOKER: Status: ACTIVE | Noted: 2018-12-28

## 2018-12-28 PROBLEM — J44.0 CHRONIC OBSTRUCTIVE PULMONARY DISEASE WITH ACUTE LOWER RESPIRATORY INFECTION: Status: ACTIVE | Noted: 2017-03-06

## 2018-12-28 PROBLEM — R65.21 SEPTIC SHOCK: Status: ACTIVE | Noted: 2018-12-28

## 2018-12-28 PROBLEM — J96.01 ACUTE RESPIRATORY FAILURE WITH HYPOXIA AND HYPERCARBIA: Status: ACTIVE | Noted: 2018-12-28

## 2018-12-28 PROBLEM — R41.0 EPISODIC CONFUSION: Status: ACTIVE | Noted: 2018-12-28

## 2018-12-28 PROBLEM — J96.02 ACUTE RESPIRATORY FAILURE WITH HYPOXIA AND HYPERCARBIA: Status: ACTIVE | Noted: 2018-12-28

## 2018-12-28 LAB
ALBUMIN SERPL BCP-MCNC: 2.3 G/DL
ALLENS TEST: ABNORMAL
ALP SERPL-CCNC: 140 U/L
ALT SERPL W/O P-5'-P-CCNC: 19 U/L
ANION GAP SERPL CALC-SCNC: 7 MMOL/L
AST SERPL-CCNC: 26 U/L
BASOPHILS # BLD AUTO: 0 K/UL
BASOPHILS NFR BLD: 0.2 %
BILIRUB SERPL-MCNC: 0.3 MG/DL
BUN SERPL-MCNC: 23 MG/DL
CALCIUM SERPL-MCNC: 9.4 MG/DL
CHLORIDE SERPL-SCNC: 102 MMOL/L
CO2 SERPL-SCNC: 31 MMOL/L
CREAT SERPL-MCNC: 1 MG/DL
DELSYS: ABNORMAL
DIFFERENTIAL METHOD: ABNORMAL
EOSINOPHIL # BLD AUTO: 0.1 K/UL
EOSINOPHIL NFR BLD: 0.3 %
ERYTHROCYTE [DISTWIDTH] IN BLOOD BY AUTOMATED COUNT: 14.6 %
ERYTHROCYTE [SEDIMENTATION RATE] IN BLOOD BY WESTERGREN METHOD: 14 MM/H
ERYTHROCYTE [SEDIMENTATION RATE] IN BLOOD BY WESTERGREN METHOD: 20 MM/H
EST. GFR  (AFRICAN AMERICAN): >60 ML/MIN/1.73 M^2
EST. GFR  (NON AFRICAN AMERICAN): >60 ML/MIN/1.73 M^2
FIO2: 100
FIO2: 100
FIO2: 80
FLOW: 15
GLUCOSE SERPL-MCNC: 160 MG/DL
HCO3 UR-SCNC: 26.1 MMOL/L (ref 24–28)
HCO3 UR-SCNC: 29.8 MMOL/L (ref 24–28)
HCO3 UR-SCNC: 31.1 MMOL/L (ref 24–28)
HCT VFR BLD AUTO: 35.4 %
HGB BLD-MCNC: 11.5 G/DL
LYMPHOCYTES # BLD AUTO: 0.9 K/UL
LYMPHOCYTES NFR BLD: 4.5 %
MAGNESIUM SERPL-MCNC: 2.1 MG/DL
MCH RBC QN AUTO: 29.7 PG
MCHC RBC AUTO-ENTMCNC: 32.4 G/DL
MCV RBC AUTO: 92 FL
MODE: ABNORMAL
MONOCYTES # BLD AUTO: 0.8 K/UL
MONOCYTES NFR BLD: 3.7 %
NEUTROPHILS # BLD AUTO: 18.8 K/UL
NEUTROPHILS NFR BLD: 91.3 %
PCO2 BLDA: 72.8 MMHG (ref 35–45)
PCO2 BLDA: 86.5 MMHG (ref 35–45)
PCO2 BLDA: 91.5 MMHG (ref 35–45)
PEEP: 5
PEEP: 5
PH SMN: 7.12 [PH] (ref 7.35–7.45)
PH SMN: 7.16 [PH] (ref 7.35–7.45)
PH SMN: 7.16 [PH] (ref 7.35–7.45)
PHOSPHATE SERPL-MCNC: 1.4 MG/DL
PLATELET # BLD AUTO: 398 K/UL
PMV BLD AUTO: 9.8 FL
PO2 BLDA: 52 MMHG (ref 80–100)
PO2 BLDA: 65 MMHG (ref 80–100)
PO2 BLDA: 80 MMHG (ref 80–100)
POC BE: -3 MMOL/L
POC BE: 0 MMOL/L
POC BE: 2 MMOL/L
POC SATURATED O2: 74 % (ref 95–100)
POC SATURATED O2: 85 % (ref 95–100)
POC SATURATED O2: 90 % (ref 95–100)
POC TCO2: 28 MMOL/L (ref 23–27)
POC TCO2: 33 MMOL/L (ref 23–27)
POC TCO2: 34 MMOL/L (ref 23–27)
POTASSIUM SERPL-SCNC: 4.2 MMOL/L
PROT SERPL-MCNC: 5.9 G/DL
RBC # BLD AUTO: 3.86 M/UL
SAMPLE: ABNORMAL
SITE: ABNORMAL
SODIUM SERPL-SCNC: 140 MMOL/L
VT: 520
VT: 580
WBC # BLD AUTO: 20.6 K/UL

## 2018-12-28 PROCEDURE — 36620 INSERTION CATHETER ARTERY: CPT | Mod: 59,,, | Performed by: ANESTHESIOLOGY

## 2018-12-28 PROCEDURE — 31500 INSERT EMERGENCY AIRWAY: CPT | Mod: ,,, | Performed by: ANESTHESIOLOGY

## 2018-12-28 PROCEDURE — 63600175 PHARM REV CODE 636 W HCPCS: Performed by: INTERNAL MEDICINE

## 2018-12-28 PROCEDURE — 31720 CLEARANCE OF AIRWAYS: CPT

## 2018-12-28 PROCEDURE — 94002 VENT MGMT INPAT INIT DAY: CPT

## 2018-12-28 PROCEDURE — C9113 INJ PANTOPRAZOLE SODIUM, VIA: HCPCS | Performed by: INTERNAL MEDICINE

## 2018-12-28 PROCEDURE — 94640 AIRWAY INHALATION TREATMENT: CPT

## 2018-12-28 PROCEDURE — 36556 INSERT NON-TUNNEL CV CATH: CPT | Mod: 59,,, | Performed by: ANESTHESIOLOGY

## 2018-12-28 PROCEDURE — 83735 ASSAY OF MAGNESIUM: CPT

## 2018-12-28 PROCEDURE — 99291 CRITICAL CARE FIRST HOUR: CPT | Mod: ,,, | Performed by: INTERNAL MEDICINE

## 2018-12-28 PROCEDURE — 25000003 PHARM REV CODE 250: Performed by: INTERNAL MEDICINE

## 2018-12-28 PROCEDURE — 99233 PR SUBSEQUENT HOSPITAL CARE,LEVL III: ICD-10-PCS | Mod: ,,, | Performed by: INTERNAL MEDICINE

## 2018-12-28 PROCEDURE — 94003 VENT MGMT INPAT SUBQ DAY: CPT

## 2018-12-28 PROCEDURE — 25000242 PHARM REV CODE 250 ALT 637 W/ HCPCS: Performed by: INTERNAL MEDICINE

## 2018-12-28 PROCEDURE — 99900035 HC TECH TIME PER 15 MIN (STAT)

## 2018-12-28 PROCEDURE — 76937 US GUIDE VASCULAR ACCESS: CPT | Performed by: ANESTHESIOLOGY

## 2018-12-28 PROCEDURE — 99291 PR CRITICAL CARE, E/M 30-74 MINUTES: ICD-10-PCS | Mod: ,,, | Performed by: INTERNAL MEDICINE

## 2018-12-28 PROCEDURE — 94761 N-INVAS EAR/PLS OXIMETRY MLT: CPT

## 2018-12-28 PROCEDURE — 85025 COMPLETE CBC W/AUTO DIFF WBC: CPT

## 2018-12-28 PROCEDURE — 36415 COLL VENOUS BLD VENIPUNCTURE: CPT

## 2018-12-28 PROCEDURE — 27000221 HC OXYGEN, UP TO 24 HOURS

## 2018-12-28 PROCEDURE — 63600175 PHARM REV CODE 636 W HCPCS

## 2018-12-28 PROCEDURE — 25000003 PHARM REV CODE 250

## 2018-12-28 PROCEDURE — 80053 COMPREHEN METABOLIC PANEL: CPT

## 2018-12-28 PROCEDURE — 99900026 HC AIRWAY MAINTENANCE (STAT)

## 2018-12-28 PROCEDURE — 36600 WITHDRAWAL OF ARTERIAL BLOOD: CPT

## 2018-12-28 PROCEDURE — 84100 ASSAY OF PHOSPHORUS: CPT

## 2018-12-28 PROCEDURE — 82803 BLOOD GASES ANY COMBINATION: CPT

## 2018-12-28 PROCEDURE — 20000000 HC ICU ROOM

## 2018-12-28 PROCEDURE — 99233 SBSQ HOSP IP/OBS HIGH 50: CPT | Mod: ,,, | Performed by: INTERNAL MEDICINE

## 2018-12-28 RX ORDER — ENOXAPARIN SODIUM 100 MG/ML
40 INJECTION SUBCUTANEOUS EVERY 24 HOURS
Status: DISCONTINUED | OUTPATIENT
Start: 2018-12-28 | End: 2019-01-01

## 2018-12-28 RX ORDER — NOREPINEPHRINE BITARTRATE 0.03 MG/ML
0.04 INJECTION, SOLUTION INTRAVENOUS CONTINUOUS
Status: DISCONTINUED | OUTPATIENT
Start: 2018-12-28 | End: 2019-01-04

## 2018-12-28 RX ORDER — SODIUM CHLORIDE 9 MG/ML
INJECTION, SOLUTION INTRAVENOUS CONTINUOUS
Status: DISCONTINUED | OUTPATIENT
Start: 2018-12-28 | End: 2018-12-30

## 2018-12-28 RX ORDER — PROPOFOL 10 MG/ML
INJECTION, EMULSION INTRAVENOUS
Status: COMPLETED
Start: 2018-12-28 | End: 2018-12-28

## 2018-12-28 RX ORDER — SODIUM CHLORIDE 9 MG/ML
INJECTION, SOLUTION INTRAVENOUS ONCE
Status: COMPLETED | OUTPATIENT
Start: 2018-12-28 | End: 2018-12-28

## 2018-12-28 RX ORDER — PROPOFOL 10 MG/ML
5 INJECTION, EMULSION INTRAVENOUS CONTINUOUS
Status: DISCONTINUED | OUTPATIENT
Start: 2018-12-28 | End: 2019-01-05

## 2018-12-28 RX ORDER — MIDAZOLAM HYDROCHLORIDE 1 MG/ML
INJECTION, SOLUTION INTRAMUSCULAR; INTRAVENOUS
Status: COMPLETED
Start: 2018-12-28 | End: 2018-12-28

## 2018-12-28 RX ORDER — IPRATROPIUM BROMIDE AND ALBUTEROL SULFATE 2.5; .5 MG/3ML; MG/3ML
3 SOLUTION RESPIRATORY (INHALATION) EVERY 4 HOURS
Status: DISCONTINUED | OUTPATIENT
Start: 2018-12-28 | End: 2019-01-11 | Stop reason: HOSPADM

## 2018-12-28 RX ORDER — MIDAZOLAM HYDROCHLORIDE 1 MG/ML
4 INJECTION INTRAMUSCULAR; INTRAVENOUS
Status: DISCONTINUED | OUTPATIENT
Start: 2018-12-28 | End: 2019-01-11 | Stop reason: HOSPADM

## 2018-12-28 RX ORDER — NOREPINEPHRINE BITARTRATE 1 MG/ML
INJECTION, SOLUTION INTRAVENOUS
Status: COMPLETED
Start: 2018-12-28 | End: 2018-12-28

## 2018-12-28 RX ADMIN — NYSTATIN 500000 UNITS: 500000 SUSPENSION ORAL at 08:12

## 2018-12-28 RX ADMIN — NOREPINEPHRINE BITARTRATE 1 MCG/KG/MIN: 8 SOLUTION at 04:12

## 2018-12-28 RX ADMIN — DEXTROSE: 5 SOLUTION INTRAVENOUS at 06:12

## 2018-12-28 RX ADMIN — SODIUM CHLORIDE: 0.9 INJECTION, SOLUTION INTRAVENOUS at 07:12

## 2018-12-28 RX ADMIN — IPRATROPIUM BROMIDE AND ALBUTEROL SULFATE 3 ML: .5; 3 SOLUTION RESPIRATORY (INHALATION) at 03:12

## 2018-12-28 RX ADMIN — NYSTATIN 500000 UNITS: 500000 SUSPENSION ORAL at 09:12

## 2018-12-28 RX ADMIN — IPRATROPIUM BROMIDE AND ALBUTEROL SULFATE 3 ML: .5; 3 SOLUTION RESPIRATORY (INHALATION) at 11:12

## 2018-12-28 RX ADMIN — PIPERACILLIN SODIUM AND TAZOBACTAM SODIUM 4.5 G: 4; .5 INJECTION, POWDER, LYOPHILIZED, FOR SOLUTION INTRAVENOUS at 12:12

## 2018-12-28 RX ADMIN — NYSTATIN 500000 UNITS: 500000 SUSPENSION ORAL at 11:12

## 2018-12-28 RX ADMIN — PROPOFOL 35 MCG/KG/MIN: 10 INJECTION, EMULSION INTRAVENOUS at 07:12

## 2018-12-28 RX ADMIN — NOREPINEPHRINE BITARTRATE 0.76 MCG/KG/MIN: 8 SOLUTION at 08:12

## 2018-12-28 RX ADMIN — PANTOPRAZOLE SODIUM 40 MG: 40 INJECTION, POWDER, LYOPHILIZED, FOR SOLUTION INTRAVENOUS at 09:12

## 2018-12-28 RX ADMIN — PROPOFOL 35 MCG/KG/MIN: 10 INJECTION, EMULSION INTRAVENOUS at 04:12

## 2018-12-28 RX ADMIN — CLOPIDOGREL BISULFATE 75 MG: 75 TABLET ORAL at 09:12

## 2018-12-28 RX ADMIN — NOREPINEPHRINE BITARTRATE 0.98 MCG/KG/MIN: 8 SOLUTION at 07:12

## 2018-12-28 RX ADMIN — SODIUM CHLORIDE: 0.9 INJECTION, SOLUTION INTRAVENOUS at 04:12

## 2018-12-28 RX ADMIN — POLYETHYLENE GLYCOL 3350 17 G: 17 POWDER, FOR SOLUTION ORAL at 09:12

## 2018-12-28 RX ADMIN — METHYLPREDNISOLONE SODIUM SUCCINATE 80 MG: 40 INJECTION, POWDER, FOR SOLUTION INTRAMUSCULAR; INTRAVENOUS at 06:12

## 2018-12-28 RX ADMIN — ASPIRIN 81 MG CHEWABLE TABLET 81 MG: 81 TABLET CHEWABLE at 09:12

## 2018-12-28 RX ADMIN — NOREPINEPHRINE BITARTRATE 4000 MCG: 1 INJECTION, SOLUTION, CONCENTRATE INTRAVENOUS at 02:12

## 2018-12-28 RX ADMIN — NOREPINEPHRINE BITARTRATE 0.98 MCG/KG/MIN: 8 SOLUTION at 06:12

## 2018-12-28 RX ADMIN — IPRATROPIUM BROMIDE AND ALBUTEROL SULFATE 3 ML: .5; 3 SOLUTION RESPIRATORY (INHALATION) at 07:12

## 2018-12-28 RX ADMIN — PIPERACILLIN SODIUM AND TAZOBACTAM SODIUM 4.5 G: 4; .5 INJECTION, POWDER, LYOPHILIZED, FOR SOLUTION INTRAVENOUS at 08:12

## 2018-12-28 RX ADMIN — MIDAZOLAM HYDROCHLORIDE 4 MG: 1 INJECTION, SOLUTION INTRAMUSCULAR; INTRAVENOUS at 04:12

## 2018-12-28 RX ADMIN — METOPROLOL TARTRATE 25 MG: 25 TABLET, FILM COATED ORAL at 09:12

## 2018-12-28 RX ADMIN — ENOXAPARIN SODIUM 40 MG: 100 INJECTION SUBCUTANEOUS at 06:12

## 2018-12-28 RX ADMIN — LISINOPRIL 2.5 MG: 2.5 TABLET ORAL at 09:12

## 2018-12-28 RX ADMIN — ONDANSETRON 4 MG: 2 INJECTION, SOLUTION INTRAMUSCULAR; INTRAVENOUS at 11:12

## 2018-12-28 RX ADMIN — VANCOMYCIN HYDROCHLORIDE 1250 MG: 750 INJECTION, POWDER, LYOPHILIZED, FOR SOLUTION INTRAVENOUS at 04:12

## 2018-12-28 NOTE — PROGRESS NOTES
Progress Note  Hospital Medicine  Patient Name:Enmanuel Armenta  MRN:  [id number]  Patient Class: IP- Inpatient  Admit Date: 12/17/2018  Length of Stay: 11 days  Expected Discharge Date:   Attending Physician: Stephanie Tena MD  Primary Care Provider:  Primary Doctor No    SUBJECTIVE:     Principal Problem: Aspiration pneumonia  Initial history of present illness: Patient is a 63 y.o. male admitted to Hospitalist Service from [facility] Emergency Room with complaint of abdominal pain. Patient is a resident of [facility] nursing home, [place]. Patient reportedly has past medical history significant for COPD, muscular dystrophy, l;imited mobility with walker and history of elevated PSA. Patient presented with 1 day history of abdominal pain associated with nausea and vomiting. The EMS reported, vomiting a brown sticky substance with pale red streaks throughout. EMS gave him 4 mg of Zofran in route to the ED. Patient denied chest pain, shortness of breath, headache, vision changes, focal neuro-deficits, cough. Patient noted to have 101.5F upon arrival.     PMH/PSH/SH/FH/Meds: reviewed.    Symptoms/Review of Systems: Patient aspirated PEG feeding contents. Aggressive suction performed but patient continues to be hypoxic and lethargic.  Diet: NPO  Activity level: Up with assistance    Pain:  Moans to verbal commands     OBJECTIVE:   Vital Signs (Most Recent):      Temp: 97.7 °F (36.5 °C) (12/28/18 0740)  Pulse: 89 (12/28/18 0750)  Resp: 18 (12/28/18 0750)  BP: 123/65 (12/28/18 0740)  SpO2: (!) 93 % (12/28/18 0750)       Vital Signs Range (Last 24H):  Temp:  [96.5 °F (35.8 °C)-98 °F (36.7 °C)]   Pulse:  [70-99]   Resp:  [14-20]   BP: (123-177)/(65-76)   SpO2:  [90 %-99 %]     Weight: 71.1 kg (156 lb 12 oz)  Body mass index is 24.55 kg/m².    Intake/Output Summary (Last 24 hours) at 12/28/2018 0940  Last data filed at 12/28/2018 0630  Gross per 24 hour   Intake 3900 ml   Output 1550 ml   Net 2350 ml      Physical Examination:  General appearance: well developed, appears stated age, chronically ill-appearing, in respiratory distress  Head: normocephalic, atraumatic  Eyes:  conjunctivae/corneas clear. PERRL.  Nose: Nares normal. Septum midline.  Throat: lips, mucosa, and tongue normal; teeth and gums normal, no throat erythema. + Dry blood at angle of mouth  Neck: supple, symmetrical, trachea midline, no JVD and thyroid not enlarged, symmetric, no tenderness/mass/nodules  Lungs:  Scattered rhonchi B/L.  Chest wall: no tenderness  Heart: regular rate and rhythm, S1, S2 normal, no murmur, click, rub or gallop  Abdomen: soft, epigastric tenderness, non-distented; bowel sounds normal; no masses,  no organomegaly. PEG site okay.  Extremities: no cyanosis, clubbing or edema.   Pulses: 2+ and symmetric  Skin: Skin color, texture, turgor normal. No rashes or lesions.  Lymph nodes: Cervical, supraclavicular, and axillary nodes normal.  Neurologic: Lethargic, non-focal.    CBC:  Recent Labs   Lab 12/26/18  0534 12/27/18  0526 12/28/18  0547   WBC 11.00 11.70 20.60*   RBC 4.13* 4.10* 3.86*   HGB 12.2* 12.0* 11.5*   HCT 37.9* 37.9* 35.4*   * 398* 398*   MCV 92 92 92   MCH 29.5 29.3 29.7   MCHC 32.2 31.7* 32.4   BMP  Recent Labs   Lab 12/26/18  0534 12/27/18  0526 12/27/18  1614 12/28/18  0547   * 111* 212* 160*   * 151* 148* 140   K 3.7 3.9 4.0 4.2    109 107 102   CO2 35* 35* 35* 31*   BUN 20 22 28* 23   CREATININE 0.9 0.9 1.0 1.0   CALCIUM 10.1 9.8 9.6 9.4   MG 2.5 2.4  --  2.1      Diagnostic Results:  Microbiology Results (last 7 days)     Procedure Component Value Units Date/Time    Blood culture x two cultures. Draw prior to antibiotics. [[id number]] Collected:  12/17/18 0958    Order Status:  Completed Specimen:  Blood from Antecubital, Right  Arm Updated:  12/22/18 2012     Blood Culture, Routine No growth after 5 days.    Narrative:       Aerobic and anaerobic    Blood culture x two cultures.  Draw prior to antibiotics. [[id number]] Collected:  12/17/18 0943    Order Status:  Completed Specimen:  Blood from Peripheral, Left  Hand Updated:  12/22/18 2012     Blood Culture, Routine No growth after 5 days.    Narrative:       Aerobic and anaerobic         Chest X-Ray: T right basilar infiltrate.     CT abdomen and pelvis with contrast:  Enlarged prostate gland, air in the bladder likely secondary to catheterization, diffuse bladder wall thickening. Prominent amount of fluid within right colon.  Appendix not identified and note is made of limitation with motion artifact in the appendiceal region.    EGD:   - Moderately severe reflux esophagitis.                       - Medium-sized hiatal hernia.                       - Gastritis.                       - A single gastric polyp.                       - Normal examined duodenum.                       - No specimens collected.    CXR: New moderate left pleural effusion and left lung infiltrate.  Leftward mediastinal shift raising consideration for left lung atelectasis as contributory to the findings.  Close follow-up recommended.    Assessment/Plan:      Acute hypoxic respiratory failure   Yes    Aspiration pneumonia, RLL [J69.0]  Swallow dysfunction s/p PEG placement  Patient could not tolerate BiPAP, ET placement and started on mechanical ventilation. Follow Dr. Marquez's recommendations  NPO.  Continue beta 2 agonist bronchodilator treatments.   Treated.  Keep at 30 degrees HOB.     Hypernatremia - corrected  Follow BMP.     Yes    Upper GI bleeding [K92.2]  Follow H/H closely. Type and screen blood and transfuse as needed.  Continue IV Protonix.  Use IV anti-emetics as needed.       Yes    Muscular dystrophy [G71.00]  Supportive care, fall precautions.       Yes    Chronic obstructive pulmonary disease [J44.9]  Patient's COPD is controlled currently. Continue scheduled inhalers and monitor respiratory status closely.   Yes      DVT prophylaxis: Use SCD and  TEDs. No anticoagulation due to GI bleeding.      Stephanie Tena MD  Department of Hospital Medicine   [facility]

## 2018-12-28 NOTE — NURSING
During rounds pt noted to have reposition self down low in bed, audible gargling noted and rhonchi auscultated, lung sounds was clear at 0730, 40cc residual aspirated from peg tube, suction mouth, stop feeding and pull pt back up to HOB, HOB elevated at 40 degrees 02 4L NC in progress, sat 86%, MD made aware    1200- respiratory notified, deep nasal suction perform, non-rebreather place, sat 96%, mitts applied due to pt constant removal of mask, will follow through MD new orders    1208- dose of zosyn infusing, pt unable to maintain sat greater than 90 on rebreather, will continue to monitor and follow out current orders    1320- transferred to ICU per dr bran, report given to ac rice

## 2018-12-28 NOTE — NURSING
Patient O2 sats still in the low 80's  spoke with again, new orders received see MAR for details.  4mg versed administered per PRN orders, 1mg wasted with Charge nurse Nestor Dave RN. Will continue to monitor patient closely.

## 2018-12-28 NOTE — PLAN OF CARE
I sent [facility] a 3 day packet to review and prepare for the pt to return tomorrow. I also updated Stella at [phone]. Ivy Hernandez LMSW     Per Stella- the fax number for discharge orders to be sent tomorrow is [phone] and report can be called to [facility] at [phone]. Ivy Hernandez LMSW       12/28/18 0977   Post-Acute Status   Post-Acute Authorization Placement   Post-Acute Placement Status Referrals Sent

## 2018-12-28 NOTE — PLAN OF CARE
Problem: Adult Inpatient Plan of Care  Goal: Plan of Care Review  Outcome: Ongoing (interventions implemented as appropriate)  Plan of care unable to be reviewed with patient due to altered mental status. Patient appeared to have aspirated on the floor, brought around to ICU urgently. Vomited again once in ICU bed before Bipap could be placed. O2 sats 70%. State intubation completed by anesthesia. Central line placed by .  consulted per  for vent management. [facility] See orders for details.

## 2018-12-28 NOTE — PROGRESS NOTES
12/28/18 1600   Patient Assessment/Suction   Level of Consciousness (AVPU) alert   MACKENZIE Breath Sounds diminished   PRE-TX-O2-ETCO2   O2 Device (Oxygen Therapy) ventilator   $ Is the patient on Low Flow Oxygen? Yes   Pulse Oximetry Type Continuous   $ Pulse Oximetry - Multiple Charge Pulse Oximetry - Multiple   Aerosol Therapy   $ Aerosol Therapy Charges Aerosol Treatment   Daily Review of Necessity (SVN) completed   Respiratory Treatment Status (SVN) given   Treatment Route (SVN) in-line   Patient Position (SVN) HOB elevated   Post Treatment Assessment (SVN) breath sounds unchanged   Signs of Intolerance (SVN) none   Breath Sounds Post-Respiratory Treatment   Throughout All Fields Post-Treatment All Fields   Throughout All Fields Post-Treatment no change   Post-treatment Heart Rate (beats/min) 75   Post-treatment Resp Rate (breaths/min) 20

## 2018-12-28 NOTE — PROGRESS NOTES
12/28/18 1607   PRE-TX-O2-ETCO2   Oxygen Concentration (%) 80   SpO2 (!) 86 %   Pulse 78   Resp 20   /63   Vent Select   Charged w/in last 24h YES   Preset Conventional Ventilator Settings   Vent Type    Ventilation Type VC   Vent Mode A/C   Set Rate 20 bmp   Vt Set 520 mL   PEEP/CPAP 3 cmH20   Pressure Support 0 cmH20   Waveform RAMP   Peak Flow 74 L/min   Set Inspiratory Pressure 0 cmH20   Insp Time 0 Sec(s)   Plateau Set/Insp. Hold (sec) 0   Insp Rise Time  0 %   Trigger Sensitivity Flow/I-Trigger 5 L/min   P High 0 cm H2O   P Low 0 cm H2O   T High 0 sec   T Low 0 sec   Patient Ventilator Parameters   Resp Rate Total 20 br/min   Peak Airway Pressure 40 cmH2O   Mean Airway Pressure 14 cmH20   Plateau Pressure 0 cmH20   Exhaled Vt 976 mL   Total Ve 19.1 mL   Spont Ve 0 L   I:E Ratio Measured 1:2.90   Conventional Ventilator Alarms   Ve High Alarm 23 L/min   Resp Rate High Alarm 0 br/min   Press High Alarm 82 cmH2O   Apnea Rate 10   Apnea Volume (mL) 620 mL   Apnea Oxygen Concentration  100   Apnea Flow Rate (L/min) 74   T Apnea 20 sec(s)   Ready to Wean/Extubation Screen   FIO2<=50 (chart decimal) (!) 0.8   MV<16L (chart vol.) (!) 19.1   PEEP <=8 (chart #) 3   Ready to Wean Parameters   F/VT Ratio<105 (RSBI) (!) 20.49   changes per Dr. Coe

## 2018-12-28 NOTE — PLAN OF CARE
12/28/18 0750   Patient Assessment/Suction   Level of Consciousness (AVPU) alert   Respiratory Effort Normal;Unlabored   Expansion/Accessory Muscles/Retractions no use of accessory muscles;no retractions;expansion symmetric   All Lung Fields Breath Sounds diminished;clear   Rhythm/Pattern, Respiratory depth regular;pattern regular;unlabored   Cough Frequency infrequent   Cough Type good;nonproductive   PRE-TX-O2-ETCO2   O2 Device (Oxygen Therapy) nasal cannula   $ Is the patient on Low Flow Oxygen? Yes   Flow (L/min) 3   SpO2 (!) 93 %   Pulse Oximetry Type Intermittent   $ Pulse Oximetry - Multiple Charge Pulse Oximetry - Multiple   Pulse 89   Resp 18   Aerosol Therapy   $ Aerosol Therapy Charges PRN treatment not required       Aerosol treatments PRN.

## 2018-12-28 NOTE — PLAN OF CARE
Problem: Adult Inpatient Plan of Care  Goal: Plan of Care Review  Outcome: Ongoing (interventions implemented as appropriate)  Plan of care reviewed with patient. Patient remains NPO.Peg tube intact & clamped .  Free water given via peg tube as per orders. No residuals noted. Iv fluids infusing as per orders. Samson catheter intact & patent. Remains free from falls/injury. Instructed to call for assistance as needed during night. Bed alarm set. Bed in low & locked position. Frequent rounds made for safety.

## 2018-12-28 NOTE — ANESTHESIA PROCEDURE NOTES
Emergent Intubation    Diagnosis: Aspiration and respiratory failure  Doctor requesting consult:   Patient location during procedure: ICU  Procedure start time: 12/28/2018 1:35 PM  Timeout: 12/28/2018 1:35 PM  Procedure end time: 12/28/2018 1:40 PM  Staffing  Anesthesiologist: Wilman Byrd MD  Performed: anesthesiologist   Anesthesiologist was present at the time of the procedure.  Preanesthetic Checklist  Completed: patient identified, site marked, surgical consent, pre-op evaluation, timeout performed, IV checked, risks and benefits discussed, monitors and equipment checked and anesthesia consent given  Intubation  Indication: respiratory distress, respiratory failure, hypoxemia, pulmonary toilet, airway protection mask ventilation: easy mask.  Intubation: preinduction-awake intubation, laryngoscopy direct, Dave 2.  Endotracheal Tube: oral, 8.0 mm ID, cuffed (inflated to minimal occlusive pressure)  Attempts: 1, Grade III - only arytenoids seen  Complicating Factors: large/floppy epiglottis  Tube secured at 21 cm at the lips.  Findings post-intubation: bilateral breath sounds, positive ETCO2, atraumatic / condition of teeth unchanged  Position Confirmation: auscultation  Additional Notes  Called emergently to bedside for patient s/p vomiting of meal and aspiration.  Ambu ventilation underway with 100% FiO2 and SpO2 of 86%.  Vomit all over face, beard and mustache and in oropharynx. Pt obtunded and doing some spontaneous ventilation. Vomit suctioned from mouth and epiglottis.  DL with Mil 2 and ETT placed under direct visualization. +EtCO2 and + BBS.  ETT Secured to face by respiratory.  Vomit on the cords and in the ETT after placement.  SpO2 improved to 90's.

## 2018-12-28 NOTE — CONSULTS
12/28/2018      Admit Date: 12/17/2018  Enmanuel Armenta  New Patient Consult    Chief Complaint   Patient presents with    Abdominal Pain       History of Present Illness:  Pt with MD, resident of [facility] nurse [place], typically ambulated with roller walker. No resp meds noted PTA.   Pt presented abd pain 12/17, had n/v and fever and infiltrates at presentation. Pt was smoker pta- eval by speech showed aspiration.  Peg place , therapy given, and placement planned.      earlier today SHERITA Power noted:During rounds pt noted to have reposition self down low in bed, audible gargling noted and rhonchi auscultated, lung sounds was clear at 0730, 40cc residual aspirated from peg tube, suction mouth, stop feeding and pull pt back up to HOB, HOB elevated at 40 degrees 02 4L NC in progress, sat 86%, MD made aware   1200- respiratory notified, deep nasal suction perform, non-rebreather place, sat 96%, mitts applied due to pt constant removal of mask, will follow through MD new orders    Pt was transferred to icu where pt intubated.  Pt had drop blood pressure and needed high fi02.    Pt currently intubated and sedated.          PFSH:  Past Medical History:   Diagnosis Date    Anticoagulant long-term use     BPH (benign prostatic hyperplasia)     COPD (chronic obstructive pulmonary disease)     Elevated PSA     Fx     RIGHT WRIST, LEFT FOOT    Hypokalemia     Muscular dystrophy     Urine retention     Wears dentures     upper     Past Surgical History:   Procedure Laterality Date    APPENDECTOMY      cataracts      bilateral     CHOLECYSTECTOMY      CYST REMOVAL      from spine     CYSTOLITHOLOPAXY (REMOVE BLADDER STONE)  3/6/2017    [facility] at [facility]    CYSTOSCOPY      EGD (ESOPHAGOGASTRODUODENOSCOPY) N/A 12/24/2018    Performed by [facility]geny Martin [facility] at [facility] ENDO    EGD (ESOPHAGOGASTRODUODENOSCOPY) N/A 12/17/2018    Per[facility] by [facility]y [facility] at [facility] ENDO    EYE  "SURGERY      cataract surgery bilateral    INSERTION, PEG TUBE N/A 12/24/2018    [facility] at [facility]O    PROSTATE BIOPSY      PROSTATE SURGERY      PROSTATECTOMY-TRANSURETHRAL N/A 3/6/2017    [facility] by [facility] at [facility] OR    PROSTATECTOMY-TRANSURETHRAL WITH GREENLIGHT LASER N/A 3/6/2017    Per[facility] by [facility] at [facility] OR    TONSILLECTOMY      TRANSRECTAL ULTRASOUND GUIDED PROSTATE BIOPSY N/A 3/28/2016    Per[facility] by [facility] at [facility] OR    VASECTOMY       Social History     Tobacco Use    Smoking status: Current Every Day Smoker     Packs/day: 1.00     Years: 48.00     Pack years: 48.00     Types: Cigarettes    Smokeless tobacco: Never Used   Substance Use Topics    Alcohol use: No     Alcohol/week: 0.0 oz    Drug use: No     History reviewed. No pertinent family history.  Review of patient's allergies indicates:  No Known Allergies         Review of Systems:  due to neurologic status/impairments a Review of Systems could not be obtained       Exam:Comprehensive exam done. BP (!) 120/59   Pulse 91   Temp 97.7 °F (36.5 °C) (Oral)   Resp (!) 29   Ht 5' 7" (1.702 m) Comment: Office 7/13/18  Wt 71.1 kg (156 lb 12 oz)   SpO2 (!) 92%   BMI 24.55 kg/m²   Exam included Vitals as listed, and patient's appearance and affect and alertness and mood, oral exam for yeast and hygiene and pharynx lesions and Mallapatti (M) score, neck with inspection for jvd and masses and thyroid abnormalities and lymph nodes (supraclavicular and infraclavicular nodes also examined and noted if abn), chest exam included symmetry and effort and fremitus and percussion and auscultation, cardiac exam included rhythm and gallops and murmur and rubs and jvd and edema, abdominal exam for mass and hepatosplenomegaly and tenderness and hernias and bowel sounds, Musculoskeletal exam with muscle tone and posture and mobility/gait and  strenght, and skin for rashes and " cyanosis and pallor and turgor, extremity for clubbing.  Findings were normal except as listed below:  Pt sedated and intubated.  Pupils react, no jvd, chest is symmetric, mild distress, normal percussion, normal fremitus and occ rales and also diffuse wheezes, nor cor, non tender abd with peg, no clubbing, no edema, rest good but not arousing.      Radiographs reviewed: view by direct vision    Results for orders placed during the hospital encounter of 12/17/18   X-Ray Chest 1 View    Narrative EXAMINATION:  XR CHEST 1 VIEW    CLINICAL HISTORY:  resp distress;    TECHNIQUE:  Single frontal view of the chest was performed.    COMPARISON:  12/20/2018    FINDINGS:  The heart is enlarged.  There is patchy airspace disease in the left mid to lower lung zone and at the right lung base.  Mildly improved aeration is present at the left lung base.  There is a small left pleural effusion which appears decreased.  There is no pneumothorax.      Impression Moderate left basilar airspace disease and small pleural effusion, with improved aeration relative to prior.  Patchy right basilar infiltrate, with no appreciable infiltrate on the right on previous exam.      Electronically signed by: Chris Pickett MD  Date:    12/28/2018  Time:    12:53   ]    Labs     Recent Labs   Lab 12/28/18  0547   WBC 20.60*   HGB 11.5*   HCT 35.4*   *     Recent Labs   Lab 12/28/18  0547      K 4.2      CO2 31*   BUN 23   CREATININE 1.0   *   CALCIUM 9.4   MG 2.1   PHOS 1.4*   AST 26   ALT 19   ALKPHOS 140*   BILITOT 0.3   PROT 5.9*   ALBUMIN 2.3*     Recent Labs   Lab 12/28/18  1242   PH 7.164*   PCO2 86.5*   PO2 52*   HCO3 31.1*     Microbiology Results (last 7 days)     Procedure Component Value Units Date/Time    Culture, Respiratory with Gram Stain [[id number]]     Order Status:  No result Specimen:  Respiratory from Sputum, Expectorated     Blood culture x two cultures. Draw prior to antibiotics. [[id number]]  Collected:  12/17/18 0958    Order Status:  Completed Specimen:  Blood from Antecubital, Right  Arm Updated:  12/22/18 2012     Blood Culture, Routine No growth after 5 days.    Narrative:       Aerobic and anaerobic    Blood culture x two cultures. Draw prior to antibiotics. [[id number]] Collected:  12/17/18 0943    Order Status:  Completed Specimen:  Blood from Peripheral, Left  Hand Updated:  12/22/18 2012     Blood Culture, Routine No growth after 5 days.    Narrative:       Aerobic and anaerobic          Impression:  Active Hospital Problems    Diagnosis  POA    *Aspiration pneumonia of both lower lobes [J69.0]  Yes    Septic shock [A41.9, R65.21]  No    Acute respiratory failure with hypoxia and hypercarbia [J96.01, J96.02]  Clinically Undetermined    Smoker [F17.200]  Yes    Episodic confusion [R41.0]  Yes    Hematemesis [K92.0]  Yes    Dysphagia [R13.10]  Yes    Malnutrition of moderate degree [E44.0]  Yes    Elevated LFTs [R94.5]  Yes    Upper GI bleeding [K92.2]  Yes    Muscular dystrophy [G71.00]  Yes    Chronic obstructive pulmonary disease with acute lower respiratory infection [J44.0]  Yes      Resolved Hospital Problems    Diagnosis Date Resolved POA    Feeding difficulty [R63.3] 12/23/2018 Yes    UTI (urinary tract infection) [N39.0] 12/23/2018 Yes    Hematemesis [K92.0] 12/23/2018 Yes    SOB (shortness of breath) [R06.02] 12/18/2018 Yes     Plan: pt apparently vomited and aspirated while awaiting placement.  Muscular Dystrophy contributing to illness as is copd.  Acute intermittent confusion was noted.  [place] would seem poor.  Pt was on protonix and with tube feeds- doubt foreign object - no bronchh.  Pressors and arterial line.  abx adjusted.  Consider wean once secretions and shock controlled.      The following were evaluated and adjusted as needed: mechanical ventilator settings and weaning status, vasopressors, intravenous fluids and nutritional status, sedation and neurologic  status, antibiotics, hemodynamics, support tubes and access lines and invasive monitoring, acid base balance and oxygenation needs and input and output and renal status       Critical Care  - THE PATIENT HAS A HIGH PROBABILITY OF IMMINENT OR LIFE THREATENING DETERIORATION.  Over 50%time of encounter was in direct care at bedside.  Time was 30 to 74 minutes required for patient care.  Time needed for all of the above totaled 39 minutes.

## 2018-12-28 NOTE — PLAN OF CARE
Problem: Adult Inpatient Plan of Care  Goal: Plan of Care Review  Outcome: Ongoing (interventions implemented as appropriate)  Transferred to ICU at approximately 1320, see nurses notes

## 2018-12-28 NOTE — PT/OT/SLP PROGRESS
Physical Therapy      Patient Name:  Enmanuel Armenta   MRN:  [id number]    Patient not seen today secondary to Other (Comment). Pt transferred to ICU. Will need new PT orders to resume therapy when appropriate.  Will follow-up .    Shanna Garland, PTA

## 2018-12-28 NOTE — ANESTHESIA PROCEDURE NOTES
Central Line    Diagnosis: Respiratory failure  Patient location during procedure: ICU  Procedure start time: 12/28/2018 2:21 PM  Timeout: 12/28/2018 2:20 PM  Procedure end time: 12/28/2018 2:35 PM  Staffing  Anesthesiologist: Hermilo Isaac MD  Performed: anesthesiologist   Anesthesiologist was present at the time of the procedure.  Preanesthetic Checklist  Completed: patient identified, site marked, surgical consent, pre-op evaluation, timeout performed, IV checked, risks and benefits discussed, monitors and equipment checked and anesthesia consent given  Indication  Indication: vascular access     Anesthesia   local infiltration    Central Line  Skin Prep: skin prepped with ChloraPrep, skin prep agent completely dried prior to procedure  maximum sterile barriers used during central venous catheter insertion  hand hygiene performed prior to central venous catheter insertion  Location: right, internal jugular.   Catheter type: triple lumen  Catheter Size: 7.5 Fr  Ultrasound: vascular probe with ultrasound   Vessel Caliber: patent, compressibility normal  Needle advanced into vessel with real time Ultrasound guidance.  Guidewire confirmed in vessel.  Image recorded and saved.  Manometry: none  Insertion Attempts: 1   Securement:line sutured, chlorhexidine patch, sterile dressing applied and blood return through all ports     Post-Procedure  X-Ray: no pneumothorax on x-ray, tip termination, successful placement and placement verified by x-ray   Adverse Events:none  Additional Notes  Right IJ TLC placed with ultrasound guidance.  Pt tolerated well. CXR ordered

## 2018-12-29 LAB
ALBUMIN SERPL BCP-MCNC: 1.8 G/DL
ALLENS TEST: ABNORMAL
ALP SERPL-CCNC: 145 U/L
ALT SERPL W/O P-5'-P-CCNC: 49 U/L
ANION GAP SERPL CALC-SCNC: 12 MMOL/L
AST SERPL-CCNC: 64 U/L
BASOPHILS # BLD AUTO: ABNORMAL K/UL
BASOPHILS NFR BLD: 0 %
BILIRUB SERPL-MCNC: 0.9 MG/DL
BUN SERPL-MCNC: 25 MG/DL
CALCIUM SERPL-MCNC: 8.4 MG/DL
CHLORIDE SERPL-SCNC: 105 MMOL/L
CO2 SERPL-SCNC: 21 MMOL/L
CREAT SERPL-MCNC: 1.6 MG/DL
DELSYS: ABNORMAL
DIFFERENTIAL METHOD: ABNORMAL
EOSINOPHIL # BLD AUTO: ABNORMAL K/UL
EOSINOPHIL NFR BLD: 0 %
ERYTHROCYTE [DISTWIDTH] IN BLOOD BY AUTOMATED COUNT: 14.5 %
ERYTHROCYTE [SEDIMENTATION RATE] IN BLOOD BY WESTERGREN METHOD: 20 MM/H
EST. GFR  (AFRICAN AMERICAN): 51 ML/MIN/1.73 M^2
EST. GFR  (NON AFRICAN AMERICAN): 45 ML/MIN/1.73 M^2
FIO2: 80
GLUCOSE SERPL-MCNC: 266 MG/DL
HCO3 UR-SCNC: 22.8 MMOL/L (ref 24–28)
HCT VFR BLD AUTO: 34.2 %
HGB BLD-MCNC: 10.8 G/DL
LYMPHOCYTES # BLD AUTO: ABNORMAL K/UL
LYMPHOCYTES NFR BLD: 1 %
MAGNESIUM SERPL-MCNC: 1.7 MG/DL
MCH RBC QN AUTO: 29.1 PG
MCHC RBC AUTO-ENTMCNC: 31.7 G/DL
MCV RBC AUTO: 92 FL
MIN VOL: 13
MODE: ABNORMAL
MONOCYTES # BLD AUTO: ABNORMAL K/UL
MONOCYTES NFR BLD: 3 %
NEUTROPHILS NFR BLD: 86 %
NEUTS BAND NFR BLD MANUAL: 10 %
PCO2 BLDA: 44.3 MMHG (ref 35–45)
PEEP: 5
PH SMN: 7.32 [PH] (ref 7.35–7.45)
PHOSPHATE SERPL-MCNC: 1.9 MG/DL
PIP: 31
PLATELET # BLD AUTO: 422 K/UL
PLATELET BLD QL SMEAR: ABNORMAL
PMV BLD AUTO: 10 FL
PO2 BLDA: 71 MMHG (ref 80–100)
POC BE: -3 MMOL/L
POC SATURATED O2: 93 % (ref 95–100)
POC TCO2: 24 MMOL/L (ref 23–27)
POTASSIUM SERPL-SCNC: 3.7 MMOL/L
PROT SERPL-MCNC: 5.5 G/DL
RBC # BLD AUTO: 3.73 M/UL
SAMPLE: ABNORMAL
SITE: ABNORMAL
SODIUM SERPL-SCNC: 138 MMOL/L
SP02: 98
VT: 600
WBC # BLD AUTO: 17.8 K/UL

## 2018-12-29 PROCEDURE — 99233 SBSQ HOSP IP/OBS HIGH 50: CPT | Mod: ,,, | Performed by: INTERNAL MEDICINE

## 2018-12-29 PROCEDURE — 87077 CULTURE AEROBIC IDENTIFY: CPT

## 2018-12-29 PROCEDURE — 20000000 HC ICU ROOM

## 2018-12-29 PROCEDURE — 94761 N-INVAS EAR/PLS OXIMETRY MLT: CPT

## 2018-12-29 PROCEDURE — 37799 UNLISTED PX VASCULAR SURGERY: CPT

## 2018-12-29 PROCEDURE — 94770 HC EXHALED C02 TEST: CPT

## 2018-12-29 PROCEDURE — 80053 COMPREHEN METABOLIC PANEL: CPT

## 2018-12-29 PROCEDURE — 27000221 HC OXYGEN, UP TO 24 HOURS

## 2018-12-29 PROCEDURE — 25000003 PHARM REV CODE 250: Performed by: NURSE PRACTITIONER

## 2018-12-29 PROCEDURE — 25000242 PHARM REV CODE 250 ALT 637 W/ HCPCS: Performed by: INTERNAL MEDICINE

## 2018-12-29 PROCEDURE — 83735 ASSAY OF MAGNESIUM: CPT

## 2018-12-29 PROCEDURE — 99900026 HC AIRWAY MAINTENANCE (STAT)

## 2018-12-29 PROCEDURE — 84100 ASSAY OF PHOSPHORUS: CPT

## 2018-12-29 PROCEDURE — 63600175 PHARM REV CODE 636 W HCPCS: Performed by: INTERNAL MEDICINE

## 2018-12-29 PROCEDURE — 36415 COLL VENOUS BLD VENIPUNCTURE: CPT

## 2018-12-29 PROCEDURE — 85027 COMPLETE CBC AUTOMATED: CPT

## 2018-12-29 PROCEDURE — 94640 AIRWAY INHALATION TREATMENT: CPT

## 2018-12-29 PROCEDURE — 99233 PR SUBSEQUENT HOSPITAL CARE,LEVL III: ICD-10-PCS | Mod: ,,, | Performed by: INTERNAL MEDICINE

## 2018-12-29 PROCEDURE — 99900035 HC TECH TIME PER 15 MIN (STAT)

## 2018-12-29 PROCEDURE — 87205 SMEAR GRAM STAIN: CPT

## 2018-12-29 PROCEDURE — 85007 BL SMEAR W/DIFF WBC COUNT: CPT

## 2018-12-29 PROCEDURE — 36600 WITHDRAWAL OF ARTERIAL BLOOD: CPT

## 2018-12-29 PROCEDURE — 94003 VENT MGMT INPAT SUBQ DAY: CPT

## 2018-12-29 PROCEDURE — 82803 BLOOD GASES ANY COMBINATION: CPT

## 2018-12-29 PROCEDURE — C9113 INJ PANTOPRAZOLE SODIUM, VIA: HCPCS | Performed by: INTERNAL MEDICINE

## 2018-12-29 PROCEDURE — 25000003 PHARM REV CODE 250: Performed by: INTERNAL MEDICINE

## 2018-12-29 PROCEDURE — 87070 CULTURE OTHR SPECIMN AEROBIC: CPT

## 2018-12-29 PROCEDURE — 87186 SC STD MICRODIL/AGAR DIL: CPT

## 2018-12-29 RX ADMIN — IPRATROPIUM BROMIDE AND ALBUTEROL SULFATE 3 ML: .5; 3 SOLUTION RESPIRATORY (INHALATION) at 07:12

## 2018-12-29 RX ADMIN — IPRATROPIUM BROMIDE AND ALBUTEROL SULFATE 3 ML: .5; 3 SOLUTION RESPIRATORY (INHALATION) at 03:12

## 2018-12-29 RX ADMIN — PANTOPRAZOLE SODIUM 40 MG: 40 INJECTION, POWDER, LYOPHILIZED, FOR SOLUTION INTRAVENOUS at 08:12

## 2018-12-29 RX ADMIN — PROPOFOL 45 MCG/KG/MIN: 10 INJECTION, EMULSION INTRAVENOUS at 12:12

## 2018-12-29 RX ADMIN — ASPIRIN 81 MG CHEWABLE TABLET 81 MG: 81 TABLET CHEWABLE at 08:12

## 2018-12-29 RX ADMIN — CLOPIDOGREL BISULFATE 75 MG: 75 TABLET ORAL at 08:12

## 2018-12-29 RX ADMIN — PROPOFOL 45 MCG/KG/MIN: 10 INJECTION, EMULSION INTRAVENOUS at 03:12

## 2018-12-29 RX ADMIN — PROPOFOL 50 MCG/KG/MIN: 10 INJECTION, EMULSION INTRAVENOUS at 10:12

## 2018-12-29 RX ADMIN — NOREPINEPHRINE BITARTRATE 0.4 MCG/KG/MIN: 8 SOLUTION at 12:12

## 2018-12-29 RX ADMIN — PROPOFOL 50 MCG/KG/MIN: 10 INJECTION, EMULSION INTRAVENOUS at 09:12

## 2018-12-29 RX ADMIN — NYSTATIN 500000 UNITS: 500000 SUSPENSION ORAL at 12:12

## 2018-12-29 RX ADMIN — PIPERACILLIN SODIUM AND TAZOBACTAM SODIUM 4.5 G: 4; .5 INJECTION, POWDER, LYOPHILIZED, FOR SOLUTION INTRAVENOUS at 12:12

## 2018-12-29 RX ADMIN — NOREPINEPHRINE BITARTRATE 0.19 MCG/KG/MIN: 8 SOLUTION at 12:12

## 2018-12-29 RX ADMIN — SODIUM CHLORIDE: 0.9 INJECTION, SOLUTION INTRAVENOUS at 08:12

## 2018-12-29 RX ADMIN — PIPERACILLIN SODIUM AND TAZOBACTAM SODIUM 4.5 G: 4; .5 INJECTION, POWDER, LYOPHILIZED, FOR SOLUTION INTRAVENOUS at 09:12

## 2018-12-29 RX ADMIN — MIDAZOLAM HYDROCHLORIDE 4 MG: 1 INJECTION, SOLUTION INTRAMUSCULAR; INTRAVENOUS at 02:12

## 2018-12-29 RX ADMIN — NYSTATIN 500000 UNITS: 500000 SUSPENSION ORAL at 11:12

## 2018-12-29 RX ADMIN — NYSTATIN 500000 UNITS: 500000 SUSPENSION ORAL at 07:12

## 2018-12-29 RX ADMIN — NOREPINEPHRINE BITARTRATE 0.3 MCG/KG/MIN: 8 SOLUTION at 04:12

## 2018-12-29 RX ADMIN — IPRATROPIUM BROMIDE AND ALBUTEROL SULFATE 3 ML: .5; 3 SOLUTION RESPIRATORY (INHALATION) at 11:12

## 2018-12-29 RX ADMIN — ATORVASTATIN CALCIUM 80 MG: 40 TABLET, FILM COATED ORAL at 09:12

## 2018-12-29 RX ADMIN — SODIUM CHLORIDE: 0.9 INJECTION, SOLUTION INTRAVENOUS at 12:12

## 2018-12-29 RX ADMIN — PIPERACILLIN SODIUM AND TAZOBACTAM SODIUM 4.5 G: 4; .5 INJECTION, POWDER, LYOPHILIZED, FOR SOLUTION INTRAVENOUS at 04:12

## 2018-12-29 RX ADMIN — METHYLPREDNISOLONE SODIUM SUCCINATE 80 MG: 40 INJECTION, POWDER, FOR SOLUTION INTRAMUSCULAR; INTRAVENOUS at 08:12

## 2018-12-29 RX ADMIN — NYSTATIN 500000 UNITS: 500000 SUSPENSION ORAL at 05:12

## 2018-12-29 RX ADMIN — VANCOMYCIN HYDROCHLORIDE 1250 MG: 750 INJECTION, POWDER, LYOPHILIZED, FOR SOLUTION INTRAVENOUS at 04:12

## 2018-12-29 RX ADMIN — ACETAMINOPHEN 650 MG: 325 TABLET, FILM COATED ORAL at 02:12

## 2018-12-29 RX ADMIN — ENOXAPARIN SODIUM 40 MG: 100 INJECTION SUBCUTANEOUS at 05:12

## 2018-12-29 RX ADMIN — PROPOFOL 50 MCG/KG/MIN: 10 INJECTION, EMULSION INTRAVENOUS at 03:12

## 2018-12-29 NOTE — NURSING
requests patient be positioned so that his left lung is down. TV be increased to 600, so that peak pressures are closer to the upper 40's. Will notify resp therapy.

## 2018-12-29 NOTE — ANESTHESIA PROCEDURE NOTES
Arterial    Diagnosis: Resp Failure    Patient location during procedure: ICU  Procedure start time: 12/28/2018 6:01 PM  Timeout: 12/28/2018 6:00 PM  Procedure end time: 12/28/2018 6:15 PM  Staffing  Anesthesiologist: Hermilo Isaac MD  Performed: anesthesiologist   Anesthesiologist was present at the time of the procedure.  Preanesthetic Checklist  Completed: patient identified, site marked, surgical consent, pre-op evaluation, timeout performed, IV checked, risks and benefits discussed, monitors and equipment checked and anesthesia consent givenArterial  Skin Prep: chlorhexidine gluconate  Local Infiltration: none  Orientation: left  Location: radial  Catheter Size: 20 G  Catheter placement by Ultrasound guidance. Heme positive aspiration all ports.  Vessel Caliber: small, patent  Needle advanced into vessel with real time Ultrasound guidance.  Guidewire confirmed in vessel.  Sterile sheath used.  Image recorded and saved.Insertion Attempts: 3  Assessment  Dressing: secured with tape and tegaderm  Patient: Tolerated well  Additional Notes  Left radial a-line placed with ultrasound guidance after several attempts.  Pt tolerated well.

## 2018-12-29 NOTE — PLAN OF CARE
12/29/18 0736   PRE-TX-O2-ETCO2   Oxygen Concentration (%) 80   SpO2 99 %   Pulse 82   Resp (!) 21       Airway - Non-Surgical 12/28/18 1335 Other (Comment)   Placement Date/Time: 12/28/18 (c) 1335   Method of Intubation: Direct laryngoscopy  Airway Device: Other (Comment)  Mask Ventilation: Easy  Intubated: Preinduction - awake intubation  Blade: Dave #2  Airway Device Size: 8.0  Style: Cuffed  Cuff Infl...   Secured at 23 cm   Measured At Lips   Secured Location Right   Secured by Commercial tube [place]   Bite Block right   Site Condition Dry   Status Secured;Intact   Site Assessment Clean   Cuff Pressure 30 cm H2O   Vent Select   $ Ventilator Subsequent 1   Charged w/in last 24h YES   Preset Conventional Ventilator Settings   Vent Type    Ventilation Type VC   Vent Mode A/C   Set Rate 20 bmp   Vt Set 600 mL   PEEP/CPAP 5 cmH20   Pressure Support 0 cmH20   Waveform RAMP   Peak Flow 74 L/min   Set Inspiratory Pressure 0 cmH20   Insp Time 0 Sec(s)   Plateau Set/Insp. Hold (sec) 0   Insp Rise Time  0 %   Trigger Sensitivity Flow/I-Trigger 3 L/min   P High 0 cm H2O   P Low 0 cm H2O   T High 0 sec   T Low 0 sec   Patient Ventilator Parameters   Resp Rate Total 20 br/min   Peak Airway Pressure 33 cmH2O   Mean Airway Pressure 13 cmH20   Plateau Pressure 0 cmH20   Exhaled Vt 622 mL   Total Ve 12.5 mL   Spont Ve 0 L   I:E Ratio Measured 1:2.40   Conventional Ventilator Alarms   Ve High Alarm 27 L/min   Resp Rate High Alarm 0 br/min   Press High Alarm 60 cmH2O   Apnea Rate 10   Apnea Volume (mL) 620 mL   Apnea Oxygen Concentration  100   Apnea Flow Rate (L/min) 74   T Apnea 20 sec(s)   Ready to Wean/Extubation Screen   FIO2<=50 (chart decimal) (!) 0.8   MV<16L (chart vol.) 12.5   PEEP <=8 (chart #) 5   Ready to Wean Parameters   F/VT Ratio<105 (RSBI) (!) 33.76

## 2018-12-29 NOTE — PLAN OF CARE
12/29/18 0738   Patient Assessment/Suction   Level of Consciousness (AVPU) responds to voice   All Lung Fields Breath Sounds coarse;diminished   $ Suction Charges Inline Suction Procedure Stat Charge   Secretions Amount copious   Secretions Color yellow;white   Secretions Characteristics thick   PRE-TX-O2-ETCO2   O2 Device (Oxygen Therapy) ventilator   $ Is the patient on Low Flow Oxygen? Yes   Oxygen Concentration (%) 80   SpO2 99 %   Pulse Oximetry Type Continuous   $ Pulse Oximetry - Multiple Charge Pulse Oximetry - Multiple   Pulse 82   Resp 20   Aerosol Therapy   $ Aerosol Therapy Charges Aerosol Treatment   Daily Review of Necessity (SVN) completed   Respiratory Treatment Status (SVN) given   Treatment Route (SVN) in-line   Patient Position (SVN) HOB elevated   Post Treatment Assessment (SVN) breath sounds unchanged   Signs of Intolerance (SVN) none   Breath Sounds Post-Respiratory Treatment   Throughout All Fields Post-Treatment All Fields   Throughout All Fields Post-Treatment no change   Post-treatment Heart Rate (beats/min) 86   Post-treatment Resp Rate (breaths/min) 20   Ready to Wean/Extubation Screen   FIO2<=50 (chart decimal) (!) 0.8

## 2018-12-29 NOTE — PROGRESS NOTES
12/28/18 1935   Patient Assessment/Suction   Level of Consciousness (AVPU) responds to pain   All Lung Fields Breath Sounds coarse   PRE-TX-O2-ETCO2   O2 Device (Oxygen Therapy) ventilator   Oxygen Concentration (%) 100   SpO2 (!) 91 %   Pulse Oximetry Type Continuous   Pulse 77   Resp (!) 21   BP (!) 108/44   Aerosol Therapy   $ Aerosol Therapy Charges Aerosol Treatment   Respiratory Treatment Status (SVN) given   Treatment Route (SVN) in-line;endotracheal tube   Patient Position (SVN) HOB elevated   Post Treatment Assessment (SVN) breath sounds improved   Signs of Intolerance (SVN) none   Breath Sounds Post-Respiratory Treatment   Throughout All Fields Post-Treatment All Fields   Throughout All Fields Post-Treatment aeration increased   Post-treatment Heart Rate (beats/min) 78   Post-treatment Resp Rate (breaths/min) 20   Vent Select   Conventional Vent Y   Charged w/in last 24h YES   Preset Conventional Ventilator Settings   Vent Type    Ventilation Type VC   Vent Mode A/C   Humidity HME   Set Rate 20 bmp   Vt Set 600 mL   PEEP/CPAP 5 cmH20   Pressure Support 0 cmH20   Waveform RAMP   Peak Flow 74 L/min   Set Inspiratory Pressure 0 cmH20   Insp Time 0 Sec(s)   Plateau Set/Insp. Hold (sec) 0   Insp Rise Time  0 %   Trigger Sensitivity Flow/I-Trigger 3 L/min   P High 0 cm H2O   P Low 0 cm H2O   T High 0 sec   T Low 0 sec   Patient Ventilator Parameters   Resp Rate Total 21 br/min   Peak Airway Pressure 37 cmH2O   Mean Airway Pressure 15 cmH20   Plateau Pressure 0 cmH20   Exhaled Vt 596 mL   Total Ve 12.9 mL   Spont Ve 0 L   I:E Ratio Measured 1:2.10   Conventional Ventilator Alarms   Alarms On Y   Ve High Alarm 14.5 L/min   Resp Rate High Alarm 0 br/min   Press High Alarm 45 cmH2O   Apnea Rate 10   Apnea Volume (mL) 620 mL   Apnea Oxygen Concentration  100   Apnea Flow Rate (L/min) 74   T Apnea 20 sec(s)   Ready to Wean/Extubation Screen   FIO2<=50 (chart decimal) (!) 1   MV<16L (chart vol.) 12.9   PEEP  <=8 (chart #) 5   Ready to Wean Parameters   F/VT Ratio<105 (RSBI) (!) 35.23

## 2018-12-29 NOTE — PROGRESS NOTES
Progress Note  Hospital Medicine  Patient Name:Enmanuel Armenta  MRN:  [id number]  Patient Class: IP- Inpatient  Admit Date: 12/17/2018  Length of Stay: 12 days  Expected Discharge Date:   Attending Physician: Stephanie Tena MD  Primary Care Provider:  Primary Doctor No    SUBJECTIVE:     Principal Problem: Aspiration pneumonia of both lower lobes  Initial history of present illness: Patient is a 63 y.o. male admitted to Hospitalist Service from [facility] Emergency Room with complaint of abdominal pain. Patient is a resident of [facility] nursing home, [place]. Patient reportedly has past medical history significant for COPD, muscular dystrophy, l;imited mobility with walker and history of elevated PSA. Patient presented with 1 day history of abdominal pain associated with nausea and vomiting. The EMS reported, vomiting a brown sticky substance with pale red streaks throughout. EMS gave him 4 mg of Zofran in route to the ED. Patient denied chest pain, shortness of breath, headache, vision changes, focal neuro-deficits, cough. Patient noted to have 101.5F upon arrival.     PMH/PSH/SH/FH/Meds: reviewed.    Symptoms/Review of Systems: Patient seen and examined. Remains intubated and sedated requiring pressors.  Overall stable overnight.   Diet: NPO  Activity level: Up with assistance    Pain:  Moans to verbal commands     OBJECTIVE:   Vital Signs (Most Recent):      Temp: 100.3 °F (37.9 °C) (12/29/18 0445)  Pulse: 90 (12/29/18 0821)  Resp: 20 (12/29/18 0806)  BP: (!) 109/47 (12/29/18 0821)  SpO2: 100 % (12/29/18 0806)       Vital Signs Range (Last 24H):  Temp:  [94.3 °F (34.6 °C)-100.5 °F (38.1 °C)]   Pulse:  []   Resp:  [12-37]   BP: ()/(35-77)   SpO2:  [69 %-100 %]   Arterial Line BP: ()/()     Weight: 72.2 kg (159 lb 2.8 oz)  Body mass index is 24.93 kg/m².    Intake/Output Summary (Last 24 hours) at 12/29/2018 0838  Last data filed at 12/29/2018 0645  Gross per 24 hour    Intake 5668.37 ml   Output 1780 ml   Net 3888.37 ml     Physical Examination:  General appearance: well developed, appears stated age, chronically ill-appearing, intubated and sedated  Head: normocephalic, atraumatic  Eyes:  conjunctivae/corneas clear. PERRL.  Nose: Nares normal. Septum midline.  Throat: lips, mucosa, and tongue normal; teeth and gums normal, no throat erythema. + Dry blood at angle of mouth  Neck: supple, symmetrical, trachea midline, no JVD and thyroid not enlarged, symmetric, no tenderness/mass/nodules  Lungs:  Scattered rhonchi B/L.  Chest wall: no tenderness  Heart: regular rate and rhythm, S1, S2 normal, no murmur, click, rub or gallop  Abdomen: soft, epigastric tenderness, non-distented; bowel sounds normal; no masses,  no organomegaly. PEG site okay.  Extremities: no cyanosis, clubbing or edema.   Pulses: 2+ and symmetric  Skin: Skin color, texture, turgor normal. No rashes or lesions.  Lymph nodes: Cervical, supraclavicular, and axillary nodes normal.  Neurologic: Lethargic, non-focal.    CBC:  Recent Labs   Lab 12/27/18  0526 12/28/18  0547 12/29/18  0321   WBC 11.70 20.60* 17.80*   RBC 4.10* 3.86* 3.73*   HGB 12.0* 11.5* 10.8*   HCT 37.9* 35.4* 34.2*   * 398* 422*   MCV 92 92 92   MCH 29.3 29.7 29.1   MCHC 31.7* 32.4 31.7*   BMP  Recent Labs   Lab 12/27/18  0526 12/27/18  1614 12/28/18  0547 12/29/18  0321   * 212* 160* 266*   * 148* 140 138   K 3.9 4.0 4.2 3.7    107 102 105   CO2 35* 35* 31* 21*   BUN 22 28* 23 25*   CREATININE 0.9 1.0 1.0 1.6*   CALCIUM 9.8 9.6 9.4 8.4*   MG 2.4  --  2.1 1.7      Diagnostic Results:  Microbiology Results (last 7 days)     Procedure Component Value Units Date/Time    Culture, Respiratory with Gram Stain [[id number]]     Order Status:  No result Specimen:  Respiratory from Sputum, Expectorated     Blood culture x two cultures. Draw prior to antibiotics. [[id number]] Collected:  12/17/18 0958    Order Status:  Completed Specimen:   Blood from Antecubital, Right  Arm Updated:  12/22/18 2012     Blood Culture, Routine No growth after 5 days.    Narrative:       Aerobic and anaerobic    Blood culture x two cultures. Draw prior to antibiotics. [[id number]] Collected:  12/17/18 0943    Order Status:  Completed Specimen:  Blood from Peripheral, Left  Hand Updated:  12/22/18 2012     Blood Culture, Routine No growth after 5 days.    Narrative:       Aerobic and anaerobic         Chest X-Ray: T right basilar infiltrate.     CT abdomen and pelvis with contrast:  Enlarged prostate gland, air in the bladder likely secondary to catheterization, diffuse bladder wall thickening. Prominent amount of fluid within right colon.  Appendix not identified and note is made of limitation with motion artifact in the appendiceal region.    EGD:   - Moderately severe reflux esophagitis.                       - Medium-sized hiatal hernia.                       - Gastritis.                       - A single gastric polyp.                       - Normal examined duodenum.                       - No specimens collected.    CXR: New moderate left pleural effusion and left lung infiltrate.  Leftward mediastinal shift raising consideration for left lung atelectasis as contributory to the findings.  Close follow-up recommended.    Assessment/Plan:      Acute hypoxic respiratory failure   Yes    Aspiration pneumonia, RLL [J69.0]  Swallow dysfunction s/p PEG placement  On ventilator. Management per pulmonary  Continue Zosyn/Vanc  Duonebs  Levophed to maintain MAP of 60     Hypernatremia - corrected  Follow BMP.     Yes    Upper GI bleeding [K92.2]  Follow H/H closely. Type and screen blood and transfuse as needed.  Continue IV Protonix.  Use IV anti-emetics as needed.       Yes    Muscular dystrophy [G71.00]  Supportive care, fall precautions.       Yes    Chronic obstructive pulmonary disease [J44.9]  Patient's COPD is controlled currently. Continue scheduled inhalers and  monitor respiratory status closely.   Yes      DVT prophylaxis: Use SCD and TEDs. No anticoagulation due to GI bleeding.      Rodney Baker MD  Department of Hospital Medicine   [facility]

## 2018-12-29 NOTE — PROGRESS NOTES
Progress Note  PULMONARY    Admit Date: 12/17/2018 12/29/2018      SUBJECTIVE:     Dec 29, sedate on vent.  Left side down.      PFSH and Allergies reviewed.    OBJECTIVE:     Vitals (Most recent):  Vitals:    12/29/18 1245   BP:    Pulse: 92   Resp: (!) 21   Temp:        Vitals (24 hour range):  Temp:  [94.3 °F (34.6 °C)-100.5 °F (38.1 °C)]   Pulse:  []   Resp:  [12-37]   BP: ()/(35-77)   SpO2:  [79 %-100 %]   Arterial Line BP: ()/()       Intake/Output Summary (Last 24 hours) at 12/29/2018 1318  Last data filed at 12/29/2018 1209  Gross per 24 hour   Intake 5568.37 ml   Output 2270 ml   Net 3298.37 ml          Physical Exam:  The patient's neuro status (alertness,orientation,cognitive function,motor skills,), pharyngeal exam (oral lesions, hygiene, abn dentition,), Neck (jvd,mass,thyroid,nodes in neck and above/below clavicle),RESPIRATORY(symmetry,effort,fremitus,percussion,auscultation),  Cor(rhythm,heart tones including gallops,perfusion,edema)ABD(distention,hepatic&splenomegaly,tenderness,masses), Skin(rash,cyanosis),Psyc(affect,judgement,).  Exam negative except for these pertinent findings:    Intubated on propofol.  No distress, nl percussion, symmetric, no edema.      Radiographs reviewed: view by direct vision  - bibasilar infiltrates c/w aspiration.  Results for orders placed during the hospital encounter of 12/17/18   X-Ray Chest 1 View    Narrative EXAMINATION:  XR CHEST 1 VIEW    CLINICAL HISTORY:  resp failure;    TECHNIQUE:  Single frontal view of the chest was performed.    COMPARISON:  Chest portable of December 28, 2018.    FINDINGS:  An endotracheal tube and right central lines remain in position.  The cardiac size is decreased to normal.  Bilateral lower lobe infiltrates are unchanged.  No pneumothorax is seen.      Impression Unchanged bilateral lower lobe infiltrates.Heart size is now within normal limits.  Endotracheal tube and right central line in  position.      Electronically signed by: Wilman Casanova MD  Date:    12/29/2018  Time:    08:37   ]    Labs     Recent Labs   Lab 12/29/18  0321   WBC 17.80*   HGB 10.8*   HCT 34.2*   *   BAND 10.0     Recent Labs   Lab 12/29/18  0321      K 3.7      CO2 21*   BUN 25*   CREATININE 1.6*   *   CALCIUM 8.4*   MG 1.7   PHOS 1.9*   AST 64*   ALT 49*   ALKPHOS 145*   BILITOT 0.9   PROT 5.5*   ALBUMIN 1.8*     Recent Labs   Lab 12/29/18  0345   PH 7.320*   PCO2 44.3   PO2 71*   HCO3 22.8*     Microbiology Results (last 7 days)     Procedure Component Value Units Date/Time    Culture, Respiratory with Gram Stain [[id number]]     Order Status:  No result Specimen:  Respiratory from Sputum, Expectorated     Blood culture x two cultures. Draw prior to antibiotics. [[id number]] Collected:  12/17/18 0958    Order Status:  Completed Specimen:  Blood from Antecubital, Right  Arm Updated:  12/22/18 2012     Blood Culture, Routine No growth after 5 days.    Narrative:       Aerobic and anaerobic    Blood culture x two cultures. Draw prior to antibiotics. [[id number]] Collected:  12/17/18 0943    Order Status:  Completed Specimen:  Blood from Peripheral, Left  Hand Updated:  12/22/18 2012     Blood Culture, Routine No growth after 5 days.    Narrative:       Aerobic and anaerobic          Impression:  Active Hospital Problems    Diagnosis  POA    *Aspiration pneumonia of both lower lobes [J69.0]  Yes    Septic shock [A41.9, R65.21]  No    Acute respiratory failure with hypoxia and hypercarbia [J96.01, J96.02]  Clinically Undetermined    Smoker [F17.200]  Yes    Episodic confusion [R41.0]  Yes    Hematemesis [K92.0]  Yes    Dysphagia [R13.10]  Yes    Malnutrition of moderate degree [E44.0]  Yes    Elevated LFTs [R94.5]  Yes    Upper GI bleeding [K92.2]  Yes    Muscular dystrophy [G71.00]  Yes    Chronic obstructive pulmonary disease with acute lower respiratory infection [J44.0]  Yes      Resolved  Hospital Problems    Diagnosis Date Resolved POA    Feeding difficulty [R63.3] 12/23/2018 Yes    UTI (urinary tract infection) [N39.0] 12/23/2018 Yes    Hematemesis [K92.0] 12/23/2018 Yes    SOB (shortness of breath) [R06.02] 12/18/2018 Yes               Plan:   Dec 29- gas exchange is better but has very severe aspiration lung injury.  cxr no ards pattern.  Need sputum cultured - expect will take awhile to clear out lung abn.  [place] would seem poor given course.                                      .

## 2018-12-30 LAB
ALBUMIN SERPL BCP-MCNC: 1.6 G/DL
ALLENS TEST: ABNORMAL
ALP SERPL-CCNC: 116 U/L
ALT SERPL W/O P-5'-P-CCNC: 39 U/L
ANION GAP SERPL CALC-SCNC: 11 MMOL/L
ANISOCYTOSIS BLD QL SMEAR: SLIGHT
AST SERPL-CCNC: 54 U/L
BASOPHILS # BLD AUTO: ABNORMAL K/UL
BASOPHILS NFR BLD: 0 %
BILIRUB SERPL-MCNC: 0.4 MG/DL
BUN SERPL-MCNC: 30 MG/DL
CALCIUM SERPL-MCNC: 8.3 MG/DL
CHLORIDE SERPL-SCNC: 110 MMOL/L
CO2 SERPL-SCNC: 23 MMOL/L
CREAT SERPL-MCNC: 1.7 MG/DL
DELSYS: ABNORMAL
DIFFERENTIAL METHOD: ABNORMAL
EOSINOPHIL # BLD AUTO: ABNORMAL K/UL
EOSINOPHIL NFR BLD: 0 %
ERYTHROCYTE [DISTWIDTH] IN BLOOD BY AUTOMATED COUNT: 14.9 %
ERYTHROCYTE [SEDIMENTATION RATE] IN BLOOD BY WESTERGREN METHOD: 20 MM/H
EST. GFR  (AFRICAN AMERICAN): 48 ML/MIN/1.73 M^2
EST. GFR  (NON AFRICAN AMERICAN): 41 ML/MIN/1.73 M^2
ETCO2: 22
FIO2: 70
GLUCOSE SERPL-MCNC: 336 MG/DL
HCO3 UR-SCNC: 23.6 MMOL/L (ref 24–28)
HCT VFR BLD AUTO: 29.2 %
HGB BLD-MCNC: 9.4 G/DL
LYMPHOCYTES # BLD AUTO: ABNORMAL K/UL
LYMPHOCYTES NFR BLD: 1 %
MAGNESIUM SERPL-MCNC: 2 MG/DL
MCH RBC QN AUTO: 29.3 PG
MCHC RBC AUTO-ENTMCNC: 32.1 G/DL
MCV RBC AUTO: 91 FL
MIN VOL: 17.3
MODE: ABNORMAL
MONOCYTES # BLD AUTO: ABNORMAL K/UL
MONOCYTES NFR BLD: 1 %
NEUTROPHILS NFR BLD: 88 %
NEUTS BAND NFR BLD MANUAL: 10 %
PCO2 BLDA: 41.9 MMHG (ref 35–45)
PEEP: 5
PH SMN: 7.36 [PH] (ref 7.35–7.45)
PHOSPHATE SERPL-MCNC: 2.4 MG/DL
PIP: 29
PLATELET # BLD AUTO: 375 K/UL
PLATELET BLD QL SMEAR: ABNORMAL
PMV BLD AUTO: 10.1 FL
PO2 BLDA: 166 MMHG (ref 80–100)
POC BE: -2 MMOL/L
POC SATURATED O2: 99 % (ref 95–100)
POC TCO2: 25 MMOL/L (ref 23–27)
POCT GLUCOSE: 262 MG/DL (ref 70–110)
POCT GLUCOSE: 280 MG/DL (ref 70–110)
POTASSIUM SERPL-SCNC: 3.1 MMOL/L
PROT SERPL-MCNC: 5.2 G/DL
RBC # BLD AUTO: 3.21 M/UL
SAMPLE: ABNORMAL
SITE: ABNORMAL
SODIUM SERPL-SCNC: 144 MMOL/L
SP02: 100
VANCOMYCIN TROUGH SERPL-MCNC: 21.7 UG/ML
VT: 600
WBC # BLD AUTO: 17.6 K/UL

## 2018-12-30 PROCEDURE — 99232 PR SUBSEQUENT HOSPITAL CARE,LEVL II: ICD-10-PCS | Mod: ,,, | Performed by: INTERNAL MEDICINE

## 2018-12-30 PROCEDURE — C9113 INJ PANTOPRAZOLE SODIUM, VIA: HCPCS | Performed by: INTERNAL MEDICINE

## 2018-12-30 PROCEDURE — 27000221 HC OXYGEN, UP TO 24 HOURS

## 2018-12-30 PROCEDURE — 94761 N-INVAS EAR/PLS OXIMETRY MLT: CPT

## 2018-12-30 PROCEDURE — 82803 BLOOD GASES ANY COMBINATION: CPT

## 2018-12-30 PROCEDURE — 63600175 PHARM REV CODE 636 W HCPCS: Performed by: INTERNAL MEDICINE

## 2018-12-30 PROCEDURE — 25000003 PHARM REV CODE 250: Performed by: INTERNAL MEDICINE

## 2018-12-30 PROCEDURE — 80202 ASSAY OF VANCOMYCIN: CPT

## 2018-12-30 PROCEDURE — 20000000 HC ICU ROOM

## 2018-12-30 PROCEDURE — 94770 HC EXHALED C02 TEST: CPT

## 2018-12-30 PROCEDURE — S5571 INSULIN DISPOS PEN 3 ML: HCPCS | Performed by: INTERNAL MEDICINE

## 2018-12-30 PROCEDURE — 99900026 HC AIRWAY MAINTENANCE (STAT)

## 2018-12-30 PROCEDURE — 94003 VENT MGMT INPAT SUBQ DAY: CPT

## 2018-12-30 PROCEDURE — 36415 COLL VENOUS BLD VENIPUNCTURE: CPT

## 2018-12-30 PROCEDURE — 84100 ASSAY OF PHOSPHORUS: CPT

## 2018-12-30 PROCEDURE — 85027 COMPLETE CBC AUTOMATED: CPT

## 2018-12-30 PROCEDURE — 25000242 PHARM REV CODE 250 ALT 637 W/ HCPCS: Performed by: INTERNAL MEDICINE

## 2018-12-30 PROCEDURE — 83735 ASSAY OF MAGNESIUM: CPT

## 2018-12-30 PROCEDURE — 99900035 HC TECH TIME PER 15 MIN (STAT)

## 2018-12-30 PROCEDURE — 37799 UNLISTED PX VASCULAR SURGERY: CPT

## 2018-12-30 PROCEDURE — 80053 COMPREHEN METABOLIC PANEL: CPT

## 2018-12-30 PROCEDURE — 94640 AIRWAY INHALATION TREATMENT: CPT

## 2018-12-30 PROCEDURE — 85007 BL SMEAR W/DIFF WBC COUNT: CPT

## 2018-12-30 PROCEDURE — 99232 SBSQ HOSP IP/OBS MODERATE 35: CPT | Mod: ,,, | Performed by: INTERNAL MEDICINE

## 2018-12-30 RX ORDER — POTASSIUM CHLORIDE 14.9 MG/ML
40 INJECTION INTRAVENOUS ONCE
Status: COMPLETED | OUTPATIENT
Start: 2018-12-30 | End: 2018-12-30

## 2018-12-30 RX ORDER — FUROSEMIDE 10 MG/ML
40 INJECTION INTRAMUSCULAR; INTRAVENOUS 2 TIMES DAILY
Status: DISCONTINUED | OUTPATIENT
Start: 2018-12-30 | End: 2019-01-01

## 2018-12-30 RX ORDER — VANCOMYCIN HCL IN 5 % DEXTROSE 1G/250ML
1000 PLASTIC BAG, INJECTION (ML) INTRAVENOUS
Status: DISCONTINUED | OUTPATIENT
Start: 2018-12-31 | End: 2018-12-31

## 2018-12-30 RX ORDER — NYSTATIN 100000 [USP'U]/ML
500000 SUSPENSION ORAL EVERY 6 HOURS
Status: DISCONTINUED | OUTPATIENT
Start: 2018-12-30 | End: 2019-01-11 | Stop reason: HOSPADM

## 2018-12-30 RX ADMIN — IPRATROPIUM BROMIDE AND ALBUTEROL SULFATE 3 ML: .5; 3 SOLUTION RESPIRATORY (INHALATION) at 11:12

## 2018-12-30 RX ADMIN — POTASSIUM CHLORIDE 40 MEQ: 200 INJECTION, SOLUTION INTRAVENOUS at 09:12

## 2018-12-30 RX ADMIN — FUROSEMIDE 40 MG: 10 INJECTION, SOLUTION INTRAMUSCULAR; INTRAVENOUS at 06:12

## 2018-12-30 RX ADMIN — IPRATROPIUM BROMIDE AND ALBUTEROL SULFATE 3 ML: .5; 3 SOLUTION RESPIRATORY (INHALATION) at 07:12

## 2018-12-30 RX ADMIN — ENOXAPARIN SODIUM 40 MG: 100 INJECTION SUBCUTANEOUS at 05:12

## 2018-12-30 RX ADMIN — CLOPIDOGREL BISULFATE 75 MG: 75 TABLET ORAL at 08:12

## 2018-12-30 RX ADMIN — INSULIN DETEMIR 10 UNITS: 100 INJECTION, SOLUTION SUBCUTANEOUS at 09:12

## 2018-12-30 RX ADMIN — VANCOMYCIN HYDROCHLORIDE 1250 MG: 750 INJECTION, POWDER, LYOPHILIZED, FOR SOLUTION INTRAVENOUS at 04:12

## 2018-12-30 RX ADMIN — PANTOPRAZOLE SODIUM 40 MG: 40 INJECTION, POWDER, LYOPHILIZED, FOR SOLUTION INTRAVENOUS at 08:12

## 2018-12-30 RX ADMIN — FUROSEMIDE 40 MG: 10 INJECTION, SOLUTION INTRAMUSCULAR; INTRAVENOUS at 09:12

## 2018-12-30 RX ADMIN — NYSTATIN 500000 UNITS: 500000 SUSPENSION ORAL at 11:12

## 2018-12-30 RX ADMIN — IPRATROPIUM BROMIDE AND ALBUTEROL SULFATE 3 ML: .5; 3 SOLUTION RESPIRATORY (INHALATION) at 12:12

## 2018-12-30 RX ADMIN — IPRATROPIUM BROMIDE AND ALBUTEROL SULFATE 3 ML: .5; 3 SOLUTION RESPIRATORY (INHALATION) at 04:12

## 2018-12-30 RX ADMIN — PROPOFOL 45 MCG/KG/MIN: 10 INJECTION, EMULSION INTRAVENOUS at 02:12

## 2018-12-30 RX ADMIN — PIPERACILLIN SODIUM AND TAZOBACTAM SODIUM 4.5 G: 4; .5 INJECTION, POWDER, LYOPHILIZED, FOR SOLUTION INTRAVENOUS at 04:12

## 2018-12-30 RX ADMIN — PIPERACILLIN SODIUM AND TAZOBACTAM SODIUM 4.5 G: 4; .5 INJECTION, POWDER, LYOPHILIZED, FOR SOLUTION INTRAVENOUS at 12:12

## 2018-12-30 RX ADMIN — METHYLPREDNISOLONE SODIUM SUCCINATE 80 MG: 40 INJECTION, POWDER, FOR SOLUTION INTRAMUSCULAR; INTRAVENOUS at 08:12

## 2018-12-30 RX ADMIN — PROPOFOL 45 MCG/KG/MIN: 10 INJECTION, EMULSION INTRAVENOUS at 08:12

## 2018-12-30 RX ADMIN — PROPOFOL 50 MCG/KG/MIN: 10 INJECTION, EMULSION INTRAVENOUS at 11:12

## 2018-12-30 RX ADMIN — PROPOFOL 50 MCG/KG/MIN: 10 INJECTION, EMULSION INTRAVENOUS at 12:12

## 2018-12-30 RX ADMIN — POLYETHYLENE GLYCOL 3350 17 G: 17 POWDER, FOR SOLUTION ORAL at 08:12

## 2018-12-30 RX ADMIN — NYSTATIN 500000 UNITS: 500000 SUSPENSION ORAL at 07:12

## 2018-12-30 RX ADMIN — NOREPINEPHRINE BITARTRATE 0.11 MCG/KG/MIN: 8 SOLUTION at 02:12

## 2018-12-30 RX ADMIN — ASPIRIN 81 MG CHEWABLE TABLET 81 MG: 81 TABLET CHEWABLE at 08:12

## 2018-12-30 RX ADMIN — ATORVASTATIN CALCIUM 80 MG: 40 TABLET, FILM COATED ORAL at 09:12

## 2018-12-30 RX ADMIN — PROPOFOL 50 MCG/KG/MIN: 10 INJECTION, EMULSION INTRAVENOUS at 05:12

## 2018-12-30 RX ADMIN — PIPERACILLIN SODIUM AND TAZOBACTAM SODIUM 4.5 G: 4; .5 INJECTION, POWDER, LYOPHILIZED, FOR SOLUTION INTRAVENOUS at 09:12

## 2018-12-30 RX ADMIN — NYSTATIN 500000 UNITS: 500000 SUSPENSION ORAL at 12:12

## 2018-12-30 RX ADMIN — SODIUM CHLORIDE: 0.9 INJECTION, SOLUTION INTRAVENOUS at 04:12

## 2018-12-30 RX ADMIN — NYSTATIN 500000 UNITS: 500000 SUSPENSION ORAL at 06:12

## 2018-12-30 RX ADMIN — IPRATROPIUM BROMIDE AND ALBUTEROL SULFATE 3 ML: .5; 3 SOLUTION RESPIRATORY (INHALATION) at 03:12

## 2018-12-30 NOTE — PLAN OF CARE
12/30/18 0716   Patient Assessment/Suction   Level of Consciousness (AVPU) unresponsive   All Lung Fields Breath Sounds coarse;diminished   PRE-TX-O2-ETCO2   O2 Device (Oxygen Therapy) ventilator   $ Is the patient on Low Flow Oxygen? Yes   Oxygen Concentration (%) 50   SpO2 100 %   Pulse Oximetry Type Continuous   $ Pulse Oximetry - Multiple Charge Pulse Oximetry - Multiple   $ ETCO2 Charge Exhaled CO2 Monitoring   $ ETCO2 Usage Currently wearing   Pulse 74   Resp 20   Aerosol Therapy   $ Aerosol Therapy Charges Aerosol Treatment   Daily Review of Necessity (SVN) completed   Respiratory Treatment Status (SVN) given   Treatment Route (SVN) in-line   Patient Position (SVN) HOB elevated   Post Treatment Assessment (SVN) breath sounds unchanged   Signs of Intolerance (SVN) none   Breath Sounds Post-Respiratory Treatment   Throughout All Fields Post-Treatment All Fields   Throughout All Fields Post-Treatment no change   Post-treatment Heart Rate (beats/min) 75   Post-treatment Resp Rate (breaths/min) 20   Ready to Wean/Extubation Screen   FIO2<=50 (chart decimal) 0.5

## 2018-12-30 NOTE — PLAN OF CARE
12/29/18 1900   Patient Assessment/Suction   Level of Consciousness (AVPU) (sedated on vent)   All Lung Fields Breath Sounds coarse   $ Suction Charges Inline Suction Procedure Stat Charge   Secretions Amount scant   Secretions Color clear   Secretions Characteristics thin   PRE-TX-O2-ETCO2   O2 Device (Oxygen Therapy) ventilator   $ Is the patient on Low Flow Oxygen? Yes   Oxygen Concentration (%) 70   SpO2 99 %   Pulse Oximetry Type Continuous   $ Pulse Oximetry - Multiple Charge Pulse Oximetry - Multiple   ETCO2 (mmHg) 22 mmHg   $ ETCO2 Charge Exhaled CO2 Monitoring   $ ETCO2 Usage Currently wearing   Pulse 77   Resp 20   Aerosol Therapy   $ Aerosol Therapy Charges Aerosol Treatment   Respiratory Treatment Status (SVN) given   Treatment Route (SVN) in-line   Patient Position (SVN) HOB elevated   Post Treatment Assessment (SVN) breath sounds unchanged   Signs of Intolerance (SVN) none   Breath Sounds Post-Respiratory Treatment   Post-treatment Heart Rate (beats/min) 77   Post-treatment Resp Rate (breaths/min) 20       Airway - Non-Surgical 12/28/18 1335 Other (Comment)   Placement Date/Time: 12/28/18 (c) 1331   Method of Intubation: Direct laryngoscopy  Airway Device: Other (Comment)  Mask Ventilation: Easy  Intubated: Preinduction - awake intubation  Blade: Dave #2  Airway Device Size: 8.0  Style: Cuffed  Cuff Infl...   Secured at 23 cm   Measured At Lips   Secured Location Right   Secured by Commercial tube [place]   Bite Block none   Cuff Pressure 32 cm H2O   Vent Select   Conventional Vent Y   Charged w/in last 24h YES   IHI Ventilator Associated Pneumonia Bundle   Daily Awakening Trials Performed No   Preset Conventional Ventilator Settings   Vent Type    Ventilation Type VC   Vent Mode A/C   Humidity HME   Set Rate 20 bmp   Vt Set 600 mL   PEEP/CPAP 5 cmH20   Pressure Support 0 cmH20   Waveform RAMP   Peak Flow 74 L/min   Set Inspiratory Pressure 0 cmH20   Insp Time 0 Sec(s)   Plateau Set/Insp. Hold  (sec) 0   Insp Rise Time  0 %   Trigger Sensitivity Flow/I-Trigger 3 L/min   P High 0 cm H2O   P Low 0 cm H2O   T High 0 sec   T Low 0 sec   Patient Ventilator Parameters   Resp Rate Total 20 br/min   Peak Airway Pressure 27 cmH2O   Mean Airway Pressure 12 cmH20   Plateau Pressure 0 cmH20   Exhaled Vt 635 mL   Total Ve 12.7 mL   Spont Ve 0 L   I:E Ratio Measured 1:2.40   Conventional Ventilator Alarms   Alarms On Y   Ve High Alarm 27 L/min   Resp Rate High Alarm 0 br/min   Press High Alarm 60 cmH2O   Apnea Rate 10   Apnea Volume (mL) 620 mL   Apnea Oxygen Concentration  100   Apnea Flow Rate (L/min) 74   T Apnea 20 sec(s)   Ready to Wean/Extubation Screen   FIO2<=50 (chart decimal) (!) 0.7   MV<16L (chart vol.) 12.7   PEEP <=8 (chart #) 5   Ready to Wean Parameters   F/VT Ratio<105 (RSBI) (!) 31.5   ambu is at the hob all vent alarms are set and working. No resp distress noted. Pt received on above documented settings. Pt gets duoneb inline merrill well. Ett secure on the right side.

## 2018-12-30 NOTE — PLAN OF CARE
Problem: Adult Inpatient Plan of Care  Goal: Plan of Care Review  Outcome: Ongoing (interventions implemented as appropriate)  Pt remains on mechanical ventilator- able to wean FiO2 by respiratory therapist to 70% and keep O2 sats > 92%. Pt remains in left lateral position as ordered; sedated. Low grade temp this shift- Tmax 99.9 ax this am. Peg tube remains to gravity- 100 cc cloudy green secretions noted in collection bag- abdomen remains rounded, few bowel sounds. Pt passing flatus but no stool this shift. No skin changes noted this shift. Pt remains on levophed- tolerating weaning to 0.13mcg/kg/min and MAP >/ = to 65 mmHg. Arterial line remains left forearm- left hand slightly edematous, pink with cap refill less than 3 sec. No active bleeding noted from any site.

## 2018-12-30 NOTE — PLAN OF CARE
12/30/18 0714   PRE-TX-O2-ETCO2   Oxygen Concentration (%) 50   SpO2 100 %   ETCO2 (mmHg) 24 mmHg   Pulse 74   Resp 20       Airway - Non-Surgical 12/28/18 1335 Other (Comment)   Placement Date/Time: 12/28/18 (c) 1337   Method of Intubation: Direct laryngoscopy  Airway Device: Other (Comment)  Mask Ventilation: Easy  Intubated: Preinduction - awake intubation  Blade: Dave #2  Airway Device Size: 8.0  Style: Cuffed  Cuff Infl...   Secured at 23 cm   Measured At Lips   Secured Location Right   Secured by Commercial tube [place]   Bite Block none   Site Condition Dry   Status Secured;Intact   Site Assessment Dry;Clean   Cuff Pressure 30 cm H2O   Vent Select   $ Ventilator Subsequent 1   Charged w/in last 24h YES   Preset Conventional Ventilator Settings   Vent Type    Ventilation Type VC   Vent Mode A/C   Humidity HME   Set Rate 20 bmp   Vt Set 600 mL   PEEP/CPAP 5 cmH20   Pressure Support 0 cmH20   Waveform RAMP   Peak Flow 70 L/min   Set Inspiratory Pressure 0 cmH20   Insp Time 0 Sec(s)   Plateau Set/Insp. Hold (sec) 0   Insp Rise Time  0 %   Trigger Sensitivity Flow/I-Trigger 3 L/min   P High 0 cm H2O   P Low 0 cm H2O   T High 0 sec   T Low 0 sec   Patient Ventilator Parameters   Resp Rate Total 20 br/min   Peak Airway Pressure 27 cmH2O   Mean Airway Pressure 12 cmH20   Plateau Pressure 0 cmH20   Exhaled Vt 594 mL   Total Ve 11.9 mL   Spont Ve 0 L   I:E Ratio Measured 1:2.20   Conventional Ventilator Alarms   Ve High Alarm 27 L/min   Resp Rate High Alarm 0 br/min   Press High Alarm 60 cmH2O   Apnea Rate 10   Apnea Volume (mL) 620 mL   Apnea Oxygen Concentration  100   Apnea Flow Rate (L/min) 74   T Apnea 20 sec(s)   Ready to Wean/Extubation Screen   FIO2<=50 (chart decimal) 0.5   MV<16L (chart vol.) 11.9   PEEP <=8 (chart #) 5   Ready to Wean Parameters   F/VT Ratio<105 (RSBI) (!) 33.67

## 2018-12-30 NOTE — PLAN OF CARE
Problem: Adult Inpatient Plan of Care  Goal: Plan of Care Review  Outcome: Ongoing (interventions implemented as appropriate)  Pt afebrile this shift. Trickle feeds started via peg tube; tolerating with small residuals. No BM this shift. No skin breakdowns noted. New order received from Dr Marquez to turn patient from side to side due to improvement of chest xray. FiO2 weaning changes made in vent settings this shift. Responding to lasix given this shift. Potassium replaced this shift. Levophed support continues.

## 2018-12-30 NOTE — PROGRESS NOTES
Progress Note  Hospital Medicine  Patient Name:Enmanuel Armenta  MRN:  [id number]  Patient Class: IP- Inpatient  Admit Date: 12/17/2018  Length of Stay: 13 days  Expected Discharge Date:   Attending Physician: Stephanie Tena MD  Primary Care Provider:  Primary Doctor No    SUBJECTIVE:     Principal Problem: Aspiration pneumonia of both lower lobes  Initial history of present illness: Patient is a 63 y.o. male admitted to Hospitalist Service from [facility] Room with complaint of abdominal pain. [facility] [place]. Patient reportedly has past medical history significant for COPD, muscular dystrophy, l;imited mobility with walker and history of elevated PSA. Patient presented with 1 day history of abdominal pain associated with nausea and vomiting. The EMS reported, vomiting a brown sticky substance with pale red streaks throughout. EMS gave him 4 mg of Zofran in route to the ED. Patient denied chest pain, shortness of breath, headache, vision changes, focal neuro-deficits, cough. Patient noted to have 101.5F upon arrival.     PMH/PSH/SH/FH/Meds: reviewed.    Symptoms/Review of Systems: Patient seen and examined. Remains intubated and sedated. Vasopressor requirement lower. Overall stable since yesterday.   Diet: NPO  Activity level: Up with assistance    Pain:  Moans to verbal commands     OBJECTIVE:   Vital Signs (Most Recent):      Temp: 97.8 °F (36.6 °C) (12/30/18 0715)  Pulse: 79 (12/30/18 0830)  Resp: 20 (12/30/18 0830)  BP: (!) 120/59 (12/30/18 0800)  SpO2: 100 % (12/30/18 0830)       Vital Signs Range (Last 24H):  Temp:  [97.6 °F (36.4 °C)-99.3 °F (37.4 °C)]   Pulse:  [67-93]   Resp:  [20-24]   BP: (101-120)/(54-59)   SpO2:  [98 %-100 %]   Arterial Line BP: ()/(47-62)     Weight: 77.6 kg (171 lb 1.2 oz)  Body mass index is 26.79 kg/m².    Intake/Output Summary (Last 24 hours) at 12/30/2018 0853  Last data filed at 12/30/2018  0830  Gross per 24 hour   Intake 2725.55 ml   Output 2495 ml   Net 230.55 ml     Physical Examination:  General appearance: well developed, appears stated age, chronically ill-appearing, intubated and sedated  Head: normocephalic, atraumatic  Eyes:  conjunctivae/corneas clear. PERRL.  Nose: Nares normal. Septum midline.  Throat: lips, mucosa, and tongue normal; teeth and gums normal, no throat erythema. + Dry blood at angle of mouth  Neck: supple, symmetrical, trachea midline, no JVD and thyroid not enlarged, symmetric, no tenderness/mass/nodules  Lungs:  Scattered rhonchi B/L.  Chest wall: no tenderness  Heart: regular rate and rhythm, S1, S2 normal, no murmur, click, rub or gallop  Abdomen: soft, epigastric tenderness, non-distented; bowel sounds normal; no masses,  no organomegaly. PEG site okay.  Extremities: no cyanosis, clubbing or edema.   Pulses: 2+ and symmetric  Skin: Skin color, texture, turgor normal. No rashes or lesions.  Lymph nodes: Cervical, supraclavicular, and axillary nodes normal.  Neurologic: Lethargic, non-focal.    CBC:  Recent Labs   Lab 12/28/18  0547 12/29/18  0321 12/30/18  0316   WBC 20.60* 17.80* 17.60*   RBC 3.86* 3.73* 3.21*   HGB 11.5* 10.8* 9.4*   HCT 35.4* 34.2* 29.2*   * 422* 375*   MCV 92 92 91   MCH 29.7 29.1 29.3   MCHC 32.4 31.7* 32.1   BMP  Recent Labs   Lab 12/28/18  0547 12/29/18  0321 12/30/18  0316   * 266* 336*    138 144   K 4.2 3.7 3.1*    105 110   CO2 31* 21* 23   BUN 23 25* 30*   CREATININE 1.0 1.6* 1.7*   CALCIUM 9.4 8.4* 8.3*   MG 2.1 1.7 2.0      Diagnostic Results:  Microbiology Results (last 7 days)     Procedure Component Value Units Date/Time    Culture, Respiratory with Gram Stain [[id number]] Collected:  12/29/18 1325    Order Status:  Completed Specimen:  Sputum Updated:  12/30/18 0535     Gram Stain (Respiratory) <10 epithelial cells per low power field.     Gram Stain (Respiratory) Rare WBC's     Gram Stain (Respiratory) Rare  Gram positive cocci    Culture, Respiratory with Gram Stain [[id number]]     Order Status:  No result Specimen:  Respiratory from Sputum, Expectorated          Chest X-Ray: T right basilar infiltrate.     CT abdomen and pelvis with contrast:  Enlarged prostate gland, air in the bladder likely secondary to catheterization, diffuse bladder wall thickening. Prominent amount of fluid within right colon.  Appendix not identified and note is made of limitation with motion artifact in the appendiceal region.    EGD:   - Moderately severe reflux esophagitis.                       - Medium-sized hiatal hernia.                       - Gastritis.                       - A single gastric polyp.                       - Normal examined duodenum.                       - No specimens collected.    CXR: New moderate left pleural effusion and left lung infiltrate.  Leftward mediastinal shift raising consideration for left lung atelectasis as contributory to the findings.  Close follow-up recommended.    Assessment/Plan:      Acute hypoxic respiratory failure   Yes    Aspiration pneumonia, RLL [J69.0]  Swallow dysfunction s/p PEG placement  On ventilator. Management per pulmonary  Continue Zosyn/Vanc  Duonebs  Levophed to maintain MAP of 60  Stop IV Fluids  Start diuresis lasix 40 BID  Decrease steroids to 40 daily     Hypernatremia - corrected  Follow BMP.    Hyperglycemia  Start Levemir 10 daily    Hypokalemia  Replace     Yes    Upper GI bleeding [K92.2]  Follow H/H closely. Type and screen blood and transfuse as needed.  Continue IV Protonix.  Use IV anti-emetics as needed.       Yes    Muscular dystrophy [G71.00]  Supportive care, fall precautions.       Yes    Chronic obstructive pulmonary disease [J44.9]  Patient's COPD is controlled currently. Continue scheduled inhalers and monitor respiratory status closely.   Yes      DVT prophylaxis: Use SCD and TEDs. No anticoagulation due to GI bleeding.      Rodney Baker,  MD  Department of Hospital Medicine   [facility]

## 2018-12-30 NOTE — PROGRESS NOTES
Progress Note  PULMONARY    Admit Date: 12/17/2018 12/30/2018      SUBJECTIVE:     Dec 29, sedate on vent.  Left side down.  Dec 30, sedated, 40% now      PFSH and Allergies reviewed.    OBJECTIVE:     Vitals (Most recent):  Vitals:    12/30/18 1015   BP:    Pulse: 76   Resp: 20   Temp:        Vitals (24 hour range):  Temp:  [97.6 °F (36.4 °C)-99.3 °F (37.4 °C)]   Pulse:  [67-93]   Resp:  [20-24]   BP: (101-122)/(54-59)   SpO2:  [98 %-100 %]   Arterial Line BP: ()/(47-62)       Intake/Output Summary (Last 24 hours) at 12/30/2018 1041  Last data filed at 12/30/2018 1000  Gross per 24 hour   Intake 2785.55 ml   Output 2645 ml   Net 140.55 ml          Physical Exam:  The patient's neuro status (alertness,orientation,cognitive function,motor skills,), pharyngeal exam (oral lesions, hygiene, abn dentition,), Neck (jvd,mass,thyroid,nodes in neck and above/below clavicle),RESPIRATORY(symmetry,effort,fremitus,percussion,auscultation),  Cor(rhythm,heart tones including gallops,perfusion,edema)ABD(distention,hepatic&splenomegaly,tenderness,masses), Skin(rash,cyanosis),Psyc(affect,judgement,).  Exam negative except for these pertinent findings:    Intubated on propofol.  No distress, nl percussion, symmetric, no edema.  Good bs,     Radiographs reviewed: view by direct vision  - bibasilar infiltrates c/w aspiration - sl clearer 12/30  Results for orders placed during the hospital encounter of 12/17/18   X-Ray Chest 1 View    Narrative EXAMINATION:  XR CHEST 1 VIEW    CLINICAL HISTORY:  resp failure;    TECHNIQUE:  Single frontal view of the chest was performed.    COMPARISON:  Chest portable of December 28, 2018.    FINDINGS:  An endotracheal tube and right central lines remain in position.  The cardiac size is decreased to normal.  Bilateral lower lobe infiltrates are unchanged.  No pneumothorax is seen.      Impression Unchanged bilateral lower lobe infiltrates.Heart size is now within normal limits.  Endotracheal  tube and right central line in position.      Electronically signed by: Wilman Casanova MD  Date:    12/29/2018  Time:    08:37   ]    Labs     Recent Labs   Lab 12/30/18 0316   WBC 17.60*   HGB 9.4*   HCT 29.2*   *   BAND 10.0     Recent Labs   Lab 12/30/18  0316      K 3.1*      CO2 23   BUN 30*   CREATININE 1.7*   *   CALCIUM 8.3*   MG 2.0   PHOS 2.4*   AST 54*   ALT 39   ALKPHOS 116   BILITOT 0.4   PROT 5.2*   ALBUMIN 1.6*     Recent Labs   Lab 12/30/18  0418   PH 7.359   PCO2 41.9   PO2 166*   HCO3 23.6*     Microbiology Results (last 7 days)     Procedure Component Value Units Date/Time    Culture, Respiratory with Gram Stain [[id number]] Collected:  12/29/18 1325    Order Status:  Completed Specimen:  Sputum Updated:  12/30/18 0535     Gram Stain (Respiratory) <10 epithelial cells per low power field.     Gram Stain (Respiratory) Rare WBC's     Gram Stain (Respiratory) Rare Gram positive cocci    Culture, Respiratory with Gram Stain [[id number]]     Order Status:  No result Specimen:  Respiratory from Sputum, Expectorated           Impression:  Active Hospital Problems    Diagnosis  POA    *Aspiration pneumonia of both lower lobes [J69.0]  Yes    Septic shock [A41.9, R65.21]  No    Acute respiratory failure with hypoxia and hypercarbia [J96.01, J96.02]  Clinically Undetermined    Smoker [F17.200]  Yes    Episodic confusion [R41.0]  Yes    Hematemesis [K92.0]  Yes    Dysphagia [R13.10]  Yes    Malnutrition of moderate degree [E44.0]  Yes    Elevated LFTs [R94.5]  Yes    Upper GI bleeding [K92.2]  Yes    Muscular dystrophy [G71.00]  Yes    Chronic obstructive pulmonary disease with acute lower respiratory infection [J44.0]  Yes      Resolved Hospital Problems    Diagnosis Date Resolved POA    Feeding difficulty [R63.3] 12/23/2018 Yes    UTI (urinary tract infection) [N39.0] 12/23/2018 Yes    Hematemesis [K92.0] 12/23/2018 Yes    SOB (shortness of breath) [R06.02]  12/18/2018 Yes               Plan:   Dec 29- gas exchange is better but has very severe aspiration lung injury.  cxr no ards pattern.  Need sputum cultured - expect will take awhile to clear out lung abn.  Lloyd would seem poor given course.    Dec 30 40% 02 and cxr sl better.  Start wean trials.                                  .

## 2018-12-31 LAB
ALBUMIN SERPL BCP-MCNC: 1.6 G/DL
ALP SERPL-CCNC: 125 U/L
ALT SERPL W/O P-5'-P-CCNC: 38 U/L
ANION GAP SERPL CALC-SCNC: 12 MMOL/L
AST SERPL-CCNC: 55 U/L
BASOPHILS # BLD AUTO: 0 K/UL
BASOPHILS NFR BLD: 0 %
BILIRUB SERPL-MCNC: 0.3 MG/DL
BUN SERPL-MCNC: 41 MG/DL
CALCIUM SERPL-MCNC: 8.4 MG/DL
CHLORIDE SERPL-SCNC: 111 MMOL/L
CO2 SERPL-SCNC: 23 MMOL/L
CREAT SERPL-MCNC: 1.8 MG/DL
DIFFERENTIAL METHOD: ABNORMAL
EOSINOPHIL # BLD AUTO: 0 K/UL
EOSINOPHIL NFR BLD: 0 %
ERYTHROCYTE [DISTWIDTH] IN BLOOD BY AUTOMATED COUNT: 15 %
EST. GFR  (AFRICAN AMERICAN): 45 ML/MIN/1.73 M^2
EST. GFR  (NON AFRICAN AMERICAN): 39 ML/MIN/1.73 M^2
GLUCOSE SERPL-MCNC: 279 MG/DL
HCT VFR BLD AUTO: 27.2 %
HGB BLD-MCNC: 8.6 G/DL
LYMPHOCYTES # BLD AUTO: 0.7 K/UL
LYMPHOCYTES NFR BLD: 4.4 %
MAGNESIUM SERPL-MCNC: 2.2 MG/DL
MCH RBC QN AUTO: 28.5 PG
MCHC RBC AUTO-ENTMCNC: 31.6 G/DL
MCV RBC AUTO: 90 FL
MONOCYTES # BLD AUTO: 0.6 K/UL
MONOCYTES NFR BLD: 4.1 %
NEUTROPHILS # BLD AUTO: 14 K/UL
NEUTROPHILS NFR BLD: 91.5 %
PHOSPHATE SERPL-MCNC: 2.7 MG/DL
PLATELET # BLD AUTO: 398 K/UL
PMV BLD AUTO: 9.8 FL
POCT GLUCOSE: 205 MG/DL (ref 70–110)
POCT GLUCOSE: 239 MG/DL (ref 70–110)
POCT GLUCOSE: 259 MG/DL (ref 70–110)
POTASSIUM SERPL-SCNC: 3 MMOL/L
PROT SERPL-MCNC: 5.2 G/DL
RBC # BLD AUTO: 3.01 M/UL
SODIUM SERPL-SCNC: 146 MMOL/L
VANCOMYCIN TROUGH SERPL-MCNC: 24.2 UG/ML
WBC # BLD AUTO: 15.3 K/UL

## 2018-12-31 PROCEDURE — 25000242 PHARM REV CODE 250 ALT 637 W/ HCPCS: Performed by: INTERNAL MEDICINE

## 2018-12-31 PROCEDURE — 99233 SBSQ HOSP IP/OBS HIGH 50: CPT | Mod: ,,, | Performed by: INTERNAL MEDICINE

## 2018-12-31 PROCEDURE — 63600175 PHARM REV CODE 636 W HCPCS: Performed by: INTERNAL MEDICINE

## 2018-12-31 PROCEDURE — 20000000 HC ICU ROOM

## 2018-12-31 PROCEDURE — 94003 VENT MGMT INPAT SUBQ DAY: CPT

## 2018-12-31 PROCEDURE — C9113 INJ PANTOPRAZOLE SODIUM, VIA: HCPCS | Performed by: INTERNAL MEDICINE

## 2018-12-31 PROCEDURE — 99233 PR SUBSEQUENT HOSPITAL CARE,LEVL III: ICD-10-PCS | Mod: ,,, | Performed by: INTERNAL MEDICINE

## 2018-12-31 PROCEDURE — 25000003 PHARM REV CODE 250: Performed by: INTERNAL MEDICINE

## 2018-12-31 PROCEDURE — 84100 ASSAY OF PHOSPHORUS: CPT

## 2018-12-31 PROCEDURE — 83735 ASSAY OF MAGNESIUM: CPT

## 2018-12-31 PROCEDURE — 94761 N-INVAS EAR/PLS OXIMETRY MLT: CPT

## 2018-12-31 PROCEDURE — 63600175 PHARM REV CODE 636 W HCPCS: Performed by: NURSE PRACTITIONER

## 2018-12-31 PROCEDURE — 27000221 HC OXYGEN, UP TO 24 HOURS

## 2018-12-31 PROCEDURE — 80202 ASSAY OF VANCOMYCIN: CPT

## 2018-12-31 PROCEDURE — 94640 AIRWAY INHALATION TREATMENT: CPT

## 2018-12-31 PROCEDURE — 80053 COMPREHEN METABOLIC PANEL: CPT

## 2018-12-31 PROCEDURE — 94770 HC EXHALED C02 TEST: CPT

## 2018-12-31 PROCEDURE — 85025 COMPLETE CBC W/AUTO DIFF WBC: CPT

## 2018-12-31 PROCEDURE — 36415 COLL VENOUS BLD VENIPUNCTURE: CPT

## 2018-12-31 PROCEDURE — 99900026 HC AIRWAY MAINTENANCE (STAT)

## 2018-12-31 PROCEDURE — 99900035 HC TECH TIME PER 15 MIN (STAT)

## 2018-12-31 RX ORDER — POTASSIUM CHLORIDE 29.8 MG/ML
40 INJECTION INTRAVENOUS ONCE
Status: COMPLETED | OUTPATIENT
Start: 2018-12-31 | End: 2018-12-31

## 2018-12-31 RX ADMIN — NYSTATIN 500000 UNITS: 500000 SUSPENSION ORAL at 12:12

## 2018-12-31 RX ADMIN — NYSTATIN 500000 UNITS: 500000 SUSPENSION ORAL at 05:12

## 2018-12-31 RX ADMIN — NYSTATIN 500000 UNITS: 500000 SUSPENSION ORAL at 11:12

## 2018-12-31 RX ADMIN — PROPOFOL 40 MCG/KG/MIN: 10 INJECTION, EMULSION INTRAVENOUS at 09:12

## 2018-12-31 RX ADMIN — INSULIN DETEMIR 10 UNITS: 100 INJECTION, SOLUTION SUBCUTANEOUS at 09:12

## 2018-12-31 RX ADMIN — METHYLPREDNISOLONE SODIUM SUCCINATE 40 MG: 40 INJECTION, POWDER, FOR SOLUTION INTRAMUSCULAR; INTRAVENOUS at 09:12

## 2018-12-31 RX ADMIN — IPRATROPIUM BROMIDE AND ALBUTEROL SULFATE 3 ML: .5; 3 SOLUTION RESPIRATORY (INHALATION) at 07:12

## 2018-12-31 RX ADMIN — FUROSEMIDE 40 MG: 10 INJECTION, SOLUTION INTRAMUSCULAR; INTRAVENOUS at 09:12

## 2018-12-31 RX ADMIN — PANTOPRAZOLE SODIUM 40 MG: 40 INJECTION, POWDER, LYOPHILIZED, FOR SOLUTION INTRAVENOUS at 09:12

## 2018-12-31 RX ADMIN — ENOXAPARIN SODIUM 40 MG: 100 INJECTION SUBCUTANEOUS at 05:12

## 2018-12-31 RX ADMIN — PROPOFOL 45 MCG/KG/MIN: 10 INJECTION, EMULSION INTRAVENOUS at 10:12

## 2018-12-31 RX ADMIN — IPRATROPIUM BROMIDE AND ALBUTEROL SULFATE 3 ML: .5; 3 SOLUTION RESPIRATORY (INHALATION) at 03:12

## 2018-12-31 RX ADMIN — POLYETHYLENE GLYCOL 3350 17 G: 17 POWDER, FOR SOLUTION ORAL at 09:12

## 2018-12-31 RX ADMIN — PIPERACILLIN SODIUM AND TAZOBACTAM SODIUM 4.5 G: 4; .5 INJECTION, POWDER, LYOPHILIZED, FOR SOLUTION INTRAVENOUS at 12:12

## 2018-12-31 RX ADMIN — IPRATROPIUM BROMIDE AND ALBUTEROL SULFATE 3 ML: .5; 3 SOLUTION RESPIRATORY (INHALATION) at 11:12

## 2018-12-31 RX ADMIN — PIPERACILLIN SODIUM AND TAZOBACTAM SODIUM 4.5 G: 4; .5 INJECTION, POWDER, LYOPHILIZED, FOR SOLUTION INTRAVENOUS at 09:12

## 2018-12-31 RX ADMIN — PROPOFOL 45 MCG/KG/MIN: 10 INJECTION, EMULSION INTRAVENOUS at 05:12

## 2018-12-31 RX ADMIN — CLOPIDOGREL BISULFATE 75 MG: 75 TABLET ORAL at 09:12

## 2018-12-31 RX ADMIN — ATORVASTATIN CALCIUM 80 MG: 40 TABLET, FILM COATED ORAL at 09:12

## 2018-12-31 RX ADMIN — PROPOFOL 45 MCG/KG/MIN: 10 INJECTION, EMULSION INTRAVENOUS at 01:12

## 2018-12-31 RX ADMIN — FUROSEMIDE 40 MG: 10 INJECTION, SOLUTION INTRAMUSCULAR; INTRAVENOUS at 05:12

## 2018-12-31 RX ADMIN — POTASSIUM CHLORIDE 40 MEQ: 400 INJECTION, SOLUTION INTRAVENOUS at 09:12

## 2018-12-31 RX ADMIN — PROPOFOL 40 MCG/KG/MIN: 10 INJECTION, EMULSION INTRAVENOUS at 05:12

## 2018-12-31 RX ADMIN — PIPERACILLIN SODIUM AND TAZOBACTAM SODIUM 4.5 G: 4; .5 INJECTION, POWDER, LYOPHILIZED, FOR SOLUTION INTRAVENOUS at 05:12

## 2018-12-31 RX ADMIN — ASPIRIN 81 MG CHEWABLE TABLET 81 MG: 81 TABLET CHEWABLE at 09:12

## 2018-12-31 NOTE — PROGRESS NOTES
12/30/18 1932   Patient Assessment/Suction   Level of Consciousness (AVPU) responds to voice   All Lung Fields Breath Sounds coarse   $ Suction Charges Inline Suction Procedure Stat Charge   Secretions Amount scant   Secretions Color clear   Secretions Characteristics thin   PRE-TX-O2-ETCO2   O2 Device (Oxygen Therapy) ventilator   Oxygen Concentration (%) 40   SpO2 99 %   Pulse Oximetry Type Continuous   ETCO2 (mmHg) 21 mmHg   Pulse 67   Resp 20   Aerosol Therapy   $ Aerosol Therapy Charges Aerosol Treatment   Daily Review of Necessity (SVN) completed   Respiratory Treatment Status (SVN) given   Treatment Route (SVN) in-line   Patient Position (SVN) HOB elevated   Post Treatment Assessment (SVN) breath sounds unchanged   Signs of Intolerance (SVN) none   Breath Sounds Post-Respiratory Treatment   Throughout All Fields Post-Treatment All Fields   Throughout All Fields Post-Treatment no change   Post-treatment Heart Rate (beats/min) 74   Post-treatment Resp Rate (breaths/min) 20       Airway - Non-Surgical 12/28/18 1335 Other (Comment)   Placement Date/Time: 12/28/18 (c) 1337   Method of Intubation: Direct laryngoscopy  Airway Device: Other (Comment)  Mask Ventilation: Easy  Intubated: Preinduction - awake intubation  Blade: Dave #2  Airway Device Size: 8.0  Style: Cuffed  Cuff Infl...   Secured at 23 cm   Measured At Lips   Secured Location Right   Secured by Commercial tube [place]   Bite Block none   Site Condition Cool;Dry   Status Intact;Secured;Patent   Site Assessment Clean;Dry   Cuff Pressure 30 cm H2O   Vent Select   Conventional Vent Y   Charged w/in last 24h YES   Preset Conventional Ventilator Settings   Vent Type    Ventilation Type VC   Vent Mode A/C   Humidity HME   Set Rate 20 bmp   Vt Set 600 mL   PEEP/CPAP 5 cmH20   Pressure Support 0 cmH20   Waveform RAMP   Peak Flow 70 L/min   Set Inspiratory Pressure 0 cmH20   Insp Time 0 Sec(s)   Plateau Set/Insp. Hold (sec) 0   Insp Rise Time  0 %    Trigger Sensitivity Flow/I-Trigger 0.7 L/min   P High 0 cm H2O   P Low 0 cm H2O   T High 0 sec   T Low 0 sec   Patient Ventilator Parameters   Resp Rate Total 20 br/min   Peak Airway Pressure 26 cmH2O   Mean Airway Pressure 13 cmH20   Plateau Pressure 0 cmH20   Exhaled Vt 965 mL   Total Ve 19.4 mL   Spont Ve 0 L   I:E Ratio Measured 1:2.20   Conventional Ventilator Alarms   Ve High Alarm 27 L/min   Resp Rate High Alarm 0 br/min   Press High Alarm 60 cmH2O   Apnea Rate 10   Apnea Volume (mL) 620 mL   Apnea Oxygen Concentration  100   Apnea Flow Rate (L/min) 74   T Apnea 20 sec(s)   Ready to Wean/Extubation Screen   FIO2<=50 (chart decimal) 0.4   MV<16L (chart vol.) (!) 19.4   PEEP <=8 (chart #) 5   Ready to Wean Parameters   F/VT Ratio<105 (RSBI) (!) 20.73

## 2018-12-31 NOTE — PLAN OF CARE
12/31/18 0714   PRE-TX-O2-ETCO2   Oxygen Concentration (%) 40   SpO2 98 %   ETCO2 (mmHg) 24 mmHg   Pulse 71   Resp 20       Airway - Non-Surgical 12/28/18 1335 Other (Comment)   Placement Date/Time: 12/28/18 (c) 1338   Method of Intubation: Direct laryngoscopy  Airway Device: Other (Comment)  Mask Ventilation: Easy  Intubated: Preinduction - awake intubation  Blade: Dave #2  Airway Device Size: 8.0  Style: Cuffed  Cuff Infl...   Secured at 23 cm   Measured At Lips   Secured Location Left   Secured by Commercial tube [place]   Bite Block none   Site Condition Dry   Status Secured;Intact   Site Assessment Dry;Clean   Cuff Pressure 32 cm H2O   Vent Select   Charged w/in last 24h YES   Preset Conventional Ventilator Settings   Vent Type    Ventilation Type VC   Vent Mode A/C   Humidity HME   Set Rate 20 bmp   Vt Set 600 mL   PEEP/CPAP 5 cmH20   Pressure Support 0 cmH20   Waveform RAMP   Peak Flow 70 L/min   Set Inspiratory Pressure 0 cmH20   Insp Time 0 Sec(s)   Plateau Set/Insp. Hold (sec) 0   Insp Rise Time  0 %   Trigger Sensitivity Flow/I-Trigger 0.7 L/min   P High 0 cm H2O   P Low 0 cm H2O   T High 0 sec   T Low 0 sec   Patient Ventilator Parameters   Resp Rate Total 20 br/min   Peak Airway Pressure 26 cmH2O   Mean Airway Pressure 12 cmH20   Plateau Pressure 0 cmH20   Exhaled Vt 613 mL   Total Ve 12.3 mL   Spont Ve 0 L   I:E Ratio Measured 1:2.20   Conventional Ventilator Alarms   Ve High Alarm 27 L/min   Resp Rate High Alarm 0 br/min   Press High Alarm 60 cmH2O   Apnea Rate 10   Apnea Volume (mL) 620 mL   Apnea Oxygen Concentration  100   Apnea Flow Rate (L/min) 74   T Apnea 20 sec(s)   Ready to Wean/Extubation Screen   FIO2<=50 (chart decimal) 0.4   MV<16L (chart vol.) 12.3   PEEP <=8 (chart #) 5   Ready to Wean Parameters   F/VT Ratio<105 (RSBI) (!) 32.63

## 2018-12-31 NOTE — CONSULTS
Date: 12/30/2018   Enmanuel Armenta [id number] is a 63 y.o. male who has been consulted for vancomycin dosing.    The patient has the following labs: Scr 1.7 Crcl 40.5 WBC 17.6  Current weight is 77.6 kg (171 lb 1.2 oz)    Pt is receiving vancomycin 1250 mg every 24 hours.  Vancomycin trough from 12/30/2018 at 1546 was 21.7 mg/dL.  Target goal is 15-20 mg/dL. Pharmacy will decrease current dose. The patient will be changed to a vancomycin dose of 1000 mg every 24 hours. The vancomycin trough has been ordered for 1/1/19 at 1600.    Patient will be followed by pharmacy for changes in renal function, toxicity, and efficacy.    Thank you for allowing us to participate in this patient's care.     Jayme Blair, PharmD

## 2018-12-31 NOTE — PROGRESS NOTES
Progress Note  PULMONARY    Admit Date: 12/17/2018 12/31/2018      SUBJECTIVE:     Dec 29, sedate on vent.  Left side down.  Dec 30, sedated, 40% now  Dec 31 sedate, not arousing.    PFSH and Allergies reviewed.    OBJECTIVE:     Vitals (Most recent):  Vitals:    12/31/18 1102   BP:    Pulse: 70   Resp: 20   Temp:        Vitals (24 hour range):  Temp:  [97.8 °F (36.6 °C)-98.6 °F (37 °C)]   Pulse:  [67-86]   Resp:  [20]   BP: ()/(50-72)   SpO2:  [90 %-100 %]   Arterial Line BP: ()/(43-63)       Intake/Output Summary (Last 24 hours) at 12/31/2018 1138  Last data filed at 12/31/2018 0700  Gross per 24 hour   Intake 1376.52 ml   Output 1439 ml   Net -62.48 ml          Physical Exam:  The patient's neuro status (alertness,orientation,cognitive function,motor skills,), pharyngeal exam (oral lesions, hygiene, abn dentition,), Neck (jvd,mass,thyroid,nodes in neck and above/below clavicle),RESPIRATORY(symmetry,effort,fremitus,percussion,auscultation),  Cor(rhythm,heart tones including gallops,perfusion,edema)ABD(distention,hepatic&splenomegaly,tenderness,masses), Skin(rash,cyanosis),Psyc(affect,judgement,).  Exam negative except for these pertinent findings:    Intubated on propofol.  No distress, nl percussion, symmetric, no edema.  Good bs,     Radiographs reviewed: view by direct vision  -12/31 right lung clear and left lung better.       Results for orders placed during the hospital encounter of 12/17/18   X-Ray Chest 1 View    Narrative EXAMINATION:  XR CHEST 1 VIEW    CLINICAL HISTORY:  resp failure;    TECHNIQUE:  Single frontal view of the chest was performed.    COMPARISON:  Chest portable of December 28, 2018.    FINDINGS:  An endotracheal tube and right central lines remain in position.  The cardiac size is decreased to normal.  Bilateral lower lobe infiltrates are unchanged.  No pneumothorax is seen.      Impression Unchanged bilateral lower lobe infiltrates.Heart size is now within normal limits.   Endotracheal tube and right central line in position.      Electronically signed by: Wilman Casanova MD  Date:    12/29/2018  Time:    08:37   ]    Labs     Recent Labs   Lab 12/31/18 0339   WBC 15.30*   HGB 8.6*   HCT 27.2*   *     Recent Labs   Lab 12/31/18 0339   *   K 3.0*   *   CO2 23   BUN 41*   CREATININE 1.8*   *   CALCIUM 8.4*   MG 2.2   PHOS 2.7   AST 55*   ALT 38   ALKPHOS 125   BILITOT 0.3   PROT 5.2*   ALBUMIN 1.6*     No results for input(s): PH, PCO2, PO2, HCO3 in the last 24 hours.  Microbiology Results (last 7 days)     Procedure Component Value Units Date/Time    Culture, Respiratory with Gram Stain [[id number]] Collected:  12/29/18 1325    Order Status:  Completed Specimen:  Sputum Updated:  12/31/18 1012     Respiratory Culture --     STAPHYLOCOCCUS AUREUS  Few  Susceptibility pending  Normal respiratory ruddy also present       Gram Stain (Respiratory) <10 epithelial cells per low power field.     Gram Stain (Respiratory) Rare WBC's     Gram Stain (Respiratory) Rare Gram positive cocci    Culture, Respiratory with Gram Stain [[id number]]     Order Status:  No result Specimen:  Respiratory from Sputum, Expectorated           Impression:  Active Hospital Problems    Diagnosis  POA    *Aspiration pneumonia of both lower lobes [J69.0]  Yes    Septic shock [A41.9, R65.21]  No    Acute respiratory failure with hypoxia and hypercarbia [J96.01, J96.02]  No    Smoker [F17.200]  Yes    Episodic confusion [R41.0]  Yes    Hematemesis [K92.0]  Yes    Dysphagia [R13.10]  Yes    Malnutrition of moderate degree [E44.0]  Yes    Elevated LFTs [R94.5]  Yes    Upper GI bleeding [K92.2]  Yes    Muscular dystrophy [G71.00]  Yes    Chronic obstructive pulmonary disease with acute lower respiratory infection [J44.0]  Yes      Resolved Hospital Problems    Diagnosis Date Resolved POA    Feeding difficulty [R63.3] 12/23/2018 Yes    UTI (urinary tract infection) [N39.0] 12/23/2018  Yes    Hematemesis [K92.0] 12/23/2018 Yes    SOB (shortness of breath) [R06.02] 12/18/2018 Yes               Plan:   Dec 29- gas exchange is better but has very severe aspiration lung injury.  cxr no ards pattern.  Need sputum cultured - expect will take awhile to clear out lung abn.  Shevlin would seem poor given course.    Dec 30 40% 02 and cxr sl better.  Start wean trials.    Dec 31, right lung clear. Should wean and extubate.                                .

## 2018-12-31 NOTE — PLAN OF CARE
CM continuing to follow; not medically cleared to return to NH at this time....KARLENE Mendoza CM      12/31/18 6294   Discharge Reassessment   Assessment Type Discharge Planning Reassessment

## 2018-12-31 NOTE — PLAN OF CARE
Problem: Adult Inpatient Plan of Care  Goal: Plan of Care Review  Outcome: Ongoing (interventions implemented as appropriate)  Pt VS stable; continued on levophed; attempted to wean O2 on vent; remains intubated; ABX continued; will continue current plan of care at this time

## 2018-12-31 NOTE — PROGRESS NOTES
Progress Note  Hospital Medicine  Patient Name:Enmanuel Armenta  MRN:  [id number]  Patient Class: IP- Inpatient  Admit Date: 12/17/2018  Length of Stay: 14 days  Expected Discharge Date:   Attending Physician: Stephanie Tena MD  Primary Care Provider:  Primary Doctor No    SUBJECTIVE:     Principal Problem: Aspiration pneumonia of both lower lobes  Initial history of present illness: Patient is a 63 y.o. male admitted to Hospitalist Service from [facility] Emergency Room with complaint of abdominal pain. Patient is a resident of [facility] nursing home, [place]. Patient reportedly has past medical history significant for COPD, muscular dystrophy, l;imited mobility with walker and history of elevated PSA. Patient presented with 1 day history of abdominal pain associated with nausea and vomiting. The EMS reported, vomiting a brown sticky substance with pale red streaks throughout. EMS gave him 4 mg of Zofran in route to the ED. Patient denied chest pain, shortness of breath, headache, vision changes, focal neuro-deficits, cough. Patient noted to have 101.5F upon arrival.     PMH/PSH/SH/FH/Meds: reviewed.    Symptoms/Review of Systems: In ICU, afebrile. Patient seen and examined. Remains intubated and sedated. Vasopressor requirement lower.   Diet: Trickle feeding  Activity level: Up with assistance    Pain:  Moans to verbal commands     OBJECTIVE:   Vital Signs (Most Recent):      Temp: 98 °F (36.7 °C) (12/31/18 0717)  Pulse: 74 (12/31/18 0830)  Resp: 20 (12/31/18 0830)  BP: (!) 112/59 (12/31/18 0800)  SpO2: (!) 94 % (12/31/18 0830)       Vital Signs Range (Last 24H):  Temp:  [97.8 °F (36.6 °C)-98.6 °F (37 °C)]   Pulse:  [67-86]   Resp:  [17-20]   BP: ()/(50-72)   SpO2:  [90 %-100 %]   Arterial Line BP: ()/(43-63)     Weight: 73.3 kg (161 lb 9.6 oz)  Body mass index is 25.31 kg/m².    Intake/Output Summary (Last 24 hours) at 12/31/2018 0901  Last data filed at 12/31/2018 0700  Gross  per 24 hour   Intake 1436.52 ml   Output 1824 ml   Net -387.48 ml     Physical Examination:  General appearance: well developed, appears stated age, chronically ill-appearing, intubated and sedated  Head: normocephalic, atraumatic  Eyes:  conjunctivae/corneas clear. PERRL.  Nose: Nares normal. Septum midline.  Throat: lips, mucosa, and tongue normal; teeth and gums normal, no throat erythema. + Dry blood at angle of mouth  Neck: supple, symmetrical, trachea midline, no JVD and thyroid not enlarged, symmetric, no tenderness/mass/nodules  Lungs:  Scattered rhonchi B/L.  Chest wall: no tenderness  Heart: regular rate and rhythm, S1, S2 normal, no murmur, click, rub or gallop  Abdomen: soft, epigastric tenderness, non-distented; bowel sounds normal; no masses,  no organomegaly. PEG site okay.  Extremities: no cyanosis, clubbing or edema.   Pulses: 2+ and symmetric  Skin: Skin color, texture, turgor normal. No rashes or lesions.  Lymph nodes: Cervical, supraclavicular, and axillary nodes normal.  Neurologic: Lethargic, non-focal.    CBC:  Recent Labs   Lab 12/29/18  0321 12/30/18 0316 12/31/18  0339   WBC 17.80* 17.60* 15.30*   RBC 3.73* 3.21* 3.01*   HGB 10.8* 9.4* 8.6*   HCT 34.2* 29.2* 27.2*   * 375* 398*   MCV 92 91 90   MCH 29.1 29.3 28.5   MCHC 31.7* 32.1 31.6*   BMP  Recent Labs   Lab 12/29/18  0321 12/30/18  0316 12/31/18  0339   * 336* 279*    144 146*   K 3.7 3.1* 3.0*    110 111*   CO2 21* 23 23   BUN 25* 30* 41*   CREATININE 1.6* 1.7* 1.8*   CALCIUM 8.4* 8.3* 8.4*   MG 1.7 2.0 2.2      Diagnostic Results:  Microbiology Results (last 7 days)     Procedure Component Value Units Date/Time    Culture, Respiratory with Gram Stain [[id number]] Collected:  12/29/18 1325    Order Status:  Completed Specimen:  Sputum Updated:  12/30/18 0535     Gram Stain (Respiratory) <10 epithelial cells per low power field.     Gram Stain (Respiratory) Rare WBC's     Gram Stain (Respiratory) Rare Gram  positive cocci    Culture, Respiratory with Gram Stain [[id number]]     Order Status:  No result Specimen:  Respiratory from Sputum, Expectorated          Chest X-Ray: T right basilar infiltrate.     CT abdomen and pelvis with contrast:  Enlarged prostate gland, air in the bladder likely secondary to catheterization, diffuse bladder wall thickening. Prominent amount of fluid within right colon.  Appendix not identified and note is made of limitation with motion artifact in the appendiceal region.    EGD:   - Moderately severe reflux esophagitis.                       - Medium-sized hiatal hernia.                       - Gastritis.                       - A single gastric polyp.                       - Normal examined duodenum.                       - No specimens collected.    CXR: New moderate left pleural effusion and left lung infiltrate.  Leftward mediastinal shift raising consideration for left lung atelectasis as contributory to the findings.  Close follow-up recommended.    CXR:   Unchanged bilateral lower lobe infiltrates.Heart size is now within normal limits.  Endotracheal tube and right central line in position.    CXR:   Increasing left lower lobe infiltrate and possible left pleural effusion.  Decreasing right base infiltrate.  Probable mild cardiomegaly obscured by the infiltrates.  Endotracheal tube and central line in position.    Assessment/Plan:      Acute hypoxic respiratory failure   Yes    Aspiration pneumonia, RLL [J69.0]  Swallow dysfunction s/p PEG placement  On ventilator. Management per pulmonary  Continue Zosyn/Vanc  Duonebs  Levophed to maintain MAP of 60  Stop IV Fluids  Start diuresis lasix 40 BID  Decrease steroids to 40 daily     Hypernatremia - corrected  Follow BMP.    Hyperglycemia  Start Levemir 10 daily    Hypokalemia  Replace     Yes    Upper GI bleeding [K92.2]  Follow H/H closely. Type and screen blood and transfuse as needed.  Continue IV Protonix.  Use IV anti-emetics as  needed.       Yes    Muscular dystrophy [G71.00]  Supportive care, fall precautions.       Yes    Chronic obstructive pulmonary disease [J44.9]  Patient's COPD is controlled currently. Continue scheduled inhalers and monitor respiratory status closely.   Yes      DVT prophylaxis: Use SCD and TEDs. No anticoagulation due to GI bleeding.      Stephanie Tena MD  Department of Hospital Medicine   [facility]

## 2019-01-01 LAB
ALBUMIN SERPL BCP-MCNC: 1.5 G/DL
ALBUMIN SERPL BCP-MCNC: 1.7 G/DL
ALLENS TEST: ABNORMAL
ALP SERPL-CCNC: 158 U/L
ALP SERPL-CCNC: 161 U/L
ALT SERPL W/O P-5'-P-CCNC: 30 U/L
ALT SERPL W/O P-5'-P-CCNC: 36 U/L
ANION GAP SERPL CALC-SCNC: 12 MMOL/L
ANION GAP SERPL CALC-SCNC: 13 MMOL/L
ANISOCYTOSIS BLD QL SMEAR: SLIGHT
AST SERPL-CCNC: 38 U/L
AST SERPL-CCNC: 44 U/L
BASOPHILS # BLD AUTO: ABNORMAL K/UL
BASOPHILS NFR BLD: 0 %
BILIRUB SERPL-MCNC: 0.3 MG/DL
BILIRUB SERPL-MCNC: 0.3 MG/DL
BUN SERPL-MCNC: 57 MG/DL
BUN SERPL-MCNC: 62 MG/DL
CALCIUM SERPL-MCNC: 8.2 MG/DL
CALCIUM SERPL-MCNC: 8.8 MG/DL
CHLORIDE SERPL-SCNC: 111 MMOL/L
CHLORIDE SERPL-SCNC: 113 MMOL/L
CO2 SERPL-SCNC: 20 MMOL/L
CO2 SERPL-SCNC: 24 MMOL/L
CREAT SERPL-MCNC: 2.1 MG/DL
CREAT SERPL-MCNC: 2.2 MG/DL
DELSYS: ABNORMAL
DIFFERENTIAL METHOD: ABNORMAL
EOSINOPHIL # BLD AUTO: ABNORMAL K/UL
EOSINOPHIL NFR BLD: 0 %
ERYTHROCYTE [DISTWIDTH] IN BLOOD BY AUTOMATED COUNT: 14.9 %
ERYTHROCYTE [SEDIMENTATION RATE] IN BLOOD BY WESTERGREN METHOD: 16 MM/H
EST. GFR  (AFRICAN AMERICAN): 35 ML/MIN/1.73 M^2
EST. GFR  (AFRICAN AMERICAN): 37 ML/MIN/1.73 M^2
EST. GFR  (NON AFRICAN AMERICAN): 30 ML/MIN/1.73 M^2
EST. GFR  (NON AFRICAN AMERICAN): 32 ML/MIN/1.73 M^2
ETCO2: 26
FIO2: 45
GLUCOSE SERPL-MCNC: 145 MG/DL
GLUCOSE SERPL-MCNC: 238 MG/DL
HCO3 UR-SCNC: 25.6 MMOL/L (ref 24–28)
HCT VFR BLD AUTO: 27.9 %
HGB BLD-MCNC: 8.9 G/DL
LYMPHOCYTES # BLD AUTO: ABNORMAL K/UL
LYMPHOCYTES NFR BLD: 13 %
MAGNESIUM SERPL-MCNC: 2.2 MG/DL
MAGNESIUM SERPL-MCNC: 2.4 MG/DL
MCH RBC QN AUTO: 28.7 PG
MCHC RBC AUTO-ENTMCNC: 32 G/DL
MCV RBC AUTO: 90 FL
METAMYELOCYTES NFR BLD MANUAL: 3 %
MIN VOL: 10
MODE: ABNORMAL
MONOCYTES # BLD AUTO: ABNORMAL K/UL
MONOCYTES NFR BLD: 4 %
NEUTROPHILS NFR BLD: 76 %
NEUTS BAND NFR BLD MANUAL: 4 %
OVALOCYTES BLD QL SMEAR: ABNORMAL
PCO2 BLDA: 41.8 MMHG (ref 35–45)
PEEP: 5
PH SMN: 7.39 [PH] (ref 7.35–7.45)
PHOSPHATE SERPL-MCNC: 3.3 MG/DL
PIP: 24
PLATELET # BLD AUTO: 437 K/UL
PLATELET BLD QL SMEAR: ABNORMAL
PMV BLD AUTO: 9.7 FL
PO2 BLDA: 73 MMHG (ref 80–100)
POC BE: 1 MMOL/L
POC SATURATED O2: 94 % (ref 95–100)
POC TCO2: 27 MMOL/L (ref 23–27)
POCT GLUCOSE: 180 MG/DL (ref 70–110)
POCT GLUCOSE: 230 MG/DL (ref 70–110)
POIKILOCYTOSIS BLD QL SMEAR: SLIGHT
POTASSIUM SERPL-SCNC: 3.1 MMOL/L
POTASSIUM SERPL-SCNC: 3.3 MMOL/L
PROT SERPL-MCNC: 5.1 G/DL
PROT SERPL-MCNC: 5.5 G/DL
RBC # BLD AUTO: 3.11 M/UL
SAMPLE: ABNORMAL
SITE: ABNORMAL
SODIUM SERPL-SCNC: 145 MMOL/L
SODIUM SERPL-SCNC: 148 MMOL/L
SP02: 99
TROPONIN I SERPL DL<=0.01 NG/ML-MCNC: 0.07 NG/ML
TROPONIN I SERPL DL<=0.01 NG/ML-MCNC: 0.11 NG/ML
VANCOMYCIN SERPL-MCNC: 23.6 UG/ML
VT: 600
WBC # BLD AUTO: 14.4 K/UL

## 2019-01-01 PROCEDURE — 80053 COMPREHEN METABOLIC PANEL: CPT | Mod: 91

## 2019-01-01 PROCEDURE — 37799 UNLISTED PX VASCULAR SURGERY: CPT

## 2019-01-01 PROCEDURE — 36415 COLL VENOUS BLD VENIPUNCTURE: CPT

## 2019-01-01 PROCEDURE — C9113 INJ PANTOPRAZOLE SODIUM, VIA: HCPCS | Performed by: INTERNAL MEDICINE

## 2019-01-01 PROCEDURE — 25000242 PHARM REV CODE 250 ALT 637 W/ HCPCS: Performed by: INTERNAL MEDICINE

## 2019-01-01 PROCEDURE — 25000003 PHARM REV CODE 250: Performed by: SPECIALIST

## 2019-01-01 PROCEDURE — 94770 HC EXHALED C02 TEST: CPT

## 2019-01-01 PROCEDURE — 63600175 PHARM REV CODE 636 W HCPCS: Performed by: INTERNAL MEDICINE

## 2019-01-01 PROCEDURE — 25000003 PHARM REV CODE 250: Performed by: INTERNAL MEDICINE

## 2019-01-01 PROCEDURE — 80202 ASSAY OF VANCOMYCIN: CPT

## 2019-01-01 PROCEDURE — 99232 SBSQ HOSP IP/OBS MODERATE 35: CPT | Mod: ,,, | Performed by: INTERNAL MEDICINE

## 2019-01-01 PROCEDURE — 82803 BLOOD GASES ANY COMBINATION: CPT

## 2019-01-01 PROCEDURE — 83735 ASSAY OF MAGNESIUM: CPT | Mod: 91

## 2019-01-01 PROCEDURE — 99232 PR SUBSEQUENT HOSPITAL CARE,LEVL II: ICD-10-PCS | Mod: ,,, | Performed by: INTERNAL MEDICINE

## 2019-01-01 PROCEDURE — 85027 COMPLETE CBC AUTOMATED: CPT

## 2019-01-01 PROCEDURE — 94761 N-INVAS EAR/PLS OXIMETRY MLT: CPT

## 2019-01-01 PROCEDURE — 20000000 HC ICU ROOM

## 2019-01-01 PROCEDURE — 25000003 PHARM REV CODE 250

## 2019-01-01 PROCEDURE — 85007 BL SMEAR W/DIFF WBC COUNT: CPT

## 2019-01-01 PROCEDURE — S5571 INSULIN DISPOS PEN 3 ML: HCPCS | Performed by: INTERNAL MEDICINE

## 2019-01-01 PROCEDURE — 27000221 HC OXYGEN, UP TO 24 HOURS

## 2019-01-01 PROCEDURE — 99900035 HC TECH TIME PER 15 MIN (STAT)

## 2019-01-01 PROCEDURE — 84100 ASSAY OF PHOSPHORUS: CPT

## 2019-01-01 PROCEDURE — 99900026 HC AIRWAY MAINTENANCE (STAT)

## 2019-01-01 PROCEDURE — 84484 ASSAY OF TROPONIN QUANT: CPT

## 2019-01-01 PROCEDURE — 97803 MED NUTRITION INDIV SUBSEQ: CPT

## 2019-01-01 PROCEDURE — 94640 AIRWAY INHALATION TREATMENT: CPT

## 2019-01-01 PROCEDURE — 94003 VENT MGMT INPAT SUBQ DAY: CPT

## 2019-01-01 RX ORDER — POTASSIUM CHLORIDE 7.45 MG/ML
30 INJECTION INTRAVENOUS ONCE
Status: COMPLETED | OUTPATIENT
Start: 2019-01-01 | End: 2019-01-01

## 2019-01-01 RX ORDER — POTASSIUM CHLORIDE 29.8 MG/ML
40 INJECTION INTRAVENOUS ONCE
Status: COMPLETED | OUTPATIENT
Start: 2019-01-01 | End: 2019-01-01

## 2019-01-01 RX ORDER — METOPROLOL TARTRATE 1 MG/ML
5 INJECTION, SOLUTION INTRAVENOUS ONCE
Status: COMPLETED | OUTPATIENT
Start: 2019-01-01 | End: 2019-01-01

## 2019-01-01 RX ORDER — DILTIAZEM HCL/D5W 125 MG/125
10 PLASTIC BAG, INJECTION (ML) INTRAVENOUS CONTINUOUS
Status: DISCONTINUED | OUTPATIENT
Start: 2019-01-01 | End: 2019-01-04

## 2019-01-01 RX ORDER — ENOXAPARIN SODIUM 100 MG/ML
1 INJECTION SUBCUTANEOUS
Status: DISCONTINUED | OUTPATIENT
Start: 2019-01-02 | End: 2019-01-02

## 2019-01-01 RX ORDER — METOPROLOL TARTRATE 1 MG/ML
INJECTION, SOLUTION INTRAVENOUS
Status: COMPLETED
Start: 2019-01-01 | End: 2019-01-01

## 2019-01-01 RX ORDER — DILTIAZEM HYDROCHLORIDE 5 MG/ML
10 INJECTION INTRAVENOUS ONCE
Status: COMPLETED | OUTPATIENT
Start: 2019-01-01 | End: 2019-01-01

## 2019-01-01 RX ORDER — HYDRALAZINE HYDROCHLORIDE 20 MG/ML
10 INJECTION INTRAMUSCULAR; INTRAVENOUS
Status: DISCONTINUED | OUTPATIENT
Start: 2019-01-01 | End: 2019-01-11 | Stop reason: HOSPADM

## 2019-01-01 RX ADMIN — METOPROLOL TARTRATE 5 MG: 1 INJECTION, SOLUTION INTRAVENOUS at 05:01

## 2019-01-01 RX ADMIN — PROPOFOL 40 MCG/KG/MIN: 10 INJECTION, EMULSION INTRAVENOUS at 03:01

## 2019-01-01 RX ADMIN — NYSTATIN 500000 UNITS: 500000 SUSPENSION ORAL at 12:01

## 2019-01-01 RX ADMIN — INSULIN DETEMIR 10 UNITS: 100 INJECTION, SOLUTION SUBCUTANEOUS at 12:01

## 2019-01-01 RX ADMIN — POTASSIUM CHLORIDE 40 MEQ: 400 INJECTION, SOLUTION INTRAVENOUS at 06:01

## 2019-01-01 RX ADMIN — ATORVASTATIN CALCIUM 80 MG: 40 TABLET, FILM COATED ORAL at 09:01

## 2019-01-01 RX ADMIN — POTASSIUM CHLORIDE 30 MEQ: 7.46 INJECTION, SOLUTION INTRAVENOUS at 09:01

## 2019-01-01 RX ADMIN — LEUCINE, PHENYLALANINE, LYSINE, METHIONINE, ISOLEUCINE, VALINE, HISTIDINE, THREONINE, TRYPTOPHAN, ALANINE, GLYCINE, ARGININE, PROLINE, SERINE, TYROSINE, DEXTROSE: 311; 238; 247; 170; 255; 247; 204; 179; 77; 880; 438; 489; 289; 213; 17; 10 INJECTION INTRAVENOUS at 11:01

## 2019-01-01 RX ADMIN — IPRATROPIUM BROMIDE AND ALBUTEROL SULFATE 3 ML: .5; 3 SOLUTION RESPIRATORY (INHALATION) at 07:01

## 2019-01-01 RX ADMIN — DILTIAZEM HYDROCHLORIDE 10 MG: 5 INJECTION INTRAVENOUS at 09:01

## 2019-01-01 RX ADMIN — IPRATROPIUM BROMIDE AND ALBUTEROL SULFATE 3 ML: .5; 3 SOLUTION RESPIRATORY (INHALATION) at 12:01

## 2019-01-01 RX ADMIN — METHYLPREDNISOLONE SODIUM SUCCINATE 40 MG: 40 INJECTION, POWDER, FOR SOLUTION INTRAMUSCULAR; INTRAVENOUS at 09:01

## 2019-01-01 RX ADMIN — PIPERACILLIN SODIUM AND TAZOBACTAM SODIUM 4.5 G: 4; .5 INJECTION, POWDER, LYOPHILIZED, FOR SOLUTION INTRAVENOUS at 12:01

## 2019-01-01 RX ADMIN — PANTOPRAZOLE SODIUM 40 MG: 40 INJECTION, POWDER, LYOPHILIZED, FOR SOLUTION INTRAVENOUS at 09:01

## 2019-01-01 RX ADMIN — PIPERACILLIN SODIUM AND TAZOBACTAM SODIUM 4.5 G: 4; .5 INJECTION, POWDER, LYOPHILIZED, FOR SOLUTION INTRAVENOUS at 09:01

## 2019-01-01 RX ADMIN — IPRATROPIUM BROMIDE AND ALBUTEROL SULFATE 3 ML: .5; 3 SOLUTION RESPIRATORY (INHALATION) at 11:01

## 2019-01-01 RX ADMIN — ENOXAPARIN SODIUM 40 MG: 100 INJECTION SUBCUTANEOUS at 04:01

## 2019-01-01 RX ADMIN — MIDAZOLAM HYDROCHLORIDE 4 MG: 1 INJECTION, SOLUTION INTRAMUSCULAR; INTRAVENOUS at 05:01

## 2019-01-01 RX ADMIN — NYSTATIN 500000 UNITS: 500000 SUSPENSION ORAL at 05:01

## 2019-01-01 RX ADMIN — IPRATROPIUM BROMIDE AND ALBUTEROL SULFATE 3 ML: .5; 3 SOLUTION RESPIRATORY (INHALATION) at 03:01

## 2019-01-01 RX ADMIN — HYDRALAZINE HYDROCHLORIDE 10 MG: 20 INJECTION INTRAMUSCULAR; INTRAVENOUS at 04:01

## 2019-01-01 RX ADMIN — ASCORBIC ACID, VITAMIN A PALMITATE, CHOLECALCIFEROL, THIAMINE HYDROCHLORIDE, RIBOFLAVIN-5 PHOSPHATE SODIUM, PYRIDOXINE HYDROCHLORIDE, NIACINAMIDE, DEXPANTHENOL, ALPHA-TOCOPHEROL ACETATE, VITAMIN K1, FOLIC ACID, BIOTIN, CYANOCOBALAMIN: 200; 3300; 200; 6; 3.6; 6; 40; 15; 10; 150; 600; 60; 5 INJECTION, SOLUTION INTRAVENOUS at 01:01

## 2019-01-01 RX ADMIN — ASPIRIN 81 MG CHEWABLE TABLET 81 MG: 81 TABLET CHEWABLE at 09:01

## 2019-01-01 RX ADMIN — NYSTATIN 500000 UNITS: 500000 SUSPENSION ORAL at 11:01

## 2019-01-01 RX ADMIN — CLOPIDOGREL BISULFATE 75 MG: 75 TABLET ORAL at 09:01

## 2019-01-01 RX ADMIN — PROPOFOL 35 MCG/KG/MIN: 10 INJECTION, EMULSION INTRAVENOUS at 09:01

## 2019-01-01 RX ADMIN — DILTIAZEM HYDROCHLORIDE 10 MG/HR: 5 INJECTION INTRAVENOUS at 09:01

## 2019-01-01 RX ADMIN — PROPOFOL 45 MCG/KG/MIN: 10 INJECTION, EMULSION INTRAVENOUS at 10:01

## 2019-01-01 RX ADMIN — POLYETHYLENE GLYCOL 3350 17 G: 17 POWDER, FOR SOLUTION ORAL at 09:01

## 2019-01-01 RX ADMIN — PIPERACILLIN SODIUM AND TAZOBACTAM SODIUM 4.5 G: 4; .5 INJECTION, POWDER, LYOPHILIZED, FOR SOLUTION INTRAVENOUS at 04:01

## 2019-01-01 NOTE — CONSULTS
Enmanuel Armenta [id number] is a 63 y.o. male who has been consulted for vancomycin dosing.    The patient has the following labs: Scr 2.1 Crcl 32.8 WBC 14.4  Current weight is 76.8 kg (169 lb 5 oz)    Pt's vancomycin is currently being held due to a decline in renal function and a trough of 24.6 mg/dL. Vancomycin level from 1/1/19 at 0310  was 23.6 mg/dL. Target trough range is 15-20 mg/dL.  Renal function has continued to decline as well. Pharmacy will continue hold doses until level is < 20 mg/dL. A vancomycin level has been ordered for 1/2/19 with AM draw.     Patient will be followed by pharmacy for changes in renal function, toxicity, and efficacy.    Thank you for allowing us to participate in this patient's care.     Jayme Blair, PharmD

## 2019-01-01 NOTE — PLAN OF CARE
Problem: Malnutrition  Goal: Improved Nutritional Intake    Intervention: Promote and Optimize Nutrition Support  Interventions: Parenteral Nutrition Therapy vs. Enteral nutrition therapy      Current Intake :   TPN D10 AA 4.25 @ 100 ml/hr   ( provides 2400 ml, 1224 kcal (70%EEN), and 102 g protein (>100%EPN))   + Glucerna 1.5 @ 10 ml/hr (Provides 360 kcal ( 20% EEN), and 20 g protein (27% EPN)     Recommendation:   1)  Change TPN to D15 AA 5 @ 60 ml/hr  (Add lipids once propofol decreased)  (with lipid or propofol at current rate Provides 7121-9749 kcal (96%EEN), and 84 g protein (100% EPN) and 1680 ml + lipid)     2) If pt able to resume TF, rec. continuous and raise HOB 90 degrees   Isosource 1.5 @15 ml/hr increasing to goal of 45 ml/hr + 155 ml flush q 4 hr  (Provides 820 ml free water, 1620 kcal (925EEN), 73 g protein (100%EPN))  - J-tube feedings would also decrease risk of aspiration     Goals: 1) TPN or TF to meet > 75% estimated needs by f/u 2) TF initiated in < 24 hours 3) Re-initiate nutrition support to meet > 75% estimated needs by f/u   Nutrition Goal Status: Met/ Met/ New  Communication of RD Recs: (POC, sticky note, second sign, reviewed with RN )

## 2019-01-01 NOTE — PROGRESS NOTES
[facility]  Adult Nutrition  Progress Note    SUMMARY   Interventions: Parenteral Nutrition Therapy vs. Enteral nutrition therapy      Current Intake :   TPN D10 AA 4.25 @ 100 ml/hr   ( provides 2400 ml, 1224 kcal (70%EEN), and 102 g protein (>100%EPN))   + Glucerna 1.5 @ 10 ml/hr (Provides 360 kcal ( 20% EEN), and 20 g protein (27% EPN)    Recommendation:   1)  Change TPN to D15 AA 5 @ 60 ml/hr  (Add lipids once propofol decreased)  (with lipid or propofol at current rate Provides 5529-0778 kcal (96%EEN), and 84 g protein (100% EPN) and 1680 ml + lipid)     2) If pt able to resume TF, rec. continuous and raise HOB 90 degrees   Isosource 1.5 @15 ml/hr increasing to goal of 45 ml/hr + 155 ml flush q 4 hr  (Provides 820 ml free water, 1620 kcal (925EEN), 73 g protein (100%EPN))  - J-tube feedings would also decrease risk of aspiration     Goals: 1) TPN or TF to meet > 75% estimated needs by f/u 2) TF initiated in < 24 hours 3) Re-initiate nutrition support to meet > 75% estimated needs by f/u   Nutrition Goal Status: Met/ Met/ New  Communication of RD Recs: (POC, sticky note, second sign, reviewed with RN )     Reason for Assessment     Reason For Assessment: Follow-up  Diagnosis: (SOB)  Relevant Medical History: COPD, muscular dystrophy, PSA, BPH, limited mobility with walker, NH resident  Interdisciplinary Rounds: Did not attend     General Information Comments: 64 y/o male with muscular dystrophy from nursing home, with SOB, UTI, and aspiration PNA. Per SLP, rec. to keep pt NPO for now. No GIB per GI, only GED, inflammation, and stomach polyp. At time of visit pt was very drowsy and hard to understand, confused per RN. Unable to get accurate diet history. Attempted diet education for GERD. NPO x 2 day. NFPE done, mild-moderate muscle/fat wasting seen.   12/21/18 Pt failed swallow eval today rec. longterm NPO. Pt now agreeable to PEG. Now qualifies for acute malnutrition and PPN initiated to meet  "67% EEN.   18 Pt receiving PPN x 3 days but was not infusing at time of visit, sent second sign to add lipids to better meet pt's needs today until TF initiated. PEG placed today, per GI OK to initiate TF in 8 hours. No further bleeding.  18 Isosource started continuous but pt was curling up in a ball and causing himself to vomit so per discussion with RN, switched to bolus feedings yesterday. Pt tolerating x 24 hr.   18 Pt aspirated on bolus feedings  despite raising HOB (and had been vomiting with continuous feedings before this). Transferred to ICU. Pt RN/MD high aspiration risk and plan per MD to d/c TF and infuse TPN until pt off vent. Trickle feed was infusing at time of visit, not the recommended formula.     Nutrition Discharge Planning: To be determined- PEG Isosource 1.5 @ 45 ml/hr + 155 ml flush q 4 hr     Nutrition Risk Screen     Nutrition Risk Screen: dysphagia or difficulty swallowing     Nutrition/Diet History     Spiritual, Cultural Beliefs, Muslim Practices, Values that Affect Care: no  Food Allergies: NKFA(or intolerances)  Factors Affecting Nutritional Intake: NPO, + vent, aspiration     Anthropometrics     Height Method: Measured  Height: 5' 7"(Office 18)  Height (inches): 67 in  Weight Method: Bed Scale  Weight: 76.8 kg (18 71-77 kg fluctuation this admission)  Weight (lb): 156 lb  Ideal Body Weight (IBW), Male: 148 lb  % Ideal Body Weight, Male (lb): 105.91 lb  BMI (Calculated): 24.6 Admission  BMI Grade: 25 - 29.9 - overweight  Usual Body Weight (UBW), k kg(3/21/17)     Lab/Procedures/Meds     Pertinent Labs Reviewed: reviewed  BMP  Lab Results   Component Value Date     (H) 2019    K 3.3 (L) 2019     (H) 2019    CO2 24 2019    BUN 57 (H) 2019    CREATININE 2.1 (H) 2019    CALCIUM 8.8 2019    ANIONGAP 13 2019    ESTGFRAFRICA 37 (A) 2019    EGFRNONAA 32 (A) 2019     Lab Results "   Component Value Date    ALBUMIN 1.7 (L) 01/01/2019     AST elevated Albumin likely inaccurate    Pertinent Medications Reviewed: reviewed  Pertinent Medications Comments: KCl, TPN, statin, insulin, methylprednisolone, zofran. Propofol @ 17.3 ml/hr ( 456 kcal)     Estimated/Assessed Needs   Admission  Weight Used For Calorie Calculations: 71.1 kg (156 lb 12 oz)  Energy Calorie Requirements (kcal): [place] St Jeor x 1.2 = 1745 kcal  Energy Need Method: [place]-St Jeor  Protein Requirements: 1.0-1.2 g protein (muscle loss)= 71-85 g protein  Weight Used For Protein Calculations: 71.1 kg (156 lb 12 oz)  Fluid Requirements (mL): 1750 ml  Estimated Fluid Requirement Method: RDA Method  CHO Requirement: N/A        Nutrition Prescription Ordered     Current Diet Order: NPO +TPN  D10 AA 4.25 @ 100 ml/hr     Evaluation of Received Nutrient/Fluid Intake     Energy Calories Required: Meeting needs  Protein Required: Meeting needs  Fluid Required: Meeting needs  Tolerance: Tolerating  % Intake of Estimated Energy Needs: 100% TPN+ TF  % Meal Intake: TF/TPN     Nutrition Risk     Level of Risk/Frequency of Follow-up: moderate - high 2 x weekly      Assessment and Plan     Malnutrition of moderate degree    Contributing Nutrition Diagnosis  Moderate acute illness related malnutrition    Related to (etiology):   Swallowing/Chewing Difficulty    Signs and Symptoms (as evidenced by):   1) PO intakes < 50% of estimated needs x 5 days 2) Moderate muscle/fat loss @ temples and pattellar area/mild muscle/fat loss @ orbital area, bicep, clavicle, and calves    Interventions/Recommendations (treatment strategy):  1) TF or TPN to meet > 75% estimated needs @ f/u     Nutrition Diagnosis Status:   Improving            Monitor and Evaluation     Food and Nutrient Intake: energy intake  Food and Nutrient Adminstration: diet order, enteral and parenteral nutrition administration  Physical Activity and Function: nutrition-related ADLs and  IADLs  Anthropometric Measurements: weight  Biochemical Data, Medical Tests and Procedures: electrolyte and renal panel  Nutrition-Focused Physical Findings: overall appearance      Malnutrition Assessment  Skin (Micronutrient): (Man = 12, no PI noted, edema 3+ dependent)  Nails (Micronutrient): none  Hair/Scalp (Micronutrient): none  Eyes (Micronutrient): none  Gums (Micronutrient): none  Lips/Mucous Membranes (Micronutrient): none  Teeth (Micronutrient): (Missing teeth, has dentures at home)  Tongue (Micronutrient): none  Neck/Chest (Micronutrient): none, other (see comments)  Musculoskeletal/Lower Extremities: none   Micronutrient Evaluation: no deficiencies   Subcutaneous Fat (Malnutrition): mild depletion  Muscle Mass (Malnutrition): moderate depletion   Orbital Region (Subcutaneous Fat Loss): mild depletion  Upper Arm Region (Subcutaneous Fat Loss): mild depletion  Thoracic and Lumbar Region: well nourished   [place] Region (Muscle Loss): moderate depletion  Clavicle Bone Region (Muscle Loss): mild depletion  Scapular Bone Region (Muscle Loss): well nourished  Patellar Region (Muscle Loss): moderate depletion  Posterior Calf Region (Muscle Loss): mild depletion   Edema (Fluid Accumulation): 0-->no edema present   Subcutaneous Fat Loss (Final Summary): mild protein-calorie malnutrition  Muscle Loss Evaluation (Final Summary): moderate protein-calorie malnutrition       Nutrition Follow-Up     RD Follow-up?: Yes

## 2019-01-01 NOTE — PLAN OF CARE
Problem: Adult Inpatient Plan of Care  Goal: Plan of Care Review  Outcome: Ongoing (interventions implemented as appropriate)  Patient unable to be weaned off sedation to be extubated. Sedation turned off per  to extubate. Patient unable to wake up enough to extubate, plan was to hold off sedation and allow patient to wake up through the night. Patients HR increased to 141, resps in the high 40's, and BP elevated to 210/115. Sedation turned back on and patient now back on ventilator support instead of spont.  notified, will allow patient to be sedated and rest on vent to night. Will continue to monitor patient closely.

## 2019-01-01 NOTE — PROGRESS NOTES
Progress Note  Hospital Medicine  Patient Name:Enmanuel Armenta  MRN:  [id number]  Patient Class: IP- Inpatient  Admit Date: 12/17/2018  Length of Stay: 15 days  Expected Discharge Date:   Attending Physician: Stephanie Tena MD  Primary Care Provider:  Primary Doctor No    SUBJECTIVE:     Principal Problem: Aspiration pneumonia of both lower lobes  Initial history of present illness: Patient is a 63 y.o. male admitted to Hospitalist Service from [facility] Emergency Room with complaint of abdominal pain. Patient is a resident of [facility] nursing home, [place]. Patient reportedly has past medical history significant for COPD, muscular dystrophy, l;imited mobility with walker and history of elevated PSA. Patient presented with 1 day history of abdominal pain associated with nausea and vomiting. The EMS reported, vomiting a brown sticky substance with pale red streaks throughout. EMS gave him 4 mg of Zofran in route to the ED. Patient denied chest pain, shortness of breath, headache, vision changes, focal neuro-deficits, cough. Patient noted to have 101.5F upon arrival.     PMH/PSH/SH/FH/Meds: reviewed.    Symptoms/Review of Systems: In ICU, afebrile. Failed weaning trial yesterday. Patient seen and examined. Remains intubated and sedated. Vasopressor requirement lower.   Diet: Trickle feeding  Activity level: Up with assistance    Pain:  Moans to verbal commands     OBJECTIVE:   Vital Signs (Most Recent):      Temp: 98.3 °F (36.8 °C) (01/01/19 0311)  Pulse: 75 (01/01/19 0723)  Resp: 16 (01/01/19 0723)  BP: 102/61 (01/01/19 0600)  SpO2: 98 % (01/01/19 0723)       Vital Signs Range (Last 24H):  Temp:  [98 °F (36.7 °C)-98.5 °F (36.9 °C)]   Pulse:  [69-92]   Resp:  [16-20]   BP: (102-151)/(59-77)   SpO2:  [91 %-100 %]   Arterial Line BP: (109-160)/(45-68)     Weight: 76.8 kg (169 lb 5 oz)  Body mass index is 26.52 kg/m².    Intake/Output Summary (Last 24 hours) at 1/1/2019 0745  Last data filed at  1/1/2019 0500  Gross per 24 hour   Intake 1161.54 ml   Output 1350 ml   Net -188.46 ml     Physical Examination:  General appearance: well developed, appears stated age, chronically ill-appearing, intubated and sedated  Head: normocephalic, atraumatic  Eyes:  conjunctivae/corneas clear. PERRL.  Nose: Nares normal. Septum midline.  Throat: lips, mucosa, and tongue normal; teeth and gums normal, no throat erythema. + Dry blood at angle of mouth  Neck: supple, symmetrical, trachea midline, no JVD and thyroid not enlarged, symmetric, no tenderness/mass/nodules  Lungs:  Scattered rhonchi B/L.  Chest wall: no tenderness  Heart: regular rate and rhythm, S1, S2 normal, no murmur, click, rub or gallop  Abdomen: soft, epigastric tenderness, non-distented; bowel sounds normal; no masses,  no organomegaly. PEG site okay.  Extremities: no cyanosis, clubbing or edema.   Pulses: 2+ and symmetric  Skin: Skin color, texture, turgor normal. No rashes or lesions.  Lymph nodes: Cervical, supraclavicular, and axillary nodes normal.  Neurologic: Sedated    CBC:  Recent Labs   Lab 12/30/18  0316 12/31/18  0339 01/01/19  0310   WBC 17.60* 15.30* 14.40*   RBC 3.21* 3.01* 3.11*   HGB 9.4* 8.6* 8.9*   HCT 29.2* 27.2* 27.9*   * 398* 437*   MCV 91 90 90   MCH 29.3 28.5 28.7   MCHC 32.1 31.6* 32.0   BMP  Recent Labs   Lab 12/30/18  0316 12/31/18  0339 01/01/19  0310   * 279* 145*    146* 148*   K 3.1* 3.0* 3.3*    111* 111*   CO2 23 23 24   BUN 30* 41* 57*   CREATININE 1.7* 1.8* 2.1*   CALCIUM 8.3* 8.4* 8.8   MG 2.0 2.2 2.4      Diagnostic Results:  Microbiology Results (last 7 days)     Procedure Component Value Units Date/Time    Culture, Respiratory with Gram Stain [[id number]] Collected:  12/29/18 1325    Order Status:  Completed Specimen:  Sputum Updated:  12/31/18 1012     Respiratory Culture --     STAPHYLOCOCCUS AUREUS  Few  Susceptibility pending  Normal respiratory ruddy also present       Gram Stain  (Respiratory) <10 epithelial cells per low power field.     Gram Stain (Respiratory) Rare WBC's     Gram Stain (Respiratory) Rare Gram positive cocci    Culture, Respiratory with Gram Stain [[id number]]     Order Status:  No result Specimen:  Respiratory from Sputum, Expectorated          Chest X-Ray: T right basilar infiltrate.     CT abdomen and pelvis with contrast:  Enlarged prostate gland, air in the bladder likely secondary to catheterization, diffuse bladder wall thickening. Prominent amount of fluid within right colon.  Appendix not identified and note is made of limitation with motion artifact in the appendiceal region.    EGD:   - Moderately severe reflux esophagitis.                       - Medium-sized hiatal hernia.                       - Gastritis.                       - A single gastric polyp.                       - Normal examined duodenum.                       - No specimens collected.    CXR: New moderate left pleural effusion and left lung infiltrate.  Leftward mediastinal shift raising consideration for left lung atelectasis as contributory to the findings.  Close follow-up recommended.    CXR:   Unchanged bilateral lower lobe infiltrates.Heart size is now within normal limits.  Endotracheal tube and right central line in position.    CXR:   Increasing left lower lobe infiltrate and possible left pleural effusion.  Decreasing right base infiltrate.  Probable mild cardiomegaly obscured by the infiltrates.  Endotracheal tube and central line in position.    Assessment/Plan:      Acute hypoxic respiratory failure   Yes    Aspiration pneumonia, RLL [J69.0]  Swallow dysfunction s/p PEG placement  On ventilator. Management per pulmonary  Continue Zosyn/Vanc  Duonebs  Levophed to maintain MAP of 60  DC Lasix  DOn Solumedrol IV 40mg  daily     Hypernatremia - corrected  Follow BMP.    Hyperglycemia  Continue Levemir 10 daily    Hypokalemia  Replace     Yes    Upper GI bleeding [K92.2]  Follow H/H  closely. Type and screen blood and transfuse as needed.  Continue IV Protonix.  Use IV anti-emetics as needed.       Yes    Muscular dystrophy [G71.00]  Supportive care, fall precautions.     Severe malnutrition  Start IV TPN.    JUSTA  Continue IVF hydration. DC IV Lasix. Follow BMP.      Yes    Chronic obstructive pulmonary disease [J44.9]  Patient's COPD is controlled currently. Continue scheduled inhalers and monitor respiratory status closely.   Yes      DVT prophylaxis: Use SCD and TEDs. No anticoagulation due to GI bleeding.      Stephanie Tena MD  Department of Hospital Medicine   [facility]

## 2019-01-01 NOTE — PROGRESS NOTES
Progress Note  PULMONARY    Admit Date: 12/17/2018 01/01/2019      SUBJECTIVE:     Dec 29, sedate on vent.  Left side down.  Dec 30, sedated, 40% now  Dec 31 sedate, not arousing.  Jan 1, sedate,   PFSH and Allergies reviewed.    OBJECTIVE:     Vitals (Most recent):  Vitals:    01/01/19 1230   BP:    Pulse: 81   Resp: 17   Temp:        Vitals (24 hour range):  Temp:  [97.9 °F (36.6 °C)-98.5 °F (36.9 °C)]   Pulse:  [70-92]   Resp:  [16-20]   BP: ()/(56-77)   SpO2:  [91 %-100 %]   Arterial Line BP: (107-153)/(46-68)       Intake/Output Summary (Last 24 hours) at 1/1/2019 1410  Last data filed at 1/1/2019 1232  Gross per 24 hour   Intake 1820.36 ml   Output 951 ml   Net 869.36 ml          Physical Exam:  The patient's neuro status (alertness,orientation,cognitive function,motor skills,), pharyngeal exam (oral lesions, hygiene, abn dentition,), Neck (jvd,mass,thyroid,nodes in neck and above/below clavicle),RESPIRATORY(symmetry,effort,fremitus,percussion,auscultation),  Cor(rhythm,heart tones including gallops,perfusion,edema)ABD(distention,hepatic&splenomegaly,tenderness,masses), Skin(rash,cyanosis),Psyc(affect,judgement,).  Exam negative except for these pertinent findings:    Intubated on propofol.  No distress, nl percussion, symmetric, no edema.  Good bs,     Radiographs reviewed: view by direct vision  -/1 right lung clearing and left lung better.       Results for orders placed during the hospital encounter of 12/17/18   X-Ray Chest 1 View    Narrative EXAMINATION:  XR CHEST 1 VIEW    CLINICAL HISTORY:  resp failure;    TECHNIQUE:  Single frontal view of the chest was performed.    COMPARISON:  Chest portable of December 28, 2018.    FINDINGS:  An endotracheal tube and right central lines remain in position.  The cardiac size is decreased to normal.  Bilateral lower lobe infiltrates are unchanged.  No pneumothorax is seen.      Impression Unchanged bilateral lower lobe infiltrates.Heart size is now within  normal limits.  Endotracheal tube and right central line in position.      Electronically signed by: Wilman Casanova MD  Date:    12/29/2018  Time:    08:37   ]    Labs     Recent Labs   Lab 01/01/19  0310   WBC 14.40*   HGB 8.9*   HCT 27.9*   *   BAND 4.0   METAMYELOCYT 3.0     Recent Labs   Lab 01/01/19  0310   *   K 3.3*   *   CO2 24   BUN 57*   CREATININE 2.1*   *   CALCIUM 8.8   MG 2.4   PHOS 3.3   AST 44*   ALT 36   ALKPHOS 161*   BILITOT 0.3   PROT 5.5*   ALBUMIN 1.7*     Recent Labs   Lab 01/01/19  0351   PH 7.395   PCO2 41.8   PO2 73*   HCO3 25.6     Microbiology Results (last 7 days)     Procedure Component Value Units Date/Time    Culture, Respiratory with Gram Stain [[id number]]  (Susceptibility) Collected:  12/29/18 1325    Order Status:  Completed Specimen:  Sputum Updated:  01/01/19 1020     Respiratory Culture --     METHICILLIN RESISTANT STAPHYLOCOCCUS AUREUS  Few  Normal respiratory ruddy also present       Gram Stain (Respiratory) <10 epithelial cells per low power field.     Gram Stain (Respiratory) Rare WBC's     Gram Stain (Respiratory) Rare Gram positive cocci    Culture, Respiratory with Gram Stain [[id number]]     Order Status:  No result Specimen:  Respiratory from Sputum, Expectorated           Impression:  Active Hospital Problems    Diagnosis  POA    *Aspiration pneumonia of both lower lobes [J69.0]  Yes    Septic shock [A41.9, R65.21]  No    Acute respiratory failure with hypoxia and hypercarbia [J96.01, J96.02]  No    Smoker [F17.200]  Yes    Episodic confusion [R41.0]  Yes    Hematemesis [K92.0]  Yes    Dysphagia [R13.10]  Yes    Malnutrition of moderate degree [E44.0]  Yes    Elevated LFTs [R94.5]  Yes    Upper GI bleeding [K92.2]  Yes    Muscular dystrophy [G71.00]  Yes    Chronic obstructive pulmonary disease with acute lower respiratory infection [J44.0]  Yes      Resolved Hospital Problems    Diagnosis Date Resolved POA    Feeding difficulty  [R63.3] 12/23/2018 Yes    UTI (urinary tract infection) [N39.0] 12/23/2018 Yes    Hematemesis [K92.0] 12/23/2018 Yes    SOB (shortness of breath) [R06.02] 12/18/2018 Yes               Plan:   Dec 29- gas exchange is better but has very severe aspiration lung injury.  cxr no ards pattern.  Need sputum cultured - expect will take awhile to clear out lung abn.  [place] would seem poor given course.    Dec 30 40% 02 and cxr sl better.  Start wean trials.    Dec 31, right lung clear. Should wean and extubate.    Jan 1, 2018- bp/pulse up with wean,   cxr much improved, has abn neruo state chronically .  wean                            .

## 2019-01-01 NOTE — NURSING
Patient appears to be in afib-rvr.EKG obtained.  notified.  consulted per  for ECHO in am. Patient now in a-fib with , after metoprolol injection. See MAR for more details.

## 2019-01-01 NOTE — PLAN OF CARE
Problem: Adult Inpatient Plan of Care  Goal: Plan of Care Review  Outcome: Ongoing (interventions implemented as appropriate)  Remains intubated and sedated on propofol. Large amts oral secretions some bloody. Desats easily with turning. Takes awhile for pt to recover  Levophed remains off. BP stable. Tube feeds at 10 cc hr only. Residuals every 4 hours 40 cc. KCL rider given for replacement of 12/31 am potassium. Urine output adequate. Safety measures in place.

## 2019-01-01 NOTE — CONSULTS
Date: 12/31/2018   Enmanuel Armenta [id number] is a 63 y.o. male who has been consulted for vancomycin dosing.    The patient has the following labs:     Creatinine (mg/dl)    WBC Count   Serum creatinine: 1.8 mg/dL (H) 12/31/18 0339  Estimated creatinine clearance: 38.3 mL/min (A) Lab Results   Component Value Date    WBC 15.30 (H) 12/31/2018        Current weight is 73.3 kg (161 lb 9.6 oz)      Pt's renal function is trending up.  Pharmacy will hold vanc dose for 1630 today. Target goal is 15-20 mg/dL.   Vanc level 12/31/2018 at 1602 was 24.2 mg/dL.    A vancomycin level will be ordered on 1/1/19 am draw.      Patient will be followed by pharmacy for changes in renal function, toxicity, and efficacy.    Thank you for allowing us to participate in this patient's care.     Morgan An, Pharmacist

## 2019-01-01 NOTE — PROGRESS NOTES
12/31/18 1923   Patient Assessment/Suction   Level of Consciousness (AVPU) responds to pain   All Lung Fields Breath Sounds coarse   PRE-TX-O2-ETCO2   O2 Device (Oxygen Therapy) ventilator   Oxygen Concentration (%) 45   SpO2 96 %   Pulse Oximetry Type Continuous   ETCO2 (mmHg) 28 mmHg   Pulse 79   Resp 19   Aerosol Therapy   $ Aerosol Therapy Charges Aerosol Treatment   Respiratory Treatment Status (SVN) given   Treatment Route (SVN) in-line   Patient Position (SVN) HOB elevated   Post Treatment Assessment (SVN) breath sounds unchanged   Signs of Intolerance (SVN) none   Breath Sounds Post-Respiratory Treatment   Throughout All Fields Post-Treatment All Fields   Throughout All Fields Post-Treatment no change   Post-treatment Heart Rate (beats/min) 79   Post-treatment Resp Rate (breaths/min) 16   Vent Select   Conventional Vent Y   Charged w/in last 24h NO   Preset Conventional Ventilator Settings   Vent Type    Ventilation Type VC   Vent Mode A/C   Humidity HME   Set Rate 16 bmp   Vt Set 600 mL   PEEP/CPAP 5 cmH20   Pressure Support 0 cmH20   Waveform RAMP   Peak Flow 70 L/min   Set Inspiratory Pressure 0 cmH20   Insp Time 0 Sec(s)   Plateau Set/Insp. Hold (sec) 0   Insp Rise Time  0 %   Trigger Sensitivity Flow/I-Trigger 0.7 L/min   P High 0 cm H2O   P Low 0 cm H2O   T High 0 sec   T Low 0 sec   Patient Ventilator Parameters   Resp Rate Total 19 br/min   Peak Airway Pressure 22 cmH2O   Mean Airway Pressure 10 cmH20   Plateau Pressure 0 cmH20   Exhaled Vt 613 mL   Total Ve 11.7 mL   Spont Ve 0 L   I:E Ratio Measured 1:2.50   Conventional Ventilator Alarms   Alarms On Y   Ve High Alarm 27 L/min   Resp Rate High Alarm 0 br/min   Press High Alarm 60 cmH2O   Apnea Rate 10   Apnea Volume (mL) 620 mL   Apnea Oxygen Concentration  100   Apnea Flow Rate (L/min) 74   T Apnea 20 sec(s)   Ready to Wean/Extubation Screen   FIO2<=50 (chart decimal) 0.45   MV<16L (chart vol.) 11.7   PEEP <=8 (chart #) 5   Ready to Wean  Parameters   F/VT Ratio<105 (RSBI) (!) 31

## 2019-01-02 LAB
ALBUMIN SERPL BCP-MCNC: 1.5 G/DL
ALP SERPL-CCNC: 151 U/L
ALT SERPL W/O P-5'-P-CCNC: 28 U/L
ANION GAP SERPL CALC-SCNC: 12 MMOL/L
ANISOCYTOSIS BLD QL SMEAR: SLIGHT
AST SERPL-CCNC: 38 U/L
BACTERIA SPEC AEROBE CULT: NORMAL
BACTERIA SPEC AEROBE CULT: NORMAL
BASOPHILS # BLD AUTO: ABNORMAL K/UL
BASOPHILS NFR BLD: 0 %
BILIRUB SERPL-MCNC: 0.2 MG/DL
BUN SERPL-MCNC: 73 MG/DL
CALCIUM SERPL-MCNC: 8.7 MG/DL
CHLORIDE SERPL-SCNC: 111 MMOL/L
CO2 SERPL-SCNC: 21 MMOL/L
CREAT SERPL-MCNC: 2.5 MG/DL
DIFFERENTIAL METHOD: ABNORMAL
EOSINOPHIL # BLD AUTO: ABNORMAL K/UL
EOSINOPHIL NFR BLD: 2 %
ERYTHROCYTE [DISTWIDTH] IN BLOOD BY AUTOMATED COUNT: 14.6 %
EST. GFR  (AFRICAN AMERICAN): 30 ML/MIN/1.73 M^2
EST. GFR  (NON AFRICAN AMERICAN): 26 ML/MIN/1.73 M^2
GLUCOSE SERPL-MCNC: 238 MG/DL
GRAM STN SPEC: NORMAL
HCT VFR BLD AUTO: 27.3 %
HGB BLD-MCNC: 8.9 G/DL
LYMPHOCYTES # BLD AUTO: ABNORMAL K/UL
LYMPHOCYTES NFR BLD: 14 %
MAGNESIUM SERPL-MCNC: 2.5 MG/DL
MCH RBC QN AUTO: 29.1 PG
MCHC RBC AUTO-ENTMCNC: 32.5 G/DL
MCV RBC AUTO: 90 FL
METAMYELOCYTES NFR BLD MANUAL: 2 %
MONOCYTES # BLD AUTO: ABNORMAL K/UL
MONOCYTES NFR BLD: 1 %
NEUTROPHILS NFR BLD: 78 %
NEUTS BAND NFR BLD MANUAL: 3 %
OVALOCYTES BLD QL SMEAR: ABNORMAL
PHOSPHATE SERPL-MCNC: 4.5 MG/DL
PLATELET # BLD AUTO: 424 K/UL
PLATELET BLD QL SMEAR: ABNORMAL
PMV BLD AUTO: 10.3 FL
POCT GLUCOSE: 257 MG/DL (ref 70–110)
POCT GLUCOSE: 292 MG/DL (ref 70–110)
POCT GLUCOSE: 362 MG/DL (ref 70–110)
POIKILOCYTOSIS BLD QL SMEAR: SLIGHT
POTASSIUM SERPL-SCNC: 4.3 MMOL/L
PROT SERPL-MCNC: 5.2 G/DL
RBC # BLD AUTO: 3.05 M/UL
SODIUM SERPL-SCNC: 144 MMOL/L
TROPONIN I SERPL DL<=0.01 NG/ML-MCNC: 0.11 NG/ML
VANCOMYCIN SERPL-MCNC: 18.7 UG/ML
WBC # BLD AUTO: 11.6 K/UL

## 2019-01-02 PROCEDURE — 85027 COMPLETE CBC AUTOMATED: CPT

## 2019-01-02 PROCEDURE — 84100 ASSAY OF PHOSPHORUS: CPT

## 2019-01-02 PROCEDURE — 27201109 HC SYSTEM FECAL MANAGEMENT

## 2019-01-02 PROCEDURE — 25000242 PHARM REV CODE 250 ALT 637 W/ HCPCS: Performed by: INTERNAL MEDICINE

## 2019-01-02 PROCEDURE — 25000003 PHARM REV CODE 250: Performed by: INTERNAL MEDICINE

## 2019-01-02 PROCEDURE — 99900035 HC TECH TIME PER 15 MIN (STAT)

## 2019-01-02 PROCEDURE — 63600175 PHARM REV CODE 636 W HCPCS: Performed by: INTERNAL MEDICINE

## 2019-01-02 PROCEDURE — 27000221 HC OXYGEN, UP TO 24 HOURS

## 2019-01-02 PROCEDURE — 94003 VENT MGMT INPAT SUBQ DAY: CPT

## 2019-01-02 PROCEDURE — 99900026 HC AIRWAY MAINTENANCE (STAT)

## 2019-01-02 PROCEDURE — 94770 HC EXHALED C02 TEST: CPT

## 2019-01-02 PROCEDURE — 80202 ASSAY OF VANCOMYCIN: CPT

## 2019-01-02 PROCEDURE — 83735 ASSAY OF MAGNESIUM: CPT

## 2019-01-02 PROCEDURE — 99232 SBSQ HOSP IP/OBS MODERATE 35: CPT | Mod: ,,, | Performed by: INTERNAL MEDICINE

## 2019-01-02 PROCEDURE — 80053 COMPREHEN METABOLIC PANEL: CPT

## 2019-01-02 PROCEDURE — 20000000 HC ICU ROOM

## 2019-01-02 PROCEDURE — 99232 PR SUBSEQUENT HOSPITAL CARE,LEVL II: ICD-10-PCS | Mod: ,,, | Performed by: INTERNAL MEDICINE

## 2019-01-02 PROCEDURE — C9113 INJ PANTOPRAZOLE SODIUM, VIA: HCPCS | Performed by: INTERNAL MEDICINE

## 2019-01-02 PROCEDURE — 84484 ASSAY OF TROPONIN QUANT: CPT

## 2019-01-02 PROCEDURE — 85007 BL SMEAR W/DIFF WBC COUNT: CPT

## 2019-01-02 PROCEDURE — 36415 COLL VENOUS BLD VENIPUNCTURE: CPT

## 2019-01-02 PROCEDURE — 94640 AIRWAY INHALATION TREATMENT: CPT

## 2019-01-02 PROCEDURE — 94761 N-INVAS EAR/PLS OXIMETRY MLT: CPT

## 2019-01-02 RX ORDER — INSULIN ASPART 100 [IU]/ML
0-5 INJECTION, SOLUTION INTRAVENOUS; SUBCUTANEOUS EVERY 4 HOURS PRN
Status: DISCONTINUED | OUTPATIENT
Start: 2019-01-02 | End: 2019-01-03

## 2019-01-02 RX ORDER — ENOXAPARIN SODIUM 100 MG/ML
40 INJECTION SUBCUTANEOUS
Status: DISCONTINUED | OUTPATIENT
Start: 2019-01-03 | End: 2019-01-04

## 2019-01-02 RX ORDER — GLUCAGON 1 MG
1 KIT INJECTION
Status: DISCONTINUED | OUTPATIENT
Start: 2019-01-02 | End: 2019-01-03

## 2019-01-02 RX ADMIN — IPRATROPIUM BROMIDE AND ALBUTEROL SULFATE 3 ML: .5; 3 SOLUTION RESPIRATORY (INHALATION) at 03:01

## 2019-01-02 RX ADMIN — PIPERACILLIN SODIUM AND TAZOBACTAM SODIUM 4.5 G: 4; .5 INJECTION, POWDER, LYOPHILIZED, FOR SOLUTION INTRAVENOUS at 04:01

## 2019-01-02 RX ADMIN — CLOPIDOGREL BISULFATE 75 MG: 75 TABLET ORAL at 08:01

## 2019-01-02 RX ADMIN — ASPIRIN 81 MG CHEWABLE TABLET 81 MG: 81 TABLET CHEWABLE at 08:01

## 2019-01-02 RX ADMIN — VANCOMYCIN HYDROCHLORIDE 750 MG: 750 INJECTION, POWDER, LYOPHILIZED, FOR SOLUTION INTRAVENOUS at 04:01

## 2019-01-02 RX ADMIN — METHYLPREDNISOLONE SODIUM SUCCINATE 40 MG: 40 INJECTION, POWDER, FOR SOLUTION INTRAMUSCULAR; INTRAVENOUS at 08:01

## 2019-01-02 RX ADMIN — PANTOPRAZOLE SODIUM 40 MG: 40 INJECTION, POWDER, LYOPHILIZED, FOR SOLUTION INTRAVENOUS at 08:01

## 2019-01-02 RX ADMIN — NYSTATIN 500000 UNITS: 500000 SUSPENSION ORAL at 01:01

## 2019-01-02 RX ADMIN — INSULIN ASPART 3 UNITS: 100 INJECTION, SOLUTION INTRAVENOUS; SUBCUTANEOUS at 05:01

## 2019-01-02 RX ADMIN — PROPOFOL 30 MCG/KG/MIN: 10 INJECTION, EMULSION INTRAVENOUS at 04:01

## 2019-01-02 RX ADMIN — ATORVASTATIN CALCIUM 80 MG: 40 TABLET, FILM COATED ORAL at 09:01

## 2019-01-02 RX ADMIN — NYSTATIN 500000 UNITS: 500000 SUSPENSION ORAL at 05:01

## 2019-01-02 RX ADMIN — INSULIN DETEMIR 10 UNITS: 100 INJECTION, SOLUTION SUBCUTANEOUS at 08:01

## 2019-01-02 RX ADMIN — PROPOFOL 8 MCG/KG/MIN: 10 INJECTION, EMULSION INTRAVENOUS at 02:01

## 2019-01-02 RX ADMIN — IPRATROPIUM BROMIDE AND ALBUTEROL SULFATE 3 ML: .5; 3 SOLUTION RESPIRATORY (INHALATION) at 11:01

## 2019-01-02 RX ADMIN — ASCORBIC ACID, VITAMIN A PALMITATE, CHOLECALCIFEROL, THIAMINE HYDROCHLORIDE, RIBOFLAVIN-5 PHOSPHATE SODIUM, PYRIDOXINE HYDROCHLORIDE, NIACINAMIDE, DEXPANTHENOL, ALPHA-TOCOPHEROL ACETATE, VITAMIN K1, FOLIC ACID, BIOTIN, CYANOCOBALAMIN: 200; 3300; 200; 6; 3.6; 6; 40; 15; 10; 150; 600; 60; 5 INJECTION, SOLUTION INTRAVENOUS at 02:01

## 2019-01-02 RX ADMIN — IPRATROPIUM BROMIDE AND ALBUTEROL SULFATE 3 ML: .5; 3 SOLUTION RESPIRATORY (INHALATION) at 07:01

## 2019-01-02 RX ADMIN — PIPERACILLIN SODIUM AND TAZOBACTAM SODIUM 4.5 G: 4; .5 INJECTION, POWDER, LYOPHILIZED, FOR SOLUTION INTRAVENOUS at 01:01

## 2019-01-02 RX ADMIN — IPRATROPIUM BROMIDE AND ALBUTEROL SULFATE 3 ML: .5; 3 SOLUTION RESPIRATORY (INHALATION) at 12:01

## 2019-01-02 RX ADMIN — ENOXAPARIN SODIUM 80 MG: 100 INJECTION SUBCUTANEOUS at 08:01

## 2019-01-02 RX ADMIN — PIPERACILLIN SODIUM AND TAZOBACTAM SODIUM 4.5 G: 4; .5 INJECTION, POWDER, LYOPHILIZED, FOR SOLUTION INTRAVENOUS at 09:01

## 2019-01-02 NOTE — PROGRESS NOTES
Pt armas has large amount of thick sediment, nurse last night stated she had to flush armas just in case so it wouldn't stop up. I will talk to md about changing cath out for new one due to build up on tubing.

## 2019-01-02 NOTE — PLAN OF CARE
Problem: Adult Inpatient Plan of Care  Goal: Plan of Care Review  Outcome: Ongoing (interventions implemented as appropriate)  Pt on vent as ordered with Q4 duoneb treatments.

## 2019-01-02 NOTE — PLAN OF CARE
Problem: Adult Inpatient Plan of Care  Goal: Plan of Care Review  Outcome: Ongoing (interventions implemented as appropriate)  Maintained on Vent through the night. Titrating Diprovan drip down. Still in NSR 80's. Blood sugars elevating. Samson needing to be flushed to keep patent due to sediment. Bed on rotation and safety maintained.

## 2019-01-02 NOTE — PROGRESS NOTES
Pt has brown liquid pulled from peg tube with appearance of coffee grounds fine and few. He also had mouth bleeding with last oral care I also removed a large clot in his mouth,.

## 2019-01-02 NOTE — CONSULTS
Enmanuel Armenta [id number] is a 63 y.o. male who has been consulted for vancomycin dosing.    The patient has the following labs:     Date Creatinine (mg/dl)    BUN WBC Count   1/2/2019 Estimated Creatinine Clearance: 27.5 mL/min (A) (based on SCr of 2.5 mg/dL (H)). Lab Results   Component Value Date    BUN 73 (H) 01/02/2019     Lab Results   Component Value Date    WBC 11.60 01/02/2019        Current weight is 76.8 kg (169 lb 5 oz)    Vancomycin level from 1/2 at 0310 was 18.7 mg/dL. The Pt will be started on vancomycin 750 mg every 48 hours. A vancomycin trough has been ordered prior to next dose on 1/4 at 1530.      Patient will be followed by pharmacy for changes in renal function, toxicity, and efficacy.  Thank you for allowing us to participate in this patient's care.     Andrew Murray

## 2019-01-02 NOTE — PROGRESS NOTES
01/01/19 1917   Patient Assessment/Suction   Level of Consciousness (AVPU) responds to pain   All Lung Fields Breath Sounds coarse   PRE-TX-O2-ETCO2   O2 Device (Oxygen Therapy) ventilator   Oxygen Concentration (%) 65   SpO2 100 %   Pulse Oximetry Type Continuous   ETCO2 (mmHg) 23 mmHg   Pulse 106   Resp (!) 23   Aerosol Therapy   $ Aerosol Therapy Charges Aerosol Treatment   Respiratory Treatment Status (SVN) given   Treatment Route (SVN) in-line   Patient Position (SVN) HOB elevated   Post Treatment Assessment (SVN) breath sounds unchanged   Signs of Intolerance (SVN) none   Breath Sounds Post-Respiratory Treatment   Throughout All Fields Post-Treatment All Fields   Throughout All Fields Post-Treatment no change   Post-treatment Heart Rate (beats/min) 104   Post-treatment Resp Rate (breaths/min) 16   Vent Select   Conventional Vent Y   Charged w/in last 24h YES   Preset Conventional Ventilator Settings   Vent Type    Ventilation Type VC   Vent Mode A/C   Humidity HME   Set Rate 16 bmp   Vt Set 600 mL   PEEP/CPAP 5 cmH20   Pressure Support 0 cmH20   Waveform RAMP   Peak Flow 70 L/min   Set Inspiratory Pressure 0 cmH20   Insp Time 0 Sec(s)   Plateau Set/Insp. Hold (sec) 0   Insp Rise Time  0 %   Trigger Sensitivity Flow/I-Trigger 0.7 L/min   P High 0 cm H2O   P Low 0 cm H2O   T High 0 sec   T Low 0 sec   Patient Ventilator Parameters   Resp Rate Total 23 br/min   Peak Airway Pressure 25 cmH2O   Mean Airway Pressure 12 cmH20   Plateau Pressure 0 cmH20   Exhaled Vt 617 mL   Total Ve 13.9 mL   Spont Ve 0 L   I:E Ratio Measured 1:1.90   Conventional Ventilator Alarms   Alarms On Y   Ve High Alarm 27 L/min   Resp Rate High Alarm 0 br/min   Press High Alarm 60 cmH2O   Apnea Rate 10   Apnea Volume (mL) 620 mL   Apnea Oxygen Concentration  100   Apnea Flow Rate (L/min) 74   T Apnea 20 sec(s)   Ready to Wean/Extubation Screen   FIO2<=50 (chart decimal) (!) 0.65   MV<16L (chart vol.) 13.9   PEEP <=8 (chart #) 5    Ready to Wean Parameters   F/VT Ratio<105 (RSBI) (!) 37.28

## 2019-01-02 NOTE — PLAN OF CARE
Problem: Adult Inpatient Plan of Care  Goal: Plan of Care Review  Outcome: Ongoing (interventions implemented as appropriate)  Pt has been on cpap all day doing very well, sedation at very minimal.urine output good, has some oral bleeding this afternoon. Has large amount of liquid stools black in color.sinus rhythm all day .

## 2019-01-02 NOTE — NURSING
called and got update on HR and BP. Order received for Cardizem bolus and drip.Confirmed LCUILLE order for AM. He will see patient in AM.

## 2019-01-02 NOTE — PHYSICIAN QUERY
PT Name: Enmanuel Armenta  MR #: [id number]    Physician Query Form -Present on Admission (POA) Diagnosis Clarification     CDS/: Zainab Suh RN CDI              Contact information: [email or web address]    This form is a permanent document in the medical record.     Query Date: January 2, 2019    By submitting this query, we are merely seeking further clarification of documentation. Please utilize your independent clinical judgment when addressing the question(s) below.       The Medical record contains the following:    Diagnosis      Supporting Clinical Information   Location in Medical Record   Acute hypoxic respiratory failure         CXR: New moderate left pleural effusion and left lung infiltrate.  Leftward mediastinal shift raising consideration for left lung atelectasis as contributory to the findings.    Acute hypoxic respiratory failure    POA status Yes  Aspiration pneumonia, POA status - Yes  Swallow dysfunction s/p PEG placement  Patient could not tolerate BiPAP,   ET placement and started on mechanical ventilation. Follow Dr. Marquez's recommendations  NPO.  Continue beta 2 agonist bronchodilator treatments.   Treated.  Keep at 30 degrees HOB.     SOB (shortness of breath)  Aspiration Pneumonia  Supplemental O2 via nasal canula; titrate O2 saturation to >92%.     Intubated via 8.0 oral ETT placed on       History of Present Illness: .   Pt presented abd pain 12/17, had n/v and fever and infiltrates at presentation. Pt was smoker pta- eval by speech showed aspiration.  Peg place , therapy given, and placement planned.       earlier today SHERITA Power noted:During rounds pt noted to have reposition self down low in bed, audible gargling noted and rhonchi auscultated, lung sounds was clear at 0730, 40cc residual aspirated from peg tube, suction mouth, stop feeding and pull pt back up to HOB, HOB elevated at 40 degrees 02 4L NC in progress, sat 86%, MD made aware   1200- respiratory notified, deep  "nasal suction perform, non-rebreather place, sat 96%, mitts applied due to pt constant removal of mask, will follow through MD new orders     Pt was transferred to icu where pt intubated.  Pt had drop blood pressure and needed high fi02.     Pt currently intubated and sedated.   Hospital medicine 12/28 940 am        Hospital medicine 12/28 940 am                      H&P and on Hospital medicine note  12/27 418 pm      Respiratory therapy note 12/28 144 pm    Pulmonary note 12/28 301 pm         Doctor, Please specify Present On Admission (POA) status of "Acute hypoxic respiratory failure."    [  ] Present on Admission      [ x ] Not Present on Admission     [  ] Clinically Undetermined               "

## 2019-01-02 NOTE — PROGRESS NOTES
Progress Note  PULMONARY    Admit Date: 12/17/2018 01/02/2019      SUBJECTIVE:     Dec 29, sedate on vent.  Left side down.  Dec 30, sedated, 40% now  Dec 31 sedate, not arousing.  Jan 1, sedate,   1/2 - a fib with decreased sedation after promising progress weaning when sedate.  Calm now    PFSH and Allergies reviewed.    OBJECTIVE:     Vitals (Most recent):  Vitals:    01/02/19 0710   BP:    Pulse: 81   Resp: (!) 24   Temp:        Vitals (24 hour range):  Temp:  [97.8 °F (36.6 °C)-98.9 °F (37.2 °C)]   Pulse:  []   Resp:  [16-33]   BP: ()/(50-80)   SpO2:  [88 %-100 %]   Arterial Line BP: ()/(43-80)       Intake/Output Summary (Last 24 hours) at 1/2/2019 0838  Last data filed at 1/2/2019 0600  Gross per 24 hour   Intake 2610.69 ml   Output 2735 ml   Net -124.31 ml          Physical Exam:  The patient's neuro status (alertness,orientation,cognitive function,motor skills,), pharyngeal exam (oral lesions, hygiene, abn dentition,), Neck (jvd,mass,thyroid,nodes in neck and above/below clavicle),RESPIRATORY(symmetry,effort,fremitus,percussion,auscultation),  Cor(rhythm,heart tones including gallops,perfusion,edema)ABD(distention,hepatic&splenomegaly,tenderness,masses), Skin(rash,cyanosis),Psyc(affect,judgement,).  Exam negative except for these pertinent findings:    Intubated on propofol.  No distress, nl percussion, symmetric, no edema.  Good bs,     Radiographs reviewed: view by direct vision  1/2 lungs have improved      Results for orders placed during the hospital encounter of 12/17/18   X-Ray Chest 1 View    Narrative EXAMINATION:  XR CHEST 1 VIEW    CLINICAL HISTORY:  resp failure;    TECHNIQUE:  Single frontal view of the chest was performed.    COMPARISON:  Chest portable of December 28, 2018.    FINDINGS:  An endotracheal tube and right central lines remain in position.  The cardiac size is decreased to normal.  Bilateral lower lobe infiltrates are unchanged.  No pneumothorax is seen.       Impression Unchanged bilateral lower lobe infiltrates.Heart size is now within normal limits.  Endotracheal tube and right central line in position.      Electronically signed by: Wilman Casanova MD  Date:    12/29/2018  Time:    08:37   ]    Labs     Recent Labs   Lab 01/02/19 0311   WBC 11.60   HGB 8.9*   HCT 27.3*   *   BAND 3.0   METAMYELOCYT 2.0     Recent Labs   Lab 01/02/19 0310 01/02/19 0311     --    K 4.3  --    *  --    CO2 21*  --    BUN 73*  --    CREATININE 2.5*  --    *  --    CALCIUM 8.7  --    MG 2.5  --    PHOS 4.5  --    AST 38  --    ALT 28  --    ALKPHOS 151*  --    BILITOT 0.2  --    PROT 5.2*  --    ALBUMIN 1.5*  --    TROPONINI  --  0.106*     No results for input(s): PH, PCO2, PO2, HCO3 in the last 24 hours.  Microbiology Results (last 7 days)     Procedure Component Value Units Date/Time    Culture, Respiratory with Gram Stain [[id number]]  (Susceptibility) Collected:  12/29/18 1325    Order Status:  Completed Specimen:  Sputum Updated:  01/01/19 1020     Respiratory Culture --     METHICILLIN RESISTANT STAPHYLOCOCCUS AUREUS  Few  Normal respiratory ruddy also present       Gram Stain (Respiratory) <10 epithelial cells per low power field.     Gram Stain (Respiratory) Rare WBC's     Gram Stain (Respiratory) Rare Gram positive cocci    Culture, Respiratory with Gram Stain [[id number]]     Order Status:  No result Specimen:  Respiratory from Sputum, Expectorated           Impression:  Active Hospital Problems    Diagnosis  POA    *Aspiration pneumonia of both lower lobes [J69.0]  Yes    Septic shock [A41.9, R65.21]  No    Acute respiratory failure with hypoxia and hypercarbia [J96.01, J96.02]  No    Smoker [F17.200]  Yes    Episodic confusion [R41.0]  Yes    Hematemesis [K92.0]  Yes    Dysphagia [R13.10]  Yes    Malnutrition of moderate degree [E44.0]  Yes    Elevated LFTs [R94.5]  Yes    Upper GI bleeding [K92.2]  Yes    Muscular dystrophy [G71.00]  Yes     Chronic obstructive pulmonary disease with acute lower respiratory infection [J44.0]  Yes      Resolved Hospital Problems    Diagnosis Date Resolved POA    Feeding difficulty [R63.3] 12/23/2018 Yes    UTI (urinary tract infection) [N39.0] 12/23/2018 Yes    Hematemesis [K92.0] 12/23/2018 Yes    SOB (shortness of breath) [R06.02] 12/18/2018 Yes               Plan:   Dec 29- gas exchange is better but has very severe aspiration lung injury.  cxr no ards pattern.  Need sputum cultured - expect will take awhile to clear out lung abn.  [place] would seem poor given course.    Dec 30 40% 02 and cxr sl better.  Start wean trials.    Dec 31, right lung clear. Should wean and extubate.    Jan 1, 2018- bp/pulse up with wean,   cxr much improved, has abn neruo state chronically .  wean    1/2- mrsa found with clearing cxr.  Suspect not significant wrt to pneumonia but colonizer.  Will rx anyhow given global picture.  Course vanc.  [place] remains very poor given his course.  Wean as able.                        .

## 2019-01-02 NOTE — PROGRESS NOTES
Progress Note  Hospital Medicine  Patient Name:Enmanuel Armenta  MRN:  [id number]  Patient Class: IP- Inpatient  Admit Date: 12/17/2018  Length of Stay: 16 days  Expected Discharge Date:   Attending Physician: Stephanie Tena MD  Primary Care Provider:  Primary Doctor No    SUBJECTIVE:     Principal Problem: Aspiration pneumonia of both lower lobes  Initial history of present illness: Patient is a 63 y.o. male admitted to Hospitalist Service from [facility] Emergency Room with complaint of abdominal pain. Patient is a resident of [facility], [place]. Patient reportedly has past medical history significant for COPD, muscular dystrophy, l;imited mobility with walker and history of elevated PSA. Patient presented with 1 day history of abdominal pain associated with nausea and vomiting. The EMS reported, vomiting a brown sticky substance with pale red streaks throughout. EMS gave him 4 mg of Zofran in route to the ED. Patient denied chest pain, shortness of breath, headache, vision changes, focal neuro-deficits, cough. Patient noted to have 101.5F upon arrival.     PMH/PSH/SH/FH/Meds: reviewed.    Symptoms/Review of Systems: In ICU, afebrile. Failed weaning trial yesterday. Getting CPAP trial. Patient had episode of transient AF with RVR last evening, converted back to NSR. Patient seen and examined. Remains intubated and sedated.   Diet: Trickle feeding + TPN  Activity level: Up with assistance    Pain:  Moans to verbal commands     OBJECTIVE:   Vital Signs (Most Recent):      Temp: 97.8 °F (36.6 °C) (01/02/19 0400)  Pulse: 81 (01/02/19 0710)  Resp: (!) 24 (01/02/19 0710)  BP: (!) 94/52 (01/02/19 0700)  SpO2: 97 % (01/02/19 0710)       Vital Signs Range (Last 24H):  Temp:  [97.8 °F (36.6 °C)-98.9 °F (37.2 °C)]   Pulse:  []   Resp:  [16-33]   BP: ()/(50-80)   SpO2:  [88 %-100 %]   Arterial Line BP: ()/(43-80)     Weight: 76.8 kg (169 lb 5 oz)  Body mass index is 26.52  kg/m².    Intake/Output Summary (Last 24 hours) at 1/2/2019 0904  Last data filed at 1/2/2019 0600  Gross per 24 hour   Intake 2610.69 ml   Output 2735 ml   Net -124.31 ml     Physical Examination:  General appearance: well developed, appears stated age, chronically ill-appearing, intubated and sedated  Head: normocephalic, atraumatic  Eyes:  conjunctivae/corneas clear. PERRL.  Nose: Nares normal. Septum midline.  Throat: lips, mucosa, and tongue normal; teeth and gums normal, no throat erythema. + Dry blood at angle of mouth  Neck: supple, symmetrical, trachea midline, no JVD and thyroid not enlarged, symmetric, no tenderness/mass/nodules  Lungs:  Scattered rhonchi B/L.  Chest wall: no tenderness  Heart: regular rate and rhythm, S1, S2 normal, no murmur, click, rub or gallop  Abdomen: soft, epigastric tenderness, non-distented; bowel sounds normal; no masses,  no organomegaly. PEG site okay.  Extremities: no cyanosis, clubbing or edema.   Pulses: 2+ and symmetric  Skin: Skin color, texture, turgor normal. No rashes or lesions.  Lymph nodes: Cervical, supraclavicular, and axillary nodes normal.  Neurologic: Sedated    CBC:  Recent Labs   Lab 12/31/18  0339 01/01/19  0310 01/02/19  0311   WBC 15.30* 14.40* 11.60   RBC 3.01* 3.11* 3.05*   HGB 8.6* 8.9* 8.9*   HCT 27.2* 27.9* 27.3*   * 437* 424*   MCV 90 90 90   MCH 28.5 28.7 29.1   MCHC 31.6* 32.0 32.5   BMP  Recent Labs   Lab 01/01/19  0310 01/01/19  1737 01/02/19  0310   * 238* 238*   * 145 144   K 3.3* 3.1* 4.3   * 113* 111*   CO2 24 20* 21*   BUN 57* 62* 73*   CREATININE 2.1* 2.2* 2.5*   CALCIUM 8.8 8.2* 8.7   MG 2.4 2.2 2.5      Diagnostic Results:  Microbiology Results (last 7 days)     Procedure Component Value Units Date/Time    Culture, Respiratory with Gram Stain [[id number]]  (Susceptibility) Collected:  12/29/18 1325    Order Status:  Completed Specimen:  Sputum Updated:  01/01/19 1020     Respiratory Culture --     METHICILLIN  RESISTANT STAPHYLOCOCCUS AUREUS  Few  Normal respiratory ruddy also present       Gram Stain (Respiratory) <10 epithelial cells per low power field.     Gram Stain (Respiratory) Rare WBC's     Gram Stain (Respiratory) Rare Gram positive cocci    Culture, Respiratory with Gram Stain [[id number]]     Order Status:  No result Specimen:  Respiratory from Sputum, Expectorated          Chest X-Ray: T right basilar infiltrate.     CT abdomen and pelvis with contrast:  Enlarged prostate gland, air in the bladder likely secondary to catheterization, diffuse bladder wall thickening. Prominent amount of fluid within right colon.  Appendix not identified and note is made of limitation with motion artifact in the appendiceal region.    EGD:   - Moderately severe reflux esophagitis.                       - Medium-sized hiatal hernia.                       - Gastritis.                       - A single gastric polyp.                       - Normal examined duodenum.                       - No specimens collected.    CXR: New moderate left pleural effusion and left lung infiltrate.  Leftward mediastinal shift raising consideration for left lung atelectasis as contributory to the findings.  Close follow-up recommended.    CXR:   Unchanged bilateral lower lobe infiltrates.Heart size is now within normal limits.  Endotracheal tube and right central line in position.    CXR:   Increasing left lower lobe infiltrate and possible left pleural effusion.  Decreasing right base infiltrate.  Probable mild cardiomegaly obscured by the infiltrates.  Endotracheal tube and central line in position.    CXR:   Stable, appropriate positioning of support devices.  Stable appearance of the chest demonstrating right basilar atelectasis or infiltrate and retrocardiac consolidation and small left pleural effusion.    Assessment/Plan:      Acute hypoxic respiratory failure   Yes    Aspiration pneumonia, RLL with MRSA sp [J69.0]  Swallow dysfunction s/p  PEG placement  On ventilator. Management per pulmonary  Continue Zosyn/Vanc  Duonebs  Levophed to maintain MAP of 60  On Solumedrol IV 40mg  daily     Hypernatremia - corrected  Follow BMP.    Atrial fibrillation with RVR  Discussed with Dr. Reyna, now in NSR, he recommends to downgrade lovenox 40 mg daily. Follow ECHO results.    Hyperglycemia  Continue Levemir 10 daily    Hypokalemia - corrected  Follow BMP.     Yes    Upper GI bleeding [K92.2]  Follow H/H closely. Type and screen blood and transfuse as needed.  Continue IV Protonix.  Use IV anti-emetics as needed.       Yes    Muscular dystrophy [G71.00]  Supportive care, fall precautions.     Severe malnutrition  Continue IV TPN.    JUSTA  Continue IVF hydration. Follow BMP. Change Samson catheter, consult nephrologist.       Yes    Chronic obstructive pulmonary disease [J44.9]  Patient's COPD is controlled currently. Continue scheduled inhalers and monitor respiratory status closely.   Yes      DVT prophylaxis: Use SCD and TEDs. No anticoagulation due to GI bleeding.      Stephanie Tena MD  Department of Hospital Medicine   [facility]

## 2019-01-03 ENCOUNTER — ANESTHESIA EVENT (OUTPATIENT)
Dept: ENDOSCOPY | Facility: HOSPITAL | Age: 64
DRG: 870 | End: 2019-01-03
Payer: MEDICARE

## 2019-01-03 ENCOUNTER — ANESTHESIA (OUTPATIENT)
Dept: ENDOSCOPY | Facility: HOSPITAL | Age: 64
DRG: 870 | End: 2019-01-03
Payer: MEDICARE

## 2019-01-03 ENCOUNTER — OUTSIDE PLACE OF SERVICE (OUTPATIENT)
Dept: PULMONOLOGY | Facility: CLINIC | Age: 64
End: 2019-01-03
Payer: MEDICARE

## 2019-01-03 LAB
ALBUMIN SERPL BCP-MCNC: 1.5 G/DL
ALLENS TEST: ABNORMAL
ALP SERPL-CCNC: 165 U/L
ALT SERPL W/O P-5'-P-CCNC: 32 U/L
ANION GAP SERPL CALC-SCNC: 11 MMOL/L
ANISOCYTOSIS BLD QL SMEAR: SLIGHT
AORTIC ROOT ANNULUS: 2.84 CM
AORTIC VALVE CUSP SEPERATION: 1.99 CM
AST SERPL-CCNC: 37 U/L
AV INDEX (PROSTH): 0.59
AV MEAN GRADIENT: 7.02 MMHG
AV PEAK GRADIENT: 11.56 MMHG
AV VALVE AREA: 1.8 CM2
BASOPHILS # BLD AUTO: ABNORMAL K/UL
BASOPHILS NFR BLD: 0 %
BILIRUB SERPL-MCNC: 0.2 MG/DL
BSA FOR ECHO PROCEDURE: 1.83 M2
BUN SERPL-MCNC: 73 MG/DL
CA-I BLDV-SCNC: 1.19 MMOL/L
CALCIUM SERPL-MCNC: 9 MG/DL
CHLORIDE SERPL-SCNC: 114 MMOL/L
CO2 SERPL-SCNC: 21 MMOL/L
CREAT SERPL-MCNC: 2.1 MG/DL
CV ECHO LV RWT: 0.39 CM
DELSYS: ABNORMAL
DIFFERENTIAL METHOD: ABNORMAL
DOP CALC AO PEAK VEL: 1.7 M/S
DOP CALC AO VTI: 30.32 CM
DOP CALC LVOT AREA: 3.05 CM2
DOP CALC LVOT DIAMETER: 1.97 CM
DOP CALC LVOT STROKE VOLUME: 54.47 CM3
DOP CALCLVOT PEAK VEL VTI: 17.88 CM
E WAVE DECELERATION TIME: 194.02 MSEC
E/A RATIO: 0.98
E/E' RATIO: 11.29
ECHO LV POSTERIOR WALL: 1.03 CM (ref 0.6–1.1)
EOSINOPHIL # BLD AUTO: ABNORMAL K/UL
EOSINOPHIL NFR BLD: 1 %
EP: 5
ERYTHROCYTE [DISTWIDTH] IN BLOOD BY AUTOMATED COUNT: 15.6 %
EST. GFR  (AFRICAN AMERICAN): 37 ML/MIN/1.73 M^2
EST. GFR  (NON AFRICAN AMERICAN): 32 ML/MIN/1.73 M^2
FIO2: 40
FRACTIONAL SHORTENING: 24 % (ref 28–44)
GLUCOSE SERPL-MCNC: 375 MG/DL
GLUCOSE SERPL-MCNC: 631 MG/DL
HCO3 UR-SCNC: 21.8 MMOL/L (ref 24–28)
HCT VFR BLD AUTO: 25.4 %
HCT VFR BLD AUTO: 26.3 %
HGB BLD-MCNC: 8.1 G/DL
HGB BLD-MCNC: 8.5 G/DL
INTERVENTRICULAR SEPTUM: 0.96 CM (ref 0.6–1.1)
IP: 10
IVRT: 0.06 MSEC
LEFT ATRIUM SIZE: 3.28 CM
LEFT INTERNAL DIMENSION IN SYSTOLE: 4.02 CM (ref 2.1–4)
LEFT VENTRICLE DIASTOLIC VOLUME INDEX: 72.17 ML/M2
LEFT VENTRICLE DIASTOLIC VOLUME: 135.95 ML
LEFT VENTRICLE MASS INDEX: 106 G/M2
LEFT VENTRICLE SYSTOLIC VOLUME INDEX: 37.6 ML/M2
LEFT VENTRICLE SYSTOLIC VOLUME: 70.81 ML
LEFT VENTRICULAR INTERNAL DIMENSION IN DIASTOLE: 5.31 CM (ref 3.5–6)
LEFT VENTRICULAR MASS: 199.69 G
LV LATERAL E/E' RATIO: 13.17
LV SEPTAL E/E' RATIO: 9.88
LYMPHOCYTES # BLD AUTO: ABNORMAL K/UL
LYMPHOCYTES NFR BLD: 14 %
MAGNESIUM SERPL-MCNC: 2.4 MG/DL
MCH RBC QN AUTO: 29 PG
MCHC RBC AUTO-ENTMCNC: 32.4 G/DL
MCV RBC AUTO: 89 FL
MIN VOL: 13
MODE: ABNORMAL
MONOCYTES # BLD AUTO: ABNORMAL K/UL
MONOCYTES NFR BLD: 5 %
MV PEAK A VEL: 0.81 M/S
MV PEAK E VEL: 0.79 M/S
NEUTROPHILS NFR BLD: 80 %
OVALOCYTES BLD QL SMEAR: ABNORMAL
PCO2 BLDA: 37.1 MMHG (ref 35–45)
PH SMN: 7.38 [PH] (ref 7.35–7.45)
PHOSPHATE SERPL-MCNC: 3.4 MG/DL
PISA TR MAX VEL: 1.75 M/S
PLATELET # BLD AUTO: 446 K/UL
PLATELET BLD QL SMEAR: ABNORMAL
PMV BLD AUTO: 9.7 FL
PO2 BLDA: 75 MMHG (ref 80–100)
POC BE: -3 MMOL/L
POC SATURATED O2: 95 % (ref 95–100)
POC TCO2: 23 MMOL/L (ref 23–27)
POCT GLUCOSE: 345 MG/DL (ref 70–110)
POCT GLUCOSE: 395 MG/DL (ref 70–110)
POCT GLUCOSE: 425 MG/DL (ref 70–110)
POCT GLUCOSE: 496 MG/DL (ref 70–110)
POIKILOCYTOSIS BLD QL SMEAR: SLIGHT
POTASSIUM SERPL-SCNC: 3.9 MMOL/L
PROT SERPL-MCNC: 5.4 G/DL
PV PEAK VELOCITY: 1.61 CM/S
RA PRESSURE: 15 MMHG
RBC # BLD AUTO: 2.94 M/UL
RIGHT VENTRICULAR END-DIASTOLIC DIMENSION: 2.48 CM
SAMPLE: ABNORMAL
SITE: ABNORMAL
SODIUM SERPL-SCNC: 146 MMOL/L
SP02: 95
SPONT RATE: 16
TDI LATERAL: 0.06
TDI SEPTAL: 0.08
TDI: 0.07
TR MAX PG: 12.25 MMHG
TRICUSPID ANNULAR PLANE SYSTOLIC EXCURSION: 2.19 CM
TV REST PULMONARY ARTERY PRESSURE: 27 MMHG
VANCOMYCIN SERPL-MCNC: 17.7 UG/ML
WBC # BLD AUTO: 13.9 K/UL

## 2019-01-03 PROCEDURE — 25000003 PHARM REV CODE 250: Performed by: INTERNAL MEDICINE

## 2019-01-03 PROCEDURE — D9220A PRA ANESTHESIA: Mod: CRNA,,, | Performed by: NURSE ANESTHETIST, CERTIFIED REGISTERED

## 2019-01-03 PROCEDURE — 94770 HC EXHALED C02 TEST: CPT

## 2019-01-03 PROCEDURE — 80202 ASSAY OF VANCOMYCIN: CPT

## 2019-01-03 PROCEDURE — 80053 COMPREHEN METABOLIC PANEL: CPT

## 2019-01-03 PROCEDURE — 94640 AIRWAY INHALATION TREATMENT: CPT

## 2019-01-03 PROCEDURE — 37000008 HC ANESTHESIA 1ST 15 MINUTES: Performed by: INTERNAL MEDICINE

## 2019-01-03 PROCEDURE — 99900035 HC TECH TIME PER 15 MIN (STAT)

## 2019-01-03 PROCEDURE — 63600175 PHARM REV CODE 636 W HCPCS: Performed by: INTERNAL MEDICINE

## 2019-01-03 PROCEDURE — 99233 PR SUBSEQUENT HOSPITAL CARE,LEVL III: ICD-10-PCS | Mod: ,,, | Performed by: INTERNAL MEDICINE

## 2019-01-03 PROCEDURE — 63600175 PHARM REV CODE 636 W HCPCS: Performed by: NURSE ANESTHETIST, CERTIFIED REGISTERED

## 2019-01-03 PROCEDURE — 43236 UPPR GI SCOPE W/SUBMUC INJ: CPT | Performed by: INTERNAL MEDICINE

## 2019-01-03 PROCEDURE — 94003 VENT MGMT INPAT SUBQ DAY: CPT

## 2019-01-03 PROCEDURE — 43255 EGD CONTROL BLEEDING ANY: CPT | Performed by: INTERNAL MEDICINE

## 2019-01-03 PROCEDURE — 43255 PR EGD, FLEX, W/CTRL BLEED, ANY METHOD: ICD-10-PCS | Mod: 22,,, | Performed by: INTERNAL MEDICINE

## 2019-01-03 PROCEDURE — C9113 INJ PANTOPRAZOLE SODIUM, VIA: HCPCS | Performed by: INTERNAL MEDICINE

## 2019-01-03 PROCEDURE — 20000000 HC ICU ROOM

## 2019-01-03 PROCEDURE — 99233 SBSQ HOSP IP/OBS HIGH 50: CPT | Mod: ,,, | Performed by: INTERNAL MEDICINE

## 2019-01-03 PROCEDURE — 85027 COMPLETE CBC AUTOMATED: CPT

## 2019-01-03 PROCEDURE — 27000221 HC OXYGEN, UP TO 24 HOURS

## 2019-01-03 PROCEDURE — 82803 BLOOD GASES ANY COMBINATION: CPT

## 2019-01-03 PROCEDURE — 85014 HEMATOCRIT: CPT

## 2019-01-03 PROCEDURE — 83735 ASSAY OF MAGNESIUM: CPT

## 2019-01-03 PROCEDURE — 85007 BL SMEAR W/DIFF WBC COUNT: CPT

## 2019-01-03 PROCEDURE — D9220A PRA ANESTHESIA: ICD-10-PCS | Mod: CRNA,,, | Performed by: NURSE ANESTHETIST, CERTIFIED REGISTERED

## 2019-01-03 PROCEDURE — D9220A PRA ANESTHESIA: ICD-10-PCS | Mod: ANES,,, | Performed by: ANESTHESIOLOGY

## 2019-01-03 PROCEDURE — 37799 UNLISTED PX VASCULAR SURGERY: CPT

## 2019-01-03 PROCEDURE — 82330 ASSAY OF CALCIUM: CPT

## 2019-01-03 PROCEDURE — 36415 COLL VENOUS BLD VENIPUNCTURE: CPT

## 2019-01-03 PROCEDURE — 82947 ASSAY GLUCOSE BLOOD QUANT: CPT

## 2019-01-03 PROCEDURE — 43255 EGD CONTROL BLEEDING ANY: CPT | Mod: 22,,, | Performed by: INTERNAL MEDICINE

## 2019-01-03 PROCEDURE — 85018 HEMOGLOBIN: CPT

## 2019-01-03 PROCEDURE — 25000003 PHARM REV CODE 250: Performed by: DENTIST

## 2019-01-03 PROCEDURE — 25000242 PHARM REV CODE 250 ALT 637 W/ HCPCS: Performed by: INTERNAL MEDICINE

## 2019-01-03 PROCEDURE — 99900026 HC AIRWAY MAINTENANCE (STAT)

## 2019-01-03 PROCEDURE — 84100 ASSAY OF PHOSPHORUS: CPT

## 2019-01-03 PROCEDURE — 27200997: Performed by: INTERNAL MEDICINE

## 2019-01-03 PROCEDURE — 94761 N-INVAS EAR/PLS OXIMETRY MLT: CPT

## 2019-01-03 PROCEDURE — 25000003 PHARM REV CODE 250: Performed by: NURSE ANESTHETIST, CERTIFIED REGISTERED

## 2019-01-03 PROCEDURE — 27201028 HC NEEDLE, SCLERO: Performed by: INTERNAL MEDICINE

## 2019-01-03 PROCEDURE — D9220A PRA ANESTHESIA: Mod: ANES,,, | Performed by: ANESTHESIOLOGY

## 2019-01-03 PROCEDURE — 37000009 HC ANESTHESIA EA ADD 15 MINS: Performed by: INTERNAL MEDICINE

## 2019-01-03 RX ORDER — IBUPROFEN 200 MG
16 TABLET ORAL
Status: DISCONTINUED | OUTPATIENT
Start: 2019-01-03 | End: 2019-01-11 | Stop reason: HOSPADM

## 2019-01-03 RX ORDER — LIDOCAINE HYDROCHLORIDE 10 MG/ML
INJECTION, SOLUTION EPIDURAL; INFILTRATION; INTRACAUDAL; PERINEURAL
Status: DISCONTINUED | OUTPATIENT
Start: 2019-01-03 | End: 2019-01-03

## 2019-01-03 RX ORDER — IBUPROFEN 200 MG
24 TABLET ORAL
Status: DISCONTINUED | OUTPATIENT
Start: 2019-01-03 | End: 2019-01-11 | Stop reason: HOSPADM

## 2019-01-03 RX ORDER — INSULIN ASPART 100 [IU]/ML
1-10 INJECTION, SOLUTION INTRAVENOUS; SUBCUTANEOUS
Status: DISCONTINUED | OUTPATIENT
Start: 2019-01-03 | End: 2019-01-11 | Stop reason: HOSPADM

## 2019-01-03 RX ORDER — PROPOFOL 10 MG/ML
VIAL (ML) INTRAVENOUS
Status: DISCONTINUED | OUTPATIENT
Start: 2019-01-03 | End: 2019-01-03

## 2019-01-03 RX ORDER — EPINEPHRINE 0.1 MG/ML
INJECTION INTRAVENOUS
Status: DISCONTINUED
Start: 2019-01-03 | End: 2019-01-11 | Stop reason: HOSPADM

## 2019-01-03 RX ORDER — GLUCAGON 1 MG
1 KIT INJECTION
Status: DISCONTINUED | OUTPATIENT
Start: 2019-01-03 | End: 2019-01-11 | Stop reason: HOSPADM

## 2019-01-03 RX ORDER — PANTOPRAZOLE SODIUM 40 MG/10ML
40 INJECTION, POWDER, LYOPHILIZED, FOR SOLUTION INTRAVENOUS 2 TIMES DAILY
Status: DISCONTINUED | OUTPATIENT
Start: 2019-01-03 | End: 2019-01-11 | Stop reason: HOSPADM

## 2019-01-03 RX ORDER — PHENYLEPHRINE HYDROCHLORIDE 10 MG/ML
INJECTION INTRAVENOUS
Status: DISCONTINUED | OUTPATIENT
Start: 2019-01-03 | End: 2019-01-03

## 2019-01-03 RX ORDER — LIDOCAINE HYDROCHLORIDE AND EPINEPHRINE 10; 10 MG/ML; UG/ML
1 INJECTION, SOLUTION INFILTRATION; PERINEURAL ONCE
Status: COMPLETED | OUTPATIENT
Start: 2019-01-03 | End: 2019-01-03

## 2019-01-03 RX ORDER — DEXTROMETHORPHAN/PSEUDOEPHED 2.5-7.5/.8
DROPS ORAL
Status: DISCONTINUED
Start: 2019-01-03 | End: 2019-01-03 | Stop reason: WASHOUT

## 2019-01-03 RX ADMIN — PANTOPRAZOLE SODIUM 40 MG: 40 INJECTION, POWDER, LYOPHILIZED, FOR SOLUTION INTRAVENOUS at 09:01

## 2019-01-03 RX ADMIN — PROPOFOL 15 MCG/KG/MIN: 10 INJECTION, EMULSION INTRAVENOUS at 01:01

## 2019-01-03 RX ADMIN — INSULIN ASPART 5 UNITS: 100 INJECTION, SOLUTION INTRAVENOUS; SUBCUTANEOUS at 11:01

## 2019-01-03 RX ADMIN — NYSTATIN 500000 UNITS: 500000 SUSPENSION ORAL at 01:01

## 2019-01-03 RX ADMIN — IPRATROPIUM BROMIDE AND ALBUTEROL SULFATE 3 ML: .5; 3 SOLUTION RESPIRATORY (INHALATION) at 03:01

## 2019-01-03 RX ADMIN — CLOPIDOGREL BISULFATE 75 MG: 75 TABLET ORAL at 09:01

## 2019-01-03 RX ADMIN — LIDOCAINE HYDROCHLORIDE 100 MG: 10 INJECTION, SOLUTION EPIDURAL; INFILTRATION; INTRACAUDAL; PERINEURAL at 12:01

## 2019-01-03 RX ADMIN — INSULIN ASPART 16 UNITS: 100 INJECTION, SOLUTION INTRAVENOUS; SUBCUTANEOUS at 06:01

## 2019-01-03 RX ADMIN — ASPIRIN 81 MG CHEWABLE TABLET 81 MG: 81 TABLET CHEWABLE at 09:01

## 2019-01-03 RX ADMIN — INSULIN ASPART 5 UNITS: 100 INJECTION, SOLUTION INTRAVENOUS; SUBCUTANEOUS at 06:01

## 2019-01-03 RX ADMIN — IPRATROPIUM BROMIDE AND ALBUTEROL SULFATE 3 ML: .5; 3 SOLUTION RESPIRATORY (INHALATION) at 07:01

## 2019-01-03 RX ADMIN — NYSTATIN 500000 UNITS: 500000 SUSPENSION ORAL at 06:01

## 2019-01-03 RX ADMIN — INSULIN DETEMIR 10 UNITS: 100 INJECTION, SOLUTION SUBCUTANEOUS at 09:01

## 2019-01-03 RX ADMIN — PIPERACILLIN SODIUM AND TAZOBACTAM SODIUM 4.5 G: 4; .5 INJECTION, POWDER, LYOPHILIZED, FOR SOLUTION INTRAVENOUS at 06:01

## 2019-01-03 RX ADMIN — PROPOFOL 50 MG: 10 INJECTION, EMULSION INTRAVENOUS at 12:01

## 2019-01-03 RX ADMIN — RETINOL, ERGOCALCIFEROL, .ALPHA.-TOCOPHEROL ACETATE, DL-, PHYTONADIONE, ASCORBIC ACID, NIACINAMIDE, RIBOFLAVIN 5-PHOSPHATE SODIUM, THIAMINE HYDROCHLORIDE, PYRIDOXINE HYDROCHLORIDE, DEXPANTHENOL, BIOTIN, FOLIC ACID, AND CYANOCOBALAMIN: KIT at 02:01

## 2019-01-03 RX ADMIN — VANCOMYCIN HYDROCHLORIDE 750 MG: 750 INJECTION, POWDER, LYOPHILIZED, FOR SOLUTION INTRAVENOUS at 02:01

## 2019-01-03 RX ADMIN — PHENYLEPHRINE HYDROCHLORIDE 100 MCG: 10 INJECTION INTRAVENOUS at 12:01

## 2019-01-03 RX ADMIN — NYSTATIN 500000 UNITS: 500000 SUSPENSION ORAL at 07:01

## 2019-01-03 RX ADMIN — PROPOFOL 30 MG: 10 INJECTION, EMULSION INTRAVENOUS at 12:01

## 2019-01-03 RX ADMIN — PHENYLEPHRINE HYDROCHLORIDE 100 MCG: 10 INJECTION INTRAVENOUS at 01:01

## 2019-01-03 RX ADMIN — PIPERACILLIN SODIUM AND TAZOBACTAM SODIUM 4.5 G: 4; .5 INJECTION, POWDER, LYOPHILIZED, FOR SOLUTION INTRAVENOUS at 01:01

## 2019-01-03 RX ADMIN — INSULIN ASPART 5 UNITS: 100 INJECTION, SOLUTION INTRAVENOUS; SUBCUTANEOUS at 01:01

## 2019-01-03 RX ADMIN — PROPOFOL 20 MG: 10 INJECTION, EMULSION INTRAVENOUS at 12:01

## 2019-01-03 RX ADMIN — LIDOCAINE HYDROCHLORIDE,EPINEPHRINE BITARTRATE 1 ML: 10; .01 INJECTION, SOLUTION INFILTRATION; PERINEURAL at 08:01

## 2019-01-03 RX ADMIN — IPRATROPIUM BROMIDE AND ALBUTEROL SULFATE 3 ML: .5; 3 SOLUTION RESPIRATORY (INHALATION) at 12:01

## 2019-01-03 RX ADMIN — IPRATROPIUM BROMIDE AND ALBUTEROL SULFATE 3 ML: .5; 3 SOLUTION RESPIRATORY (INHALATION) at 11:01

## 2019-01-03 RX ADMIN — METHYLPREDNISOLONE SODIUM SUCCINATE 40 MG: 40 INJECTION, POWDER, FOR SOLUTION INTRAMUSCULAR; INTRAVENOUS at 09:01

## 2019-01-03 RX ADMIN — INSULIN ASPART 2 UNITS: 100 INJECTION, SOLUTION INTRAVENOUS; SUBCUTANEOUS at 01:01

## 2019-01-03 RX ADMIN — ATORVASTATIN CALCIUM 80 MG: 40 TABLET, FILM COATED ORAL at 09:01

## 2019-01-03 RX ADMIN — ASCORBIC ACID, VITAMIN A PALMITATE, CHOLECALCIFEROL, THIAMINE HYDROCHLORIDE, RIBOFLAVIN-5 PHOSPHATE SODIUM, PYRIDOXINE HYDROCHLORIDE, NIACINAMIDE, DEXPANTHENOL, ALPHA-TOCOPHEROL ACETATE, VITAMIN K1, FOLIC ACID, BIOTIN, CYANOCOBALAMIN: 200; 3300; 200; 6; 3.6; 6; 40; 15; 10; 150; 600; 60; 5 INJECTION, SOLUTION INTRAVENOUS at 01:01

## 2019-01-03 RX ADMIN — NYSTATIN 500000 UNITS: 500000 SUSPENSION ORAL at 12:01

## 2019-01-03 RX ADMIN — PROPOFOL 30 MG: 10 INJECTION, EMULSION INTRAVENOUS at 01:01

## 2019-01-03 NOTE — PLAN OF CARE
Problem: Adult Inpatient Plan of Care  Goal: Plan of Care Review  Outcome: Ongoing (interventions implemented as appropriate)  Pt on vent as ordered with Q4 duoneb treatments and daily CPAP trials.

## 2019-01-03 NOTE — CONSULTS
INPATIENT NEPHROLOGY CONSULT   [facility] NEPHROLOGY    Patient Name: Enmanuel Armenta  Date: 01/03/2019    Reason for consultation: JUSTA    Chief Complaint:   Chief Complaint   Patient presents with    Abdominal Pain       History of Present Illness:  Hx obtained from chart due to intubation    62 y/o M with hx of COPD and muscle dystrophy who resides at [facility] who was brought in for 1 day of abdominal pain associated with nausea and vomiting. Patient noted to have 101.5F upon arrival. There were no exac/reliev factors. He is undergoing treatment for UGIB 2/2 esophagitis/gastritis, aspiration PNA (on vent, with PEG), and AF with RVR (not in NSR). We have been consulted for JUSTA.    Change in Cr from 1 --> 1.6 on 12/29 correlated with time he required NS IVFs and levophed for hypotension.    Change in Cr from 1.8 --> 2s correlated with AF with RVR requiring diltiazem.    Got lisinopril 12/25-12/28, lasix 12/30-12/31.   Highest vanc level 28     Renal u/s neg for obstruction  Last contrast exposure 12/17    In NSR, BP is better, off pressors  UOP 3L    Plan of Care:    Assessment:  JUSTA 2/2 ATN- baseline Cr < 1  Hypernatremia  Hypokalemia  Anemia    Plan:    - Suspect JUSTA is prerenal vs ischemic ATN 2/2 hemodynamic changes (septic shock requiring pressors, AF with RVR). It hard to say if he took a hit from vancomycin- would still fall under ATN. He is now hemodynamically stable (off pressors, in NSR). Renal function is starting to improve (Cr peaked yest) and he is nonoliguric. Will continue to trend. Keep armas (BPH noted on ultrasound). Dose antbx for CrCl 30-60. May see lag in improvement of BUN due to IV steroids.   - Ordered free water flushes 250cc q6 hours.  - K is better after repletion.   - Anemia is stable- will transfuse PRN (suspect acute illness related BM suppression).     Thank you for allowing us to participate in this patient's care. We will continue to follow.    Vital  Signs:  Temp Readings from Last 3 Encounters:   01/03/19 98.7 °F (37.1 °C) (Axillary)   03/02/18 97.4 °F (36.3 °C)   11/21/17 98 °F (36.7 °C) (Oral)       Pulse Readings from Last 3 Encounters:   01/03/19 92   08/03/18 72   03/02/18 61       BP Readings from Last 3 Encounters:   01/03/19 128/72   08/03/18 94/64   03/02/18 97/63       Weight:  Wt Readings from Last 3 Encounters:   01/02/19 76.8 kg (169 lb 5 oz)   08/03/18 68 kg (150 lb)   03/02/18 68.5 kg (151 lb 0.2 oz)       Past Medical & Surgical History:  Past Medical History:   Diagnosis Date    Anticoagulant long-term use     BPH (benign prostatic hyperplasia)     COPD (chronic obstructive pulmonary disease)     Elevated PSA     Fx     RIGHT WRIST, LEFT FOOT    Hypokalemia     Muscular dystrophy     Urine retention     Wears dentures     upper       Past Surgical History:   Procedure Laterality Date    APPENDECTOMY      cataracts      bilateral     CHOLECYSTECTOMY      CYST REMOVAL      from spine     CYSTOLITHOLOPAXY (REMOVE BLADDER STONE)  3/6/2017    [facility] at [facility]    CYSTOSCOPY      EGD (ESOPHAGOGASTRODUODENOSCOPY) N/A 12/24/2018    Performed by Evgeny Martin [facility] at [facility] ENDO    EGD (ESOPHAGOGASTRODUODENOSCOPY) N/A 12/17/2018    Performed by [facility]y Martin [facility] at [facility] ENDO    EYE SURGERY      cataract surgery bilateral    INSERTION, PEG TUBE N/A 12/24/2018    [facility] by [facility] at [facility] ENDO    PROSTATE BIOPSY      PROSTATE SURGERY      PROSTATECTOMY-TRANSURETHRAL N/A 3/6/2017    [facility] at [facility] OR    PROSTATECTOMY-TRANSURETHRAL WITH GREENLIGHT LASER N/A 3/6/2017    Per[facility] by [facility]an [facility] at [facility] OR    TONSILLECTOMY      TRANSRECTAL ULTRASOUND GUIDED PROSTATE BIOPSY N/A 3/28/2016    [facility] at [facility] OR    VASECTOMY         Past Social History:  Social History     Socioeconomic History    Marital status: Single     Spouse  name: None    Number of children: None    Years of education: None    Highest education level: None   Social Needs    Financial resource strain: None    Food insecurity - worry: None    Food insecurity - inability: None    Transportation needs - medical: None    Transportation needs - non-medical: None   Occupational History    None   Tobacco Use    Smoking status: Current Every Day Smoker     Packs/day: 1.00     Years: 48.00     Pack years: 48.00     Types: Cigarettes    Smokeless tobacco: Never Used   Substance and Sexual Activity    Alcohol use: No     Alcohol/week: 0.0 oz    Drug use: No    Sexual activity: None   Other Topics Concern    None   Social History Narrative    None       Medications:  No current facility-administered medications on file prior to encounter.      Current Outpatient Medications on File Prior to Encounter   Medication Sig Dispense Refill    aspirin (ECOTRIN) 81 MG EC tablet Take one tablet daily  2    atorvastatin (LIPITOR) 80 MG tablet Take 80 mg by mouth every evening.       benzonatate (TESSALON) 100 MG capsule Take 100 mg by mouth 3 (three) times daily as needed for Cough.      clopidogrel (PLAVIX) 75 mg tablet Take one tablet daily  3    dextromethorphan-guaifenesin  mg (MUCINEX DM)  mg per 12 hr tablet Take 2 tablets by mouth every 12 (twelve) hours.      lisinopril (PRINIVIL,ZESTRIL) 2.5 MG tablet Take 2.5 mg by mouth once daily.       metoprolol succinate (TOPROL-XL) 25 MG 24 hr tablet Take 25 mg by mouth once daily.       polyethylene glycol (GLYCOLAX) 17 gram PwPk Take 17 g by mouth once daily.        Scheduled Meds:   albuterol-ipratropium  3 mL Nebulization Q4H    aspirin  81 mg Oral Daily    atorvastatin  80 mg Oral QHS    clopidogrel  75 mg Oral Daily    enoxaparin  40 mg Subcutaneous Q24H    insulin detemir U-100  10 Units Subcutaneous Daily    methylPREDNISolone sodium succinate  40 mg Intravenous Daily    nystatin  500,000  "Units Mouth/Throat Q6H    pantoprazole  40 mg Intravenous Daily    piperacillin-tazobactam (ZOSYN) IVPB  4.5 g Intravenous Q8H    polyethylene glycol  17 g Oral Daily    vancomycin (VANCOCIN) IVPB  750 mg Intravenous Once    [START ON 1/4/2019] vancomycin (VANCOCIN) IVPB  750 mg Intravenous Q24H     Continuous Infusions:   Amino acid 5% - dextrose 15% (CLINIMIX-E) solution with additives (1L  provides 510 kcal/L dextrose, with 50 gm AA, 150 gm CHO, Na 35, K 30, Mg 5, Ca 4.5, Acetate 80, Cl 39, Phos 15) 100 mL/hr at 01/02/19 1700    Amino acid 5% - dextrose 15% (CLINIMIX-E) solution with additives (1L  provides 510 kcal/L dextrose, with 50 gm AA, 150 gm CHO, Na 35, K 30, Mg 5, Ca 4.5, Acetate 80, Cl 39, Phos 15)      dilTIAZem Stopped (01/02/19 0300)    norepinephrine bitartrate-D5W Stopped (12/31/18 1729)    propofol 5 mcg/kg/min (01/03/19 0400)     PRN Meds:.acetaminophen, dextrose 50%, glucagon (human recombinant), hydrALAZINE, insulin aspart U-100, midazolam, ondansetron    Allergies:  Patient has no known allergies.    Past Family History:  Unable to obtain due to intubation    Review of Systems:  Unable to obtain due to intubation    Physical Exam:  /72   Pulse 92   Temp 98.7 °F (37.1 °C) (Axillary)   Resp 16   Ht 5' 7" (1.702 m) Comment: Office 7/13/18  Wt 76.8 kg (169 lb 5 oz)   SpO2 95%   BMI 26.52 kg/m²     INS/OUTS:  I/O last 3 completed shifts:  In: 3566.5 [I.V.:266.5; NG/GT:180; IV Piggyback:750]  Out: 5500 [Urine:5020; Drains:80; Stool:400]  I/O this shift:  In: -   Out: 425 [Urine:425]    General Appearance:    NAD, intubated, sedated   Head:    Normocephalic, atraumatic   Throat:   Moist mucus membraness   Lungs:     Coarse to auscultation bilaterally, no wheeze, crackles, rales   or rhonchi, symmetric air movement, respirations unlabored   Heart:    Regular rate and rhythm, S1 and S2 normal, no murmur, rub   or gallop   Abdomen:     Soft, non-tender, non-distended, bowel sounds " active all four   quadrants, no RT or guarding, no masses, no organomegaly   Extremities:   Warm and well perfused, distal pulses intact, no cyanosis,     trace UE peripheral edema   MSK:   No joint or muscle swelling, tenderness or deformity   Skin:   Skin color, texture, turgor normal, no rashes or lesions   Neurologic/Psychiatric:   Unable to obtain due to intubation/sedation     Results:  Lab Results   Component Value Date     (H) 01/03/2019    K 3.9 01/03/2019     (H) 01/03/2019    CO2 21 (L) 01/03/2019    BUN 73 (H) 01/03/2019    CREATININE 2.1 (H) 01/03/2019    CALCIUM 9.0 01/03/2019    ANIONGAP 11 01/03/2019    ESTGFRAFRICA 37 (A) 01/03/2019    EGFRNONAA 32 (A) 01/03/2019       Lab Results   Component Value Date    CALCIUM 9.0 01/03/2019    PHOS 3.4 01/03/2019       Recent Labs   Lab 01/03/19  0340   WBC 13.90*   RBC 2.94*   HGB 8.5*   HCT 26.3*   *   MCV 89   MCH 29.0   MCHC 32.4       I have personally reviewed pertinent radiological imaging and reports.    Sheri Monroe MD MPH  [facility]  [address] [place] [address] [id number] [phone] (p)  [phone] (f)

## 2019-01-03 NOTE — PROGRESS NOTES
EGD done.  Ulcer noted with evidence of active bleeding and adherent clot.  Treated with epinephrine injection and hemoclip x 4 with good hemostasis.  No electrocautery used secondary to recent Plavix use.  Recommend IV PPI and sucralfate per PEG.  Recommend monitor H/H.  Hold all anticoagulation.  May resume lovenox tomorrow if no further evidence for bleeding.  Will follow.

## 2019-01-03 NOTE — ANESTHESIA POSTPROCEDURE EVALUATION
"Anesthesia Post Evaluation    Patient: Enmanuel Armenta    Procedure(s) Performed: Procedure(s) (LRB):  EGD (ESOPHAGOGASTRODUODENOSCOPY) (N/A)    Final Anesthesia Type: general  Patient location during evaluation: PACU  Patient participation: Yes- Able to Participate  Level of consciousness: awake and alert  Post-procedure vital signs: reviewed and stable  Pain management: adequate  Airway patency: patent  PONV status at discharge: No PONV  Anesthetic complications: no      Cardiovascular status: hemodynamically stable  Respiratory status: unassisted and room air  Hydration status: euvolemic  Follow-up not needed.        Visit Vitals  /70   Pulse 100   Temp 36.8 °C (98.3 °F) (Axillary)   Resp (!) 21   Ht 5' 7" (1.702 m)   Wt 76.8 kg (169 lb 5 oz)   SpO2 95%   BMI 26.52 kg/m²       Pain/Pedro Score: Pain Rating Prior to Med Admin: 0 (1/2/2019  5:15 PM)  Pain Rating Post Med Admin: 0 (1/2/2019  5:15 PM)  Pedro Score: 6 (1/2/2019  7:25 AM)        "

## 2019-01-03 NOTE — [employer] PATIENT INSTRUCTIONS
Discharge Summary/Instructions after an Endoscopic Procedure  Patient Name: Enmanuel Armenta  Patient MRN: [id number]  Patient YOB: 1955 Thursday, January 03, 2019  Evgeny Anthony MD  RESTRICTIONS:  During your procedure today, you received medications for sedation.  These   medications may affect your judgment, balance and coordination.  Therefore,   for 24 hours, you have the following restrictions:   - DO NOT drive a car, operate machinery, make legal/financial decisions,   sign important papers or drink alcohol.    ACTIVITY:  Today: no heavy lifting, straining or running due to procedural   sedation/anesthesia.  The following day: return to full activity including work.  DIET:  Eat and drink normally unless instructed otherwise.     TREATMENT FOR COMMON SIDE EFFECTS:  - Mild abdominal pain, nausea, belching, bloating or excessive gas:  rest,   eat lightly and use a heating pad.  - Sore Throat: treat with throat lozenges and/or gargle with warm salt   water.  - Because air was used during the procedure, expelling large amounts of air   from your rectum or belching is normal.  - If a bowel prep was taken, you may not have a bowel movement for 1-3 days.    This is normal.  SYMPTOMS TO WATCH FOR AND REPORT TO YOUR PHYSICIAN:  1. Abdominal pain or bloating, other than gas cramps.  2. Chest pain.  3. Back pain.  4. Signs of infection such as: chills or fever occurring within 24 hours   after the procedure.  5. Rectal bleeding, which would show as bright red, maroon, or black stools.   (A tablespoon of blood from the rectum is not serious, especially if   hemorrhoids are present.)  6. Vomiting.  7. Weakness or dizziness.  GO DIRECTLY TO THE NEAREST EMERGENCY ROOM IF YOU HAVE ANY OF THE FOLLOWING:      Difficulty breathing              Chills and/or fever over 101 F   Persistent vomiting and/or vomiting blood   Severe abdominal pain   Severe chest pain   Black, tarry stools   Bleeding- more than one  tablespoon   Any other symptom or condition that you feel may need urgent attention  Your doctor recommends these additional instructions:  If any biopsies were taken, your doctors clinic will contact you in 1 to 2   weeks with any results.  - Return patient to ICU for ongoing care.   - NPO.   - Continue present medications.   - No aspirin, ibuprofen, naproxen, or other non-steroidal anti-inflammatory   drugs.   - Repeat upper endoscopy in 8 weeks to check healing.   - Follow an antireflux regimen.   - Give Protonix (pantoprazole): initiate therapy with 80 mg IV bolus, then 8   mg/hr IV by continuous infusion for 3 days.   - Use sucralfate suspension 1 gram PO QID for 10 days.  For questions, problems or results please call your physician - Evgeny Anthony MD at Work:  [phone].  [facility], EMERGENCY ROOM PHONE NUMBER: [phone]  IF A COMPLICATION OR EMERGENCY SITUATION ARISES AND YOU ARE UNABLE TO REACH   YOUR PHYSICIAN - GO DIRECTLY TO THE EMERGENCY ROOM.  Evgeny Anthony MD  1/3/2019 1:15:46 PM  This report has been verified and signed electronically.  [employer]

## 2019-01-03 NOTE — PROGRESS NOTES
Progress Note  Hospital Medicine  Patient Name:Enmanuel Armenta  MRN:  [id number]  Patient Class: IP- Inpatient  Admit Date: 12/17/2018  Length of Stay: 17 days  Expected Discharge Date:   Attending Physician: Stephanie Tena MD  Primary Care Provider:  Primary Doctor No    SUBJECTIVE:     Principal Problem: Aspiration pneumonia of both lower lobes  Initial history of present illness: Patient is a 63 y.o. male admitted to Hospitalist Service from [facility] Emergency Room with complaint of abdominal pain. Patient is a resident of [facility] nursing home, [place]. Patient reportedly has past medical history significant for COPD, muscular dystrophy, l;imited mobility with walker and history of elevated PSA. Patient presented with 1 day history of abdominal pain associated with nausea and vomiting. The EMS reported, vomiting a brown sticky substance with pale red streaks throughout. EMS gave him 4 mg of Zofran in route to the ED. Patient denied chest pain, shortness of breath, headache, vision changes, focal neuro-deficits, cough. Patient noted to have 101.5F upon arrival.     PMH/PSH/SH/FH/Meds: reviewed.    Symptoms/Review of Systems: In ICU, afebrile. Getting CPAP trial. In sinus rhythm now. Patient seen and examined. Remains intubated and sedated.   Diet: Trickle feeding + TPN  Activity level: Up with assistance    Pain:  Moans to verbal commands     OBJECTIVE:   Vital Signs (Most Recent):      Temp: 98.6 °F (37 °C) (01/03/19 0400)  Pulse: 85 (01/03/19 0736)  Resp: 13 (01/03/19 0736)  BP: 137/65 (01/03/19 0600)  SpO2: 97 % (01/03/19 0736)       Vital Signs Range (Last 24H):  Temp:  [98.3 °F (36.8 °C)-100.4 °F (38 °C)]   Pulse:  []   Resp:  [12-20]   BP: (101-184)/(56-87)   SpO2:  [92 %-100 %]   Arterial Line BP: (108-177)/(50-73)     Weight: 76.8 kg (169 lb 5 oz)  Body mass index is 26.52 kg/m².    Intake/Output Summary (Last 24 hours) at 1/3/2019 0832  Last data filed at 1/3/2019  0600  Gross per 24 hour   Intake 2294.21 ml   Output 3300 ml   Net -1005.79 ml     Physical Examination:  General appearance: well developed, appears stated age, chronically ill-appearing, intubated and sedated  Head: normocephalic, atraumatic  Eyes:  conjunctivae/corneas clear. PERRL.  Nose: Nares normal. Septum midline.  Throat: lips, mucosa, and tongue normal; teeth and gums normal, no throat erythema. + Dry blood at angle of mouth  Neck: supple, symmetrical, trachea midline, no JVD and thyroid not enlarged, symmetric, no tenderness/mass/nodules  Lungs:  Scattered rhonchi B/L.  Chest wall: no tenderness  Heart: regular rate and rhythm, S1, S2 normal, no murmur, click, rub or gallop  Abdomen: soft, epigastric tenderness, non-distented; bowel sounds normal; no masses,  no organomegaly. PEG site okay.  Extremities: no cyanosis, clubbing or edema.   Pulses: 2+ and symmetric  Skin: Skin color, texture, turgor normal. No rashes or lesions.  Lymph nodes: Cervical, supraclavicular, and axillary nodes normal.  Neurologic: Sedated    CBC:  Recent Labs   Lab 01/01/19  0310 01/02/19  0311 01/03/19  0340   WBC 14.40* 11.60 13.90*   RBC 3.11* 3.05* 2.94*   HGB 8.9* 8.9* 8.5*   HCT 27.9* 27.3* 26.3*   * 424* 446*   MCV 90 90 89   MCH 28.7 29.1 29.0   MCHC 32.0 32.5 32.4   BMP  Recent Labs   Lab 01/01/19  1737 01/02/19  0310 01/03/19  0340   * 238* 375*    144 146*   K 3.1* 4.3 3.9   * 111* 114*   CO2 20* 21* 21*   BUN 62* 73* 73*   CREATININE 2.2* 2.5* 2.1*   CALCIUM 8.2* 8.7 9.0   MG 2.2 2.5 2.4      Diagnostic Results:  Microbiology Results (last 7 days)     Procedure Component Value Units Date/Time    Culture, Respiratory with Gram Stain [[id number]] Collected:  01/02/19 1229    Order Status:  Canceled Specimen:  Respiratory from Sputum, Expectorated Updated:  01/02/19 1230    Culture, Respiratory with Gram Stain [[id number]]  (Susceptibility) Collected:  12/29/18 1325    Order Status:  Completed  Specimen:  Sputum Updated:  01/02/19 1112     Respiratory Culture No Pseudomonas isolated.     Respiratory Culture --     METHICILLIN RESISTANT STAPHYLOCOCCUS AUREUS  Few  Normal respiratory ruddy also present       Gram Stain (Respiratory) <10 epithelial cells per low power field.     Gram Stain (Respiratory) Rare WBC's     Gram Stain (Respiratory) Rare Gram positive cocci         Chest X-Ray: T right basilar infiltrate.     CT abdomen and pelvis with contrast:  Enlarged prostate gland, air in the bladder likely secondary to catheterization, diffuse bladder wall thickening. Prominent amount of fluid within right colon.  Appendix not identified and note is made of limitation with motion artifact in the appendiceal region.    EGD:   - Moderately severe reflux esophagitis.                       - Medium-sized hiatal hernia.                       - Gastritis.                       - A single gastric polyp.                       - Normal examined duodenum.                       - No specimens collected.    CXR: New moderate left pleural effusion and left lung infiltrate.  Leftward mediastinal shift raising consideration for left lung atelectasis as contributory to the findings.  Close follow-up recommended.    CXR:   Unchanged bilateral lower lobe infiltrates.Heart size is now within normal limits.  Endotracheal tube and right central line in position.    CXR:   Increasing left lower lobe infiltrate and possible left pleural effusion.  Decreasing right base infiltrate.  Probable mild cardiomegaly obscured by the infiltrates.  Endotracheal tube and central line in position.    CXR:   Stable, appropriate positioning of support devices.  Stable appearance of the chest demonstrating right basilar atelectasis or infiltrate and retrocardiac consolidation and small left pleural effusion.    CXR: No significant change in the appearance of the chest compared to the prior exam.    Assessment/Plan:      Acute hypoxic respiratory  failure   Yes    Aspiration pneumonia, RLL with MRSA sp [J69.0]  Swallow dysfunction s/p PEG placement  On ventilator. Management per pulmonary. Hopefully extubation expected today.  Continue Zosyn/Vanc  Duonebs  On Solumedrol IV 40mg  daily     Hypernatremia - corrected  Follow BMP.    Atrial fibrillation with RVR  Discussed with Dr. Reyna, now in NSR, he recommends to downgrade lovenox 40 mg daily. Follow ECHO results.    Hyperglycemia  Continue Levemir 10 daily    Hypokalemia - corrected  Follow BMP.     Yes    Upper GI bleeding [K92.2]  Follow H/H closely. Type and screen blood and transfuse as needed.  Continue IV Protonix.  Use IV anti-emetics as needed.       Yes    Muscular dystrophy [G71.00]  Supportive care, fall precautions.     Severe malnutrition  Continue IV TPN.    JUSTA  Continue IVF hydration. Follow BMP. Change Samson catheter, consult nephrologist.       Yes    Chronic obstructive pulmonary disease [J44.9]  Patient's COPD is controlled currently. Continue scheduled inhalers and monitor respiratory status closely.   Yes      DVT prophylaxis: Use SCD and TEDs. No anticoagulation due to GI bleeding.      Stephanie Tena MD  Department of Hospital Medicine   [facility]

## 2019-01-03 NOTE — ANESTHESIA PREPROCEDURE EVALUATION
01/03/2019  Enmanuel Armenta is a 63 y.o., male.    Anesthesia Evaluation    I have reviewed the Patient Summary Reports.    I have reviewed the Nursing Notes.      Review of Systems  Anesthesia Hx:  No problems with previous Anesthesia    Pulmonary:   COPD, moderate Vent dependent       Physical Exam  General:  Well nourished    Airway/Jaw/Neck:  Airway Findings: Mallampati: II                Anesthesia Plan  Type of Anesthesia, risks & benefits discussed:  Anesthesia Type:  general  Patient's Preference:   Intra-op Monitoring Plan:   Intra-op Monitoring Plan Comments:   Post Op Pain Control Plan:   Post Op Pain Control Plan Comments:   Induction:   IV  Beta Blocker:  Patient is not currently on a Beta-Blocker (No further documentation required).       Informed Consent: Patient understands risks and agrees with Anesthesia plan.  Questions answered. Anesthesia consent signed with patient.  ASA Score: 3     Day of Surgery Review of History & Physical:    H&P update referred to the surgeon.         Ready For Surgery From Anesthesia Perspective.

## 2019-01-03 NOTE — PROGRESS NOTES
"Progress Note  Pulmonary/Critical Care      Admit Date: 12/17/2018      SUBJECTIVE:      Patient ID: Enmanuel Armenta is a 63 y.o. male.    HPI/Interval history (See H&P for complete P,F,SHx) :     The patient is 63 year old patient with muscular dystrophy who was transferred for abdominal pain.  He aspirated and was ventilated.  He had a PEG tube placed and was reintubated when he massively aspirated.  ROS  Lethargic, sedated, and unresponsive.  OBJECTIVE:     Vital Signs Range (Last 24H):  Temp:  [98.3 °F (36.8 °C)-100.4 °F (38 °C)]   Pulse:  []   Resp:  [12-29]   BP: (104-184)/(55-87)   SpO2:  [92 %-100 %]   Arterial Line BP: (105-177)/(43-73)     I & O (Last 24H):    Intake/Output Summary (Last 24 hours) at 1/3/2019 1454  Last data filed at 1/3/2019 1000  Gross per 24 hour   Intake 2364.21 ml   Output 3515 ml   Net -1150.79 ml        Estimated body mass index is 26.52 kg/m² as calculated from the following:    Height as of this encounter: 5' 7" (1.702 m).    Weight as of this encounter: 76.8 kg (169 lb 5 oz).    Physical Exam  GENERAL: Older patient in no distress, resting on the ventilator.  HEENT: Pupils equal and reactive. Extraocular movements intact. Nose intact.      Pharynx intubated.  There is bleeding in the mouth from where the tongue is being impaled by 2 rotten lower incisors.  NECK: Supple.   HEART: Regular rate and rhythm. No murmur or gallop auscultated.  LUNGS: Crackles in the bases.. Lung excursion symmetrical. No change in fremitus.   ABDOMEN: Bowel sounds present. Non-tender, no masses palpated. Rectal tube in place.  : Normal anatomy.Samson in place with lots of urine.  EXTREMITIES: Decreased muscle mass bilaterally.   LYMPHATICS: No adenopathy palpated, no edema.  SKIN: Dry, intact, no lesions.   NEURO: Unresponsive but has been on Propofol.  PSYCH: Unable to assess.    Laboratory/Diagnostic Data:    Vent Settings- Vent Mode: Spont  Oxygen Concentration (%):  [40-50] 50  Resp Rate " Total:  [12 br/min-26 br/min] 21 br/min  Vt Set:  [600 mL] 600 mL  PEEP/CPAP:  [5 cmH20] 5 cmH20  Pressure Support:  [10 cmH20] 10 cmH20  Mean Airway Pressure:  [8.7 cmH20-9 cmH20] 8.8 cmH20    ABG  Recent Labs   Lab 01/03/19  0357   PH 7.377   PO2 75*   PCO2 37.1   HCO3 21.8*   BE -3       Recent Labs   Lab 01/03/19  0340   WBC 13.90*   HGB 8.5*   HCT 26.3*   *   *   K 3.9   *   CO2 21*   BUN 73*   CREATININE 2.1*   AST 37   ALT 32   PROT 5.4*   ALBUMIN 1.5*   BILITOT 0.2   PHOS 3.4          Microbiology:    Microbiology Results (last 7 days)     Procedure Component Value Units Date/Time    Culture, Respiratory with Gram Stain [[id number]] Collected:  01/02/19 1229    Order Status:  Canceled Specimen:  Respiratory from Sputum, Expectorated Updated:  01/02/19 1230    Culture, Respiratory with Gram Stain [[id number]]  (Susceptibility) Collected:  12/29/18 1325    Order Status:  Completed Specimen:  Sputum Updated:  01/02/19 1112     Respiratory Culture No Pseudomonas isolated.     Respiratory Culture --     METHICILLIN RESISTANT STAPHYLOCOCCUS AUREUS  Few  Normal respiratory ruddy also present       Gram Stain (Respiratory) <10 epithelial cells per low power field.     Gram Stain (Respiratory) Rare WBC's     Gram Stain (Respiratory) Rare Gram positive cocci          Radiology:  Endotracheal tube and right internal jugular venous catheter remain in place.  Cardiomediastinal silhouette is stable.  There is small left pleural effusion.  There are bilateral infrahilar infiltrate/atelectasis more so in confluent left medial lung base..  The appearance is not significantly changed    ASSESSMENT/PLAN:     Active Problems:    Acute respiratory failure with mechanical ventilation  Aspiration pneumonia with MRSA  Septic shock, resolved  COPD  Anemia   Muscular dystrophy  PEG tube    Hold sedation to check mental status and try to extubate again  D/C Zosyn, only MRSA growing

## 2019-01-03 NOTE — NURSING
[id number]  and anesthesia at bedside. Placed back on vent rate and Diprivan dose increased per anesthesia.  EGD completed without problems. See endo note. Will place back on CPAP once more alert.  Diprivan decreased to 15 mcgs/kg/min.

## 2019-01-03 NOTE — CONSULTS
Enmanuel Armenta [id number] is a 63 y.o. male who has been consulted for vancomycin dosing.    Vancomycin level has been changed to 1/3/19 at am draw.      Patient will be followed by pharmacy for changes in renal function, toxicity, and efficacy.    Thank you for allowing us to participate in this patient's care.     Morgan An, Pharmacist

## 2019-01-03 NOTE — CONSULTS
Date: 1/3/2019   Enmanuel Armenta [id number] is a 63 y.o. male who has been consulted for vancomycin dosing.    The patient has the following labs: Scr 2.1 Crcl 32.8 WBC 13.9  Current weight is 76.8 kg (169 lb 5 oz)      Pt's renal function is not stable.  Pharmacy will dose by level. Target goal is 15-20 mg/dL.   Vanc level 1/3/2019 at 0340 was 17.7 mg/dL. Pharmacy will dose vancomycin 750 mg x1.  A vancomycin level will be ordered on 1/4/19 at 1130.      Patient will be followed by pharmacy for changes in renal function, toxicity, and efficacy.    Thank you for allowing us to participate in this patient's care.     Jayme Blair, PharmD

## 2019-01-03 NOTE — NURSING
Remains on CPAP without problems. Diprivan at 5 mcgs/kg/min.  Pulls exts from stimuli but does not follow commands. Continue to monitor.   Dr. Martin on unit. Informed of gastric contents and coffee ground/maroon appearance.  Plans to do EGD this afternoon.  Dr. Tena updated. Orders to hold Lovenox and Plavix today. Informed already received Plavix this am.

## 2019-01-03 NOTE — NURSING
Dr. Dumont on unit. Diprivan off and placed back on CPAP per RT.  Will attempt to extubate once fully arousable.   Moving all exts to minimal stimuli.

## 2019-01-03 NOTE — PLAN OF CARE
Problem: Adult Inpatient Plan of Care  Goal: Plan of Care Review  Unable to discuss goals with patient due to level of consciousness.    Comments: Safety maintained, remains intubated on CPAP, tolerating well. Minimal Propofol to increase compliance with ventilator. PEG flushed. Minimal residual noted. Skin cleansed, Flexiseal intact to gravity

## 2019-01-03 NOTE — PROGRESS NOTES
01/02/19 1919   Patient Assessment/Suction   Level of Consciousness (AVPU) responds to pain   All Lung Fields Breath Sounds clear   PRE-TX-O2-ETCO2   O2 Device (Oxygen Therapy) ventilator   Oxygen Concentration (%) 40   SpO2 97 %   Pulse Oximetry Type Continuous   ETCO2 (mmHg) 26 mmHg   Pulse 97   Resp 17   Aerosol Therapy   $ Aerosol Therapy Charges Aerosol Treatment   Respiratory Treatment Status (SVN) given   Treatment Route (SVN) in-line   Patient Position (SVN) HOB elevated   Post Treatment Assessment (SVN) breath sounds unchanged   Signs of Intolerance (SVN) none   Breath Sounds Post-Respiratory Treatment   Throughout All Fields Post-Treatment All Fields   Throughout All Fields Post-Treatment no change   Post-treatment Heart Rate (beats/min) 95   Post-treatment Resp Rate (breaths/min) 15   Vent Select   Conventional Vent Y   Charged w/in last 24h YES   Preset Conventional Ventilator Settings   Vent Type    Ventilation Type VC   Vent Mode Spont   Humidity HME   Set Rate 0 bmp   Vt Set 600 mL   PEEP/CPAP 5 cmH20   Pressure Support 10 cmH20   Waveform RAMP   Peak Flow 70 L/min   Set Inspiratory Pressure 0 cmH20   Insp Time 0 Sec(s)   Plateau Set/Insp. Hold (sec) 0   Insp Rise Time  50 %   Trigger Sensitivity Flow/I-Trigger 0.7 L/min   P High 0 cm H2O   P Low 0 cm H2O   T High 0 sec   T Low 0 sec   Patient Ventilator Parameters   Resp Rate Total 17 br/min   Peak Airway Pressure 16 cmH2O   Mean Airway Pressure 9 cmH20   Plateau Pressure 0 cmH20   Exhaled Vt 780 mL   Total Ve 13.4 mL   Spont Ve 13.4 L   I:E Ratio Measured 1:1.70   Conventional Ventilator Alarms   Alarms On Y   Ve High Alarm 27 L/min   Resp Rate High Alarm 0 br/min   Press High Alarm 60 cmH2O   Apnea Rate 10   Apnea Volume (mL) 620 mL   Apnea Oxygen Concentration  100   Apnea Flow Rate (L/min) 74   T Apnea 20 sec(s)   Ready to Wean/Extubation Screen   FIO2<=50 (chart decimal) 0.4   MV<16L (chart vol.) 13.4   PEEP <=8 (chart #) 5   Ready to  Wean Parameters   F/VT Ratio<105 (RSBI) (!) 21.79

## 2019-01-03 NOTE — NURSING
Dr. Tena on unit and updated. Informed of suctioning blood tinged secretions from back of throat.  Peg remains clamped but had 110 cc coffee ground drainage, slightly maroon tinged. Meds reviewed. No new orders at present.

## 2019-01-03 NOTE — TRANSFER OF CARE
"Anesthesia Transfer of Care Note    Patient: Enmanuel Armenta    Procedure(s) Performed: Procedure(s) (LRB):  EGD (ESOPHAGOGASTRODUODENOSCOPY) (N/A)    Patient location: ICU (remaining in icu)    Anesthesia Type: general    Post pain: adequate analgesia    Post assessment: no apparent anesthetic complications    Post vital signs: stable    Level of consciousness: sedated    Nausea/Vomiting: no nausea/vomiting    Complications: none    Transfer of care protocol was followed      Last vitals:   Visit Vitals  /68   Pulse 97   Temp 37.1 °C (98.7 °F) (Axillary)   Resp 15   Ht 5' 7" (1.702 m)   Wt 76.8 kg (169 lb 5 oz)   SpO2 (!) 94%   BMI 26.52 kg/m²     "

## 2019-01-03 NOTE — CONSULTS
PCP: Primary Doctor No    Cardiology Consult    Chief Complaint: Abdominal pain; resp failure and vent support; AF/RVR 1/1/19    History of Present Illness:  Patient is a 63 y.o. male admitted to Hospitalist Service from [facility] Emergency Room with complaint of abdominal pain. Patient is a resident of [facility] nursing [facility], [place]. Patient reportedly has past medical history significant for COPD, muscular dystrophy, l;imited mobility with walker and history of elevated PSA. Patient presented with 1 day history of abdominal pain associated with nausea and vomiting. The EMS reported, vomiting a brown sticky substance with pale red streaks throughout. EMS gave him 4 mg of Zofran in route to the ED. Patient denied chest pain, shortness of breath, headache, vision changes, focal neuro-deficits, cough. Patient noted to have 101.5F upon arrival.   The pt developed increasing distress and was intubated. He was weaned off 1/1/19 when he developed AF / RVR. He was given metoprolol 5 mg iv with better rate control and was placed back on the ventilator. He developed RVR again and was given diltiazem 10 mg iv and placed on 10 mg/hr. He has since converted to NSR. He presently on CPAP.    Past Medical History:   Diagnosis Date    Anticoagulant long-term use     BPH (benign prostatic hyperplasia)     COPD (chronic obstructive pulmonary disease)     Elevated PSA     Fx     RIGHT WRIST, LEFT FOOT    Hypokalemia     Muscular dystrophy     Urine retention     Wears dentures     upper     Past Surgical History:   Procedure Laterality Date    APPENDECTOMY      cataracts      bilateral     CHOLECYSTECTOMY      CYST REMOVAL      from spine     CYSTOLITHOLOPAXY (REMOVE BLADDER STONE)  3/6/2017    Performed by Hernan Lake MD at [facility] OR    CYSTOSCOPY      EGD (ESOPHAGOGASTRODUODENOSCOPY) N/A 12/24/2018    Performed by Evgeny Martin MD at [facility] ENDO    EGD (ESOPHAGOGASTRODUODENOSCOPY) N/A  12/17/2018    Per[facility] by [facility] at [facility] ENDO    EYE SURGERY      cataract surgery bilateral    INSERTION, PEG TUBE N/A 12/24/2018    Per[facility] by [facility]y [facility] at [facility] ENDO    PROSTATE BIOPSY      PROSTATE SURGERY      PROSTATECTOMY-TRANSURETHRAL N/A 3/6/2017    Per[facility] by [facility]an Lake [facility] at [facility] OR    PROSTATECTOMY-TRANSURETHRAL WITH GREENLIGHT LASER N/A 3/6/2017    Performed by [facility]an Lake [facility] at [facility] OR    TONSILLECTOMY      TRANSRECTAL ULTRASOUND GUIDED PROSTATE BIOPSY N/A 3/28/2016    Performed by Hernan Lake [facility] at [facility] OR    VASECTOMY       History reviewed. No pertinent family history.  Social History     Tobacco Use    Smoking status: Current Every Day Smoker     Packs/day: 1.00     Years: 48.00     Pack years: 48.00     Types: Cigarettes    Smokeless tobacco: Never Used   Substance Use Topics    Alcohol use: No     Alcohol/week: 0.0 oz    Drug use: No      Review of patient's allergies indicates:  No Known Allergies  PTA Medications   Medication Sig    aspirin (ECOTRIN) 81 MG EC tablet Take one tablet daily    atorvastatin (LIPITOR) 80 MG tablet Take 80 mg by mouth every evening.     benzonatate (TESSALON) 100 MG capsule Take 100 mg by mouth 3 (three) times daily as needed for Cough.    clopidogrel (PLAVIX) 75 mg tablet Take one tablet daily    dextromethorphan-guaifenesin  mg (MUCINEX DM)  mg per 12 hr tablet Take 2 tablets by mouth every 12 (twelve) hours.    lisinopril (PRINIVIL,ZESTRIL) 2.5 MG tablet Take 2.5 mg by mouth once daily.     metoprolol succinate (TOPROL-XL) 25 MG 24 hr tablet Take 25 mg by mouth once daily.     polyethylene glycol (GLYCOLAX) 17 gram PwPk Take 17 g by mouth once daily.      Review of Systems:  Constitutional: + Fever  Eyes: no visual changes  Ears, nose, mouth, throat, and face: no nasal congestion or sore throat  Respiratory: + cough but no shorness of breath  Cardiovascular: no chest pain or  palpitations  Gastrointestinal: see HPI  Genitourinary: no hematuria or dysuria  Integument/breast: no rash or pruritis  Hematologic/lymphatic: no easy bruising or lymphadenopathy  Musculoskeletal: no arthralgias or myalgias  Neurological: no seizures or tremors.  Behavioral/Psych: no auditory or visual hallucinations  Endocrine: no heat or cold intolerance     OBJECTIVE:     Vital Signs (Most Recent)  Temp: (!) 100.4 °F (38 °C) (01/02/19 1630)  Pulse: 97 (01/02/19 1919)  Resp: 17 (01/02/19 1919)  BP: (!) 149/74 (01/02/19 1800)  SpO2: 97 % (01/02/19 1919)    Physical Exam:  General appearance: well developed, appears stated age, chronically ill-appearing  Head: normocephalic, atraumatic  Eyes:  conjunctivae/corneas clear. PERRL.  Nose: Nares normal. Septum midline.  Throat: lips, mucosa, and tongue normal; intubated.  Neck: supple, symmetrical, trachea midline, no JVD and thyroid not enlarged, symmetric, no tenderness/mass/nodules  Lungs:  clear  Heart: regular rate and rhythm, S1, S2 normal, no murmur, click, rub or gallop  Abdomen: soft, epigastric tenderness, non-distented; bowel sounds normal; no masses,  no organomegaly. PEG  Extremities: no cyanosis, clubbing or edema.   Pulses: 2+ and symmetric  Skin: Skin color, texture, turgor normal. No rashes or lesions.  Lymph nodes: Cervical, supraclavicular, and axillary nodes normal.       Laboratory:   CBC:   Recent Labs   Lab 01/02/19  0311   WBC 11.60   RBC 3.05*   HGB 8.9*   HCT 27.3*   *   MCV 90   MCH 29.1   MCHC 32.5     CMP:   Recent Labs   Lab 01/02/19 0310   *   CALCIUM 8.7   ALBUMIN 1.5*   PROT 5.2*      K 4.3   CO2 21*   *   BUN 73*   CREATININE 2.5*   ALKPHOS 151*   ALT 28   AST 38   BILITOT 0.2     Coagulation:   No results for input(s): LABPROT, INR, APTT in the last 168 hours.  Microbiology Results (last 7 days)     Procedure Component Value Units Date/Time    Culture, Respiratory with Gram Stain [[id number]] Collected:   01/02/19 1229    Order Status:  Canceled Specimen:  Respiratory from Sputum, Expectorated Updated:  01/02/19 1230    Culture, Respiratory with Gram Stain [[id number]]  (Susceptibility) Collected:  12/29/18 1325    Order Status:  Completed Specimen:  Sputum Updated:  01/02/19 1112     Respiratory Culture No Pseudomonas isolated.     Respiratory Culture --     METHICILLIN RESISTANT STAPHYLOCOCCUS AUREUS  Few  Normal respiratory ruddy also present       Gram Stain (Respiratory) <10 epithelial cells per low power field.     Gram Stain (Respiratory) Rare WBC's     Gram Stain (Respiratory) Rare Gram positive cocci        No results for input(s): COLORU, CLARITYU, SPECGRAV, PHUR, PROTEINUA, GLUCOSEU, BILIRUBINCON, BLOODU, WBCU, RBCU, BACTERIA, MUCUS, NITRITE, LEUKOCYTESUR, UROBILINOGEN, HYALINECASTS in the last 168 hours.  Microbiology Results (last 7 days)     Procedure Component Value Units Date/Time    Culture, Respiratory with Gram Stain [[id number]] Collected:  01/02/19 1229    Order Status:  Canceled Specimen:  Respiratory from Sputum, Expectorated Updated:  01/02/19 1230    Culture, Respiratory with Gram Stain [[id number]]  (Susceptibility) Collected:  12/29/18 1325    Order Status:  Completed Specimen:  Sputum Updated:  01/02/19 1112     Respiratory Culture No Pseudomonas isolated.     Respiratory Culture --     METHICILLIN RESISTANT STAPHYLOCOCCUS AUREUS  Few  Normal respiratory ruddy also present       Gram Stain (Respiratory) <10 epithelial cells per low power field.     Gram Stain (Respiratory) Rare WBC's     Gram Stain (Respiratory) Rare Gram positive cocci        Diagnostic Results:  Chest X-Ray: T right basilar infiltrate. CXR 1/1/19 shows marked clearing of the RLL infiltrate.  EKG:   AF /RVR; intraventricular conduction delay LBBB type.  NSR this am.    CT abdomen and pelvis with contrast:  Enlarged prostate gland, air in the bladder likely secondary to catheterization, diffuse bladder wall thickening.  Prominent amount of fluid within right colon.  Appendix not identified and note is made of limitation with motion artifact in the appendiceal region.    Assessment/Plan:     Active Hospital Problems    Diagnosis  POA    SOB (shortness of breath) [R06.02]  Yes    Aspiration pneumonia, RLL [J69.0]  Continue beta 2 agonist bronchodilator treatments.   Continue IV antibiotics - Vancomycin and Zosyn. Pharmacist to dose Vancomycin.   .     Yes    Upper GI bleeding [K92.2]  Follow H/H closely. Type and screen blood and transfuse as needed.  Check Iron, TIBC, B12 and folic acid.   Continue IVF hydration.   Use IV anti-emetics as needed.     Yes    Muscular dystrophy [G71.00]  Supportive care, fall precautions.     UTI   IV antibiotics.    Yes    Chronic obstructive pulmonary disease [J44.9]  Patient's COPD is controlled currently. Continue scheduled inhalers and monitor respiratory status closely.      Yes        Atrial fibrillation - resolved.        Discontinue IV diltiazem and Lovenox bid.        Weaning in progress again; Pt on CPAP.         Hopefully he will continue in NSR         Echo being performed.Will review.    DVT prophylaxis: Use SCD and TEDs.      Ninfa Reyna MD  Cardiology   [facility]

## 2019-01-04 LAB
ABO + RH BLD: NORMAL
ALBUMIN SERPL BCP-MCNC: 1.6 G/DL
ALLENS TEST: ABNORMAL
ALLENS TEST: ABNORMAL
ALP SERPL-CCNC: 185 U/L
ALT SERPL W/O P-5'-P-CCNC: 37 U/L
ANION GAP SERPL CALC-SCNC: 9 MMOL/L
AST SERPL-CCNC: 38 U/L
BASOPHILS # BLD AUTO: 0 K/UL
BASOPHILS NFR BLD: 0.2 %
BILIRUB SERPL-MCNC: 0.2 MG/DL
BLD GP AB SCN CELLS X3 SERPL QL: NORMAL
BLD PROD TYP BPU: NORMAL
BLD PROD TYP BPU: NORMAL
BLOOD UNIT EXPIRATION DATE: NORMAL
BLOOD UNIT EXPIRATION DATE: NORMAL
BLOOD UNIT TYPE CODE: 6200
BLOOD UNIT TYPE CODE: 6200
BLOOD UNIT TYPE: NORMAL
BLOOD UNIT TYPE: NORMAL
BUN SERPL-MCNC: 74 MG/DL
CALCIUM SERPL-MCNC: 9 MG/DL
CHLORIDE SERPL-SCNC: 118 MMOL/L
CO2 SERPL-SCNC: 21 MMOL/L
CODING SYSTEM: NORMAL
CODING SYSTEM: NORMAL
CREAT SERPL-MCNC: 2 MG/DL
DELSYS: ABNORMAL
DELSYS: ABNORMAL
DIFFERENTIAL METHOD: ABNORMAL
DISPENSE STATUS: NORMAL
DISPENSE STATUS: NORMAL
EOSINOPHIL # BLD AUTO: 0.2 K/UL
EOSINOPHIL NFR BLD: 1.4 %
ERYTHROCYTE [DISTWIDTH] IN BLOOD BY AUTOMATED COUNT: 15.3 %
EST. GFR  (AFRICAN AMERICAN): 39 ML/MIN/1.73 M^2
EST. GFR  (NON AFRICAN AMERICAN): 34 ML/MIN/1.73 M^2
FLOW: 6
GLUCOSE SERPL-MCNC: 399 MG/DL
HCO3 UR-SCNC: 21.7 MMOL/L (ref 24–28)
HCO3 UR-SCNC: 23.3 MMOL/L (ref 24–28)
HCT VFR BLD AUTO: 24.3 %
HGB BLD-MCNC: 7.8 G/DL
LYMPHOCYTES # BLD AUTO: 1.8 K/UL
LYMPHOCYTES NFR BLD: 13.3 %
MAGNESIUM SERPL-MCNC: 2.4 MG/DL
MCH RBC QN AUTO: 28.8 PG
MCHC RBC AUTO-ENTMCNC: 32 G/DL
MCV RBC AUTO: 90 FL
MODE: ABNORMAL
MODE: ABNORMAL
MONOCYTES # BLD AUTO: 1 K/UL
MONOCYTES NFR BLD: 7.4 %
NEUTROPHILS # BLD AUTO: 10.3 K/UL
NEUTROPHILS NFR BLD: 77.7 %
NUM UNITS TRANS PACKED RBC: NORMAL
PCO2 BLDA: 40 MMHG (ref 35–45)
PCO2 BLDA: 40.7 MMHG (ref 35–45)
PH SMN: 7.34 [PH] (ref 7.35–7.45)
PH SMN: 7.37 [PH] (ref 7.35–7.45)
PHOSPHATE SERPL-MCNC: 3.8 MG/DL
PLATELET # BLD AUTO: 441 K/UL
PMV BLD AUTO: 9.5 FL
PO2 BLDA: 60 MMHG (ref 80–100)
PO2 BLDA: 78 MMHG (ref 80–100)
POC BE: -2 MMOL/L
POC BE: -4 MMOL/L
POC SATURATED O2: 90 % (ref 95–100)
POC SATURATED O2: 95 % (ref 95–100)
POC TCO2: 23 MMOL/L (ref 23–27)
POC TCO2: 24 MMOL/L (ref 23–27)
POCT GLUCOSE: 270 MG/DL (ref 70–110)
POCT GLUCOSE: 294 MG/DL (ref 70–110)
POCT GLUCOSE: >500 MG/DL (ref 70–110)
POTASSIUM SERPL-SCNC: 3.9 MMOL/L
PROT SERPL-MCNC: 5.3 G/DL
RBC # BLD AUTO: 2.69 M/UL
SAMPLE: ABNORMAL
SAMPLE: ABNORMAL
SITE: ABNORMAL
SITE: ABNORMAL
SODIUM SERPL-SCNC: 148 MMOL/L
SP02: 89
TRANS ERYTHROCYTES VOL PATIENT: NORMAL ML
VANCOMYCIN SERPL-MCNC: 15.8 UG/ML
WBC # BLD AUTO: 13.3 K/UL

## 2019-01-04 PROCEDURE — 80202 ASSAY OF VANCOMYCIN: CPT

## 2019-01-04 PROCEDURE — 63600175 PHARM REV CODE 636 W HCPCS: Performed by: INTERNAL MEDICINE

## 2019-01-04 PROCEDURE — 27100092 HC HIGH FLOW DELIVERY CANNULA

## 2019-01-04 PROCEDURE — 94640 AIRWAY INHALATION TREATMENT: CPT

## 2019-01-04 PROCEDURE — 37799 UNLISTED PX VASCULAR SURGERY: CPT

## 2019-01-04 PROCEDURE — 94761 N-INVAS EAR/PLS OXIMETRY MLT: CPT

## 2019-01-04 PROCEDURE — P9021 RED BLOOD CELLS UNIT: HCPCS

## 2019-01-04 PROCEDURE — 25000003 PHARM REV CODE 250: Performed by: INTERNAL MEDICINE

## 2019-01-04 PROCEDURE — 85025 COMPLETE CBC W/AUTO DIFF WBC: CPT

## 2019-01-04 PROCEDURE — 94660 CPAP INITIATION&MGMT: CPT

## 2019-01-04 PROCEDURE — 27000190 HC CPAP FULL FACE MASK W/VALVE

## 2019-01-04 PROCEDURE — 82803 BLOOD GASES ANY COMBINATION: CPT

## 2019-01-04 PROCEDURE — 99900035 HC TECH TIME PER 15 MIN (STAT)

## 2019-01-04 PROCEDURE — 99233 PR SUBSEQUENT HOSPITAL CARE,LEVL III: ICD-10-PCS | Mod: ,,, | Performed by: INTERNAL MEDICINE

## 2019-01-04 PROCEDURE — 99232 PR SUBSEQUENT HOSPITAL CARE,LEVL II: ICD-10-PCS | Mod: ,,, | Performed by: INTERNAL MEDICINE

## 2019-01-04 PROCEDURE — 27201040 HC RC 50 FILTER

## 2019-01-04 PROCEDURE — 27100171 HC OXYGEN HIGH FLOW UP TO 24 HOURS

## 2019-01-04 PROCEDURE — 27000221 HC OXYGEN, UP TO 24 HOURS

## 2019-01-04 PROCEDURE — 36415 COLL VENOUS BLD VENIPUNCTURE: CPT

## 2019-01-04 PROCEDURE — C9113 INJ PANTOPRAZOLE SODIUM, VIA: HCPCS | Performed by: INTERNAL MEDICINE

## 2019-01-04 PROCEDURE — 99233 SBSQ HOSP IP/OBS HIGH 50: CPT | Mod: ,,, | Performed by: INTERNAL MEDICINE

## 2019-01-04 PROCEDURE — 84100 ASSAY OF PHOSPHORUS: CPT

## 2019-01-04 PROCEDURE — 99232 SBSQ HOSP IP/OBS MODERATE 35: CPT | Mod: ,,, | Performed by: INTERNAL MEDICINE

## 2019-01-04 PROCEDURE — 94770 HC EXHALED C02 TEST: CPT

## 2019-01-04 PROCEDURE — 36430 TRANSFUSION BLD/BLD COMPNT: CPT

## 2019-01-04 PROCEDURE — 83735 ASSAY OF MAGNESIUM: CPT

## 2019-01-04 PROCEDURE — 94003 VENT MGMT INPAT SUBQ DAY: CPT

## 2019-01-04 PROCEDURE — 86920 COMPATIBILITY TEST SPIN: CPT

## 2019-01-04 PROCEDURE — 86901 BLOOD TYPING SEROLOGIC RH(D): CPT

## 2019-01-04 PROCEDURE — 99900026 HC AIRWAY MAINTENANCE (STAT)

## 2019-01-04 PROCEDURE — P9016 RBC LEUKOCYTES REDUCED: HCPCS

## 2019-01-04 PROCEDURE — 25000242 PHARM REV CODE 250 ALT 637 W/ HCPCS: Performed by: INTERNAL MEDICINE

## 2019-01-04 PROCEDURE — 80053 COMPREHEN METABOLIC PANEL: CPT

## 2019-01-04 PROCEDURE — B4185 PARENTERAL SOL 10 GM LIPIDS: HCPCS | Performed by: INTERNAL MEDICINE

## 2019-01-04 PROCEDURE — 20000000 HC ICU ROOM

## 2019-01-04 RX ORDER — VANCOMYCIN HCL IN 5 % DEXTROSE 1G/250ML
1000 PLASTIC BAG, INJECTION (ML) INTRAVENOUS
Status: DISCONTINUED | OUTPATIENT
Start: 2019-01-04 | End: 2019-01-04

## 2019-01-04 RX ORDER — HYDROCODONE BITARTRATE AND ACETAMINOPHEN 500; 5 MG/1; MG/1
TABLET ORAL
Status: DISCONTINUED | OUTPATIENT
Start: 2019-01-04 | End: 2019-01-11 | Stop reason: HOSPADM

## 2019-01-04 RX ORDER — DEXTROSE MONOHYDRATE 50 MG/ML
INJECTION, SOLUTION INTRAVENOUS CONTINUOUS
Status: DISCONTINUED | OUTPATIENT
Start: 2019-01-04 | End: 2019-01-09

## 2019-01-04 RX ADMIN — IPRATROPIUM BROMIDE AND ALBUTEROL SULFATE 3 ML: .5; 3 SOLUTION RESPIRATORY (INHALATION) at 08:01

## 2019-01-04 RX ADMIN — VANCOMYCIN HYDROCHLORIDE 1000 MG: 1 INJECTION, POWDER, FOR SOLUTION INTRAVENOUS at 03:01

## 2019-01-04 RX ADMIN — PANTOPRAZOLE SODIUM 40 MG: 40 INJECTION, POWDER, LYOPHILIZED, FOR SOLUTION INTRAVENOUS at 09:01

## 2019-01-04 RX ADMIN — HYDRALAZINE HYDROCHLORIDE 10 MG: 20 INJECTION INTRAMUSCULAR; INTRAVENOUS at 11:01

## 2019-01-04 RX ADMIN — INSULIN ASPART 10 UNITS: 100 INJECTION, SOLUTION INTRAVENOUS; SUBCUTANEOUS at 07:01

## 2019-01-04 RX ADMIN — DEXTROSE: 5 SOLUTION INTRAVENOUS at 11:01

## 2019-01-04 RX ADMIN — IPRATROPIUM BROMIDE AND ALBUTEROL SULFATE 3 ML: .5; 3 SOLUTION RESPIRATORY (INHALATION) at 07:01

## 2019-01-04 RX ADMIN — INSULIN DETEMIR 16 UNITS: 100 INJECTION, SOLUTION SUBCUTANEOUS at 09:01

## 2019-01-04 RX ADMIN — IPRATROPIUM BROMIDE AND ALBUTEROL SULFATE 3 ML: .5; 3 SOLUTION RESPIRATORY (INHALATION) at 03:01

## 2019-01-04 RX ADMIN — IPRATROPIUM BROMIDE AND ALBUTEROL SULFATE 3 ML: .5; 3 SOLUTION RESPIRATORY (INHALATION) at 12:01

## 2019-01-04 RX ADMIN — METHYLPREDNISOLONE SODIUM SUCCINATE 40 MG: 40 INJECTION, POWDER, FOR SOLUTION INTRAMUSCULAR; INTRAVENOUS at 09:01

## 2019-01-04 RX ADMIN — NYSTATIN 500000 UNITS: 500000 SUSPENSION ORAL at 11:01

## 2019-01-04 RX ADMIN — ASCORBIC ACID, VITAMIN A PALMITATE, CHOLECALCIFEROL, THIAMINE HYDROCHLORIDE, RIBOFLAVIN-5 PHOSPHATE SODIUM, PYRIDOXINE HYDROCHLORIDE, NIACINAMIDE, DEXPANTHENOL, ALPHA-TOCOPHEROL ACETATE, VITAMIN K1, FOLIC ACID, BIOTIN, CYANOCOBALAMIN: 200; 3300; 200; 6; 3.6; 6; 40; 15; 10; 150; 600; 60; 5 INJECTION, SOLUTION INTRAVENOUS at 11:01

## 2019-01-04 RX ADMIN — NYSTATIN 500000 UNITS: 500000 SUSPENSION ORAL at 06:01

## 2019-01-04 RX ADMIN — INSULIN ASPART 6 UNITS: 100 INJECTION, SOLUTION INTRAVENOUS; SUBCUTANEOUS at 06:01

## 2019-01-04 RX ADMIN — INSULIN ASPART 6 UNITS: 100 INJECTION, SOLUTION INTRAVENOUS; SUBCUTANEOUS at 11:01

## 2019-01-04 RX ADMIN — IPRATROPIUM BROMIDE AND ALBUTEROL SULFATE 3 ML: .5; 3 SOLUTION RESPIRATORY (INHALATION) at 11:01

## 2019-01-04 RX ADMIN — NYSTATIN 500000 UNITS: 500000 SUSPENSION ORAL at 07:01

## 2019-01-04 RX ADMIN — I.V. FAT EMULSION 250 ML: 20 EMULSION INTRAVENOUS at 09:01

## 2019-01-04 RX ADMIN — ATORVASTATIN CALCIUM 80 MG: 40 TABLET, FILM COATED ORAL at 09:01

## 2019-01-04 RX ADMIN — HYDRALAZINE HYDROCHLORIDE 10 MG: 20 INJECTION INTRAMUSCULAR; INTRAVENOUS at 03:01

## 2019-01-04 RX ADMIN — NYSTATIN 500000 UNITS: 500000 SUSPENSION ORAL at 12:01

## 2019-01-04 NOTE — NURSING
1803  Blood sugar > 500.  Stat glucose ordered. Attempted to call . No answer.     1822 Glucose drawn form A line.  Awaiting result   Accucheck repeated, 496.  Return call from Dr Tena. Updated on elevated blood sugar.  New orders received. Novolog 16 units SQ given at time and ss increased to medium scale.   Will have ABX mixed in normal saline.

## 2019-01-04 NOTE — CONSULTS
Enmanuel Armenta [id number] is a 63 y.o. male who has been consulted for vancomycin dosing.    The patient has the following labs: Scr 2 Crcl 34.4 WBC 13.3   Current weight is 76.8 kg (169 lb 5 oz)    Pt being treated with vancomycin for PNA.    The patient has been dosed by level due to unstable renal function.     Level from 1/4/19 at 1137 was 15.8 (~ 20 hr level) after receiving 750 mg on 1/3/19. The patient will be started on vancomycin at a dose of 1000 mg every 24 hours (13 mg/kg/dose). The vancomycin level has been ordered prior to next dose, 1/5/19 at 1330.  Target goal is 15-20.     Patient will be followed by pharmacy for changes in renal function, toxicity, and efficacy.      Thank you for allowing us to participate in this patient's care.     Jayme Blair, PharmD

## 2019-01-04 NOTE — PLAN OF CARE
Problem: Adult Inpatient Plan of Care  Goal: Plan of Care Review  Outcome: Ongoing (interventions implemented as appropriate)  Remains intubated. On CPAP most of the day and tolerating well. Becoming more restless this pm but not following commands. Soft wrist restraints in use.  PEG intact with coffee ground/maroon drainage. EGD done. Gastric ulcer noted and treated. See endo note. Water flushes ordered, 250cc every 6 hours. Suctioning bloody secretions orally. Small area under tongue that appears to be bleeding. Consult to Dr. Malik (Dr Rodriguez) to remove decayed fragments of teeth that appear to have injured tongue.  Samson intact with good urine output. Safety maintained. Heel protectors on  Rotation bed in use.

## 2019-01-04 NOTE — PROGRESS NOTES
Extubated pt per MD order. Placed on 4 lpm nasal cannula. Discontinued ventilator. Mo complications at this time O2Sat 95%. Will continue to monitor pt

## 2019-01-04 NOTE — PLAN OF CARE
Problem: Malnutrition  Goal: Improved Nutritional Intake    Intervention: Promote and Optimize Nutrition Support  Interventions: Parenteral Nutrition Therapy  Vs. Enteral nutrition therapy      This morning Intake :   TPN D15 AA 5 @ 100 ml/hr -   ( provides 2400 ml, 360 g dextrose, 2204 kcal (126%EEN), and 120 g protein (170%EPN))      Currently @ 60 ml/hr + 250 l standard lipids     Recommendation:   1)  ContinueTPN to D15 AA 5 @ 60 ml/hr + standard lipids  (Provides 1692 kcal (96%EEN), and 84 g protein (100% EPN) and 1680 ml + lipid)     2) If pt able to resume TF, rec. continuous and raise HOB 90 degrees   Isosource 1.5 @15 ml/hr increasing to goal of 45 ml/hr + 155 ml flush q 4 hr  (Provides 820 ml free water, 1620 kcal (92%EEN), 73 g protein (100%EPN))  - J-tube feedings would also decrease risk of aspiration     Goals: 1) TPN or TF to meet > 75% estimated needs by f/u 2) TF initiated in < 24 hours 3) Re-initiate nutrition support to meet > 75% estimated needs by f/u   Nutrition Goal Status: Met/ Met/ Met  Communication of RD Recs: (POC, sticky note, second sign, reviewed with RN )

## 2019-01-04 NOTE — PROGRESS NOTES
Progress Note  Hospital Medicine  Patient Name:Enmanuel Armenta  MRN:  [id number]  Patient Class: IP- Inpatient  Admit Date: 12/17/2018  Length of Stay: 18 days  Expected Discharge Date:   Attending Physician: Stephanie Tena MD  Primary Care Provider:  Primary Doctor No    SUBJECTIVE:     Principal Problem: Aspiration pneumonia of both lower lobes  Initial history of present illness: Patient is a 63 y.o. male admitted to Hospitalist Service from [facility] Emergency Room with complaint of abdominal pain. Patient is a resident of [facility] nursing home, [place]. Patient reportedly has past medical history significant for COPD, muscular dystrophy, l;imited mobility with walker and history of elevated PSA. Patient presented with 1 day history of abdominal pain associated with nausea and vomiting. The EMS reported, vomiting a brown sticky substance with pale red streaks throughout. EMS gave him 4 mg of Zofran in route to the ED. Patient denied chest pain, shortness of breath, headache, vision changes, focal neuro-deficits, cough. Patient noted to have 101.5F upon arrival.     PMH/PSH/SH/FH/Meds: reviewed.    Symptoms/Review of Systems: In ICU, afebrile. Getting CPAP trial. Following commands. In sinus rhythm now. Blood glucose readings are high. Patient seen and examined. Remains intubated and sedated. Patient had an EGD done again and note dto have bleeding  with adherent clot.  Diet: Trickle feeding + TPN  Activity level: Up with assistance    Pain:  Moans to verbal commands     OBJECTIVE:   Vital Signs (Most Recent):      Temp: 98.9 °F (37.2 °C) (01/03/19 2000)  Pulse: 91 (01/04/19 0419)  Resp: 14 (01/04/19 0419)  BP: (!) 118/58 (01/03/19 2300)  SpO2: 98 % (01/04/19 0419)       Vital Signs Range (Last 24H):  Temp:  [98.3 °F (36.8 °C)-99.1 °F (37.3 °C)]   Pulse:  []   Resp:  [12-29]   BP: (104-155)/(55-76)   SpO2:  [92 %-100 %]   Arterial Line BP: (105-168)/(43-72)     Weight: 76.8 kg  (169 lb 5 oz)  Body mass index is 26.52 kg/m².    Intake/Output Summary (Last 24 hours) at 1/4/2019 0741  Last data filed at 1/4/2019 0600  Gross per 24 hour   Intake 2065.4 ml   Output 4050 ml   Net -1984.6 ml     Physical Examination:  General appearance: well developed, appears stated age, chronically ill-appearing, intubated and sedated  Head: normocephalic, atraumatic  Eyes:  conjunctivae/corneas clear. PERRL.  Nose: Nares normal. Septum midline.  Throat: lips, mucosa, and tongue normal; teeth and gums normal, no throat erythema. + Dry blood at angle of mouth  Neck: supple, symmetrical, trachea midline, no JVD and thyroid not enlarged, symmetric, no tenderness/mass/nodules  Lungs:  Scattered rhonchi B/L.  Chest wall: no tenderness  Heart: regular rate and rhythm, S1, S2 normal, no murmur, click, rub or gallop  Abdomen: soft, epigastric tenderness, non-distented; bowel sounds normal; no masses,  no organomegaly. PEG site okay.  Extremities: no cyanosis, clubbing or edema.   Pulses: 2+ and symmetric  Skin: Skin color, texture, turgor normal. No rashes or lesions.  Lymph nodes: Cervical, supraclavicular, and axillary nodes normal.  Neurologic: Sedated    CBC:  Recent Labs   Lab 01/02/19  0311 01/03/19  0340 01/03/19  1534 01/04/19  0357   WBC 11.60 13.90*  --  13.30*   RBC 3.05* 2.94*  --  2.69*   HGB 8.9* 8.5* 8.1* 7.8*   HCT 27.3* 26.3* 25.4* 24.3*   * 446*  --  441*   MCV 90 89  --  90   MCH 29.1 29.0  --  28.8   MCHC 32.5 32.4  --  32.0   BMP  Recent Labs   Lab 01/02/19  0310 01/03/19  0340 01/03/19  1816 01/04/19  0358   * 375* 631* 399*    146*  --  148*   K 4.3 3.9  --  3.9   * 114*  --  118*   CO2 21* 21*  --  21*   BUN 73* 73*  --  74*   CREATININE 2.5* 2.1*  --  2.0*   CALCIUM 8.7 9.0  --  9.0   MG 2.5 2.4  --  2.4      Diagnostic Results:  Microbiology Results (last 7 days)     Procedure Component Value Units Date/Time    Culture, Respiratory with Gram Stain [[id number]]  Collected:  01/02/19 1229    Order Status:  Canceled Specimen:  Respiratory from Sputum, Expectorated Updated:  01/02/19 1230    Culture, Respiratory with Gram Stain [[id number]]  (Susceptibility) Collected:  12/29/18 1325    Order Status:  Completed Specimen:  Sputum Updated:  01/02/19 1112     Respiratory Culture No Pseudomonas isolated.     Respiratory Culture --     METHICILLIN RESISTANT STAPHYLOCOCCUS AUREUS  Few  Normal respiratory ruddy also present       Gram Stain (Respiratory) <10 epithelial cells per low power field.     Gram Stain (Respiratory) Rare WBC's     Gram Stain (Respiratory) Rare Gram positive cocci         Chest X-Ray: T right basilar infiltrate.     CT abdomen and pelvis with contrast:  Enlarged prostate gland, air in the bladder likely secondary to catheterization, diffuse bladder wall thickening. Prominent amount of fluid within right colon.  Appendix not identified and note is made of limitation with motion artifact in the appendiceal region.    EGD:   - Moderately severe reflux esophagitis.                       - Medium-sized hiatal hernia.                       - Gastritis.                       - A single gastric polyp.                       - Normal examined duodenum.                       - No specimens collected.    CXR: New moderate left pleural effusion and left lung infiltrate.  Leftward mediastinal shift raising consideration for left lung atelectasis as contributory to the findings.  Close follow-up recommended.    CXR:   Unchanged bilateral lower lobe infiltrates.Heart size is now within normal limits.  Endotracheal tube and right central line in position.    CXR:   Increasing left lower lobe infiltrate and possible left pleural effusion.  Decreasing right base infiltrate.  Probable mild cardiomegaly obscured by the infiltrates.  Endotracheal tube and central line in position.    CXR:   Stable, appropriate positioning of support devices.  Stable appearance of the chest  demonstrating right basilar atelectasis or infiltrate and retrocardiac consolidation and small left pleural effusion.    CXR: No significant change in the appearance of the chest compared to the prior exam.    EGD:   - Normal esophagus.                       - Z-line regular.                       - Hematin (altered blood/coffee-ground-like                        material) in the gastric fundus and in the gastric                        body.                       - Intact gastrostomy with a patent G-tube present                        characterized by congestion, edema, erosion and                        erythema.                       - Oozing gastric ulcer with adherent clot. Injected.                        Clips were placed.                       - Normal antrum.                       - Normal examined duodenum.                       - No specimens collected.    ECHO:  · Mildly decreased left ventricular systolic function. The estimated ejection fraction is 49%  · Grade I (mild) left ventricular diastolic dysfunction consistent with impaired relaxation.  · Normal left atrial pressure.  · Normal right ventricular systolic function.  · Elevated central venous pressure (15 mm Hg).  · The estimated PA systolic pressure is 27 mm Hg  · No evidence of endocarditis.  · Patient is in on CPAP.    Assessment/Plan:      Acute hypoxic respiratory failure   Yes    Aspiration pneumonia, RLL with MRSA sp [J69.0]  Swallow dysfunction s/p PEG placement  On ventilator. Management per pulmonary. Expecting extubation soon.  Continue Vanc  Duonebs  On Solumedrol IV 40mg  daily     Hypernatremia   Follow BMP. On TPN.    Atrial fibrillation with RVR  Controlled, NSR noted. Dr. Reyna is following.     Hyperglycemia  Increase Levemir 16 units BID.    Hypokalemia - corrected  Follow BMP.     Yes    Upper GI bleeding [K92.2]  Acute GI bleeding loss anemia  Follow H/H closely. Transfuse 2 units of PRBC.  Continue IV Protonix BID. Follow  GI recommendations.  Use IV anti-emetics as needed.       Yes    Muscular dystrophy [G71.00]  Supportive care, fall precautions.     Severe malnutrition  Continue IV TPN.    JUSTA  Continue IVF hydration. Follow BMP. Follow nephrology recommendations.        Yes    Chronic obstructive pulmonary disease [J44.9]  Patient's COPD is controlled currently. Continue scheduled inhalers and monitor respiratory status closely.   Yes      DVT prophylaxis: Use SCD and TEDs. No anticoagulation due to GI bleeding.      Stephanie Tena MD  Department of Hospital Medicine   [facility]

## 2019-01-04 NOTE — PLAN OF CARE
Problem: Adult Inpatient Plan of Care  Goal: Plan of Care Review  Unable to discuss goals with patient due to level of consciousness.    Comments: Dr Rodriguez at bedside early in the shift, tooth extracted, tolerated well. Rested well, sedation off, bath completed. No needs assessed

## 2019-01-04 NOTE — CONSULTS
Mr. Armenta is a 62 yo male being consulted for tooth extractions.    PE: teeth 24 and 26 are nonrestorable and have sharp edges. The ET tube is causing the teeth to cut the ventral surface of the tongue and floor of mouth.    his anticoagulant (plavix) was d/navneet today, however, the lacerations continue to ooze.     PLAN/Procedure:  Anesthetized will 2 cc of 1% lidocaine with 1:100k epi and then extracted teeth 24 and 26.  Of note, tooth #25 is present but is underneath the gingiva and does not have any sharp edges. This tooth can be extracted at a later date.    MIGUEL Rodriguez DDS, MD

## 2019-01-04 NOTE — PROGRESS NOTES
INPATIENT NEPHROLOGY PROGRESS NOTE  [facility] NEPHROLOGY    Patient Name: Enmanuel Armenta  Date: 01/04/2019    Reason for consultation: JUSTA    Chief Complaint:   Chief Complaint   Patient presents with    Abdominal Pain       History of Present Illness:  Hx obtained from chart due to intubation     62 y/o M with hx of COPD and muscle dystrophy who resides at [facility] who was brought in for 1 day of abdominal pain associated with nausea and vomiting. Patient noted to have 101.5F upon arrival. THe is undergoing treatment for UGIB 2/2 esophagitis/gastritis, aspiration PNA (on vent, with PEG), and AF with RVR (not in NSR). We have been consulted for JUSTA.     Change in Cr from 1 --> 1.6 on 12/29 correlated with time he required NS IVFs and levophed for hypotension.     Change in Cr from 1.8 --> 2s correlated with AF with RVR requiring diltiazem.     Got lisinopril 12/25-12/28, lasix 12/30-12/31.   Highest vanc level 28     · Interval History/Subjective:    - may get extubated today  - VSS, FIO2 40%, UOP 3.6L  - had tooth extraction earlier today    · Review of Systems: unable to obtain due to intubation    Plan of Care:    Assessment:  JUSTA 2/2 ATN- baseline Cr < 1  Hyperglycemia induced dehydration  Hypernatremia  Hypokalemia  Anemia    Plan:    - Suspect JUSTA is prerenal vs ischemic ATN 2/2 hemodynamic changes (septic shock requiring pressors, AF with RVR). It hard to say if he took a hit from vancomycin- would still fall under ATN. Renal function is starting to level off. I am worried he is dehydrating from hyperglycemia driven osmotic diuresis. He is on steroids and clinimix- hopefully can wean off steroids soon- would d/c clinimix altogether for now. He is on insulin. Will adjust free water flushes. Anemia may also be contributing to renal hypoperfusion- would recommend 1 unit of blood. Keep chanda (BPH noted on ultrasound). Dose antbx for CrCl 30-60.   - See discussion above. Changed free  water flushes to 250cc q3 hours.  - K is stable- no further repletion is needed.   - Hb < 8- defer transfusion goals to primary team.    Thank you for allowing us to participate in this patient's care. We will continue to follow.    Medications:  No current facility-administered medications on file prior to encounter.      Current Outpatient Medications on File Prior to Encounter   Medication Sig Dispense Refill    aspirin (ECOTRIN) 81 MG EC tablet Take one tablet daily  2    atorvastatin (LIPITOR) 80 MG tablet Take 80 mg by mouth every evening.       benzonatate (TESSALON) 100 MG capsule Take 100 mg by mouth 3 (three) times daily as needed for Cough.      clopidogrel (PLAVIX) 75 mg tablet Take one tablet daily  3    dextromethorphan-guaifenesin  mg (MUCINEX DM)  mg per 12 hr tablet Take 2 tablets by mouth every 12 (twelve) hours.      lisinopril (PRINIVIL,ZESTRIL) 2.5 MG tablet Take 2.5 mg by mouth once daily.       metoprolol succinate (TOPROL-XL) 25 MG 24 hr tablet Take 25 mg by mouth once daily.       polyethylene glycol (GLYCOLAX) 17 gram PwPk Take 17 g by mouth once daily.        Scheduled Meds:   albuterol-ipratropium  3 mL Nebulization Q4H    atorvastatin  80 mg Oral QHS    EPINEPHrine        fat emulsion 20%  250 mL Intravenous Daily    insulin detemir U-100  16 Units Subcutaneous BID    methylPREDNISolone sodium succinate  40 mg Intravenous Daily    nystatin  500,000 Units Mouth/Throat Q6H    pantoprazole  40 mg Intravenous BID    polyethylene glycol  17 g Oral Daily    vancomycin (VANCOCIN) IVPB  1,000 mg Intravenous Q24H     Continuous Infusions:   Amino acid 5% - dextrose 15% (CLINIMIX-E) solution with additives (1L  provides 510 kcal/L dextrose, with 50 gm AA, 150 gm CHO, Na 35, K 30, Mg 5, Ca 4.5, Acetate 80, Cl 39, Phos 15) 60 mL/hr at 01/04/19 1136    dilTIAZem Stopped (01/02/19 0300)    norepinephrine bitartrate-D5W Stopped (12/31/18 1729)    propofol Stopped  (01/03/19 1520)     PRN Meds:.sodium chloride, acetaminophen, dextrose 50%, dextrose 50%, glucagon (human recombinant), glucose, glucose, hydrALAZINE, insulin aspart U-100, midazolam, ondansetron    Allergies:  Patient has no known allergies.    Vital Signs:  Temp Readings from Last 3 Encounters:   01/04/19 97.6 °F (36.4 °C) (Axillary)   03/02/18 97.4 °F (36.3 °C)   11/21/17 98 °F (36.7 °C) (Oral)       Pulse Readings from Last 3 Encounters:   01/04/19 80   08/03/18 72   03/02/18 61       BP Readings from Last 3 Encounters:   01/04/19 112/62   08/03/18 94/64   03/02/18 97/63       Weight:  Wt Readings from Last 3 Encounters:   01/02/19 76.8 kg (169 lb 5 oz)   08/03/18 68 kg (150 lb)   03/02/18 68.5 kg (151 lb 0.2 oz)       INS/OUTS:  I/O last 3 completed shifts:  In: 3583.6 [I.V.:163.6; NG/GT:570; IV Piggyback:450]  Out: 6150 [Urine:5600; Stool:550]  I/O this shift:  In: 250 [NG/GT:250]  Out: -     Physical Exam:  Constitutional: nad, intubated, sedated, appears older than stated age, ill appearing  Heart: rrr, no m/r/g, wwp, trace UE edema  Lungs: coarse, no judy w/r/r/c, no lb  Abdomen: s/nt/nd, +BS    Results:  Lab Results   Component Value Date     (H) 01/04/2019    K 3.9 01/04/2019     (H) 01/04/2019    CO2 21 (L) 01/04/2019    BUN 74 (H) 01/04/2019    CREATININE 2.0 (H) 01/04/2019    CALCIUM 9.0 01/04/2019    ANIONGAP 9 01/04/2019    ESTGFRAFRICA 39 (A) 01/04/2019    EGFRNONAA 34 (A) 01/04/2019       Lab Results   Component Value Date    CALCIUM 9.0 01/04/2019    PHOS 3.8 01/04/2019       Recent Labs   Lab 01/04/19  0357   WBC 13.30*   RBC 2.69*   HGB 7.8*   HCT 24.3*   *   MCV 90   MCH 28.8   MCHC 32.0       I have personally reviewed pertinent radiological imaging and reports.    Sheri Monroe MD MPH  [facility] [address] [address]  [phone] (p)  [phone] (f)

## 2019-01-04 NOTE — PLAN OF CARE
01/03/19 1955   Patient Assessment/Suction   Level of Consciousness (AVPU) responds to voice   Respiratory Effort Normal;Unlabored   Expansion/Accessory Muscles/Retractions expansion symmetric;no retractions;no use of accessory muscles   All Lung Fields Breath Sounds clear   PRE-TX-O2-ETCO2   O2 Device (Oxygen Therapy) ventilator   Oxygen Concentration (%) 40   SpO2 96 %   Pulse Oximetry Type Continuous   ETCO2 (mmHg) 27 mmHg   Pulse 95   Resp 17   Aerosol Therapy   $ Aerosol Therapy Charges Aerosol Treatment   Respiratory Treatment Status (SVN) given   Treatment Route (SVN) in-line;oxygen   Patient Position (SVN) HOB elevated   Signs of Intolerance (SVN) none   Breath Sounds Post-Respiratory Treatment   Throughout All Fields Post-Treatment All Fields   Throughout All Fields Post-Treatment no change   Post-treatment Heart Rate (beats/min) 94   Post-treatment Resp Rate (breaths/min) 16   Vent Select   Conventional Vent Y   Charged w/in last 24h YES   Preset Conventional Ventilator Settings   Vent Type    Ventilation Type VC   Vent Mode Spont   Humidity HME   Set Rate 0 bmp   Vt Set 600 mL   PEEP/CPAP 5 cmH20   Pressure Support 10 cmH20   Waveform RAMP   Peak Flow 70 L/min   Set Inspiratory Pressure 0 cmH20   Insp Time 0 Sec(s)   Plateau Set/Insp. Hold (sec) 0   Insp Rise Time  50 %   Trigger Sensitivity Flow/I-Trigger 0.7 L/min   P High 0 cm H2O   P Low 0 cm H2O   T High 0 sec   T Low 0 sec   Patient Ventilator Parameters   Resp Rate Total 17 br/min   Peak Airway Pressure 16 cmH2O   Mean Airway Pressure 8.9 cmH20   Plateau Pressure 0 cmH20   Exhaled Vt 886 mL   Total Ve 13.8 mL   Spont Ve 13.8 L   I:E Ratio Measured 1:2.10   Conventional Ventilator Alarms   Ve High Alarm 27 L/min   Resp Rate High Alarm 0 br/min   Press High Alarm 60 cmH2O   Apnea Rate 10   Apnea Volume (mL) 620 mL   Apnea Oxygen Concentration  100   Apnea Flow Rate (L/min) 74   T Apnea 20 sec(s)   Ready to Wean/Extubation Screen   FIO2<=50  (chart decimal) 0.4   MV<16L (chart vol.) 13.8   PEEP <=8 (chart #) 5   Ready to Wean Parameters   F/VT Ratio<105 (RSBI) (!) 19.19

## 2019-01-04 NOTE — PROGRESS NOTES
"[facility] Note    CC: Hematemesis/blood in PEG tube    HPI 63 y.o. male with blood per PEG tube, bright red, moderate amount, recent onset, associated with acute on chronic anemia, evaluated by EGD yesterday with ulcer noted and treated with injection and hemostatic clipping.  Patient was on Plavix until yesterday.  Anticoagulation held and patient extubated today.  Case discussed with nursing who relates no further evidence of bleeding.  No other acute issues.    Past Medical History:   Diagnosis Date    Anticoagulant long-term use     BPH (benign prostatic hyperplasia)     COPD (chronic obstructive pulmonary disease)     Elevated PSA     Fx     RIGHT WRIST, LEFT FOOT    Hypokalemia     Muscular dystrophy     Urine retention     Wears dentures     upper         Review of Systems  Unable to obtain secondary to somnolence    Physical Examination  BP (!) 181/88   Pulse 99   Temp 97.6 °F (36.4 °C) (Axillary)   Resp 16   Ht 5' 7" (1.702 m) Comment: Office 7/13/18  Wt 76.8 kg (169 lb 5 oz)   SpO2 95%   BMI 26.52 kg/m²   General appearance: somnolent but arousable, confused, no distress  HENT: Normocephalic, atraumatic, neck symmetrical, no nasal discharge, sclera anicteric   Lungs: coarse to auscultation bilaterally, symmetric chest wall expansion bilaterally  Heart: regular rate and rhythm without rub; no displacement of the PMI   Abdomen: obese, PEG site intact.  Extremities: extremities symmetric; no clubbing, cyanosis, or edema  Neurologic: Confused      Labs:  Lab Results   Component Value Date    WBC 13.30 (H) 01/04/2019    HGB 7.8 (L) 01/04/2019    HCT 24.3 (L) 01/04/2019    MCV 90 01/04/2019     (H) 01/04/2019         Assessment:   1.  PEG tube  2.  Hematemesis - resolved  3.  Peptic ulcer disease      Plan:  1.  Continue current care per primary team  2.  IV PPI  3.  Hold ASA and Plavix.  OK to resume lovenox  4.  NPO for now  5.  Repeat EGD in 8 weeks  6.  Will " follow.    Evgeny Anthony MD  [facility]  [address], [address]  [place] [address]  Office: [phone]  Fax: [phone]

## 2019-01-04 NOTE — SIGNIFICANT EVENT
Results for VERNA MONTAÑO (MRN [id number]) as of 1/4/2019 04:46   Ref. Range 1/4/2019 04:19   POC PH Latest Ref Range: 7.35 - 7.45  7.342 (L)   POC PCO2 Latest Ref Range: 35 - 45 mmHg 40.0   POC PO2 Latest Ref Range: 80 - 100 mmHg 78 (L)   POC BE Latest Ref Range: -2 to 2 mmol/L -4   POC HCO3 Latest Ref Range: 24 - 28 mmol/L 21.7 (L)   POC SATURATED O2 Latest Ref Range: 95 - 100 % 95   POC TCO2 Latest Ref Range: 23 - 27 mmol/L 23   Sample Unknown ARTERIAL   DelSys Unknown Adult Vent   Allens Test Unknown N/A   Site Unknown Will/UAC   Mode Unknown SPONT

## 2019-01-04 NOTE — NURSING
Becoming more restless and moving lower exts frequently.  Sedation remains off. Soft wrist restraints placed to prevent pulling ETT.

## 2019-01-04 NOTE — NURSING
1340 Dr. Dumont on unit. Updated. Follows commands but inconsistent.  Moves all exts, left weaker.Verbal orders to extubate. RT notified.     1400  Extubated and placed on 4L NC. Maintaining o2 sats in the 90's. Continue to monitor.

## 2019-01-04 NOTE — PROGRESS NOTES
"Progress Note  Pulmonary/Critical Care      Admit Date: 12/17/2018      SUBJECTIVE:      Patient ID: Enmanuel Armenta is a 63 y.o. male.    HPI/Interval history (See H&P for complete P,F,SHx)   The patient has been on CPAP since last night.  He is still on the ventilator secondary to poor responsiveness.        ROS  Lethargic, sedated, and unresponsive.  OBJECTIVE:     Vital Signs Range (Last 24H):  Temp:  [98.3 °F (36.8 °C)-99.1 °F (37.3 °C)]   Pulse:  []   Resp:  [10-29]   BP: (104-140)/(55-75)   SpO2:  [92 %-100 %]   Arterial Line BP: (105-168)/(43-72)     I & O (Last 24H):    Intake/Output Summary (Last 24 hours) at 1/4/2019 0927  Last data filed at 1/4/2019 0600  Gross per 24 hour   Intake 2045.4 ml   Output 3800 ml   Net -1754.6 ml        Estimated body mass index is 26.52 kg/m² as calculated from the following:    Height as of this encounter: 5' 7" (1.702 m).    Weight as of this encounter: 76.8 kg (169 lb 5 oz).    Physical Exam  GENERAL: Older patient in no distress, resting on the ventilator.  HEENT: Pupils equal and reactive. Extraocular movements intact. Nose intact.      Pharynx intubated.  There is bleeding in the mouth from where the tongue is being impaled by 2 rotten lower incisors.  NECK: Supple.   HEART: Regular rate and rhythm. No murmur or gallop auscultated.  LUNGS: Crackles in the bases.. Lung excursion symmetrical. No change in fremitus.   ABDOMEN: Bowel sounds present. Non-tender, no masses palpated. Rectal tube in place.  : Normal anatomy.Samson in place with lots of urine.  EXTREMITIES: Decreased muscle mass bilaterally.   LYMPHATICS: No adenopathy palpated, no edema.  SKIN: Dry, intact, no lesions.   NEURO: Patient squeezes hands to command  PSYCH: Unable to assess.    Laboratory/Diagnostic Data:    Vent Settings- Vent Mode: Spont  Oxygen Concentration (%):  [40-50] 40  Resp Rate Total:  [10 br/min-26 br/min] 10 br/min  Vt Set:  [600 mL] 600 mL  PEEP/CPAP:  [5 cmH20] 5 " cmH20  Pressure Support:  [10 cmH20] 10 cmH20  Mean Airway Pressure:  [8.5 cmH20-9.4 cmH20] 8.5 cmH20    ABG  Recent Labs   Lab 01/04/19  0419   PH 7.342*   PO2 78*   PCO2 40.0   HCO3 21.7*   BE -4       Recent Labs   Lab 01/04/19  0357 01/04/19  0358   WBC 13.30*  --    HGB 7.8*  --    HCT 24.3*  --    *  --    NA  --  148*   K  --  3.9   CL  --  118*   CO2  --  21*   BUN  --  74*   CREATININE  --  2.0*   AST  --  38   ALT  --  37   PROT  --  5.3*   ALBUMIN  --  1.6*   BILITOT  --  0.2   PHOS  --  3.8          Microbiology:    Microbiology Results (last 7 days)     Procedure Component Value Units Date/Time    Culture, Respiratory with Gram Stain [[id number]] Collected:  01/02/19 1229    Order Status:  Canceled Specimen:  Respiratory from Sputum, Expectorated Updated:  01/02/19 1230    Culture, Respiratory with Gram Stain [[id number]]  (Susceptibility) Collected:  12/29/18 1325    Order Status:  Completed Specimen:  Sputum Updated:  01/02/19 1112     Respiratory Culture No Pseudomonas isolated.     Respiratory Culture --     METHICILLIN RESISTANT STAPHYLOCOCCUS AUREUS  Few  Normal respiratory ruddy also present       Gram Stain (Respiratory) <10 epithelial cells per low power field.     Gram Stain (Respiratory) Rare WBC's     Gram Stain (Respiratory) Rare Gram positive cocci          Radiology:  Irregular left base infiltrate.  A left pleural effusion is not seen.  Endotracheal tube and central line in position    ASSESSMENT/PLAN:     Active Problems:    Acute respiratory failure with mechanical ventilation  Aspiration pneumonia with MRSA  Septic shock, resolved  COPD  Anemia worse. Getting 2 units  Muscular dystrophy  PEG tube  Hypernatremic, getting free water    Extubate and see how he does

## 2019-01-05 LAB
ALBUMIN SERPL BCP-MCNC: 2 G/DL
ALLENS TEST: ABNORMAL
ALP SERPL-CCNC: 348 U/L
ALT SERPL W/O P-5'-P-CCNC: 58 U/L
ANION GAP SERPL CALC-SCNC: 9 MMOL/L
AST SERPL-CCNC: 58 U/L
BASOPHILS # BLD AUTO: 0 K/UL
BASOPHILS NFR BLD: 0.1 %
BILIRUB SERPL-MCNC: 0.5 MG/DL
BUN SERPL-MCNC: 59 MG/DL
CALCIUM SERPL-MCNC: 9.6 MG/DL
CHLORIDE SERPL-SCNC: 122 MMOL/L
CO2 SERPL-SCNC: 22 MMOL/L
CREAT SERPL-MCNC: 1.4 MG/DL
DELSYS: ABNORMAL
DIFFERENTIAL METHOD: ABNORMAL
EOSINOPHIL # BLD AUTO: 0.5 K/UL
EOSINOPHIL NFR BLD: 2.9 %
EP: 6
ERYTHROCYTE [DISTWIDTH] IN BLOOD BY AUTOMATED COUNT: 15.7 %
EST. GFR  (AFRICAN AMERICAN): >60 ML/MIN/1.73 M^2
EST. GFR  (NON AFRICAN AMERICAN): 52 ML/MIN/1.73 M^2
FIO2: 40
GLUCOSE SERPL-MCNC: 177 MG/DL
HCO3 UR-SCNC: 22 MMOL/L (ref 24–28)
HCT VFR BLD AUTO: 35.2 %
HGB BLD-MCNC: 11.6 G/DL
IP: 12
LYMPHOCYTES # BLD AUTO: 1.9 K/UL
LYMPHOCYTES NFR BLD: 11.1 %
MAGNESIUM SERPL-MCNC: 2.2 MG/DL
MCH RBC QN AUTO: 29.4 PG
MCHC RBC AUTO-ENTMCNC: 32.9 G/DL
MCV RBC AUTO: 89 FL
MIN VOL: 14.7
MODE: ABNORMAL
MONOCYTES # BLD AUTO: 1.2 K/UL
MONOCYTES NFR BLD: 6.9 %
NEUTROPHILS # BLD AUTO: 13.6 K/UL
NEUTROPHILS NFR BLD: 79 %
PCO2 BLDA: 39.9 MMHG (ref 35–45)
PH SMN: 7.35 [PH] (ref 7.35–7.45)
PHOSPHATE SERPL-MCNC: 3 MG/DL
PLATELET # BLD AUTO: 476 K/UL
PMV BLD AUTO: 9.4 FL
PO2 BLDA: 68 MMHG (ref 80–100)
POC BE: -4 MMOL/L
POC SATURATED O2: 92 % (ref 95–100)
POC TCO2: 23 MMOL/L (ref 23–27)
POCT GLUCOSE: 159 MG/DL (ref 70–110)
POCT GLUCOSE: 192 MG/DL (ref 70–110)
POCT GLUCOSE: 238 MG/DL (ref 70–110)
POCT GLUCOSE: 242 MG/DL (ref 70–110)
POTASSIUM SERPL-SCNC: 3.8 MMOL/L
PROT SERPL-MCNC: 6.1 G/DL
RBC # BLD AUTO: 3.93 M/UL
SAMPLE: ABNORMAL
SITE: ABNORMAL
SODIUM SERPL-SCNC: 153 MMOL/L
SP02: 96
SPONT RATE: 21
VANCOMYCIN TROUGH SERPL-MCNC: 17.5 UG/ML
WBC # BLD AUTO: 17.2 K/UL

## 2019-01-05 PROCEDURE — 80202 ASSAY OF VANCOMYCIN: CPT

## 2019-01-05 PROCEDURE — 99233 SBSQ HOSP IP/OBS HIGH 50: CPT | Mod: ,,, | Performed by: INTERNAL MEDICINE

## 2019-01-05 PROCEDURE — 83735 ASSAY OF MAGNESIUM: CPT

## 2019-01-05 PROCEDURE — 31720 CLEARANCE OF AIRWAYS: CPT

## 2019-01-05 PROCEDURE — 63600175 PHARM REV CODE 636 W HCPCS: Performed by: INTERNAL MEDICINE

## 2019-01-05 PROCEDURE — 27000221 HC OXYGEN, UP TO 24 HOURS

## 2019-01-05 PROCEDURE — 25000003 PHARM REV CODE 250: Performed by: INTERNAL MEDICINE

## 2019-01-05 PROCEDURE — 37799 UNLISTED PX VASCULAR SURGERY: CPT

## 2019-01-05 PROCEDURE — 94761 N-INVAS EAR/PLS OXIMETRY MLT: CPT

## 2019-01-05 PROCEDURE — 36415 COLL VENOUS BLD VENIPUNCTURE: CPT

## 2019-01-05 PROCEDURE — S5571 INSULIN DISPOS PEN 3 ML: HCPCS | Performed by: INTERNAL MEDICINE

## 2019-01-05 PROCEDURE — 80053 COMPREHEN METABOLIC PANEL: CPT

## 2019-01-05 PROCEDURE — 99900035 HC TECH TIME PER 15 MIN (STAT)

## 2019-01-05 PROCEDURE — 25000242 PHARM REV CODE 250 ALT 637 W/ HCPCS: Performed by: INTERNAL MEDICINE

## 2019-01-05 PROCEDURE — 99233 PR SUBSEQUENT HOSPITAL CARE,LEVL III: ICD-10-PCS | Mod: ,,, | Performed by: INTERNAL MEDICINE

## 2019-01-05 PROCEDURE — C9113 INJ PANTOPRAZOLE SODIUM, VIA: HCPCS | Performed by: INTERNAL MEDICINE

## 2019-01-05 PROCEDURE — 99232 PR SUBSEQUENT HOSPITAL CARE,LEVL II: ICD-10-PCS | Mod: ,,, | Performed by: INTERNAL MEDICINE

## 2019-01-05 PROCEDURE — 94640 AIRWAY INHALATION TREATMENT: CPT

## 2019-01-05 PROCEDURE — 84100 ASSAY OF PHOSPHORUS: CPT

## 2019-01-05 PROCEDURE — 85025 COMPLETE CBC W/AUTO DIFF WBC: CPT

## 2019-01-05 PROCEDURE — 20000000 HC ICU ROOM

## 2019-01-05 PROCEDURE — 82803 BLOOD GASES ANY COMBINATION: CPT

## 2019-01-05 PROCEDURE — 94660 CPAP INITIATION&MGMT: CPT

## 2019-01-05 PROCEDURE — 99232 SBSQ HOSP IP/OBS MODERATE 35: CPT | Mod: ,,, | Performed by: INTERNAL MEDICINE

## 2019-01-05 RX ADMIN — POLYETHYLENE GLYCOL 3350 17 G: 17 POWDER, FOR SOLUTION ORAL at 08:01

## 2019-01-05 RX ADMIN — PANTOPRAZOLE SODIUM 40 MG: 40 INJECTION, POWDER, LYOPHILIZED, FOR SOLUTION INTRAVENOUS at 08:01

## 2019-01-05 RX ADMIN — NYSTATIN 500000 UNITS: 500000 SUSPENSION ORAL at 12:01

## 2019-01-05 RX ADMIN — LEUCINE, PHENYLALANINE, LYSINE, METHIONINE, ISOLEUCINE, VALINE, HISTIDINE, THREONINE, TRYPTOPHAN, ALANINE, GLYCINE, ARGININE, PROLINE, SERINE, TYROSINE, DEXTROSE: 311; 238; 247; 170; 255; 247; 204; 179; 77; 880; 438; 489; 289; 213; 17; 10 INJECTION INTRAVENOUS at 03:01

## 2019-01-05 RX ADMIN — NYSTATIN 500000 UNITS: 500000 SUSPENSION ORAL at 06:01

## 2019-01-05 RX ADMIN — HYDRALAZINE HYDROCHLORIDE 10 MG: 20 INJECTION INTRAMUSCULAR; INTRAVENOUS at 08:01

## 2019-01-05 RX ADMIN — INSULIN DETEMIR 16 UNITS: 100 INJECTION, SOLUTION SUBCUTANEOUS at 08:01

## 2019-01-05 RX ADMIN — ATORVASTATIN CALCIUM 80 MG: 40 TABLET, FILM COATED ORAL at 08:01

## 2019-01-05 RX ADMIN — NYSTATIN 500000 UNITS: 500000 SUSPENSION ORAL at 05:01

## 2019-01-05 RX ADMIN — METHYLPREDNISOLONE SODIUM SUCCINATE 40 MG: 40 INJECTION, POWDER, FOR SOLUTION INTRAMUSCULAR; INTRAVENOUS at 08:01

## 2019-01-05 RX ADMIN — IPRATROPIUM BROMIDE AND ALBUTEROL SULFATE 3 ML: .5; 3 SOLUTION RESPIRATORY (INHALATION) at 12:01

## 2019-01-05 RX ADMIN — HYDRALAZINE HYDROCHLORIDE 10 MG: 20 INJECTION INTRAMUSCULAR; INTRAVENOUS at 03:01

## 2019-01-05 RX ADMIN — IPRATROPIUM BROMIDE AND ALBUTEROL SULFATE 3 ML: .5; 3 SOLUTION RESPIRATORY (INHALATION) at 11:01

## 2019-01-05 RX ADMIN — INSULIN ASPART 2 UNITS: 100 INJECTION, SOLUTION INTRAVENOUS; SUBCUTANEOUS at 06:01

## 2019-01-05 RX ADMIN — IPRATROPIUM BROMIDE AND ALBUTEROL SULFATE 3 ML: .5; 3 SOLUTION RESPIRATORY (INHALATION) at 07:01

## 2019-01-05 RX ADMIN — VANCOMYCIN HYDROCHLORIDE 1000 MG: 1 INJECTION, POWDER, FOR SOLUTION INTRAVENOUS at 02:01

## 2019-01-05 RX ADMIN — IPRATROPIUM BROMIDE AND ALBUTEROL SULFATE 3 ML: .5; 3 SOLUTION RESPIRATORY (INHALATION) at 04:01

## 2019-01-05 RX ADMIN — INSULIN ASPART 4 UNITS: 100 INJECTION, SOLUTION INTRAVENOUS; SUBCUTANEOUS at 05:01

## 2019-01-05 RX ADMIN — INSULIN ASPART 4 UNITS: 100 INJECTION, SOLUTION INTRAVENOUS; SUBCUTANEOUS at 12:01

## 2019-01-05 RX ADMIN — IPRATROPIUM BROMIDE AND ALBUTEROL SULFATE 3 ML: .5; 3 SOLUTION RESPIRATORY (INHALATION) at 03:01

## 2019-01-05 NOTE — PLAN OF CARE
Problem: Adult Inpatient Plan of Care  Goal: Plan of Care Review  Outcome: Ongoing (interventions implemented as appropriate)  Extubated at 1400 today.  Respirations unlabored and maintaining O2 sats.until episode of vomiting after free water given. Decrease in O2 sats. Accessory muscle use. Placed on Bipap. Abdomen soft. PEG intact. Orders to decrease free water to 200cc every 4 hours. Samson intact, good urine output. Moving all exts. Follows commands at times. Safety maintained.

## 2019-01-05 NOTE — CONSULTS
Date: 1/5/2019   Enmanuel Armenta [id number] is a 63 y.o. male who has been consulted for vancomycin dosing.    The patient has the following labs:     Creatinine (mg/dl)    WBC Count   Serum creatinine: 1.4 mg/dL 01/05/19 0439  Estimated creatinine clearance: 49.2 mL/min Lab Results   Component Value Date    WBC 17.20 (H) 01/05/2019        Current weight is 76.8 kg (169 lb 5 oz)      Pt's renal function is improving but vancomycin level is high before 2nd dose.  2nd dose was given.  Target goal is 15-20 mg/dL.   Vanc level 1/5/2019 at 1349 was 17.5 mg/dL.  Pharmacy will stop doses and take a vancomycin level on 1/6/19 at 1400.      Patient will be followed by pharmacy for changes in renal function, toxicity, and efficacy.    Thank you for allowing us to participate in this patient's care.     Morgan An, Pharmacist

## 2019-01-05 NOTE — NURSING
Large amt of clear emesis after giving free water via peg.  Coughing and decrease in O2 sats to 89-90%. Accessory muscle use. RT notified of need for ABG.  Placed on Bipap with improvement in O2 sats.  ABG done and Dr. Dumont notified. New orders received.

## 2019-01-05 NOTE — PLAN OF CARE
Problem: Adult Inpatient Plan of Care  Goal: Plan of Care Review  Outcome: Ongoing (interventions implemented as appropriate)  Maintained on BIPAP. PEG tube clamped and water flushes 200cc's given Q 4 hours. TPN/lipids infusing. Moves all extremities but not to command. Not speaking. Samson draining well. Blood sugars covered with sliding scale.

## 2019-01-05 NOTE — PLAN OF CARE
01/05/19 0030   Patient Assessment/Suction   Level of Consciousness (AVPU) responds to voice   Respiratory Effort Normal;Unlabored   Expansion/Accessory Muscles/Retractions expansion symmetric;no retractions;no use of accessory muscles   All Lung Fields Breath Sounds coarse   PRE-TX-O2-ETCO2   O2 Device (Oxygen Therapy) BiPAP   Oxygen Concentration (%) 40   SpO2 98 %   Pulse Oximetry Type Continuous   Pulse 93   Resp 16   Aerosol Therapy   $ Aerosol Therapy Charges Aerosol Treatment   Respiratory Treatment Status (SVN) given   Treatment Route (SVN) in-line;oxygen   Patient Position (SVN) HOB elevated   Signs of Intolerance (SVN) none   Breath Sounds Post-Respiratory Treatment   Throughout All Fields Post-Treatment All Fields   Throughout All Fields Post-Treatment coarse   Post-treatment Heart Rate (beats/min) 91   Post-treatment Resp Rate (breaths/min) 16   Ready to Wean/Extubation Screen   FIO2<=50 (chart decimal) 0.4   Preset CPAP/BiPAP Settings   Mode Of Delivery BiPAP   $ CPAP/BiPAP Daily Charge BiPAP/CPAP Daily   $ Initial CPAP/BiPAP Setup? No   $ Is patient using? Yes   Sized Appropriately? Yes   Equipment Type V60   Airway Device Type medium full face mask   Humidifier not applicable   Ipap 12   EPAP (cm H2O) 6   Pressure Support (cm H2O) 6   ITime (sec) 1   Rise Time (sec) 3   Patient CPAP/BiPAP Settings   Timed Inspiration (Sec) 1   IPAP Rise Time (sec) 3   RR Total (Breaths/Min) 16   Tidal Volume (mL) 966   VE Minute Ventilation (L/min) 15.4 L/min   Peak Inspiratory Pressure (cm H2O) 12   TiTOT (%) 18   Total Leak (L/Min) 6   Patient Trigger - ST Mode Only (%) 89   CPAP/BiPAP Backup Settings   Backup Rate 14 breaths per minute (bpm)   CPAP/BiPAP Alarms   High Pressure (cm H2O) 17   Low Pressure (cm H2O) 7   Low Pressure Delay (Sec) 20   Minute Ventilation (L/Min) 3   High RR (breaths/min) 35   Low RR (breaths/min) 10

## 2019-01-05 NOTE — PROGRESS NOTES
INPATIENT NEPHROLOGY CONSULT   [facility] NEPHROLOGY    Patient Name: Enmanuel Armenta  Date: 01/05/2019    Reason for consultation: JUSTA    Chief Complaint:   Chief Complaint   Patient presents with    Abdominal Pain       History of Present Illness:  Hx obtained from chart due to intubation    64 y/o M with hx of COPD and muscle dystrophy who resides at [facility] who was brought in for 1 day of abdominal pain associated with nausea and vomiting. Patient noted to have 101.5F upon arrival. There were no exac/reliev factors. He is undergoing treatment for UGIB 2/2 esophagitis/gastritis, aspiration PNA (on vent, with PEG), and AF with RVR (not in NSR). We have been consulted for JUSTA.    Change in Cr from 1 --> 1.6 on 12/29 correlated with time he required NS IVFs and levophed for hypotension.    Change in Cr from 1.8 --> 2s correlated with AF with RVR requiring diltiazem.    Got lisinopril 12/25-12/28, lasix 12/30-12/31.   Highest vanc level 28     Renal u/s neg for obstruction  Last contrast exposure 12/17 1/5  Extubated.  No distress. Not interactive.      Plan of Care:    Assessment:  JUSTA 2/2 ATN- baseline Cr < 1  Hypernatremia--worse  Hypokalemia  Anemia    Plan:    - Suspect JUSTA is prerenal vs ischemic ATN 2/2 hemodynamic changes (septic shock requiring pressors, AF with RVR). It hard to say if he took a hit from vancomycin- would still fall under ATN. He is now hemodynamically stable (off pressors, in NSR). Renal function is starting to improve (Cr peaked yest) and he is nonoliguric. Will continue to trend. Keep armas (BPH noted on ultrasound). Dose antbx for CrCl 30-60. May see lag in improvement of BUN due to IV steroids.   Continue free water flushes.  Volume limited by aspiration.  May need to add free h20 to the TPN.  - K is better after repletion.   - Anemia is stable- will transfuse PRN (suspect acute illness related BM suppression).   --no nonessential nephrotoxic agents  --keep  sbp greater than 90    Thank you for allowing us to participate in this patient's care. We will continue to follow.    Vital Signs:  Temp Readings from Last 3 Encounters:   01/05/19 97.7 °F (36.5 °C) (Axillary)   03/02/18 97.4 °F (36.3 °C)   11/21/17 98 °F (36.7 °C) (Oral)       Pulse Readings from Last 3 Encounters:   01/05/19 92   08/03/18 72   03/02/18 61       BP Readings from Last 3 Encounters:   01/05/19 (!) 168/75   08/03/18 94/64   03/02/18 97/63       Weight:  Wt Readings from Last 3 Encounters:   01/02/19 76.8 kg (169 lb 5 oz)   08/03/18 68 kg (150 lb)   03/02/18 68.5 kg (151 lb 0.2 oz)       Past Medical & Surgical History:  Past Medical History:   Diagnosis Date    Anticoagulant long-term use     BPH (benign prostatic hyperplasia)     COPD (chronic obstructive pulmonary disease)     Elevated PSA     Fx     RIGHT WRIST, LEFT FOOT    Hypokalemia     Muscular dystrophy     Urine retention     Wears dentures     upper       Past Surgical History:   Procedure Laterality Date    APPENDECTOMY      cataracts      bilateral     CHOLECYSTECTOMY      CYST REMOVAL      from spine     CYSTOLITHOLOPAXY (REMOVE BLADDER STONE)  3/6/2017    [facility]    CYSTOSCOPY      EGD (ESOPHAGOGASTRODUODENOSCOPY) N/A 1/3/2019    [facility]y [facility] at [facility] ENDO    EGD (ESOPHAGOGASTRODUODENOSCOPY) N/A 12/24/2018    [facility] at [facility] ENDO    EGD (ESOPHAGOGASTRODUODENOSCOPY) N/A 12/17/2018    [facility] at [facility] ENDO    EYE SURGERY      cataract surgery bilateral    INSERTION, PEG TUBE N/A 12/24/2018    [facility]y [facility] at [facility] ENDO    PROSTATE BIOPSY      PROSTATE SURGERY      PROSTATECTOMY-TRANSURETHRAL N/A 3/6/2017    [facility]an [facility] at [facility] OR    PROSTATECTOMY-TRANSURETHRAL WITH GREENLIGHT LASER N/A 3/6/2017    [facility] at [facility] OR    TONSILLECTOMY       TRANSRECTAL ULTRASOUND GUIDED PROSTATE BIOPSY N/A 3/28/2016    [facility]    VASECTOMY         Past Social History:  Social History     Socioeconomic History    Marital status: Single     Spouse name: None    Number of children: None    Years of education: None    Highest education level: None   Social Needs    Financial resource strain: None    Food insecurity - worry: None    Food insecurity - inability: None    Transportation needs - medical: None    Transportation needs - non-medical: None   Occupational History    None   Tobacco Use    Smoking status: Current Every Day Smoker     Packs/day: 1.00     Years: 48.00     Pack years: 48.00     Types: Cigarettes    Smokeless tobacco: Never Used   Substance and Sexual Activity    Alcohol use: No     Alcohol/week: 0.0 oz    Drug use: No    Sexual activity: None   Other Topics Concern    None   Social History Narrative    None       Medications:  No current facility-administered medications on file prior to encounter.      Current Outpatient Medications on File Prior to Encounter   Medication Sig Dispense Refill    aspirin (ECOTRIN) 81 MG EC tablet Take one tablet daily  2    atorvastatin (LIPITOR) 80 MG tablet Take 80 mg by mouth every evening.       benzonatate (TESSALON) 100 MG capsule Take 100 mg by mouth 3 (three) times daily as needed for Cough.      clopidogrel (PLAVIX) 75 mg tablet Take one tablet daily  3    dextromethorphan-guaifenesin  mg (MUCINEX DM)  mg per 12 hr tablet Take 2 tablets by mouth every 12 (twelve) hours.      lisinopril (PRINIVIL,ZESTRIL) 2.5 MG tablet Take 2.5 mg by mouth once daily.       metoprolol succinate (TOPROL-XL) 25 MG 24 hr tablet Take 25 mg by mouth once daily.       polyethylene glycol (GLYCOLAX) 17 gram PwPk Take 17 g by mouth once daily.        Scheduled Meds:   albuterol-ipratropium  3 mL Nebulization Q4H    atorvastatin  80 mg Oral QHS    EPINEPHrine      "   fat emulsion 20%  250 mL Intravenous Daily    insulin detemir U-100  16 Units Subcutaneous BID    methylPREDNISolone sodium succinate  40 mg Intravenous Daily    nystatin  500,000 Units Mouth/Throat Q6H    pantoprazole  40 mg Intravenous BID    polyethylene glycol  17 g Oral Daily    vancomycin (VANCOCIN) IVPB  1,000 mg Intravenous Q24H     Continuous Infusions:   Amino acid 5% - dextrose 15% (CLINIMIX-E) solution with additives (1L  provides 510 kcal/L dextrose, with 50 gm AA, 150 gm CHO, Na 35, K 30, Mg 5, Ca 4.5, Acetate 80, Cl 39, Phos 15) 60 mL/hr at 01/04/19 1136    dextrose 5 % 50 mL/hr at 01/04/19 2319     PRN Meds:.sodium chloride, acetaminophen, dextrose 50%, dextrose 50%, glucagon (human recombinant), glucose, glucose, hydrALAZINE, insulin aspart U-100, midazolam, ondansetron    Allergies:  Patient has no known allergies.    Past Family History:  Unable to obtain due to intubation    Review of Systems:  Unable to obtain due to intubation    Physical Exam:  BP (!) 168/75   Pulse 92   Temp 97.7 °F (36.5 °C) (Axillary)   Resp 14   Ht 5' 7" (1.702 m) Comment: Office 7/13/18  Wt 76.8 kg (169 lb 5 oz)   SpO2 98%   BMI 26.52 kg/m²     INS/OUTS:  I/O last 3 completed shifts:  In: 4125.6 [I.V.:434.2; Blood:510.4; NG/GT:1650; IV Piggyback:250]  Out: 6125 [Urine:5725; Emesis/NG output:100; Stool:300]  I/O this shift:  In: -   Out: 130 [Urine:130]    General Appearance:    NAD, intubated, sedated   Head:    Normocephalic, atraumatic   Throat:   Moist mucus membraness   Lungs:     Coarse to auscultation bilaterally, no wheeze, crackles, rales   or rhonchi, symmetric air movement, respirations unlabored   Heart:    Regular rate and rhythm, S1 and S2 normal, no murmur, rub   or gallop   Abdomen:     Soft, non-tender, non-distended, bowel sounds active all four   quadrants, no RT or guarding, no masses, no organomegaly   Extremities:   Warm and well perfused, distal pulses intact, no cyanosis,     " trace UE peripheral edema   MSK:   No joint or muscle swelling, tenderness or deformity   Skin:   Skin color, texture, turgor normal, no rashes or lesions   Neurologic/Psychiatric:   Unable to obtain due to intubation/sedation     Results:  Lab Results   Component Value Date     (H) 01/05/2019    K 3.8 01/05/2019     (H) 01/05/2019    CO2 22 (L) 01/05/2019    BUN 59 (H) 01/05/2019    CREATININE 1.4 01/05/2019    CALCIUM 9.6 01/05/2019    ANIONGAP 9 01/05/2019    ESTGFRAFRICA >60 01/05/2019    EGFRNONAA 52 (A) 01/05/2019       Lab Results   Component Value Date    CALCIUM 9.6 01/05/2019    PHOS 3.0 01/05/2019       Recent Labs   Lab 01/05/19  0439   WBC 17.20*   RBC 3.93*   HGB 11.6*   HCT 35.2*   *   MCV 89   MCH 29.4   MCHC 32.9       I have personally reviewed pertinent radiological imaging and reports.    Kalin Gregory MD  Nephrology  [facility]  [phone]

## 2019-01-05 NOTE — PLAN OF CARE
Problem: Adult Inpatient Plan of Care  Goal: Plan of Care Review  Outcome: Ongoing (interventions implemented as appropriate)  Pt off BIPAP and on NC at this time; TPN adjusted; pt not following commands or talking; free water flushes given every 4 hours; will continue current plan of care at this time

## 2019-01-05 NOTE — PROGRESS NOTES
Progress Note  Hospital Medicine  Patient Name:Enmanuel Armenta  MRN:  [id number]  Patient Class: IP- Inpatient  Admit Date: 12/17/2018  Length of Stay: 19 days  Expected Discharge Date:   Attending Physician: Stephanie Tena MD  Primary Care Provider:  Primary Doctor No    SUBJECTIVE:     Principal Problem: Aspiration pneumonia of both lower lobes  Initial history of present illness: Patient is a 63 y.o. male admitted to Hospitalist Service from [facility] with complaint of abdominal pain. Patient is a resident of [facility] nursing home, [place]. Patient reportedly has past medical history significant for COPD, muscular dystrophy, l;imited mobility with walker and history of elevated PSA. Patient presented with 1 day history of abdominal pain associated with nausea and vomiting. The EMS reported, vomiting a brown sticky substance with pale red streaks throughout. EMS gave him 4 mg of Zofran in route to the ED. Patient denied chest pain, shortness of breath, headache, vision changes, focal neuro-deficits, cough. Patient noted to have 101.5F upon arrival.     PMH/PSH/SH/FH/Meds: reviewed.    Symptoms/Review of Systems: In ICU, afebrile. On Cpap and tolerating well. No further overt bleeding. HD stable.     Diet: Trickle feeding + TPN  Activity level: Up with assistance    Pain:  Moans to verbal commands     OBJECTIVE:   Vital Signs (Most Recent):      Temp: 97.7 °F (36.5 °C) (01/05/19 0800)  Pulse: 92 (01/05/19 0800)  Resp: 14 (01/05/19 0800)  BP: (!) 168/75 (01/05/19 0800)  SpO2: 98 % (01/05/19 0800)       Vital Signs Range (Last 24H):  Temp:  [97.5 °F (36.4 °C)-98.3 °F (36.8 °C)]   Pulse:  []   Resp:  [9-22]   BP: (112-181)/()   SpO2:  [89 %-100 %]   Arterial Line BP: (131-185)/(51-70)     Weight: 76.8 kg (169 lb 5 oz)  Body mass index is 26.52 kg/m².    Intake/Output Summary (Last 24 hours) at 1/5/2019 0907  Last data filed at 1/5/2019 0800  Gross per 24 hour    Intake 3625.59 ml   Output 3855 ml   Net -229.41 ml     Physical Examination:  General appearance: well developed, appears stated age, chronically ill-appearing, intubated and sedated  Head: normocephalic, atraumatic  Eyes:  conjunctivae/corneas clear. PERRL.  Nose: Nares normal. Septum midline.  Throat: lips, mucosa, and tongue normal; teeth and gums normal, no throat erythema. + Dry blood at angle of mouth  Neck: supple, symmetrical, trachea midline, no JVD and thyroid not enlarged, symmetric, no tenderness/mass/nodules  Lungs:  Scattered rhonchi B/L.  Chest wall: no tenderness  Heart: regular rate and rhythm, S1, S2 normal, no murmur, click, rub or gallop  Abdomen: soft, epigastric tenderness, non-distented; bowel sounds normal; no masses,  no organomegaly. PEG site okay.  Extremities: no cyanosis, clubbing or edema.   Pulses: 2+ and symmetric  Skin: Skin color, texture, turgor normal. No rashes or lesions.  Lymph nodes: Cervical, supraclavicular, and axillary nodes normal.  Neurologic: Sedated    CBC:  Recent Labs   Lab 01/03/19  0340 01/03/19  1534 01/04/19  0357 01/05/19  0439   WBC 13.90*  --  13.30* 17.20*   RBC 2.94*  --  2.69* 3.93*   HGB 8.5* 8.1* 7.8* 11.6*   HCT 26.3* 25.4* 24.3* 35.2*   *  --  441* 476*   MCV 89  --  90 89   MCH 29.0  --  28.8 29.4   MCHC 32.4  --  32.0 32.9   BMP  Recent Labs   Lab 01/03/19  0340 01/03/19  1816 01/04/19  0358 01/05/19  0439   * 631* 399* 177*   *  --  148* 153*   K 3.9  --  3.9 3.8   *  --  118* 122*   CO2 21*  --  21* 22*   BUN 73*  --  74* 59*   CREATININE 2.1*  --  2.0* 1.4   CALCIUM 9.0  --  9.0 9.6   MG 2.4  --  2.4 2.2      Diagnostic Results:  Microbiology Results (last 7 days)     Procedure Component Value Units Date/Time    Culture, Respiratory with Gram Stain [[id number]] Collected:  01/02/19 1229    Order Status:  Canceled Specimen:  Respiratory from Sputum, Expectorated Updated:  01/02/19 1230    Culture, Respiratory with Gram  Stain [[id number]]  (Susceptibility) Collected:  12/29/18 1325    Order Status:  Completed Specimen:  Sputum Updated:  01/02/19 1112     Respiratory Culture No Pseudomonas isolated.     Respiratory Culture --     METHICILLIN RESISTANT STAPHYLOCOCCUS AUREUS  Few  Normal respiratory ruddy also present       Gram Stain (Respiratory) <10 epithelial cells per low power field.     Gram Stain (Respiratory) Rare WBC's     Gram Stain (Respiratory) Rare Gram positive cocci         Chest X-Ray: T right basilar infiltrate.     CT abdomen and pelvis with contrast:  Enlarged prostate gland, air in the bladder likely secondary to catheterization, diffuse bladder wall thickening. Prominent amount of fluid within right colon.  Appendix not identified and note is made of limitation with motion artifact in the appendiceal region.    EGD:   - Moderately severe reflux esophagitis.                       - Medium-sized hiatal hernia.                       - Gastritis.                       - A single gastric polyp.                       - Normal examined duodenum.                       - No specimens collected.    CXR: New moderate left pleural effusion and left lung infiltrate.  Leftward mediastinal shift raising consideration for left lung atelectasis as contributory to the findings.  Close follow-up recommended.    CXR:   Unchanged bilateral lower lobe infiltrates.Heart size is now within normal limits.  Endotracheal tube and right central line in position.    CXR:   Increasing left lower lobe infiltrate and possible left pleural effusion.  Decreasing right base infiltrate.  Probable mild cardiomegaly obscured by the infiltrates.  Endotracheal tube and central line in position.    CXR:   Stable, appropriate positioning of support devices.  Stable appearance of the chest demonstrating right basilar atelectasis or infiltrate and retrocardiac consolidation and small left pleural effusion.    CXR: No significant change in the appearance of  the chest compared to the prior exam.    EGD:   - Normal esophagus.                       - Z-line regular.                       - Hematin (altered blood/coffee-ground-like                        material) in the gastric fundus and in the gastric                        body.                       - Intact gastrostomy with a patent G-tube present                        characterized by congestion, edema, erosion and                        erythema.                       - Oozing gastric ulcer with adherent clot. Injected.                        Clips were placed.                       - Normal antrum.                       - Normal examined duodenum.                       - No specimens collected.    ECHO:  · Mildly decreased left ventricular systolic function. The estimated ejection fraction is 49%  · Grade I (mild) left ventricular diastolic dysfunction consistent with impaired relaxation.  · Normal left atrial pressure.  · Normal right ventricular systolic function.  · Elevated central venous pressure (15 mm Hg).  · The estimated PA systolic pressure is 27 mm Hg  · No evidence of endocarditis.  · Patient is in on CPAP.    Assessment/Plan:      Acute hypoxic respiratory failure   Yes    Aspiration pneumonia, RLL with MRSA sp [J69.0]  Swallow dysfunction s/p PEG placement  On NIPPV  Continue Vanc  Duonebs  On Solumedrol IV 40mg  daily     Hypernatremia   Follow BMP. On TPN.  Continue free water flushes    Atrial fibrillation with RVR  Controlled, NSR noted. Dr. Reyna is following.     Hyperglycemia  Continue Levemir 16 units BID.    Hypokalemia - corrected  Follow BMP.     Yes    Upper GI bleeding [K92.2]  Acute GI bleeding loss anemia  Protonix BID      Yes    Muscular dystrophy [G71.00]  Supportive care, fall precautions.     Severe malnutrition  Continue IV TPN.    JUSTA  Continue IVF hydration. Follow BMP. Follow nephrology recommendations.        Yes    Chronic obstructive pulmonary disease  [J44.9]  Patient's COPD is controlled currently. Continue scheduled inhalers and monitor respiratory status closely.   Yes      DVT prophylaxis: Use SCD and TEDs. No anticoagulation due to GI bleeding.      Rodney Baker MD  Department of Hospital Medicine   [facility]

## 2019-01-05 NOTE — PROGRESS NOTES
"[facility] Note    CC: Hematemesis/blood in PEG tube    HPI 63 y.o. male with blood per PEG tube, bright red, moderate amount, recent onset, associated with acute on chronic anemia, evaluated by EGD yesterday with ulcer noted and treated with injection and hemostatic clipping.  Patient was on Plavix until yesterday.  Anticoagulation held and patient extubated today.  Case discussed with nursing who relates no further evidence of bleeding.  No other acute issues.    INTERVAL HISTORY:  Patient on mask oxygen, still altered and not communicating but with no acute distress.  H/H stable.  No evidence of melena or blood from PEG tube.  No other acute GI issues.    Past Medical History:   Diagnosis Date    Anticoagulant long-term use     BPH (benign prostatic hyperplasia)     COPD (chronic obstructive pulmonary disease)     Elevated PSA     Fx     RIGHT WRIST, LEFT FOOT    Hypokalemia     Muscular dystrophy     Urine retention     Wears dentures     upper         Review of Systems  Unable to obtain secondary to somnolence/confusion    Physical Examination  /72   Pulse 84   Temp 97.8 °F (36.6 °C) (Axillary)   Resp 11   Ht 5' 7" (1.702 m) Comment: Office 7/13/18  Wt 76.8 kg (169 lb 5 oz)   SpO2 98%   BMI 26.52 kg/m²   General appearance: somnolent but arousable, confused, no distress  HENT: Normocephalic, atraumatic, neck symmetrical, no nasal discharge, sclera anicteric   Lungs: coarse to auscultation bilaterally, symmetric chest wall expansion bilaterally  Heart: regular rate and rhythm without rub; no displacement of the PMI   Abdomen: obese, PEG site intact.  Extremities: extremities symmetric; no clubbing, cyanosis, or edema  Neurologic: Confused      Labs:  Lab Results   Component Value Date    WBC 17.20 (H) 01/05/2019    HGB 11.6 (L) 01/05/2019    HCT 35.2 (L) 01/05/2019    MCV 89 01/05/2019     (H) 01/05/2019         Assessment:   1.  PEG tube  2.  Hematemesis - resolved  3. "  Peptic ulcer disease      Plan:  1.  Continue current care per primary team  2.  IV PPI  3.  Hold ASA and Plavix.  OK to resume lovenox  4.  Clear liquids once patient coherent enough and advance as tolerated  5.  Repeat EGD in 8 weeks  6.  Will follow.    Evgeny Anthony MD  [facility]  [address], [address]  [place] [address]  Office: [phone]  Fax: [phone]

## 2019-01-05 NOTE — SIGNIFICANT EVENT
Results for VERNA MONTAÑO (MRN [id number]) as of 1/5/2019 05:47   Ref. Range 1/5/2019 04:35   POC PH Latest Ref Range: 7.35 - 7.45  7.350   POC PCO2 Latest Ref Range: 35 - 45 mmHg 39.9   POC PO2 Latest Ref Range: 80 - 100 mmHg 68 (L)   POC BE Latest Ref Range: -2 to 2 mmol/L -4   POC HCO3 Latest Ref Range: 24 - 28 mmol/L 22.0 (L)   POC SATURATED O2 Latest Ref Range: 95 - 100 % 92 (L)   POC TCO2 Latest Ref Range: 23 - 27 mmol/L 23   FiO2 Unknown 40   Sample Unknown ARTERIAL   DelSys Unknown CPAP/BiPAP   Allens Test Unknown N/A   Site Unknown Will/UAC   Mode Unknown BiPAP   FIO2 increased to 50%

## 2019-01-05 NOTE — PROGRESS NOTES
01/05/19 0702   Patient Assessment/Suction   Level of Consciousness (AVPU) alert   All Lung Fields Breath Sounds coarse;diminished   Cough Frequency no cough   PRE-TX-O2-ETCO2   O2 Device (Oxygen Therapy) BiPAP   Oxygen Concentration (%) 50   SpO2 97 %   Pulse Oximetry Type Continuous   $ Pulse Oximetry - Multiple Charge Pulse Oximetry - Multiple   Pulse 91   Resp 17   Aerosol Therapy   $ Aerosol Therapy Charges Aerosol Treatment   Respiratory Treatment Status (SVN) given   Treatment Route (SVN) in-line   Patient Position (SVN) HOB elevated   Post Treatment Assessment (SVN) breath sounds unchanged   Signs of Intolerance (SVN) none   Breath Sounds Post-Respiratory Treatment   Post-treatment Heart Rate (beats/min) 92   Post-treatment Resp Rate (breaths/min) 16   Ready to Wean/Extubation Screen   FIO2<=50 (chart decimal) 0.5   Preset CPAP/BiPAP Settings   Mode Of Delivery BiPAP   $ Initial CPAP/BiPAP Setup? No   $ Is patient using? Yes   Sized Appropriately? Yes   Equipment Type V60   Airway Device Type medium full face mask   Ipap 12   EPAP (cm H2O) 6   Pressure Support (cm H2O) 6   ITime (sec) 1   Rise Time (sec) 3   Patient CPAP/BiPAP Settings   RR Total (Breaths/Min) 17   Tidal Volume (mL) 942   VE Minute Ventilation (L/min) 15.2 L/min   Peak Inspiratory Pressure (cm H2O) 12   TiTOT (%) 22   Total Leak (L/Min) 11   Patient Trigger - ST Mode Only (%) 63   CPAP/BiPAP Backup Settings   Backup Rate 14 breaths per minute (bpm)   CPAP/BiPAP Alarms   High Pressure (cm H2O) 17   Low Pressure (cm H2O) 7   Low Pressure Delay (Sec) 20   Minute Ventilation (L/Min) 3   High RR (breaths/min) 35   Low RR (breaths/min) 6

## 2019-01-06 LAB
ALBUMIN SERPL BCP-MCNC: 2 G/DL
ALLENS TEST: ABNORMAL
ALP SERPL-CCNC: 359 U/L
ALT SERPL W/O P-5'-P-CCNC: 70 U/L
AMMONIA PLAS-SCNC: 33 UMOL/L
ANION GAP SERPL CALC-SCNC: 11 MMOL/L
AST SERPL-CCNC: 54 U/L
BASOPHILS # BLD AUTO: 0 K/UL
BASOPHILS NFR BLD: 0.3 %
BILIRUB SERPL-MCNC: 0.3 MG/DL
BUN SERPL-MCNC: 48 MG/DL
CALCIUM SERPL-MCNC: 9.3 MG/DL
CHLORIDE SERPL-SCNC: 115 MMOL/L
CO2 SERPL-SCNC: 22 MMOL/L
CREAT SERPL-MCNC: 1.1 MG/DL
DELSYS: ABNORMAL
DIFFERENTIAL METHOD: ABNORMAL
EOSINOPHIL # BLD AUTO: 0.3 K/UL
EOSINOPHIL NFR BLD: 2.4 %
EP: 10
EP: 6
ERYTHROCYTE [DISTWIDTH] IN BLOOD BY AUTOMATED COUNT: 15.6 %
ERYTHROCYTE [SEDIMENTATION RATE] IN BLOOD BY WESTERGREN METHOD: 14 MM/H
ERYTHROCYTE [SEDIMENTATION RATE] IN BLOOD BY WESTERGREN METHOD: 20 MM/H
EST. GFR  (AFRICAN AMERICAN): >60 ML/MIN/1.73 M^2
EST. GFR  (NON AFRICAN AMERICAN): >60 ML/MIN/1.73 M^2
FIO2: 50
FIO2: 50
GLUCOSE SERPL-MCNC: 134 MG/DL
HCO3 UR-SCNC: 19.9 MMOL/L (ref 24–28)
HCO3 UR-SCNC: 21.1 MMOL/L (ref 24–28)
HCO3 UR-SCNC: 21.5 MMOL/L (ref 24–28)
HCT VFR BLD AUTO: 34.2 %
HGB BLD-MCNC: 11.2 G/DL
IP: 12
IP: 20
LYMPHOCYTES # BLD AUTO: 1.7 K/UL
LYMPHOCYTES NFR BLD: 11.9 %
MAGNESIUM SERPL-MCNC: 2 MG/DL
MCH RBC QN AUTO: 29.2 PG
MCHC RBC AUTO-ENTMCNC: 32.6 G/DL
MCV RBC AUTO: 90 FL
MODE: ABNORMAL
MODE: ABNORMAL
MONOCYTES # BLD AUTO: 1.2 K/UL
MONOCYTES NFR BLD: 8.1 %
NEUTROPHILS # BLD AUTO: 11.1 K/UL
NEUTROPHILS NFR BLD: 77.3 %
PCO2 BLDA: 41.5 MMHG (ref 35–45)
PCO2 BLDA: 45.1 MMHG (ref 35–45)
PCO2 BLDA: 47.9 MMHG (ref 35–45)
PH SMN: 7.26 [PH] (ref 7.35–7.45)
PH SMN: 7.28 [PH] (ref 7.35–7.45)
PH SMN: 7.29 [PH] (ref 7.35–7.45)
PHOSPHATE SERPL-MCNC: 3 MG/DL
PLATELET # BLD AUTO: 463 K/UL
PMV BLD AUTO: 8.8 FL
PO2 BLDA: 71 MMHG (ref 80–100)
PO2 BLDA: 75 MMHG (ref 80–100)
PO2 BLDA: 77 MMHG (ref 80–100)
POC BE: -6 MMOL/L
POC BE: -6 MMOL/L
POC BE: -7 MMOL/L
POC SATURATED O2: 91 % (ref 95–100)
POC SATURATED O2: 93 % (ref 95–100)
POC SATURATED O2: 93 % (ref 95–100)
POC TCO2: 21 MMOL/L (ref 23–27)
POC TCO2: 22 MMOL/L (ref 23–27)
POC TCO2: 23 MMOL/L (ref 23–27)
POCT GLUCOSE: 117 MG/DL (ref 70–110)
POCT GLUCOSE: 144 MG/DL (ref 70–110)
POCT GLUCOSE: 202 MG/DL (ref 70–110)
POCT GLUCOSE: 237 MG/DL (ref 70–110)
POTASSIUM SERPL-SCNC: 3.6 MMOL/L
PROT SERPL-MCNC: 5.8 G/DL
RBC # BLD AUTO: 3.82 M/UL
SAMPLE: ABNORMAL
SITE: ABNORMAL
SODIUM SERPL-SCNC: 148 MMOL/L
VANCOMYCIN SERPL-MCNC: 19.3 UG/ML
WBC # BLD AUTO: 14.4 K/UL

## 2019-01-06 PROCEDURE — 94761 N-INVAS EAR/PLS OXIMETRY MLT: CPT

## 2019-01-06 PROCEDURE — 63600175 PHARM REV CODE 636 W HCPCS: Performed by: INTERNAL MEDICINE

## 2019-01-06 PROCEDURE — 25000003 PHARM REV CODE 250: Performed by: INTERNAL MEDICINE

## 2019-01-06 PROCEDURE — 80053 COMPREHEN METABOLIC PANEL: CPT

## 2019-01-06 PROCEDURE — 25000242 PHARM REV CODE 250 ALT 637 W/ HCPCS: Performed by: INTERNAL MEDICINE

## 2019-01-06 PROCEDURE — 94640 AIRWAY INHALATION TREATMENT: CPT

## 2019-01-06 PROCEDURE — 99233 PR SUBSEQUENT HOSPITAL CARE,LEVL III: ICD-10-PCS | Mod: ,,, | Performed by: INTERNAL MEDICINE

## 2019-01-06 PROCEDURE — 99233 SBSQ HOSP IP/OBS HIGH 50: CPT | Mod: ,,, | Performed by: INTERNAL MEDICINE

## 2019-01-06 PROCEDURE — 36415 COLL VENOUS BLD VENIPUNCTURE: CPT

## 2019-01-06 PROCEDURE — 80202 ASSAY OF VANCOMYCIN: CPT

## 2019-01-06 PROCEDURE — 82140 ASSAY OF AMMONIA: CPT

## 2019-01-06 PROCEDURE — 85025 COMPLETE CBC W/AUTO DIFF WBC: CPT

## 2019-01-06 PROCEDURE — 99900026 HC AIRWAY MAINTENANCE (STAT)

## 2019-01-06 PROCEDURE — 27000221 HC OXYGEN, UP TO 24 HOURS

## 2019-01-06 PROCEDURE — 99900035 HC TECH TIME PER 15 MIN (STAT)

## 2019-01-06 PROCEDURE — 82803 BLOOD GASES ANY COMBINATION: CPT

## 2019-01-06 PROCEDURE — 94660 CPAP INITIATION&MGMT: CPT

## 2019-01-06 PROCEDURE — 31720 CLEARANCE OF AIRWAYS: CPT

## 2019-01-06 PROCEDURE — 20000000 HC ICU ROOM

## 2019-01-06 PROCEDURE — 84100 ASSAY OF PHOSPHORUS: CPT

## 2019-01-06 PROCEDURE — C9113 INJ PANTOPRAZOLE SODIUM, VIA: HCPCS | Performed by: INTERNAL MEDICINE

## 2019-01-06 PROCEDURE — 37799 UNLISTED PX VASCULAR SURGERY: CPT

## 2019-01-06 PROCEDURE — 83735 ASSAY OF MAGNESIUM: CPT

## 2019-01-06 RX ORDER — FUROSEMIDE 10 MG/ML
40 INJECTION INTRAMUSCULAR; INTRAVENOUS 2 TIMES DAILY
Status: DISCONTINUED | OUTPATIENT
Start: 2019-01-06 | End: 2019-01-09

## 2019-01-06 RX ORDER — GLYCOPYRROLATE 1 MG/1
1 TABLET ORAL 3 TIMES DAILY
Status: DISCONTINUED | OUTPATIENT
Start: 2019-01-06 | End: 2019-01-06

## 2019-01-06 RX ORDER — VANCOMYCIN HCL IN 5 % DEXTROSE 1G/250ML
1000 PLASTIC BAG, INJECTION (ML) INTRAVENOUS
Status: DISCONTINUED | OUTPATIENT
Start: 2019-01-06 | End: 2019-01-06

## 2019-01-06 RX ORDER — GLYCOPYRROLATE 1 MG/1
1 TABLET ORAL 3 TIMES DAILY PRN
Status: DISCONTINUED | OUTPATIENT
Start: 2019-01-06 | End: 2019-01-11 | Stop reason: HOSPADM

## 2019-01-06 RX ADMIN — DEXTROSE 50 ML/HR: 5 SOLUTION INTRAVENOUS at 04:01

## 2019-01-06 RX ADMIN — NYSTATIN 500000 UNITS: 500000 SUSPENSION ORAL at 06:01

## 2019-01-06 RX ADMIN — PANTOPRAZOLE SODIUM 40 MG: 40 INJECTION, POWDER, LYOPHILIZED, FOR SOLUTION INTRAVENOUS at 08:01

## 2019-01-06 RX ADMIN — POLYETHYLENE GLYCOL 3350 17 G: 17 POWDER, FOR SOLUTION ORAL at 08:01

## 2019-01-06 RX ADMIN — INSULIN ASPART 4 UNITS: 100 INJECTION, SOLUTION INTRAVENOUS; SUBCUTANEOUS at 11:01

## 2019-01-06 RX ADMIN — INSULIN DETEMIR 16 UNITS: 100 INJECTION, SOLUTION SUBCUTANEOUS at 09:01

## 2019-01-06 RX ADMIN — NYSTATIN 500000 UNITS: 500000 SUSPENSION ORAL at 12:01

## 2019-01-06 RX ADMIN — IPRATROPIUM BROMIDE AND ALBUTEROL SULFATE 3 ML: .5; 3 SOLUTION RESPIRATORY (INHALATION) at 11:01

## 2019-01-06 RX ADMIN — ATORVASTATIN CALCIUM 80 MG: 40 TABLET, FILM COATED ORAL at 08:01

## 2019-01-06 RX ADMIN — FUROSEMIDE 40 MG: 10 INJECTION, SOLUTION INTRAVENOUS at 06:01

## 2019-01-06 RX ADMIN — METHYLPREDNISOLONE SODIUM SUCCINATE 40 MG: 40 INJECTION, POWDER, FOR SOLUTION INTRAMUSCULAR; INTRAVENOUS at 08:01

## 2019-01-06 RX ADMIN — NYSTATIN 500000 UNITS: 500000 SUSPENSION ORAL at 11:01

## 2019-01-06 RX ADMIN — IPRATROPIUM BROMIDE AND ALBUTEROL SULFATE 3 ML: .5; 3 SOLUTION RESPIRATORY (INHALATION) at 07:01

## 2019-01-06 RX ADMIN — IPRATROPIUM BROMIDE AND ALBUTEROL SULFATE 3 ML: .5; 3 SOLUTION RESPIRATORY (INHALATION) at 03:01

## 2019-01-06 RX ADMIN — IPRATROPIUM BROMIDE AND ALBUTEROL SULFATE 3 ML: .5; 3 SOLUTION RESPIRATORY (INHALATION) at 12:01

## 2019-01-06 RX ADMIN — LEUCINE, PHENYLALANINE, LYSINE, METHIONINE, ISOLEUCINE, VALINE, HISTIDINE, THREONINE, TRYPTOPHAN, ALANINE, GLYCINE, ARGININE, PROLINE, SERINE, TYROSINE, DEXTROSE: 311; 238; 247; 170; 255; 247; 204; 179; 77; 880; 438; 489; 289; 213; 17; 10 INJECTION INTRAVENOUS at 09:01

## 2019-01-06 RX ADMIN — VANCOMYCIN HYDROCHLORIDE 1000 MG: 1 INJECTION, POWDER, LYOPHILIZED, FOR SOLUTION INTRAVENOUS at 04:01

## 2019-01-06 RX ADMIN — FUROSEMIDE 40 MG: 10 INJECTION, SOLUTION INTRAVENOUS at 08:01

## 2019-01-06 RX ADMIN — INSULIN ASPART 4 UNITS: 100 INJECTION, SOLUTION INTRAVENOUS; SUBCUTANEOUS at 04:01

## 2019-01-06 RX ADMIN — INSULIN DETEMIR 16 UNITS: 100 INJECTION, SOLUTION SUBCUTANEOUS at 08:01

## 2019-01-06 NOTE — PROGRESS NOTES
01/06/19 0703   Patient Assessment/Suction   Level of Consciousness (AVPU) alert   All Lung Fields Breath Sounds coarse;diminished;rhonchi   Cough Frequency infrequent   Cough Type fair   Suction Method nasal   $ Suction Charges NT Suction Procedure   Secretions Amount moderate   Secretions Color tan   Secretions Characteristics thick   PRE-TX-O2-ETCO2   O2 Device (Oxygen Therapy) High Flow nasal Cannula   $ Is the patient on Low Flow Oxygen? Yes   Flow (L/min) 8   SpO2 96 %   Pulse Oximetry Type Continuous   $ Pulse Oximetry - Multiple Charge Pulse Oximetry - Multiple   Pulse 85   Resp (!) 24   Aerosol Therapy   $ Aerosol Therapy Charges Aerosol Treatment   Respiratory Treatment Status (SVN) given   Treatment Route (SVN) mask   Patient Position (SVN) HOB elevated   Post Treatment Assessment (SVN) breath sounds improved   Signs of Intolerance (SVN) none   Breath Sounds Post-Respiratory Treatment   Post-treatment Heart Rate (beats/min) 83   Post-treatment Resp Rate (breaths/min) 19   Preset CPAP/BiPAP Settings   Mode Of Delivery Standby

## 2019-01-06 NOTE — PROGRESS NOTES
Progress Note  Hospital Medicine  Patient Name:Enmanuel Armenta  MRN:  [id number]  Patient Class: IP- Inpatient  Admit Date: 12/17/2018  Length of Stay: 20 days  Expected Discharge Date:   Attending Physician: Stephanie Tena MD  Primary Care Provider:  Primary Doctor No    SUBJECTIVE:     Principal Problem: Aspiration pneumonia of both lower lobes  Initial history of present illness: Patient is a 63 y.o. male admitted to Hospitalist Service from [facility] Emergency Room with complaint of abdominal pain. Patient is a resident of [facility] nursing [facility], [place]. Patient reportedly has past medical history significant for COPD, muscular dystrophy, l;imited mobility with walker and history of elevated PSA. Patient presented with 1 day history of abdominal pain associated with nausea and vomiting. The EMS reported, vomiting a brown sticky substance with pale red streaks throughout. EMS gave him 4 mg of Zofran in route to the ED. Patient denied chest pain, shortness of breath, headache, vision changes, focal neuro-deficits, cough. Patient noted to have 101.5F upon arrival.     PMH/PSH/SH/FH/Meds: reviewed.    Symptoms/Review of Systems: In ICU, afebrile. Tolerating venti mask.  Still only minimally responsive.     Diet: Trickle feeding + TPN  Activity level: Up with assistance    Pain:  Moans to verbal commands     OBJECTIVE:   Vital Signs (Most Recent):      Temp: 98.4 °F (36.9 °C) (01/06/19 0400)  Pulse: 85 (01/06/19 0703)  Resp: (!) 24 (01/06/19 0703)  BP: 136/75 (01/06/19 0600)  SpO2: 96 % (01/06/19 0703)       Vital Signs Range (Last 24H):  Temp:  [97.6 °F (36.4 °C)-99.1 °F (37.3 °C)]   Pulse:  [74-92]   Resp:  [10-33]   BP: (128-168)/(63-85)   SpO2:  [88 %-100 %]   Arterial Line BP: (189)/(73)     Weight: 75.1 kg (165 lb 9.1 oz)  Body mass index is 25.93 kg/m².    Intake/Output Summary (Last 24 hours) at 1/6/2019 0721  Last data filed at 1/6/2019 0649  Gross per 24 hour   Intake 2613.33 ml    Output 2420 ml   Net 193.33 ml     Physical Examination:  General appearance: well developed, appears stated age, chronically ill-appearing, intubated and sedated  Head: normocephalic, atraumatic  Eyes:  conjunctivae/corneas clear. PERRL.  Nose: Nares normal. Septum midline.  Throat: lips, mucosa, and tongue normal; teeth and gums normal, no throat erythema. + Dry blood at angle of mouth  Neck: supple, symmetrical, trachea midline, no JVD and thyroid not enlarged, symmetric, no tenderness/mass/nodules  Lungs:  Scattered rhonchi B/L.  Chest wall: no tenderness  Heart: regular rate and rhythm, S1, S2 normal, no murmur, click, rub or gallop  Abdomen: soft, epigastric tenderness, non-distented; bowel sounds normal; no masses,  no organomegaly. PEG site okay.  Extremities: no cyanosis, clubbing or edema.   Pulses: 2+ and symmetric  Skin: Skin color, texture, turgor normal. No rashes or lesions.  Lymph nodes: Cervical, supraclavicular, and axillary nodes normal.  Neurologic: Sedated    CBC:  Recent Labs   Lab 01/04/19  0357 01/05/19  0439 01/06/19  0341   WBC 13.30* 17.20* 14.40*   RBC 2.69* 3.93* 3.82*   HGB 7.8* 11.6* 11.2*   HCT 24.3* 35.2* 34.2*   * 476* 463*   MCV 90 89 90   MCH 28.8 29.4 29.2   MCHC 32.0 32.9 32.6   BMP  Recent Labs   Lab 01/04/19  0358 01/05/19  0439 01/06/19  0341   * 177* 134*   * 153* 148*   K 3.9 3.8 3.6   * 122* 115*   CO2 21* 22* 22*   BUN 74* 59* 48*   CREATININE 2.0* 1.4 1.1   CALCIUM 9.0 9.6 9.3   MG 2.4 2.2 2.0      Diagnostic Results:  Microbiology Results (last 7 days)     Procedure Component Value Units Date/Time    Culture, Respiratory with Gram Stain [[id number]] Collected:  01/02/19 1229    Order Status:  Canceled Specimen:  Respiratory from Sputum, Expectorated Updated:  01/02/19 1230    Culture, Respiratory with Gram Stain [[id number]]  (Susceptibility) Collected:  12/29/18 1325    Order Status:  Completed Specimen:  Sputum Updated:  01/02/19 1112      Respiratory Culture No Pseudomonas isolated.     Respiratory Culture --     METHICILLIN RESISTANT STAPHYLOCOCCUS AUREUS  Few  Normal respiratory ruddy also present       Gram Stain (Respiratory) <10 epithelial cells per low power field.     Gram Stain (Respiratory) Rare WBC's     Gram Stain (Respiratory) Rare Gram positive cocci         Chest X-Ray: T right basilar infiltrate.     CT abdomen and pelvis with contrast:  Enlarged prostate gland, air in the bladder likely secondary to catheterization, diffuse bladder wall thickening. Prominent amount of fluid within right colon.  Appendix not identified and note is made of limitation with motion artifact in the appendiceal region.    EGD:   - Moderately severe reflux esophagitis.                       - Medium-sized hiatal hernia.                       - Gastritis.                       - A single gastric polyp.                       - Normal examined duodenum.                       - No specimens collected.    CXR: New moderate left pleural effusion and left lung infiltrate.  Leftward mediastinal shift raising consideration for left lung atelectasis as contributory to the findings.  Close follow-up recommended.    CXR:   Unchanged bilateral lower lobe infiltrates.Heart size is now within normal limits.  Endotracheal tube and right central line in position.    CXR:   Increasing left lower lobe infiltrate and possible left pleural effusion.  Decreasing right base infiltrate.  Probable mild cardiomegaly obscured by the infiltrates.  Endotracheal tube and central line in position.    CXR:   Stable, appropriate positioning of support devices.  Stable appearance of the chest demonstrating right basilar atelectasis or infiltrate and retrocardiac consolidation and small left pleural effusion.    CXR: No significant change in the appearance of the chest compared to the prior exam.    EGD:   - Normal esophagus.                       - Z-line regular.                       -  Hematin (altered blood/coffee-ground-like                        material) in the gastric fundus and in the gastric                        body.                       - Intact gastrostomy with a patent G-tube present                        characterized by congestion, edema, erosion and                        erythema.                       - Oozing gastric ulcer with adherent clot. Injected.                        Clips were placed.                       - Normal antrum.                       - Normal examined duodenum.                       - No specimens collected.    ECHO:  · Mildly decreased left ventricular systolic function. The estimated ejection fraction is 49%  · Grade I (mild) left ventricular diastolic dysfunction consistent with impaired relaxation.  · Normal left atrial pressure.  · Normal right ventricular systolic function.  · Elevated central venous pressure (15 mm Hg).  · The estimated PA systolic pressure is 27 mm Hg  · No evidence of endocarditis.  · Patient is in on CPAP.    Assessment/Plan:      Acute hypoxic respiratory failure   Yes    Aspiration pneumonia, RLL with MRSA sp [J69.0]  Swallow dysfunction s/p PEG placement  On NIPPV  Continue Vanc  Duonebs  On Solumedrol IV 40mg  daily     Hypernatremia   Follow BMP. On TPN.  Continue free water flushes  TPN adjusted    Atrial fibrillation with RVR  Controlled, NSR noted. Dr. Reyna is following.     Hyperglycemia  Continue Levemir 16 units BID.    Hypokalemia - corrected  Follow BMP.     Yes    Upper GI bleeding [K92.2]  Acute GI bleeding loss anemia  Protonix BID      Yes    Muscular dystrophy [G71.00]  Supportive care, fall precautions.     Severe malnutrition  Continue IV TPN.    JUSTA  Resolved  Start diuresis    Abnormal liver tests  Mixed pattern  Monitor and no synthetic liver dysfunction  Possibly related to TPN?      Yes    Chronic obstructive pulmonary disease [J44.9]  Patient's COPD is controlled currently. Continue scheduled  inhalers and monitor respiratory status closely.   Yes      DVT prophylaxis: Use SCD and TEDs. No anticoagulation due to GI bleeding.      Rodney Baker MD  Department of Hospital Medicine   [facility]

## 2019-01-06 NOTE — PROGRESS NOTES
INPATIENT NEPHROLOGY CONSULT   [facility] NEPHROLOGY    Patient Name: Enmanuel Armenta  Date: 01/06/2019    Reason for consultation: JUSTA    Chief Complaint:   Chief Complaint   Patient presents with    Abdominal Pain       History of Present Illness:  Hx obtained from chart due to intubation    62 y/o M with hx of COPD and muscle dystrophy who resides at [facility] who was brought in for 1 day of abdominal pain associated with nausea and vomiting. Patient noted to have 101.5F upon arrival. There were no exac/reliev factors. He is undergoing treatment for UGIB 2/2 esophagitis/gastritis, aspiration PNA (on vent, with PEG), and AF with RVR (not in NSR). We have been consulted for JUSTA.    Change in Cr from 1 --> 1.6 on 12/29 correlated with time he required NS IVFs and levophed for hypotension.    Change in Cr from 1.8 --> 2s correlated with AF with RVR requiring diltiazem.    Got lisinopril 12/25-12/28, lasix 12/30-12/31.   Highest vanc level 28     Renal u/s neg for obstruction  Last contrast exposure 12/17 1/5  Extubated.  No distress. Not interactive.  1/6  About the same today.  No distress      Plan of Care:    Assessment:  JUSTA 2/2 ATN- baseline Cr < 1  Hypernatremia--worse  Hypokalemia  Anemia    Plan:    - Suspect JUSTA is prerenal vs ischemic ATN 2/2 hemodynamic changes (septic shock requiring pressors, AF with RVR). It hard to say if he took a hit from vancomycin- would still fall under ATN. He is now hemodynamically stable (off pressors, in NSR). Renal function is starting to improve (Cr peaked yest) and he is nonoliguric. Will continue to trend. Keep armas (BPH noted on ultrasound). Dose antbx for CrCl 30-60. May see lag in improvement of BUN due to IV steroids.   Continue free water flushes.  Volume limited by aspiration.  May need to add free h20 to the TPN.  - K is better after repletion.   - Anemia is stable- will transfuse PRN (suspect acute illness related BM suppression).   --no  nonessential nephrotoxic agents  --keep sbp greater than 90    Thank you for allowing us to participate in this patient's care. We will continue to follow.    Vital Signs:  Temp Readings from Last 3 Encounters:   01/06/19 96.6 °F (35.9 °C) (Axillary)   03/02/18 97.4 °F (36.3 °C)   11/21/17 98 °F (36.7 °C) (Oral)       Pulse Readings from Last 3 Encounters:   01/06/19 76   08/03/18 72   03/02/18 61       BP Readings from Last 3 Encounters:   01/06/19 132/67   08/03/18 94/64   03/02/18 97/63       Weight:  Wt Readings from Last 3 Encounters:   01/06/19 75.1 kg (165 lb 9.1 oz)   08/03/18 68 kg (150 lb)   03/02/18 68.5 kg (151 lb 0.2 oz)       Past Medical & Surgical History:  Past Medical History:   Diagnosis Date    Anticoagulant long-term use     BPH (benign prostatic hyperplasia)     COPD (chronic obstructive pulmonary disease)     Elevated PSA     Fx     RIGHT WRIST, LEFT FOOT    Hypokalemia     Muscular dystrophy     Urine retention     Wears dentures     upper       Past Surgical History:   Procedure Laterality Date    APPENDECTOMY      cataracts      bilateral     CHOLECYSTECTOMY      CYST REMOVAL      from spine     CYSTOLITHOLOPAXY (REMOVE BLADDER STONE)  3/6/2017    [facility] by [facility] at [facility]    CYSTOSCOPY      EGD (ESOPHAGOGASTRODUODENOSCOPY) N/A 1/3/2019    Performed by Evgeny Martin [facility] at [facility] ENDO    EGD (ESOPHAGOGASTRODUODENOSCOPY) N/A 12/24/2018    Performed by [facility]y [facility] at [facility] ENDO    EGD (ESOPHAGOGASTRODUODENOSCOPY) N/A 12/17/2018    Performed by [facility]y [facility] at [facility] ENDO    EYE SURGERY      cataract surgery bilateral    INSERTION, PEG TUBE N/A 12/24/2018    Performed by [facility]y [facility] at [facility] ENDO    PROSTATE BIOPSY      PROSTATE SURGERY      PROSTATECTOMY-TRANSURETHRAL N/A 3/6/2017    Performed by [facility]an Lake [facility] at [facility] OR    PROSTATECTOMY-TRANSURETHRAL WITH GREENLIGHT LASER N/A 3/6/2017    [facility] by [facility]  at [facility]    TONSILLECTOMY      TRANSRECTAL ULTRASOUND GUIDED PROSTATE BIOPSY N/A 3/28/2016    [facility]    VASECTOMY         Past Social History:  Social History     Socioeconomic History    Marital status: Single     Spouse name: None    Number of children: None    Years of education: None    Highest education level: None   Social Needs    Financial resource strain: None    Food insecurity - worry: None    Food insecurity - inability: None    Transportation needs - medical: None    Transportation needs - non-medical: None   Occupational History    None   Tobacco Use    Smoking status: Current Every Day Smoker     Packs/day: 1.00     Years: 48.00     Pack years: 48.00     Types: Cigarettes    Smokeless tobacco: Never Used   Substance and Sexual Activity    Alcohol use: No     Alcohol/week: 0.0 oz    Drug use: No    Sexual activity: None   Other Topics Concern    None   Social History Narrative    None       Medications:  No current facility-administered medications on file prior to encounter.      Current Outpatient Medications on File Prior to Encounter   Medication Sig Dispense Refill    aspirin (ECOTRIN) 81 MG EC tablet Take one tablet daily  2    atorvastatin (LIPITOR) 80 MG tablet Take 80 mg by mouth every evening.       benzonatate (TESSALON) 100 MG capsule Take 100 mg by mouth 3 (three) times daily as needed for Cough.      clopidogrel (PLAVIX) 75 mg tablet Take one tablet daily  3    dextromethorphan-guaifenesin  mg (MUCINEX DM)  mg per 12 hr tablet Take 2 tablets by mouth every 12 (twelve) hours.      lisinopril (PRINIVIL,ZESTRIL) 2.5 MG tablet Take 2.5 mg by mouth once daily.       metoprolol succinate (TOPROL-XL) 25 MG 24 hr tablet Take 25 mg by mouth once daily.       polyethylene glycol (GLYCOLAX) 17 gram PwPk Take 17 g by mouth once daily.        Scheduled Meds:   albuterol-ipratropium  3 mL Nebulization Q4H    atorvastatin   "80 mg Oral QHS    EPINEPHrine        furosemide  40 mg Intravenous BID    insulin detemir U-100  16 Units Subcutaneous BID    methylPREDNISolone sodium succinate  40 mg Intravenous Daily    nystatin  500,000 Units Mouth/Throat Q6H    pantoprazole  40 mg Intravenous BID    polyethylene glycol  17 g Oral Daily     Continuous Infusions:   Amino acid 4.25% - dextrose 10% (CLINIMIX) solution with additives (1L  provides 340 kcal/L dextrose, with 42.5 gm AA, 100 gm CHO, and no electrolytes except acetate 37 mEq/Cl- 17 mEq) 75 mL/hr at 01/06/19 0649    dextrose 5 % 50 mL/hr at 01/06/19 0649     PRN Meds:.sodium chloride, acetaminophen, dextrose 50%, dextrose 50%, glucagon (human recombinant), glucose, glucose, hydrALAZINE, insulin aspart U-100, midazolam, ondansetron    Allergies:  Patient has no known allergies.    Past Family History:  Unable to obtain due to intubation    Review of Systems:  Unable to obtain due to intubation    Physical Exam:  /67   Pulse 76   Temp 96.6 °F (35.9 °C) (Axillary)   Resp 20   Ht 5' 7" (1.702 m) Comment: Office 7/13/18  Wt 75.1 kg (165 lb 9.1 oz)   SpO2 96%   BMI 25.93 kg/m²     INS/OUTS:  I/O last 3 completed shifts:  In: 4668.3 [I.V.:1575; Blood:193.8; NG/GT:890]  Out: 4195 [Urine:4145; Stool:50]  I/O this shift:  In: 260 [NG/GT:260]  Out: 150 [Urine:150]    General Appearance:    NAD, intubated, sedated   Head:    Normocephalic, atraumatic   Throat:   Moist mucus membraness   Lungs:     Coarse to auscultation bilaterally, no wheeze, crackles, rales   or rhonchi, symmetric air movement, respirations unlabored   Heart:    Regular rate and rhythm, S1 and S2 normal, no murmur, rub   or gallop   Abdomen:     Soft, non-tender, non-distended, bowel sounds active all four   quadrants, no RT or guarding, no masses, no organomegaly   Extremities:   Warm and well perfused, distal pulses intact, no cyanosis,     trace UE peripheral edema   MSK:   No joint or muscle swelling, " tenderness or deformity   Skin:   Skin color, texture, turgor normal, no rashes or lesions   Neurologic/Psychiatric:   Unable to obtain due to intubation/sedation     Results:  Lab Results   Component Value Date     (H) 01/06/2019    K 3.6 01/06/2019     (H) 01/06/2019    CO2 22 (L) 01/06/2019    BUN 48 (H) 01/06/2019    CREATININE 1.1 01/06/2019    CALCIUM 9.3 01/06/2019    ANIONGAP 11 01/06/2019    ESTGFRAFRICA >60 01/06/2019    EGFRNONAA >60 01/06/2019       Lab Results   Component Value Date    CALCIUM 9.3 01/06/2019    PHOS 3.0 01/06/2019       Recent Labs   Lab 01/06/19  0341   WBC 14.40*   RBC 3.82*   HGB 11.2*   HCT 34.2*   *   MCV 90   MCH 29.2   MCHC 32.6       I have personally reviewed pertinent radiological imaging and reports.    Kalin Gregory MD  Nephrology  [facility] [place]  [phone]

## 2019-01-06 NOTE — PLAN OF CARE
01/05/19 1918   Patient Assessment/Suction   Level of Consciousness (AVPU) alert   Respiratory Effort Normal;Unlabored   Expansion/Accessory Muscles/Retractions no use of accessory muscles   Rhythm/Pattern, Respiratory depth regular;pattern regular;unlabored   PRE-TX-O2-ETCO2   O2 Device (Oxygen Therapy) High Flow nasal Cannula   $ Is the patient on Low Flow Oxygen? Yes   Flow (L/min) 8   SpO2 97 %   Pulse Oximetry Type Continuous   $ Pulse Oximetry - Multiple Charge Pulse Oximetry - Multiple   Pulse 85   Resp (!) 26   Aerosol Therapy   $ Aerosol Therapy Charges Aerosol Treatment   Daily Review of Necessity (SVN) completed   Respiratory Treatment Status (SVN) given   Treatment Route (SVN) mask   Patient Position (SVN) Geiger's   Post Treatment Assessment (SVN) breath sounds improved   Signs of Intolerance (SVN) none   Breath Sounds Post-Respiratory Treatment   Post-treatment Heart Rate (beats/min) 88   Post-treatment Resp Rate (breaths/min) 20

## 2019-01-06 NOTE — PLAN OF CARE
Problem: Adult Inpatient Plan of Care  Goal: Plan of Care Review  Outcome: Ongoing (interventions implemented as appropriate)  Pt back on BIPAP today; fluids continued; TPN infusing; VS stable; afebrile; responsive to pain; will continue current plan of care at this time

## 2019-01-06 NOTE — CONSULTS
Date: 1/6/2019   Enmanuel Armenta [id number] is a 63 y.o. male who has been consulted for vancomycin dosing.    The patient has the following labs:     Creatinine (mg/dl)    WBC Count   Serum creatinine: 1.1 mg/dL 01/06/19 0341  Estimated creatinine clearance: 62.6 mL/min Lab Results   Component Value Date    WBC 14.40 (H) 01/06/2019        Current weight is 75.1 kg (165 lb 9.1 oz)      Pt is receiving vancomycin 1000 mg every 24 hours.  Vancomycin trough before 3rd dose from 1/6/2019  at 1416  was 19.3 mg/dL.  Target trough range is 15-20 mg/dL.   Trough was drawn on time and anticipate it is  Therapeutic.  Vancomycin level is not decreasing proportionately with improvement in kidney function.  Pharmacy will give one dose of 1000 mg vancomycin.  The vancomycin level has been ordered for 1/7/18 at 1530.     Patient will be followed by pharmacy for changes in renal function, toxicity, and efficacy.    Thank you for allowing us to participate in this patient's care.     Morgan An, Pharmacist

## 2019-01-06 NOTE — PLAN OF CARE
Problem: Adult Inpatient Plan of Care  Goal: Plan of Care Review  Outcome: Ongoing (interventions implemented as appropriate)  Pt restful throughout night. Remains on 8L high flow nc. Flexiseal, minimal output. armas in place with good urine output. Afebrile. Minimal sputum from oral suctioning. 200cc free water flushes [id number]. PEG tube flushes without difficulty; clamped at this time. Bathed. Lateral rotation. Safety maintained.

## 2019-01-07 LAB
ALBUMIN SERPL BCP-MCNC: 1.9 G/DL
ALP SERPL-CCNC: 404 U/L
ALT SERPL W/O P-5'-P-CCNC: 65 U/L
ANION GAP SERPL CALC-SCNC: 10 MMOL/L
AST SERPL-CCNC: 54 U/L
BASOPHILS # BLD AUTO: 0 K/UL
BASOPHILS NFR BLD: 0.1 %
BILIRUB SERPL-MCNC: 0.3 MG/DL
BUN SERPL-MCNC: 48 MG/DL
CALCIUM SERPL-MCNC: 8.7 MG/DL
CHLORIDE SERPL-SCNC: 109 MMOL/L
CO2 SERPL-SCNC: 21 MMOL/L
CREAT SERPL-MCNC: 1.1 MG/DL
DIFFERENTIAL METHOD: ABNORMAL
EOSINOPHIL # BLD AUTO: 0.2 K/UL
EOSINOPHIL NFR BLD: 1 %
ERYTHROCYTE [DISTWIDTH] IN BLOOD BY AUTOMATED COUNT: 15.2 %
EST. GFR  (AFRICAN AMERICAN): >60 ML/MIN/1.73 M^2
EST. GFR  (NON AFRICAN AMERICAN): >60 ML/MIN/1.73 M^2
GLUCOSE SERPL-MCNC: 107 MG/DL
HCT VFR BLD AUTO: 31.1 %
HGB BLD-MCNC: 10.2 G/DL
LYMPHOCYTES # BLD AUTO: 1.6 K/UL
LYMPHOCYTES NFR BLD: 10.3 %
MAGNESIUM SERPL-MCNC: 1.5 MG/DL
MCH RBC QN AUTO: 29 PG
MCHC RBC AUTO-ENTMCNC: 32.8 G/DL
MCV RBC AUTO: 89 FL
MONOCYTES # BLD AUTO: 0.9 K/UL
MONOCYTES NFR BLD: 5.5 %
NEUTROPHILS # BLD AUTO: 13.3 K/UL
NEUTROPHILS NFR BLD: 83.1 %
PHOSPHATE SERPL-MCNC: 2.1 MG/DL
PLATELET # BLD AUTO: 426 K/UL
PMV BLD AUTO: 8.9 FL
POCT GLUCOSE: 125 MG/DL (ref 70–110)
POCT GLUCOSE: 239 MG/DL (ref 70–110)
POCT GLUCOSE: 279 MG/DL (ref 70–110)
POTASSIUM SERPL-SCNC: 3.1 MMOL/L
PROT SERPL-MCNC: 5.4 G/DL
RBC # BLD AUTO: 3.52 M/UL
SODIUM SERPL-SCNC: 140 MMOL/L
VANCOMYCIN SERPL-MCNC: 21.2 UG/ML
WBC # BLD AUTO: 16 K/UL

## 2019-01-07 PROCEDURE — 27100171 HC OXYGEN HIGH FLOW UP TO 24 HOURS

## 2019-01-07 PROCEDURE — 99232 SBSQ HOSP IP/OBS MODERATE 35: CPT | Mod: ,,, | Performed by: INTERNAL MEDICINE

## 2019-01-07 PROCEDURE — C9113 INJ PANTOPRAZOLE SODIUM, VIA: HCPCS | Performed by: INTERNAL MEDICINE

## 2019-01-07 PROCEDURE — 63600175 PHARM REV CODE 636 W HCPCS: Performed by: INTERNAL MEDICINE

## 2019-01-07 PROCEDURE — B4185 PARENTERAL SOL 10 GM LIPIDS: HCPCS | Performed by: INTERNAL MEDICINE

## 2019-01-07 PROCEDURE — 80053 COMPREHEN METABOLIC PANEL: CPT

## 2019-01-07 PROCEDURE — 25000003 PHARM REV CODE 250: Performed by: INTERNAL MEDICINE

## 2019-01-07 PROCEDURE — 27000221 HC OXYGEN, UP TO 24 HOURS

## 2019-01-07 PROCEDURE — 99232 PR SUBSEQUENT HOSPITAL CARE,LEVL II: ICD-10-PCS | Mod: ,,, | Performed by: INTERNAL MEDICINE

## 2019-01-07 PROCEDURE — 94761 N-INVAS EAR/PLS OXIMETRY MLT: CPT

## 2019-01-07 PROCEDURE — 84100 ASSAY OF PHOSPHORUS: CPT

## 2019-01-07 PROCEDURE — 94640 AIRWAY INHALATION TREATMENT: CPT

## 2019-01-07 PROCEDURE — 25000242 PHARM REV CODE 250 ALT 637 W/ HCPCS: Performed by: INTERNAL MEDICINE

## 2019-01-07 PROCEDURE — 99900035 HC TECH TIME PER 15 MIN (STAT)

## 2019-01-07 PROCEDURE — 80202 ASSAY OF VANCOMYCIN: CPT

## 2019-01-07 PROCEDURE — 20000000 HC ICU ROOM

## 2019-01-07 PROCEDURE — 36415 COLL VENOUS BLD VENIPUNCTURE: CPT

## 2019-01-07 PROCEDURE — 83735 ASSAY OF MAGNESIUM: CPT

## 2019-01-07 PROCEDURE — 94660 CPAP INITIATION&MGMT: CPT

## 2019-01-07 PROCEDURE — 85025 COMPLETE CBC W/AUTO DIFF WBC: CPT

## 2019-01-07 RX ORDER — MAGNESIUM SULFATE HEPTAHYDRATE 40 MG/ML
2 INJECTION, SOLUTION INTRAVENOUS ONCE
Status: COMPLETED | OUTPATIENT
Start: 2019-01-07 | End: 2019-01-07

## 2019-01-07 RX ADMIN — NYSTATIN 500000 UNITS: 500000 SUSPENSION ORAL at 12:01

## 2019-01-07 RX ADMIN — METHYLPREDNISOLONE SODIUM SUCCINATE 40 MG: 40 INJECTION, POWDER, FOR SOLUTION INTRAMUSCULAR; INTRAVENOUS at 08:01

## 2019-01-07 RX ADMIN — FUROSEMIDE 40 MG: 10 INJECTION, SOLUTION INTRAVENOUS at 05:01

## 2019-01-07 RX ADMIN — LEUCINE, PHENYLALANINE, LYSINE, METHIONINE, ISOLEUCINE, VALINE, HISTIDINE, THREONINE, TRYPTOPHAN, ALANINE, GLYCINE, ARGININE, PROLINE, SERINE, TYROSINE, DEXTROSE: 311; 238; 247; 170; 255; 247; 204; 179; 77; 880; 438; 489; 289; 213; 17; 10 INJECTION INTRAVENOUS at 09:01

## 2019-01-07 RX ADMIN — PANTOPRAZOLE SODIUM 40 MG: 40 INJECTION, POWDER, LYOPHILIZED, FOR SOLUTION INTRAVENOUS at 08:01

## 2019-01-07 RX ADMIN — NYSTATIN 500000 UNITS: 500000 SUSPENSION ORAL at 06:01

## 2019-01-07 RX ADMIN — IPRATROPIUM BROMIDE AND ALBUTEROL SULFATE 3 ML: .5; 3 SOLUTION RESPIRATORY (INHALATION) at 04:01

## 2019-01-07 RX ADMIN — I.V. FAT EMULSION 250 ML: 20 EMULSION INTRAVENOUS at 09:01

## 2019-01-07 RX ADMIN — NYSTATIN 500000 UNITS: 500000 SUSPENSION ORAL at 05:01

## 2019-01-07 RX ADMIN — IPRATROPIUM BROMIDE AND ALBUTEROL SULFATE 3 ML: .5; 3 SOLUTION RESPIRATORY (INHALATION) at 11:01

## 2019-01-07 RX ADMIN — INSULIN DETEMIR 16 UNITS: 100 INJECTION, SOLUTION SUBCUTANEOUS at 09:01

## 2019-01-07 RX ADMIN — IPRATROPIUM BROMIDE AND ALBUTEROL SULFATE 3 ML: .5; 3 SOLUTION RESPIRATORY (INHALATION) at 07:01

## 2019-01-07 RX ADMIN — IPRATROPIUM BROMIDE AND ALBUTEROL SULFATE 3 ML: .5; 3 SOLUTION RESPIRATORY (INHALATION) at 12:01

## 2019-01-07 RX ADMIN — MAGNESIUM SULFATE HEPTAHYDRATE 2 G: 40 INJECTION, SOLUTION INTRAVENOUS at 10:01

## 2019-01-07 RX ADMIN — FUROSEMIDE 40 MG: 10 INJECTION, SOLUTION INTRAVENOUS at 08:01

## 2019-01-07 RX ADMIN — VANCOMYCIN HYDROCHLORIDE 750 MG: 750 INJECTION, POWDER, LYOPHILIZED, FOR SOLUTION INTRAVENOUS at 06:01

## 2019-01-07 RX ADMIN — ATORVASTATIN CALCIUM 80 MG: 40 TABLET, FILM COATED ORAL at 09:01

## 2019-01-07 RX ADMIN — INSULIN DETEMIR 16 UNITS: 100 INJECTION, SOLUTION SUBCUTANEOUS at 12:01

## 2019-01-07 RX ADMIN — INSULIN ASPART 6 UNITS: 100 INJECTION, SOLUTION INTRAVENOUS; SUBCUTANEOUS at 05:01

## 2019-01-07 RX ADMIN — POTASSIUM PHOSPHATE, MONOBASIC AND POTASSIUM PHOSPHATE, DIBASIC 30 MMOL: 224; 236 INJECTION, SOLUTION INTRAVENOUS at 10:01

## 2019-01-07 RX ADMIN — PANTOPRAZOLE SODIUM 40 MG: 40 INJECTION, POWDER, LYOPHILIZED, FOR SOLUTION INTRAVENOUS at 09:01

## 2019-01-07 RX ADMIN — POLYETHYLENE GLYCOL 3350 17 G: 17 POWDER, FOR SOLUTION ORAL at 08:01

## 2019-01-07 RX ADMIN — INSULIN ASPART 4 UNITS: 100 INJECTION, SOLUTION INTRAVENOUS; SUBCUTANEOUS at 12:01

## 2019-01-07 NOTE — PLAN OF CARE
Problem: Adult Inpatient Plan of Care  Goal: Plan of Care Review  Outcome: Ongoing (interventions implemented as appropriate)  Remains on BIPAP 50%, 18/10 rate 20. Rhonci noted. Redness noted to bridge of nose and red/blue area noted to forehead. Duoderm applied to both sites for comfort and to decrease risk of breakdown. Suctioned thick secretions from back of throat. Dry mucosa, swabbed and moisturizer applied. Temp max 99.5 ax. This shift. BS range 117-125 with TPN and D5W infusing as ordered. Detemir 16 units given BID as ordered. Patient moves all extremities, does not follow commands. Good response to lasix. Scant amount loose green stool noted in flexiseal.   01/07/19 0624   Plan of Care Review   Plan of Care Reviewed With patient

## 2019-01-07 NOTE — PLAN OF CARE
"I called Sabina at [facility] to see if she could advise us who to contact for decisions regarding the pt is he can not make his own decisions; she is unsure of what their facility's process is but will call their  and call me back.   I called the friend, Louis [phone]) listed on the face sheet and also in past Epic notes to see if he knew of any family the pt had; he states that he knows the pt has some family but is unable to give me a name of anyone. The pt has not been in contact with any of his family is several years. Louis took my name and number, he states he is going to reach out to "a lead" that might have a name of one of the pt's brothers. He will call me back with any information.....KARLENE Mendoza       01/07/19 1560   Discharge Reassessment   Assessment Type Discharge Planning Reassessment     "

## 2019-01-07 NOTE — PLAN OF CARE
Problem: Adult Inpatient Plan of Care  Goal: Plan of Care Review  Outcome: Ongoing (interventions implemented as appropriate)  Pt responsive to pain; not following commands; BIPAP continued; case management attempting to locate family; TPN continued;   ABX continued; will continue current plan of care

## 2019-01-07 NOTE — PLAN OF CARE
01/06/19 1908   Patient Assessment/Suction   Level of Consciousness (AVPU) alert   Respiratory Effort Normal;Unlabored   Expansion/Accessory Muscles/Retractions no use of accessory muscles   All Lung Fields Breath Sounds rhonchi   Rhythm/Pattern, Respiratory assisted mechanically   PRE-TX-O2-ETCO2   O2 Device (Oxygen Therapy) BiPAP   $ Is the patient on Low Flow Oxygen? Yes   Oxygen Concentration (%) 50   SpO2 99 %   Pulse Oximetry Type Continuous   $ Pulse Oximetry - Multiple Charge Pulse Oximetry - Multiple   Pulse 89   Resp (!) 26   Aerosol Therapy   $ Aerosol Therapy Charges Aerosol Treatment   Daily Review of Necessity (SVN) completed   Respiratory Treatment Status (SVN) given   Treatment Route (SVN) in-line   Patient Position (SVN) HOB elevated   Post Treatment Assessment (SVN) breath sounds improved   Signs of Intolerance (SVN) none   Breath Sounds Post-Respiratory Treatment   Post-treatment Heart Rate (beats/min) 84   Post-treatment Resp Rate (breaths/min) 23   Ready to Wean/Extubation Screen   FIO2<=50 (chart decimal) 0.5   Preset CPAP/BiPAP Settings   Mode Of Delivery BiPAP   $ CPAP/BiPAP Daily Charge BiPAP/CPAP Daily   $ Initial CPAP/BiPAP Setup? No   $ Is patient using? Yes   Size of Mask Medium/Large   Sized Appropriately? Yes   Equipment Type V60   Airway Device Type medium full face mask   Humidifier not applicable   Ipap 18   EPAP (cm H2O) 10   Pressure Support (cm H2O) 8   Set Rate (Breaths/Min) 20   ITime (sec) 1   Rise Time (sec) 3   Patient CPAP/BiPAP Settings   RR Total (Breaths/Min) 26   Tidal Volume (mL) 741   VE Minute Ventilation (L/min) 19.3 L/min   Peak Inspiratory Pressure (cm H2O) 20   TiTOT (%) 42   Total Leak (L/Min) 28   Patient Trigger - ST Mode Only (%) 51

## 2019-01-07 NOTE — PLAN OF CARE
01/07/19 0724   Patient Assessment/Suction   Level of Consciousness (AVPU) responds to voice   All Lung Fields Breath Sounds coarse;diminished   PRE-TX-O2-ETCO2   O2 Device (Oxygen Therapy) BiPAP   $ Is the patient on Low Flow Oxygen? Yes   SpO2 97 %   Pulse Oximetry Type Continuous   $ Pulse Oximetry - Multiple Charge Pulse Oximetry - Multiple   Pulse 96   Resp 20   Aerosol Therapy   $ Aerosol Therapy Charges Aerosol Treatment   Daily Review of Necessity (SVN) completed   Respiratory Treatment Status (SVN) given   Treatment Route (SVN) in-line   Patient Position (SVN) HOB elevated   Post Treatment Assessment (SVN) breath sounds unchanged   Signs of Intolerance (SVN) none   Breath Sounds Post-Respiratory Treatment   Throughout All Fields Post-Treatment All Fields   Throughout All Fields Post-Treatment coarse;aeration increased   Post-treatment Heart Rate (beats/min) 90   Post-treatment Resp Rate (breaths/min) 26

## 2019-01-07 NOTE — PROGRESS NOTES
Progress Note  Hospital Medicine  Patient Name:Enmanuel Armenta  MRN:  [id number]  Patient Class: IP- Inpatient  Admit Date: 12/17/2018  Length of Stay: 21 days  Expected Discharge Date:   Attending Physician: Stephanie Tena MD  Primary Care Provider:  Primary Doctor No    SUBJECTIVE:     Principal Problem: Aspiration pneumonia of both lower lobes  Initial history of present illness: Patient is a 63 y.o. male admitted to Hospitalist Service from [facility] Emergency Room with complaint of abdominal pain. Patient is a resident of [facility] nursing home, [place]. Patient reportedly has past medical history significant for COPD, muscular dystrophy, l;imited mobility with walker and history of elevated PSA. Patient presented with 1 day history of abdominal pain associated with nausea and vomiting. The EMS reported, vomiting a brown sticky substance with pale red streaks throughout. EMS gave him 4 mg of Zofran in route to the ED. Patient denied chest pain, shortness of breath, headache, vision changes, focal neuro-deficits, cough. Patient noted to have 101.5F upon arrival.     PMH/PSH/SH/FH/Meds: reviewed.    Symptoms/Review of Systems: In ICU, afebrile. Patient was extubate don 01-05-19. On BiPAP. Tolerating venti mask.  Still only minimally responsive. Off pressors.  Diet: Trickle feeding + TPN  Activity level: Up with assistance    Pain:  Moans to verbal commands     OBJECTIVE:   Vital Signs (Most Recent):      Temp: 98.7 °F (37.1 °C) (01/07/19 0403)  Pulse: 96 (01/07/19 0724)  Resp: 20 (01/07/19 0724)  BP: 133/70 (01/07/19 0600)  SpO2: 97 % (01/07/19 0724)       Vital Signs Range (Last 24H):  Temp:  [96.6 °F (35.9 °C)-99.5 °F (37.5 °C)]   Pulse:  []   Resp:  [11-30]   BP: (115-146)/()   SpO2:  [94 %-100 %]   Arterial Line BP: (144-168)/(55-65)     Weight: 74.5 kg (164 lb 3.9 oz)  Body mass index is 25.72 kg/m².    Intake/Output Summary (Last 24 hours) at 1/7/2019 0758  Last data  filed at 1/7/2019 0600  Gross per 24 hour   Intake 2510 ml   Output 2550 ml   Net -40 ml     Physical Examination:  General appearance: well developed, appears stated age, chronically ill-appearing, intubated and sedated  Head: normocephalic, atraumatic  Eyes:  conjunctivae/corneas clear. PERRL.  Nose: Nares normal. Septum midline.  Throat: lips, mucosa, and tongue normal; teeth and gums normal, no throat erythema. + Dry blood at angle of mouth  Neck: supple, symmetrical, trachea midline, no JVD and thyroid not enlarged, symmetric, no tenderness/mass/nodules  Lungs:  Scattered rhonchi B/L.  Chest wall: no tenderness  Heart: regular rate and rhythm, S1, S2 normal, no murmur, click, rub or gallop  Abdomen: soft, epigastric tenderness, non-distented; bowel sounds normal; no masses,  no organomegaly. PEG site okay.  Extremities: no cyanosis, clubbing or edema.   Pulses: 2+ and symmetric  Skin: Skin color, texture, turgor normal. No rashes or lesions.  Lymph nodes: Cervical, supraclavicular, and axillary nodes normal.  Neurologic: Sedated    CBC:  Recent Labs   Lab 01/05/19  0439 01/06/19  0341 01/07/19  0305   WBC 17.20* 14.40* 16.00*   RBC 3.93* 3.82* 3.52*   HGB 11.6* 11.2* 10.2*   HCT 35.2* 34.2* 31.1*   * 463* 426*   MCV 89 90 89   MCH 29.4 29.2 29.0   MCHC 32.9 32.6 32.8   BMP  Recent Labs   Lab 01/05/19  0439 01/06/19  0341 01/07/19  0305   * 134* 107   * 148* 140   K 3.8 3.6 3.1*   * 115* 109   CO2 22* 22* 21*   BUN 59* 48* 48*   CREATININE 1.4 1.1 1.1   CALCIUM 9.6 9.3 8.7   MG 2.2 2.0 1.5*      Diagnostic Results:  Microbiology Results (last 7 days)     Procedure Component Value Units Date/Time    Culture, Respiratory with Gram Stain [[id number]] Collected:  01/02/19 1229    Order Status:  Canceled Specimen:  Respiratory from Sputum, Expectorated Updated:  01/02/19 1230    Culture, Respiratory with Gram Stain [[id number]]  (Susceptibility) Collected:  12/29/18 1325    Order Status:   Completed Specimen:  Sputum Updated:  01/02/19 1112     Respiratory Culture No Pseudomonas isolated.     Respiratory Culture --     METHICILLIN RESISTANT STAPHYLOCOCCUS AUREUS  Few  Normal respiratory ruddy also present       Gram Stain (Respiratory) <10 epithelial cells per low power field.     Gram Stain (Respiratory) Rare WBC's     Gram Stain (Respiratory) Rare Gram positive cocci         Chest X-Ray: T right basilar infiltrate.     CT abdomen and pelvis with contrast:  Enlarged prostate gland, air in the bladder likely secondary to catheterization, diffuse bladder wall thickening. Prominent amount of fluid within right colon.  Appendix not identified and note is made of limitation with motion artifact in the appendiceal region.    EGD:   - Moderately severe reflux esophagitis.                       - Medium-sized hiatal hernia.                       - Gastritis.                       - A single gastric polyp.                       - Normal examined duodenum.                       - No specimens collected.    CXR: New moderate left pleural effusion and left lung infiltrate.  Leftward mediastinal shift raising consideration for left lung atelectasis as contributory to the findings.  Close follow-up recommended.    CXR:   Unchanged bilateral lower lobe infiltrates.Heart size is now within normal limits.  Endotracheal tube and right central line in position.    CXR:   Increasing left lower lobe infiltrate and possible left pleural effusion.  Decreasing right base infiltrate.  Probable mild cardiomegaly obscured by the infiltrates.  Endotracheal tube and central line in position.    CXR:   Stable, appropriate positioning of support devices.  Stable appearance of the chest demonstrating right basilar atelectasis or infiltrate and retrocardiac consolidation and small left pleural effusion.    CXR: No significant change in the appearance of the chest compared to the prior exam.    EGD:   - Normal esophagus.                        - Z-line regular.                       - Hematin (altered blood/coffee-ground-like                        material) in the gastric fundus and in the gastric                        body.                       - Intact gastrostomy with a patent G-tube present                        characterized by congestion, edema, erosion and                        erythema.                       - Oozing gastric ulcer with adherent clot. Injected.                        Clips were placed.                       - Normal antrum.                       - Normal examined duodenum.                       - No specimens collected.    ECHO:  · Mildly decreased left ventricular systolic function. The estimated ejection fraction is 49%  · Grade I (mild) left ventricular diastolic dysfunction consistent with impaired relaxation.  · Normal left atrial pressure.  · Normal right ventricular systolic function.  · Elevated central venous pressure (15 mm Hg).  · The estimated PA systolic pressure is 27 mm Hg  · No evidence of endocarditis.  · Patient is in on CPAP.    CXR: Appropriately positioned right central line.  Interval extubation.  Unchanged bibasilar airspace disease.    CXR: Bibasilar airspace disease unchanged.  Similar and appropriate positioning of right central line.    Assessment/Plan:      Acute hypoxic respiratory failure   Yes    Aspiration pneumonia, RLL with MRSA sp [J69.0]  Swallow dysfunction s/p PEG placement  On NIPPV  Continue Vanc  Duonebs  On Solumedrol IV 40mg  daily     Hypernatremia - better  Follow BMP. On TPN.  Continue free water flushes  TPN adjusted    Atrial fibrillation with RVR  Controlled, NSR noted. Dr. Reyna is following.     Hyperglycemia  Continue Levemir 16 units BID.    Hypokalemia   Follow BMP. Replete KCl.     Yes    Upper GI bleeding [K92.2]  Acute GI bleeding loss anemia  Protonix BID      Yes    Muscular dystrophy [G71.00]  Supportive care, fall precautions.     Severe  malnutrition  Continue IV TPN.    JUSTA  Resolved  Started diuresis    Hypomagnesemia  Replete Mag. Follow Mag level.    Abnormal liver tests  Mixed pattern  Monitor and no synthetic liver dysfunction  Possibly related to TPN?      Yes    Chronic obstructive pulmonary disease [J44.9]  Patient's COPD is controlled currently. Continue scheduled inhalers and monitor respiratory status closely.   Yes      DVT prophylaxis: Use SCD and TEDs. No anticoagulation due to GI bleeding.     Discussed with CM, no family to discuss case with.      Stephanie Tena MD  Department of Hospital Medicine   [facility]

## 2019-01-07 NOTE — PLAN OF CARE
01/07/19 0722   Preset CPAP/BiPAP Settings   Mode Of Delivery BiPAP   $ CPAP/BiPAP Daily Charge BiPAP/CPAP Daily   $ Is patient using? Yes   Size of Mask Medium/Large   Sized Appropriately? Yes   Equipment Type V60   Airway Device Type medium full face mask   Ipap 18   EPAP (cm H2O) 10   Pressure Support (cm H2O) 8   Set Rate (Breaths/Min) 20   ITime (sec) 1   Rise Time (sec) 3   Patient CPAP/BiPAP Settings   Timed Inspiration (Sec) 1   IPAP Rise Time (sec) 3   RR Total (Breaths/Min) 28   Tidal Volume (mL) 508   VE Minute Ventilation (L/min) 10 L/min   Peak Inspiratory Pressure (cm H2O) 19   TiTOT (%) 35   Total Leak (L/Min) 9   Patient Trigger - ST Mode Only (%) 69

## 2019-01-08 LAB
ALBUMIN SERPL BCP-MCNC: 1.9 G/DL
ALP SERPL-CCNC: 370 U/L
ALT SERPL W/O P-5'-P-CCNC: 62 U/L
ANION GAP SERPL CALC-SCNC: 12 MMOL/L
AST SERPL-CCNC: 47 U/L
BASOPHILS # BLD AUTO: 0 K/UL
BASOPHILS NFR BLD: 0.2 %
BILIRUB SERPL-MCNC: 0.2 MG/DL
BUN SERPL-MCNC: 45 MG/DL
CALCIUM SERPL-MCNC: 8.4 MG/DL
CHLORIDE SERPL-SCNC: 105 MMOL/L
CO2 SERPL-SCNC: 19 MMOL/L
CREAT SERPL-MCNC: 1.1 MG/DL
DIFFERENTIAL METHOD: ABNORMAL
EOSINOPHIL # BLD AUTO: 0 K/UL
EOSINOPHIL NFR BLD: 0.2 %
ERYTHROCYTE [DISTWIDTH] IN BLOOD BY AUTOMATED COUNT: 14.4 %
EST. GFR  (AFRICAN AMERICAN): >60 ML/MIN/1.73 M^2
EST. GFR  (NON AFRICAN AMERICAN): >60 ML/MIN/1.73 M^2
GLUCOSE SERPL-MCNC: 102 MG/DL
HCT VFR BLD AUTO: 30.6 %
HGB BLD-MCNC: 10.2 G/DL
LYMPHOCYTES # BLD AUTO: 0.9 K/UL
LYMPHOCYTES NFR BLD: 5.1 %
MAGNESIUM SERPL-MCNC: 2.1 MG/DL
MCH RBC QN AUTO: 29.4 PG
MCHC RBC AUTO-ENTMCNC: 33.2 G/DL
MCV RBC AUTO: 88 FL
MONOCYTES # BLD AUTO: 0.7 K/UL
MONOCYTES NFR BLD: 4.2 %
NEUTROPHILS # BLD AUTO: 16.3 K/UL
NEUTROPHILS NFR BLD: 90.3 %
PHOSPHATE SERPL-MCNC: 2.9 MG/DL
PLATELET # BLD AUTO: 441 K/UL
PMV BLD AUTO: 9.5 FL
POCT GLUCOSE: 110 MG/DL (ref 70–110)
POCT GLUCOSE: 171 MG/DL (ref 70–110)
POCT GLUCOSE: 186 MG/DL (ref 70–110)
POCT GLUCOSE: 249 MG/DL (ref 70–110)
POTASSIUM SERPL-SCNC: 3.1 MMOL/L
PROT SERPL-MCNC: 5.9 G/DL
RBC # BLD AUTO: 3.46 M/UL
SODIUM SERPL-SCNC: 136 MMOL/L
WBC # BLD AUTO: 18 K/UL

## 2019-01-08 PROCEDURE — 94660 CPAP INITIATION&MGMT: CPT

## 2019-01-08 PROCEDURE — 25000242 PHARM REV CODE 250 ALT 637 W/ HCPCS: Performed by: INTERNAL MEDICINE

## 2019-01-08 PROCEDURE — 27000221 HC OXYGEN, UP TO 24 HOURS

## 2019-01-08 PROCEDURE — 84100 ASSAY OF PHOSPHORUS: CPT

## 2019-01-08 PROCEDURE — 99232 SBSQ HOSP IP/OBS MODERATE 35: CPT | Mod: ,,, | Performed by: INTERNAL MEDICINE

## 2019-01-08 PROCEDURE — 25000003 PHARM REV CODE 250: Performed by: INTERNAL MEDICINE

## 2019-01-08 PROCEDURE — 94761 N-INVAS EAR/PLS OXIMETRY MLT: CPT

## 2019-01-08 PROCEDURE — C9113 INJ PANTOPRAZOLE SODIUM, VIA: HCPCS | Performed by: INTERNAL MEDICINE

## 2019-01-08 PROCEDURE — 83735 ASSAY OF MAGNESIUM: CPT

## 2019-01-08 PROCEDURE — 85025 COMPLETE CBC W/AUTO DIFF WBC: CPT

## 2019-01-08 PROCEDURE — 20000000 HC ICU ROOM

## 2019-01-08 PROCEDURE — 99232 PR SUBSEQUENT HOSPITAL CARE,LEVL II: ICD-10-PCS | Mod: ,,, | Performed by: INTERNAL MEDICINE

## 2019-01-08 PROCEDURE — 36415 COLL VENOUS BLD VENIPUNCTURE: CPT

## 2019-01-08 PROCEDURE — 94640 AIRWAY INHALATION TREATMENT: CPT

## 2019-01-08 PROCEDURE — 63600175 PHARM REV CODE 636 W HCPCS: Performed by: INTERNAL MEDICINE

## 2019-01-08 PROCEDURE — 97803 MED NUTRITION INDIV SUBSEQ: CPT

## 2019-01-08 PROCEDURE — 99900035 HC TECH TIME PER 15 MIN (STAT)

## 2019-01-08 PROCEDURE — 80053 COMPREHEN METABOLIC PANEL: CPT

## 2019-01-08 RX ORDER — POTASSIUM CHLORIDE 7.45 MG/ML
10 INJECTION INTRAVENOUS
Status: COMPLETED | OUTPATIENT
Start: 2019-01-08 | End: 2019-01-08

## 2019-01-08 RX ADMIN — FUROSEMIDE 40 MG: 10 INJECTION, SOLUTION INTRAVENOUS at 05:01

## 2019-01-08 RX ADMIN — NYSTATIN 500000 UNITS: 500000 SUSPENSION ORAL at 06:01

## 2019-01-08 RX ADMIN — IPRATROPIUM BROMIDE AND ALBUTEROL SULFATE 3 ML: .5; 3 SOLUTION RESPIRATORY (INHALATION) at 11:01

## 2019-01-08 RX ADMIN — FUROSEMIDE 40 MG: 10 INJECTION, SOLUTION INTRAVENOUS at 08:01

## 2019-01-08 RX ADMIN — IPRATROPIUM BROMIDE AND ALBUTEROL SULFATE 3 ML: .5; 3 SOLUTION RESPIRATORY (INHALATION) at 04:01

## 2019-01-08 RX ADMIN — NYSTATIN 500000 UNITS: 500000 SUSPENSION ORAL at 11:01

## 2019-01-08 RX ADMIN — IPRATROPIUM BROMIDE AND ALBUTEROL SULFATE 3 ML: .5; 3 SOLUTION RESPIRATORY (INHALATION) at 03:01

## 2019-01-08 RX ADMIN — POTASSIUM CHLORIDE 10 MEQ: 7.46 INJECTION, SOLUTION INTRAVENOUS at 11:01

## 2019-01-08 RX ADMIN — NYSTATIN 500000 UNITS: 500000 SUSPENSION ORAL at 12:01

## 2019-01-08 RX ADMIN — PANTOPRAZOLE SODIUM 40 MG: 40 INJECTION, POWDER, LYOPHILIZED, FOR SOLUTION INTRAVENOUS at 10:01

## 2019-01-08 RX ADMIN — VANCOMYCIN HYDROCHLORIDE 750 MG: 750 INJECTION, POWDER, LYOPHILIZED, FOR SOLUTION INTRAVENOUS at 06:01

## 2019-01-08 RX ADMIN — PANTOPRAZOLE SODIUM 40 MG: 40 INJECTION, POWDER, LYOPHILIZED, FOR SOLUTION INTRAVENOUS at 08:01

## 2019-01-08 RX ADMIN — POLYETHYLENE GLYCOL 3350 17 G: 17 POWDER, FOR SOLUTION ORAL at 08:01

## 2019-01-08 RX ADMIN — INSULIN ASPART 2 UNITS: 100 INJECTION, SOLUTION INTRAVENOUS; SUBCUTANEOUS at 11:01

## 2019-01-08 RX ADMIN — INSULIN ASPART 4 UNITS: 100 INJECTION, SOLUTION INTRAVENOUS; SUBCUTANEOUS at 05:01

## 2019-01-08 RX ADMIN — IPRATROPIUM BROMIDE AND ALBUTEROL SULFATE 3 ML: .5; 3 SOLUTION RESPIRATORY (INHALATION) at 07:01

## 2019-01-08 RX ADMIN — POTASSIUM CHLORIDE 10 MEQ: 7.46 INJECTION, SOLUTION INTRAVENOUS at 10:01

## 2019-01-08 RX ADMIN — LEUCINE, PHENYLALANINE, LYSINE, METHIONINE, ISOLEUCINE, VALINE, HISTIDINE, THREONINE, TRYPTOPHAN, ALANINE, GLYCINE, ARGININE, PROLINE, SERINE, TYROSINE, DEXTROSE: 311; 238; 247; 170; 255; 247; 204; 179; 77; 880; 438; 489; 289; 213; 17; 10 INJECTION INTRAVENOUS at 10:01

## 2019-01-08 RX ADMIN — ATORVASTATIN CALCIUM 80 MG: 40 TABLET, FILM COATED ORAL at 10:01

## 2019-01-08 RX ADMIN — POTASSIUM CHLORIDE 10 MEQ: 7.46 INJECTION, SOLUTION INTRAVENOUS at 02:01

## 2019-01-08 RX ADMIN — METHYLPREDNISOLONE SODIUM SUCCINATE 40 MG: 40 INJECTION, POWDER, FOR SOLUTION INTRAMUSCULAR; INTRAVENOUS at 08:01

## 2019-01-08 RX ADMIN — INSULIN DETEMIR 16 UNITS: 100 INJECTION, SOLUTION SUBCUTANEOUS at 10:01

## 2019-01-08 RX ADMIN — NYSTATIN 500000 UNITS: 500000 SUSPENSION ORAL at 05:01

## 2019-01-08 RX ADMIN — POTASSIUM CHLORIDE 10 MEQ: 7.46 INJECTION, SOLUTION INTRAVENOUS at 08:01

## 2019-01-08 RX ADMIN — INSULIN DETEMIR 16 UNITS: 100 INJECTION, SOLUTION SUBCUTANEOUS at 08:01

## 2019-01-08 NOTE — PLAN OF CARE
I sent a 3 day packet, current meds and the LTAC consult to [facility] via [facility]. Ivy Hernandez LMSW     Per Abhijeet with [facility] [phone]- he will be here first thing in the morning to see about getting the pt moved to LTAC. Ivy Hernandez LMSW       01/08/19 0723   Post-Acute Status   Post-Acute Authorization Placement   Post-Acute Placement Status Referrals Sent

## 2019-01-08 NOTE — PHYSICIAN QUERY
"PT Name: Enmanuel Armenta  MR #: [id number]     Physician Query Form - Documentation Clarification      CDS/: Zainab Suh RN CDI            Contact information: [email or web address]     This form is a permanent document in the medical record.     Query Date: January 8, 2019    By submitting this query, we are merely seeking further clarification of documentation. Please utilize your independent clinical judgment when addressing the question(s) below.    The Medical record reflects the following:    Supporting Clinical Findings Location in Medical Record   Severe malnutrition  Start IV TPN.  Diet: Trickle feeding  On ventilator. Management per pulmonary    Malnutrition query 12/20 - moderate protein-calorie malnutrtion   [facility] medicine note   1/1 745 am        Query answer 12/20   Height - 5'7"  Weight - 68 kg  150 lbs.  BMI - 23.5    Acute, chronic illness  COPD, muscular dystrophy, elevated PSA, BPH, and hypokalemia, urine retention   SOB, UTI, and aspiration PNA.  UGIB   Vital signs        ER MD Notes      [facility] medicine H&P  12/17 329 pm   Tube feeding per order started in this shift : -Isosource 1.5 @ 15 ml/hr advancing to goal of 45 ml/hr + 30 ml flush q 4 hr .    TPN 12/20 - 1/7   RN Note 12/25 322 pm        Medication sheets                                                                            Doctor, Please specify diagnosis or diagnoses associated with above clinical findings.    Doctor, please "Clarify" the degree of "Protein-calorie malnutrition."     Provider Use Only      [   ] Moderate Protein-Calorie Malnutrition      [ x  ] Severe Protein-Calorie Malnutrition      [   ] Moderate that has advanced to severe Protein-Calorie Malnutrition .       [   ] Other, please specify__________________.                                                                                                               [  ] Clinically Undetermined               "

## 2019-01-08 NOTE — PHYSICIAN QUERY
PT Name: Enmanuel Armenta  MR #: [id number]     Physician Query Form - Gastrointestinal Ulcer  Clarification     CDS/: Zainab Suh RN CDI             Contact information: [email or web address]  This form is a permanent document in the medical record.     Query Date: January 8, 2019    By submitting this query, we are merely seeking further clarification of documentation to reflect the severity of illness of your patient. Please utilize your independent clinical judgment when addressing the question(s) below.    The Medical record reflects the following:     Indicators   Supporting Clinical Findings Location in Medical Record    Gastrointestinal Ulcer Documented   One oozing cratered gastric ulcer with adherent clot was found in the gastric body EGD report 1/3   X EGD/colonoscopy Findings      There was evidence of an intact gastrostomy with a patent G-tube        present in the gastric body. This was characterized by congestion,        edema, erosion and erythema.       One oozing cratered gastric ulcer with adherent clot was found in        the gastric body near the gastrostomy site but not involving it. The        lesion was 5 mm in largest dimension. Area was successfully injected        with 2 mL of a 1:10,000 solution of epinephrine for hemostasis. For        hemostasis, four hemostatic clips were successfully placed. There        was no bleeding at the end of the procedure. EGD report 1/3    Radiology Findings     X Treatment/Medication EGD done.  Ulcer noted with evidence of active bleeding and adherent clot.  Treated with epinephrine injection and hemoclip x 4 with good hemostasis.  No electrocautery used secondary to recent Plavix use.  Recommend IV PPI and sucralfate per PEG.  Recommend monitor H/H.  Hold all anticoagulation.  May resume lovenox tomorrow if no further evidence for bleeding.     Gastroenterology note 1/3 408 pm    Other       Provider, please further specify the gastrointestinal  ulcer diagnosis. Morteza all that apply:  Location Acuity With Hemorrhage With Perforation   Gastric [ x ]  Acute          [  ]  Chronic or unspecified    gastric body near the gastrostomy site  [ x ]  Yes - with adherent clot  [  ]  No  [   ] Clinically Undetermined [  ]  Yes   [  ]  No  [   ] Clinically Undetermined   Other site (specify): ______________ [  ]  Acute           [  ]  Chronic or unspecified [  ]  Yes   [  ]  No  [   ] Clinically Undetermined [  ]  Yes   [  ]  No  [   ] Clinically Undetermined   GI Peptic Ulcer present, but site unknown [  ]  Acute           [  ]  Chronic or unspecified [  ]  Yes   [  ]  No  [   ] Clinically Undetermined [  ]  Yes   [  ]  No  [   ] Clinically Undetermined   GI Ulcer Ruled Out [  ] Yes  [  ]  No          Please document in your progress notes daily for the duration of treatment until resolved, and include in your discharge summary.

## 2019-01-08 NOTE — PLAN OF CARE
Problem: Adult Inpatient Plan of Care  Goal: Plan of Care Review  Outcome: Outcome(s) achieved Date Met: 01/08/19  Pt unchanged from yesterday; does not follow commands; does not open eyes; remains on BIPAP; TPN infusing; VS stable and afebrile this shift; will continue current plan of care at this time

## 2019-01-08 NOTE — PLAN OF CARE
Problem: Adult Inpatient Plan of Care  Goal: Plan of Care Review  Unable to discuss goals with patient due to level of consciousness.    Comments: Safety maintained, flexiseal removed after BM. Bath completed, TPN in use BiPap intact

## 2019-01-08 NOTE — PROGRESS NOTES
Progress Note  PULMONARY    Admit Date: 12/17/2018 01/08/2019      SUBJECTIVE:     Dec 29, sedate on vent.  Left side down.  Dec 30, sedated, 40% now  Dec 31 sedate, not arousing.  Jan 1, sedate,   1/2 - a fib with decreased sedation after promising progress weaning when sedate.  Calm now  1/7, extubated, needing bipap, calm now  1/8 - not arousing.    PFSH and Allergies reviewed.    OBJECTIVE:     Vitals (Most recent):  Vitals:    01/08/19 1600   BP: 114/71   Pulse: 86   Resp: 19   Temp:        Vitals (24 hour range):  Temp:  [97.8 °F (36.6 °C)-99.3 °F (37.4 °C)]   Pulse:  []   Resp:  [15-26]   BP: (109-148)/(55-81)   SpO2:  [92 %-98 %]   Arterial Line BP: ()/(59-86)       Intake/Output Summary (Last 24 hours) at 1/8/2019 1724  Last data filed at 1/8/2019 1400  Gross per 24 hour   Intake 4729.92 ml   Output 2100 ml   Net 2629.92 ml          Physical Exam:  The patient's neuro status (alertness,orientation,cognitive function,motor skills,), pharyngeal exam (oral lesions, hygiene, abn dentition,), Neck (jvd,mass,thyroid,nodes in neck and above/below clavicle),RESPIRATORY(symmetry,effort,fremitus,percussion,auscultation),  Cor(rhythm,heart tones including gallops,perfusion,edema)ABD(distention,hepatic&splenomegaly,tenderness,masses), Skin(rash,cyanosis),Psyc(affect,judgement,).  Exam negative except for these pertinent findings:    Extubated, bipap No distress, nl percussion, symmetric, no edema.  Good bs,     Radiographs reviewed: view by direct vision  1/6 minimal densities bilat      Results for orders placed during the hospital encounter of 12/17/18   X-Ray Chest 1 View    Narrative EXAMINATION:  XR CHEST 1 VIEW    CLINICAL HISTORY:  resp failure;    TECHNIQUE:  Single frontal view of the chest was performed.    COMPARISON:  Chest portable of December 28, 2018.    FINDINGS:  An endotracheal tube and right central lines remain in position.  The cardiac size is decreased to normal.  Bilateral lower  lobe infiltrates are unchanged.  No pneumothorax is seen.      Impression Unchanged bilateral lower lobe infiltrates.Heart size is now within normal limits.  Endotracheal tube and right central line in position.      Electronically signed by: Wimlan Casanova MD  Date:    12/29/2018  Time:    08:37   ]    Labs     Recent Labs   Lab 01/08/19  0322   WBC 18.00*   HGB 10.2*   HCT 30.6*   *     Recent Labs   Lab 01/08/19  0322      K 3.1*      CO2 19*   BUN 45*   CREATININE 1.1      CALCIUM 8.4*   MG 2.1   PHOS 2.9   AST 47*   ALT 62*   ALKPHOS 370*   BILITOT 0.2   PROT 5.9*   ALBUMIN 1.9*     No results for input(s): PH, PCO2, PO2, HCO3 in the last 24 hours.  Microbiology Results (last 7 days)     Procedure Component Value Units Date/Time    Culture, Respiratory with Gram Stain [[id number]] Collected:  01/02/19 1229    Order Status:  Canceled Specimen:  Respiratory from Sputum, Expectorated Updated:  01/02/19 1230    Culture, Respiratory with Gram Stain [[id number]]  (Susceptibility) Collected:  12/29/18 1325    Order Status:  Completed Specimen:  Sputum Updated:  01/02/19 1112     Respiratory Culture No Pseudomonas isolated.     Respiratory Culture --     METHICILLIN RESISTANT STAPHYLOCOCCUS AUREUS  Few  Normal respiratory ruddy also present       Gram Stain (Respiratory) <10 epithelial cells per low power field.     Gram Stain (Respiratory) Rare WBC's     Gram Stain (Respiratory) Rare Gram positive cocci          Impression:  Active Hospital Problems    Diagnosis  POA    *Aspiration pneumonia of both lower lobes [J69.0]  Yes    Septic shock [A41.9, R65.21]  No    Acute respiratory failure with hypoxia and hypercarbia [J96.01, J96.02]  No    Smoker [F17.200]  Yes    Episodic confusion [R41.0]  Yes    Hematemesis [K92.0]  Yes    Dysphagia [R13.10]  Yes    Malnutrition of moderate degree [E44.0]  Yes    Elevated LFTs [R94.5]  Yes    Upper GI bleeding [K92.2]  Yes    Muscular dystrophy  [G71.00]  Yes    Chronic obstructive pulmonary disease with acute lower respiratory infection [J44.0]  Yes      Resolved Hospital Problems    Diagnosis Date Resolved POA    Feeding difficulty [R63.3] 12/23/2018 Yes    UTI (urinary tract infection) [N39.0] 12/23/2018 Yes    Hematemesis [K92.0] 12/23/2018 Yes    SOB (shortness of breath) [R06.02] 12/18/2018 Yes               Plan:   Dec 29- gas exchange is better but has very severe aspiration lung injury.  cxr no ards pattern.  Need sputum cultured - expect will take awhile to clear out lung abn.  Elizabeth would seem poor given course.    Dec 30 40% 02 and cxr sl better.  Start wean trials.    Dec 31, right lung clear. Should wean and extubate.    Jan 1, 2018- bp/pulse up with wean,   cxr much improved, has abn neruo state chronically .  wean    1/2- mrsa found with clearing cxr.  Suspect not significant wrt to pneumonia but colonizer.  Will rx anyhow given global picture.  Course vanc.  Elizabeth remains very poor given his course.  Wean as able.  1/7 pt marginal.  Encephalopathy.  resp failure lingering, poor outlook  Likely try high flow am    1/8/2019-wbc 18, pt not progressing, f/u cxr am.    Pt poor brain function is cause of failure to improve.  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  Illness is terminal and care is expected to be futile.  Treatment looks to be prolonging dying with no hope of improving.  No family available.  Given clear futility of prolongation, recommend dnr status.                    .

## 2019-01-08 NOTE — PLAN OF CARE
01/07/19 1902   Patient Assessment/Suction   Level of Consciousness (AVPU) alert   Respiratory Effort Normal   Expansion/Accessory Muscles/Retractions no use of accessory muscles   All Lung Fields Breath Sounds rhonchi   Rhythm/Pattern, Respiratory assisted mechanically   PRE-TX-O2-ETCO2   O2 Device (Oxygen Therapy) BiPAP   $ Is the patient on Low Flow Oxygen? Yes   SpO2 98 %   Pulse Oximetry Type Continuous   $ Pulse Oximetry - Multiple Charge Pulse Oximetry - Multiple   Pulse 75   Resp (!) 26   Aerosol Therapy   $ Aerosol Therapy Charges Aerosol Treatment   Daily Review of Necessity (SVN) completed   Respiratory Treatment Status (SVN) given   Treatment Route (SVN) mask   Patient Position (SVN) semi-Geiger's   Post Treatment Assessment (SVN) breath sounds improved   Signs of Intolerance (SVN) none   Breath Sounds Post-Respiratory Treatment   Post-treatment Heart Rate (beats/min) 86   Post-treatment Resp Rate (breaths/min) 20   Preset CPAP/BiPAP Settings   Mode Of Delivery BiPAP   $ CPAP/BiPAP Daily Charge BiPAP/CPAP Daily   $ Initial CPAP/BiPAP Setup? No   $ Is patient using? Yes   Size of Mask Medium/Large   Sized Appropriately? Yes   Equipment Type V60   Airway Device Type medium full face mask   Humidifier not applicable   Ipap 18   EPAP (cm H2O) 10   Pressure Support (cm H2O) 8   Set Rate (Breaths/Min) 20   ITime (sec) 1   Rise Time (sec) 3   Patient CPAP/BiPAP Settings   RR Total (Breaths/Min) 23   Tidal Volume (mL) 406   VE Minute Ventilation (L/min) 8.72 L/min   Peak Inspiratory Pressure (cm H2O) 18   TiTOT (%) 28   Total Leak (L/Min) 38   Patient Trigger - ST Mode Only (%) 47

## 2019-01-08 NOTE — PROGRESS NOTES
INPATIENT NEPHROLOGY CONSULT   [facility] NEPHROLOGY    Patient Name: Enmanuel Armenta  Date: 01/07/2019    Reason for consultation: JUSTA    History of Present Illness:    62 y/o M with hx of COPD and muscle dystrophy who resides at [facility], was brought in for abdominal pain, nausea and vomiting, fever 101.5F upon arrival. He is undergoing treatment for UGIB 2/2 esophagitis/gastritis, aspiration PNA (on vent, with PEG), and AF with RVR (not in NSR). We have been consulted for JUSTA.    Change in Cr from 1 --> 1.6 on 12/29 correlated with time he required NS IVFs and levophed for hypotension.    Change in Cr from 1.8 --> 2s correlated with AF with RVR requiring diltiazem.    Got lisinopril 12/25-12/28, lasix 12/30-12/31.   Highest vanc level 28.    Renal u/s neg for obstruction.  Last contrast exposure 12/17.    1/5  Extubated.  No distress. Not interactive.  1/6  About the same today.  No distress  1/7 VSS, no distress.    Plan of Care:    Assessment:  JUSTA 2/2 ATN- baseline Cr < 1  Hypernatremia--worse  Hypokalemia  Anemia    Plan:    - Suspect JUSTA is prerenal vs ischemic ATN 2/2 hemodynamic changes (septic shock requiring pressors, AF with RVR). He is now hemodynamically stable (off pressors, in NSR). Renal function isimproving and he is nonoliguric. Will continue to trend. Keep armas (BPH noted on ultrasound). Dose antbx for CrCl 30-60. May see lag in improvement of BUN due to IV steroids.   - Continue free water flushes. Volume limited by aspiration. May need to add free h20 to the TPN.  - K is better after repletion.   - Anemia is stable- will transfuse PRN (suspect acute illness related BM suppression).   --no nonessential nephrotoxic agents.  --keep sbp greater than 90.    Thank you for allowing us to participate in this patient's care. We will continue to follow.    Vital Signs:  Temp Readings from Last 3 Encounters:   01/07/19 98.6 °F (37 °C) (Axillary)   03/02/18 97.4 °F (36.3 °C)    11/21/17 98 °F (36.7 °C) (Oral)       Pulse Readings from Last 3 Encounters:   01/07/19 75   08/03/18 72   03/02/18 61       BP Readings from Last 3 Encounters:   01/07/19 114/66   08/03/18 94/64   03/02/18 97/63       Weight:  Wt Readings from Last 3 Encounters:   01/07/19 74.5 kg (164 lb 3.9 oz)   08/03/18 68 kg (150 lb)   03/02/18 68.5 kg (151 lb 0.2 oz)       Past Medical & Surgical History:  Past Medical History:   Diagnosis Date    Anticoagulant long-term use     BPH (benign prostatic hyperplasia)     COPD (chronic obstructive pulmonary disease)     Elevated PSA     Fx     RIGHT WRIST, LEFT FOOT    Hypokalemia     Muscular dystrophy     Urine retention     Wears dentures     upper       Past Surgical History:   Procedure Laterality Date    APPENDECTOMY      cataracts      bilateral     CHOLECYSTECTOMY      CYST REMOVAL      from spine     CYSTOLITHOLOPAXY (REMOVE BLADDER STONE)  3/6/2017    Performed by [facility] at [facility] OR    CYSTOSCOPY      EGD (ESOPHAGOGASTRODUODENOSCOPY) N/A 1/3/2019    Performed by Evgeny Martin MD at [facility] ENDO    EGD (ESOPHAGOGASTRODUODENOSCOPY) N/A 12/24/2018    Performed by [facility]y Martin [facility] at [facility] ENDO    EGD (ESOPHAGOGASTRODUODENOSCOPY) N/A 12/17/2018    Performed by [facility]geny Martin [facility] at [facility] ENDO    EYE SURGERY      cataract surgery bilateral    INSERTION, PEG TUBE N/A 12/24/2018    Performed by [facility]geny Martin [facility] at [facility] ENDO    PROSTATE BIOPSY      PROSTATE SURGERY      PROSTATECTOMY-TRANSURETHRAL N/A 3/6/2017    Performed by [facility]an Lake [facility] at [facility] OR    PROSTATECTOMY-TRANSURETHRAL WITH GREENLIGHT LASER N/A 3/6/2017    Performed by [facility]an Lake [facility] at [facility] OR    TONSILLECTOMY      TRANSRECTAL ULTRASOUND GUIDED PROSTATE BIOPSY N/A 3/28/2016    Performed by [facility]an Lake [facility] at [facility] OR    VASECTOMY         Past Social History:  Social History     Socioeconomic History    Marital status: Single     Spouse name: None     Number of children: None    Years of education: None    Highest education level: None   Social Needs    Financial resource strain: None    Food insecurity - worry: None    Food insecurity - inability: None    Transportation needs - medical: None    Transportation needs - non-medical: None   Occupational History    None   Tobacco Use    Smoking status: Current Every Day Smoker     Packs/day: 1.00     Years: 48.00     Pack years: 48.00     Types: Cigarettes    Smokeless tobacco: Never Used   Substance and Sexual Activity    Alcohol use: No     Alcohol/week: 0.0 oz    Drug use: No    Sexual activity: None   Other Topics Concern    None   Social History Narrative    None       Medications:  No current facility-administered medications on file prior to encounter.      Current Outpatient Medications on File Prior to Encounter   Medication Sig Dispense Refill    aspirin (ECOTRIN) 81 MG EC tablet Take one tablet daily  2    atorvastatin (LIPITOR) 80 MG tablet Take 80 mg by mouth every evening.       benzonatate (TESSALON) 100 MG capsule Take 100 mg by mouth 3 (three) times daily as needed for Cough.      clopidogrel (PLAVIX) 75 mg tablet Take one tablet daily  3    dextromethorphan-guaifenesin  mg (MUCINEX DM)  mg per 12 hr tablet Take 2 tablets by mouth every 12 (twelve) hours.      lisinopril (PRINIVIL,ZESTRIL) 2.5 MG tablet Take 2.5 mg by mouth once daily.       metoprolol succinate (TOPROL-XL) 25 MG 24 hr tablet Take 25 mg by mouth once daily.       polyethylene glycol (GLYCOLAX) 17 gram PwPk Take 17 g by mouth once daily.        Scheduled Meds:   albuterol-ipratropium  3 mL Nebulization Q4H    atorvastatin  80 mg Oral QHS    EPINEPHrine        fat emulsion 20%  250 mL Intravenous Daily    furosemide  40 mg Intravenous BID    insulin detemir U-100  16 Units Subcutaneous BID    methylPREDNISolone sodium succinate  40 mg Intravenous Daily    nystatin  500,000 Units Mouth/Throat  "Q6H    pantoprazole  40 mg Intravenous BID    polyethylene glycol  17 g Oral Daily    vancomycin (VANCOCIN) IVPB (custom)  750 mg Intravenous Q24H     Continuous Infusions:   Amino acid 4.25% - dextrose 10% (CLINIMIX) solution with additives (1L  provides 340 kcal/L dextrose, with 42.5 gm AA, 100 gm CHO, and no electrolytes except acetate 37 mEq/Cl- 17 mEq) 75 mL/hr at 01/07/19 1718    Amino acid 4.25% - dextrose 10% (CLINIMIX) solution with additives (1L  provides 340 kcal/L dextrose, with 42.5 gm AA, 100 gm CHO, and no electrolytes except acetate 37 mEq/Cl- 17 mEq)      dextrose 5 % 50 mL/hr at 01/07/19 1718     PRN Meds:.sodium chloride, acetaminophen, dextrose 50%, dextrose 50%, glucagon (human recombinant), glucose, glucose, glycopyrrolate, hydrALAZINE, insulin aspart U-100, midazolam, ondansetron    Allergies:  Patient has no known allergies.    Past Family History:  Unable to obtain due to intubation    Review of Systems:  Unable to obtain due to intubation    Physical Exam:  /66   Pulse 75   Temp 98.6 °F (37 °C) (Axillary)   Resp (!) 26   Ht 5' 7" (1.702 m) Comment: Office 7/13/18  Wt 74.5 kg (164 lb 3.9 oz)   SpO2 98%   BMI 25.72 kg/m²     INS/OUTS:  I/O last 3 completed shifts:  In: 5082.5 [I.V.:1165; NG/GT:1670; IV Piggyback:500]  Out: 4300 [Urine:4300]  No intake/output data recorded.    General Appearance:    NAD, intubated, sedated   Head:    Normocephalic, atraumatic   Throat:   Moist mucus membraness   Lungs:     Coarse to auscultation bilaterally, no wheeze, crackles, rales   or rhonchi, symmetric air movement, respirations unlabored   Heart:    Regular rate and rhythm, S1 and S2 normal, no murmur, rub   or gallop   Abdomen:     Soft, non-tender, non-distended, bowel sounds active all four   quadrants, no RT or guarding, no masses, no organomegaly   Extremities:   Warm and well perfused, distal pulses intact, no cyanosis,     trace UE peripheral edema   MSK:   No joint or muscle " swelling, tenderness or deformity   Skin:   Skin color, texture, turgor normal, no rashes or lesions   Neurologic/Psychiatric:   Unable to obtain due to intubation/sedation     Results:  Lab Results   Component Value Date     01/07/2019    K 3.1 (L) 01/07/2019     01/07/2019    CO2 21 (L) 01/07/2019    BUN 48 (H) 01/07/2019    CREATININE 1.1 01/07/2019    CALCIUM 8.7 01/07/2019    ANIONGAP 10 01/07/2019    ESTGFRAFRICA >60 01/07/2019    EGFRNONAA >60 01/07/2019       Lab Results   Component Value Date    CALCIUM 8.7 01/07/2019    PHOS 2.1 (L) 01/07/2019       Recent Labs   Lab 01/07/19  0305   WBC 16.00*   RBC 3.52*   HGB 10.2*   HCT 31.1*   *   MCV 89   MCH 29.0   MCHC 32.8       I have personally reviewed pertinent radiological imaging and reports.    Obie Malave MD  Nephrology  [facility]  [phone]

## 2019-01-08 NOTE — ASSESSMENT & PLAN NOTE
Contributing Nutrition Diagnosis  Moderate acute illness related malnutrition    Related to (etiology):   Swallowing/Chewing Difficulty    Signs and Symptoms (as evidenced by):   1) PO intakes < 50% of estimated needs x 5 days 2) Moderate muscle/fat loss @ temples and pattellar area/mild muscle/fat loss @ orbital area, bicep, clavicle, and calves    Interventions/Recommendations (treatment strategy):  1) TF or TPN to meet > 75% estimated needs @ f/u     Nutrition Diagnosis Status:   Met/ongoing-now digressed

## 2019-01-08 NOTE — PLAN OF CARE
Problem: Malnutrition  Goal: Improved Nutritional Intake  Interventions: Parenteral Nutrition Therapy  Vs. Enteral nutrition therapy     Currently :  D10 AA 4.25 @ 75 mls/hr + lipids  (Provides 1418 calories, 77 gms protein, 1800 mls fluid + lipids)  D5 @ 50 mls/hr  (Provides 204 calories)  Total:  1622 calories, (93% estimated needs), 77 gms protein (100% estimated needs).fluid from IVF and TPN 3000 mls/day. (I/O from yesterday +5232.4 mls)      Recommendation:   1)  Recommend custom TPN D15 AA5 @ 70 ml/hr + standard lipids to meet energy/protein needs, limit fluids and allow flexibility in adjusting lytes.   (Provides 1696 calories (97% estimated needs), 84 gms protein (100% estimated needs), 1680 mls fluid)       2) If pt able to resume TF, rec. continuous and raise HOB 90 degrees   Isosource 1.5 @15 ml/hr increasing to goal of 45 ml/hr + 155 ml flush q 4 hr  (Provides 820 ml free water, 1620 kcal (92%EEN), 73 g protein (100%EPN))  - J-tube feedings would also decrease risk of aspiration     Goals: 1) TPN or TF to meet > 75% estimated needs by f/u 2) TF initiated in < 24 hours 3) Re-initiate nutrition support to meet > 75% estimated needs by f/u   Nutrition Goal Status: Met/ Met/ Met  Communication of RD Recs: (POC,  reviewed with RN )

## 2019-01-08 NOTE — PHYSICIAN QUERY
PT Name: Enmanuel Armenta  MR #: [id number]    Physician Query Form - Atrial Fibrillation Specificity     CDS/: Zainab Suh RN CDI            Contact information: [email or web address]     This form is a permanent document in the medical record.     Query Date: January 8, 2019    By submitting this query, we are merely seeking further clarification of documentation. Please utilize your independent clinical judgment when addressing the question(s) below.    The medical record contains the following:   Indicators     Supporting Clinical Findings Location in Medical Record   X Atrial Fibrillation He was weaned off 1/1/19 when he developed AF / RVR. He was given metoprolol 5 mg iv with better rate control and was placed back on the ventilator. He developed RVR again and was given diltiazem 10 mg iv and placed on 10 mg/hr. He has since converted to NSR. He presently on CPAP.    Atrial fibrillation with RVR  Discussed with Dr. Reyna, now in NSR, he recommends to downgrade lovenox 40 mg daily. Follow ECHO results   Cardiology consult note 1/2 739 pm          Hospital medicine 1/2 904 am    EKG results     X Medication Diltiazem 10 mg/hr IV 1/1 2145 - 1/4 1332                   10 mg IV once 1/1 2109 Medication sheets        Treatment      Other         Provider, please further specify the Atrial Fibrillation diagnosis.    [  ] Chronic   [ x ] Paroxysmal   [  ] Permanent   [  ] Persistent   [  ] Other (please specify):   [  ] Clinically Undetermined       Please document in your progress notes daily for the duration of treatment until resolved, and include in your discharge summary.

## 2019-01-08 NOTE — PROGRESS NOTES
Progress Note  Hospital Medicine  Patient Name:Enmanuel Armenta  MRN:  [id number]  Patient Class: IP- Inpatient  Admit Date: 12/17/2018  Length of Stay: 22 days  Expected Discharge Date:   Attending Physician: Stephanie Tena MD  Primary Care Provider:  Primary Doctor No    SUBJECTIVE:     Principal Problem: Aspiration pneumonia of both lower lobes  Initial history of present illness: Patient is a 63 y.o. male admitted to Hospitalist Service from [facility] with complaint of abdominal pain. [facility] a [facility] [place]. Patient reportedly has past medical history significant for COPD, muscular dystrophy, l;imited mobility with walker and history of elevated PSA. Patient presented with 1 day history of abdominal pain associated with nausea and vomiting. The EMS reported, vomiting a brown sticky substance with pale red streaks throughout. EMS gave him 4 mg of Zofran in route to the ED. Patient denied chest pain, shortness of breath, headache, vision changes, focal neuro-deficits, cough. Patient noted to have 101.5F upon arrival.     PMH/PSH/SH/FH/Meds: reviewed.    Symptoms/Review of Systems: In ICU, afebrile. Patient is still encephalopathic. On BiPAP. Tolerating venti mask.  Still only minimally responsive. Off pressors.  Diet: Trickle feeding + TPN  Activity level: Up with assistance    Pain:  Moans to verbal commands     OBJECTIVE:   Vital Signs (Most Recent):      Temp: 98.3 °F (36.8 °C) (01/08/19 0400)  Pulse: 86 (01/08/19 0727)  Resp: (!) 21 (01/08/19 0727)  BP: 123/67 (01/08/19 0600)  SpO2: 95 % (01/08/19 0727)       Vital Signs Range (Last 24H):  Temp:  [97.7 °F (36.5 °C)-99.3 °F (37.4 °C)]   Pulse:  []   Resp:  [15-26]   BP: (101-148)/(58-81)   SpO2:  [91 %-98 %]   Arterial Line BP: ()/(59-86)     Weight: 74.5 kg (164 lb 3.9 oz)  Body mass index is 25.72 kg/m².    Intake/Output Summary (Last 24 hours) at 1/8/2019 0800  Last data  filed at 1/8/2019 0600  Gross per 24 hour   Intake 4972.42 ml   Output 3375 ml   Net 1597.42 ml     Physical Examination:  General appearance: well developed, appears stated age, chronically ill-appearing, intubated and sedated  Head: normocephalic, atraumatic  Eyes:  conjunctivae/corneas clear. PERRL.  Nose: Nares normal. Septum midline.  Throat: lips, mucosa, and tongue normal; teeth and gums normal, no throat erythema. + Dry blood at angle of mouth  Neck: supple, symmetrical, trachea midline, no JVD and thyroid not enlarged, symmetric, no tenderness/mass/nodules  Lungs:  Scattered rhonchi B/L.  Chest wall: no tenderness  Heart: regular rate and rhythm, S1, S2 normal, no murmur, click, rub or gallop  Abdomen: soft, epigastric tenderness, non-distented; bowel sounds normal; no masses,  no organomegaly. PEG site okay.  Extremities: no cyanosis, clubbing or edema.   Pulses: 2+ and symmetric  Skin: Skin color, texture, turgor normal. No rashes or lesions.  Lymph nodes: Cervical, supraclavicular, and axillary nodes normal.  Neurologic: Sedated    CBC:  Recent Labs   Lab 01/06/19  0341 01/07/19  0305 01/08/19  0322   WBC 14.40* 16.00* 18.00*   RBC 3.82* 3.52* 3.46*   HGB 11.2* 10.2* 10.2*   HCT 34.2* 31.1* 30.6*   * 426* 441*   MCV 90 89 88   MCH 29.2 29.0 29.4   MCHC 32.6 32.8 33.2   BMP  Recent Labs   Lab 01/06/19  0341 01/07/19  0305 01/08/19  0322   * 107 102   * 140 136   K 3.6 3.1* 3.1*   * 109 105   CO2 22* 21* 19*   BUN 48* 48* 45*   CREATININE 1.1 1.1 1.1   CALCIUM 9.3 8.7 8.4*   MG 2.0 1.5* 2.1      Diagnostic Results:  Microbiology Results (last 7 days)     Procedure Component Value Units Date/Time    Culture, Respiratory with Gram Stain [[id number]] Collected:  01/02/19 1229    Order Status:  Canceled Specimen:  Respiratory from Sputum, Expectorated Updated:  01/02/19 1230    Culture, Respiratory with Gram Stain [[id number]]  (Susceptibility) Collected:  12/29/18 1325    Order Status:   Completed Specimen:  Sputum Updated:  01/02/19 1112     Respiratory Culture No Pseudomonas isolated.     Respiratory Culture --     METHICILLIN RESISTANT STAPHYLOCOCCUS AUREUS  Few  Normal respiratory ruddy also present       Gram Stain (Respiratory) <10 epithelial cells per low power field.     Gram Stain (Respiratory) Rare WBC's     Gram Stain (Respiratory) Rare Gram positive cocci         Chest X-Ray: T right basilar infiltrate.     CT abdomen and pelvis with contrast:  Enlarged prostate gland, air in the bladder likely secondary to catheterization, diffuse bladder wall thickening. Prominent amount of fluid within right colon.  Appendix not identified and note is made of limitation with motion artifact in the appendiceal region.    EGD:   - Moderately severe reflux esophagitis.                       - Medium-sized hiatal hernia.                       - Gastritis.                       - A single gastric polyp.                       - Normal examined duodenum.                       - No specimens collected.    CXR: New moderate left pleural effusion and left lung infiltrate.  Leftward mediastinal shift raising consideration for left lung atelectasis as contributory to the findings.  Close follow-up recommended.    CXR:   Unchanged bilateral lower lobe infiltrates.Heart size is now within normal limits.  Endotracheal tube and right central line in position.    CXR:   Increasing left lower lobe infiltrate and possible left pleural effusion.  Decreasing right base infiltrate.  Probable mild cardiomegaly obscured by the infiltrates.  Endotracheal tube and central line in position.    CXR:   Stable, appropriate positioning of support devices.  Stable appearance of the chest demonstrating right basilar atelectasis or infiltrate and retrocardiac consolidation and small left pleural effusion.    CXR: No significant change in the appearance of the chest compared to the prior exam.    EGD:   - Normal esophagus.                        - Z-line regular.                       - Hematin (altered blood/coffee-ground-like                        material) in the gastric fundus and in the gastric                        body.                       - Intact gastrostomy with a patent G-tube present                        characterized by congestion, edema, erosion and                        erythema.                       - Oozing gastric ulcer with adherent clot. Injected.                        Clips were placed.                       - Normal antrum.                       - Normal examined duodenum.                       - No specimens collected.    ECHO:  · Mildly decreased left ventricular systolic function. The estimated ejection fraction is 49%  · Grade I (mild) left ventricular diastolic dysfunction consistent with impaired relaxation.  · Normal left atrial pressure.  · Normal right ventricular systolic function.  · Elevated central venous pressure (15 mm Hg).  · The estimated PA systolic pressure is 27 mm Hg  · No evidence of endocarditis.  · Patient is in on CPAP.    CXR: Appropriately positioned right central line.  Interval extubation.  Unchanged bibasilar airspace disease.    CXR: Bibasilar airspace disease unchanged.  Similar and appropriate positioning of right central line.    Assessment/Plan:      Acute hypoxic respiratory failure   Yes    Aspiration pneumonia, RLL with MRSA sp [J69.0]  Swallow dysfunction s/p PEG placement  On NIPPV  Continue Vanc  Duonebs  On Solumedrol IV 40mg  daily     Hypernatremia - better  Follow BMP. On TPN.  Continue free water flushes  TPN adjusted    Atrial fibrillation with RVR  Controlled, NSR noted. Dr. Reyna is following.     Hyperglycemia  Continue Levemir 16 units BID.    Hypokalemia   Follow BMP. Replete KCl.     Yes    Upper GI bleeding [K92.2]  Acute GI bleeding loss anemia  Protonix BID      Yes    Muscular dystrophy [G71.00]  Supportive care, fall precautions.     Severe  malnutrition  Continue IV TPN.    JUSTA  Resolved  Started diuresis    Hypomagnesemia  Replete Mag. Follow Mag level.    Abnormal liver tests  Mixed pattern  Monitor and no synthetic liver dysfunction  Possibly related to TPN?      Yes    Chronic obstructive pulmonary disease [J44.9]  Patient's COPD is controlled currently. Continue scheduled inhalers and monitor respiratory status closely.   Yes      DVT prophylaxis: Use SCD and TEDs. No anticoagulation due to GI bleeding.     Discussed with CM, no family to discuss case with. Discussed with Dr. Marquez. Patient may benefit with LTAC placement for oxygen weaning.     Stephanie Tena MD  Department of Hospital Medicine   [facility]

## 2019-01-08 NOTE — PROGRESS NOTES
Progress Note  PULMONARY    Admit Date: 12/17/2018 01/07/2019      SUBJECTIVE:     Dec 29, sedate on vent.  Left side down.  Dec 30, sedated, 40% now  Dec 31 sedate, not arousing.  Jan 1, sedate,   1/2 - a fib with decreased sedation after promising progress weaning when sedate.  Calm now  1/7, extubated, needing bipap, calm now      PFSH and Allergies reviewed.    OBJECTIVE:     Vitals (Most recent):  Vitals:    01/07/19 1902   BP:    Pulse: 75   Resp: (!) 26   Temp:        Vitals (24 hour range):  Temp:  [97.7 °F (36.5 °C)-98.9 °F (37.2 °C)]   Pulse:  []   Resp:  [15-30]   BP: (101-155)/()   SpO2:  [91 %-98 %]   Arterial Line BP: (144-168)/(56-65)       Intake/Output Summary (Last 24 hours) at 1/7/2019 2035  Last data filed at 1/7/2019 1718  Gross per 24 hour   Intake 4472.5 ml   Output 2600 ml   Net 1872.5 ml          Physical Exam:  The patient's neuro status (alertness,orientation,cognitive function,motor skills,), pharyngeal exam (oral lesions, hygiene, abn dentition,), Neck (jvd,mass,thyroid,nodes in neck and above/below clavicle),RESPIRATORY(symmetry,effort,fremitus,percussion,auscultation),  Cor(rhythm,heart tones including gallops,perfusion,edema)ABD(distention,hepatic&splenomegaly,tenderness,masses), Skin(rash,cyanosis),Psyc(affect,judgement,).  Exam negative except for these pertinent findings:    Extubated, bipap No distress, nl percussion, symmetric, no edema.  Good bs,     Radiographs reviewed: view by direct vision  1/6 minimal densities bilat      Results for orders placed during the hospital encounter of 12/17/18   X-Ray Chest 1 View    Narrative EXAMINATION:  XR CHEST 1 VIEW    CLINICAL HISTORY:  resp failure;    TECHNIQUE:  Single frontal view of the chest was performed.    COMPARISON:  Chest portable of December 28, 2018.    FINDINGS:  An endotracheal tube and right central lines remain in position.  The cardiac size is decreased to normal.  Bilateral lower lobe infiltrates are  unchanged.  No pneumothorax is seen.      Impression Unchanged bilateral lower lobe infiltrates.Heart size is now within normal limits.  Endotracheal tube and right central line in position.      Electronically signed by: Wilman Casanova MD  Date:    12/29/2018  Time:    08:37   ]    Labs     Recent Labs   Lab 01/07/19  0305   WBC 16.00*   HGB 10.2*   HCT 31.1*   *     Recent Labs   Lab 01/07/19  0305      K 3.1*      CO2 21*   BUN 48*   CREATININE 1.1      CALCIUM 8.7   MG 1.5*   PHOS 2.1*   AST 54*   ALT 65*   ALKPHOS 404*   BILITOT 0.3   PROT 5.4*   ALBUMIN 1.9*     No results for input(s): PH, PCO2, PO2, HCO3 in the last 24 hours.  Microbiology Results (last 7 days)     Procedure Component Value Units Date/Time    Culture, Respiratory with Gram Stain [[id number]] Collected:  01/02/19 1229    Order Status:  Canceled Specimen:  Respiratory from Sputum, Expectorated Updated:  01/02/19 1230    Culture, Respiratory with Gram Stain [[id number]]  (Susceptibility) Collected:  12/29/18 1325    Order Status:  Completed Specimen:  Sputum Updated:  01/02/19 1112     Respiratory Culture No Pseudomonas isolated.     Respiratory Culture --     METHICILLIN RESISTANT STAPHYLOCOCCUS AUREUS  Few  Normal respiratory ruddy also present       Gram Stain (Respiratory) <10 epithelial cells per low power field.     Gram Stain (Respiratory) Rare WBC's     Gram Stain (Respiratory) Rare Gram positive cocci          Impression:  Active Hospital Problems    Diagnosis  POA    *Aspiration pneumonia of both lower lobes [J69.0]  Yes    Septic shock [A41.9, R65.21]  No    Acute respiratory failure with hypoxia and hypercarbia [J96.01, J96.02]  No    Smoker [F17.200]  Yes    Episodic confusion [R41.0]  Yes    Hematemesis [K92.0]  Yes    Dysphagia [R13.10]  Yes    Malnutrition of moderate degree [E44.0]  Yes    Elevated LFTs [R94.5]  Yes    Upper GI bleeding [K92.2]  Yes    Muscular dystrophy [G71.00]  Yes     Chronic obstructive pulmonary disease with acute lower respiratory infection [J44.0]  Yes      Resolved Hospital Problems    Diagnosis Date Resolved POA    Feeding difficulty [R63.3] 12/23/2018 Yes    UTI (urinary tract infection) [N39.0] 12/23/2018 Yes    Hematemesis [K92.0] 12/23/2018 Yes    SOB (shortness of breath) [R06.02] 12/18/2018 Yes               Plan:   Dec 29- gas exchange is better but has very severe aspiration lung injury.  cxr no ards pattern.  Need sputum cultured - expect will take awhile to clear out lung abn.  Topmost would seem poor given course.    Dec 30 40% 02 and cxr sl better.  Start wean trials.    Dec 31, right lung clear. Should wean and extubate.    Jan 1, 2018- bp/pulse up with wean,   cxr much improved, has abn neruo state chronically .  wean    1/2- mrsa found with clearing cxr.  Suspect not significant wrt to pneumonia but colonizer.  Will rx anyhow given global picture.  Course vanc.  Topmost remains very poor given his course.  Wean as able.  1/7 pt marginal.  Encephalopathy.  resp failure lingering, poor outlook  Likely try7 high flow am                    .

## 2019-01-08 NOTE — PLAN OF CARE
Problem: Adult Inpatient Plan of Care  Goal: Plan of Care Review  Outcome: Ongoing (interventions implemented as appropriate)  Remains on BIPAP at present, will try NC, isaiah aerosol tx well inline bbs coarse rhochi

## 2019-01-09 LAB
ALBUMIN SERPL BCP-MCNC: 1.9 G/DL
ALBUMIN SERPL BCP-MCNC: 2 G/DL
ALP SERPL-CCNC: 367 U/L
ALP SERPL-CCNC: 407 U/L
ALT SERPL W/O P-5'-P-CCNC: 59 U/L
ALT SERPL W/O P-5'-P-CCNC: 60 U/L
ANION GAP SERPL CALC-SCNC: 10 MMOL/L
ANION GAP SERPL CALC-SCNC: 12 MMOL/L
AST SERPL-CCNC: 38 U/L
AST SERPL-CCNC: 50 U/L
BASOPHILS # BLD AUTO: ABNORMAL K/UL
BASOPHILS NFR BLD: 0 %
BILIRUB SERPL-MCNC: 0.3 MG/DL
BILIRUB SERPL-MCNC: 0.3 MG/DL
BUN SERPL-MCNC: 58 MG/DL
BUN SERPL-MCNC: 73 MG/DL
CALCIUM SERPL-MCNC: 8.7 MG/DL
CALCIUM SERPL-MCNC: 8.8 MG/DL
CHLORIDE SERPL-SCNC: 105 MMOL/L
CHLORIDE SERPL-SCNC: 105 MMOL/L
CO2 SERPL-SCNC: 19 MMOL/L
CO2 SERPL-SCNC: 19 MMOL/L
CREAT SERPL-MCNC: 1.2 MG/DL
CREAT SERPL-MCNC: 1.2 MG/DL
DIFFERENTIAL METHOD: ABNORMAL
EOSINOPHIL # BLD AUTO: ABNORMAL K/UL
EOSINOPHIL NFR BLD: 0 %
ERYTHROCYTE [DISTWIDTH] IN BLOOD BY AUTOMATED COUNT: 14.5 %
EST. GFR  (AFRICAN AMERICAN): >60 ML/MIN/1.73 M^2
EST. GFR  (AFRICAN AMERICAN): >60 ML/MIN/1.73 M^2
EST. GFR  (NON AFRICAN AMERICAN): >60 ML/MIN/1.73 M^2
EST. GFR  (NON AFRICAN AMERICAN): >60 ML/MIN/1.73 M^2
GLUCOSE SERPL-MCNC: 107 MG/DL
GLUCOSE SERPL-MCNC: 143 MG/DL
HCT VFR BLD AUTO: 32.3 %
HGB BLD-MCNC: 10.7 G/DL
LYMPHOCYTES # BLD AUTO: ABNORMAL K/UL
LYMPHOCYTES NFR BLD: 2 %
MAGNESIUM SERPL-MCNC: 1.7 MG/DL
MAGNESIUM SERPL-MCNC: 1.7 MG/DL
MCH RBC QN AUTO: 29.5 PG
MCHC RBC AUTO-ENTMCNC: 33.1 G/DL
MCV RBC AUTO: 89 FL
MONOCYTES # BLD AUTO: ABNORMAL K/UL
MONOCYTES NFR BLD: 1 %
NEUTROPHILS NFR BLD: 93 %
NEUTS BAND NFR BLD MANUAL: 4 %
PHOSPHATE SERPL-MCNC: 2.4 MG/DL
PHOSPHATE SERPL-MCNC: 2.7 MG/DL
PLATELET # BLD AUTO: 453 K/UL
PLATELET BLD QL SMEAR: ABNORMAL
PMV BLD AUTO: 9.4 FL
POCT GLUCOSE: 116 MG/DL (ref 70–110)
POCT GLUCOSE: 137 MG/DL (ref 70–110)
POCT GLUCOSE: 96 MG/DL (ref 70–110)
POTASSIUM SERPL-SCNC: 3 MMOL/L
POTASSIUM SERPL-SCNC: 3.8 MMOL/L
PROT SERPL-MCNC: 5.8 G/DL
PROT SERPL-MCNC: 6.1 G/DL
RBC # BLD AUTO: 3.63 M/UL
SODIUM SERPL-SCNC: 134 MMOL/L
SODIUM SERPL-SCNC: 136 MMOL/L
VANCOMYCIN TROUGH SERPL-MCNC: 21.3 UG/ML
WBC # BLD AUTO: 22.3 K/UL

## 2019-01-09 PROCEDURE — 85027 COMPLETE CBC AUTOMATED: CPT

## 2019-01-09 PROCEDURE — 99232 SBSQ HOSP IP/OBS MODERATE 35: CPT | Mod: ,,, | Performed by: INTERNAL MEDICINE

## 2019-01-09 PROCEDURE — 87205 SMEAR GRAM STAIN: CPT

## 2019-01-09 PROCEDURE — 25000003 PHARM REV CODE 250: Performed by: INTERNAL MEDICINE

## 2019-01-09 PROCEDURE — 80053 COMPREHEN METABOLIC PANEL: CPT

## 2019-01-09 PROCEDURE — 94761 N-INVAS EAR/PLS OXIMETRY MLT: CPT

## 2019-01-09 PROCEDURE — 99232 PR SUBSEQUENT HOSPITAL CARE,LEVL II: ICD-10-PCS | Mod: ,,, | Performed by: INTERNAL MEDICINE

## 2019-01-09 PROCEDURE — 85007 BL SMEAR W/DIFF WBC COUNT: CPT

## 2019-01-09 PROCEDURE — 63600175 PHARM REV CODE 636 W HCPCS: Performed by: INTERNAL MEDICINE

## 2019-01-09 PROCEDURE — 25000242 PHARM REV CODE 250 ALT 637 W/ HCPCS: Performed by: INTERNAL MEDICINE

## 2019-01-09 PROCEDURE — 87077 CULTURE AEROBIC IDENTIFY: CPT

## 2019-01-09 PROCEDURE — 87070 CULTURE OTHR SPECIMN AEROBIC: CPT

## 2019-01-09 PROCEDURE — 80202 ASSAY OF VANCOMYCIN: CPT

## 2019-01-09 PROCEDURE — 20000000 HC ICU ROOM

## 2019-01-09 PROCEDURE — 27000221 HC OXYGEN, UP TO 24 HOURS

## 2019-01-09 PROCEDURE — 87186 SC STD MICRODIL/AGAR DIL: CPT

## 2019-01-09 PROCEDURE — 36415 COLL VENOUS BLD VENIPUNCTURE: CPT

## 2019-01-09 PROCEDURE — 31720 CLEARANCE OF AIRWAYS: CPT

## 2019-01-09 PROCEDURE — C9113 INJ PANTOPRAZOLE SODIUM, VIA: HCPCS | Performed by: INTERNAL MEDICINE

## 2019-01-09 PROCEDURE — 87040 BLOOD CULTURE FOR BACTERIA: CPT

## 2019-01-09 PROCEDURE — 94660 CPAP INITIATION&MGMT: CPT

## 2019-01-09 PROCEDURE — 99900026 HC AIRWAY MAINTENANCE (STAT)

## 2019-01-09 PROCEDURE — 83735 ASSAY OF MAGNESIUM: CPT

## 2019-01-09 PROCEDURE — 94640 AIRWAY INHALATION TREATMENT: CPT

## 2019-01-09 PROCEDURE — 99900035 HC TECH TIME PER 15 MIN (STAT)

## 2019-01-09 PROCEDURE — 84100 ASSAY OF PHOSPHORUS: CPT | Mod: 91

## 2019-01-09 RX ORDER — POTASSIUM CHLORIDE 29.8 MG/ML
40 INJECTION INTRAVENOUS ONCE
Status: COMPLETED | OUTPATIENT
Start: 2019-01-09 | End: 2019-01-09

## 2019-01-09 RX ORDER — FLUCONAZOLE 2 MG/ML
400 INJECTION, SOLUTION INTRAVENOUS
Status: DISCONTINUED | OUTPATIENT
Start: 2019-01-09 | End: 2019-01-11 | Stop reason: HOSPADM

## 2019-01-09 RX ORDER — FUROSEMIDE 10 MG/ML
20 INJECTION INTRAMUSCULAR; INTRAVENOUS 2 TIMES DAILY
Status: DISCONTINUED | OUTPATIENT
Start: 2019-01-09 | End: 2019-01-11 | Stop reason: HOSPADM

## 2019-01-09 RX ADMIN — ATORVASTATIN CALCIUM 80 MG: 40 TABLET, FILM COATED ORAL at 09:01

## 2019-01-09 RX ADMIN — PANTOPRAZOLE SODIUM 40 MG: 40 INJECTION, POWDER, LYOPHILIZED, FOR SOLUTION INTRAVENOUS at 11:01

## 2019-01-09 RX ADMIN — FLUCONAZOLE, SODIUM CHLORIDE 400 MG: 2 INJECTION INTRAVENOUS at 09:01

## 2019-01-09 RX ADMIN — NYSTATIN 500000 UNITS: 500000 SUSPENSION ORAL at 05:01

## 2019-01-09 RX ADMIN — NYSTATIN 500000 UNITS: 500000 SUSPENSION ORAL at 06:01

## 2019-01-09 RX ADMIN — NYSTATIN 500000 UNITS: 500000 SUSPENSION ORAL at 12:01

## 2019-01-09 RX ADMIN — POTASSIUM CHLORIDE 40 MEQ: 400 INJECTION, SOLUTION INTRAVENOUS at 08:01

## 2019-01-09 RX ADMIN — VANCOMYCIN HYDROCHLORIDE 500 MG: 500 INJECTION, POWDER, LYOPHILIZED, FOR SOLUTION INTRAVENOUS at 09:01

## 2019-01-09 RX ADMIN — IPRATROPIUM BROMIDE AND ALBUTEROL SULFATE 3 ML: .5; 3 SOLUTION RESPIRATORY (INHALATION) at 07:01

## 2019-01-09 RX ADMIN — DEXTROSE: 5 SOLUTION INTRAVENOUS at 02:01

## 2019-01-09 RX ADMIN — POLYETHYLENE GLYCOL 3350 17 G: 17 POWDER, FOR SOLUTION ORAL at 08:01

## 2019-01-09 RX ADMIN — IPRATROPIUM BROMIDE AND ALBUTEROL SULFATE 3 ML: .5; 3 SOLUTION RESPIRATORY (INHALATION) at 04:01

## 2019-01-09 RX ADMIN — FUROSEMIDE 20 MG: 10 INJECTION, SOLUTION INTRAVENOUS at 06:01

## 2019-01-09 RX ADMIN — LEUCINE, PHENYLALANINE, LYSINE, METHIONINE, ISOLEUCINE, VALINE, HISTIDINE, THREONINE, TRYPTOPHAN, ALANINE, GLYCINE, ARGININE, PROLINE, SERINE, TYROSINE, DEXTROSE: 311; 238; 247; 170; 255; 247; 204; 179; 77; 880; 438; 489; 289; 213; 17; 10 INJECTION INTRAVENOUS at 02:01

## 2019-01-09 RX ADMIN — PANTOPRAZOLE SODIUM 40 MG: 40 INJECTION, POWDER, LYOPHILIZED, FOR SOLUTION INTRAVENOUS at 08:01

## 2019-01-09 RX ADMIN — IPRATROPIUM BROMIDE AND ALBUTEROL SULFATE 3 ML: .5; 3 SOLUTION RESPIRATORY (INHALATION) at 11:01

## 2019-01-09 RX ADMIN — IPRATROPIUM BROMIDE AND ALBUTEROL SULFATE 3 ML: .5; 3 SOLUTION RESPIRATORY (INHALATION) at 03:01

## 2019-01-09 RX ADMIN — IPRATROPIUM BROMIDE AND ALBUTEROL SULFATE 3 ML: .5; 3 SOLUTION RESPIRATORY (INHALATION) at 12:01

## 2019-01-09 RX ADMIN — INSULIN DETEMIR 16 UNITS: 100 INJECTION, SOLUTION SUBCUTANEOUS at 11:01

## 2019-01-09 RX ADMIN — INSULIN DETEMIR 16 UNITS: 100 INJECTION, SOLUTION SUBCUTANEOUS at 08:01

## 2019-01-09 RX ADMIN — METHYLPREDNISOLONE SODIUM SUCCINATE 40 MG: 40 INJECTION, POWDER, FOR SOLUTION INTRAMUSCULAR; INTRAVENOUS at 08:01

## 2019-01-09 NOTE — PROGRESS NOTES
Progress Note  Hospital Medicine  Patient Name:Enmanuel Armenta  MRN:  [id number]  Patient Class: IP- Inpatient  Admit Date: 12/17/2018  Length of Stay: 23 days  Expected Discharge Date:   Attending Physician: Stephanie Tena MD  Primary Care Provider:  Primary Doctor No    SUBJECTIVE:     Principal Problem: Aspiration pneumonia of both lower lobes  Initial history of present illness: Patient is a 63 y.o. male admitted to Hospitalist Service from [facility] Emergency Room with complaint of abdominal pain. Patient is a resident of [facility] nursing [facility], [place]. Patient reportedly has past medical history significant for COPD, muscular dystrophy, l;imited mobility with walker and history of elevated PSA. Patient presented with 1 day history of abdominal pain associated with nausea and vomiting. The EMS reported, vomiting a brown sticky substance with pale red streaks throughout. EMS gave him 4 mg of Zofran in route to the ED. Patient denied chest pain, shortness of breath, headache, vision changes, focal neuro-deficits, cough. Patient noted to have 101.5F upon arrival.     PMH/PSH/SH/FH/Meds: reviewed.    Symptoms/Review of Systems: In ICU, patient was hypothermic last night, elevation in WBC noted. Patient is still encephalopathic. On BiPAP. Still only minimally responsive.   Diet: Trickle feeding + TPN  Activity level: Up with assistance    Pain:  Moans to verbal commands     OBJECTIVE:   Vital Signs (Most Recent):      Temp: (!) 95.9 °F (35.5 °C) (01/08/19 2300)  Pulse: 90 (01/09/19 0734)  Resp: (!) 24 (01/09/19 0734)  BP: (!) 142/71 (01/09/19 0200)  SpO2: (!) 94 % (01/09/19 0734)       Vital Signs Range (Last 24H):  Temp:  [95.9 °F (35.5 °C)-98.6 °F (37 °C)]   Pulse:  []   Resp:  [16-37]   BP: (109-155)/(55-84)   SpO2:  [90 %-98 %]     Weight: 74.5 kg (164 lb 3.9 oz)  Body mass index is 25.72 kg/m².    Intake/Output Summary (Last 24 hours) at 1/9/2019 0800  Last data filed at 1/9/2019  0746  Gross per 24 hour   Intake 3962.08 ml   Output 1325 ml   Net 2637.08 ml     Physical Examination:  General appearance: well developed, appears stated age, chronically ill-appearing, on BiPAP  Head: normocephalic, atraumatic  Eyes:  conjunctivae/corneas clear. PERRL.  Nose: Nares normal. Septum midline.  Throat: lips, mucosa, and tongue normal; teeth and gums normal, no throat erythema.  Neck: supple, symmetrical, trachea midline, no JVD and thyroid not enlarged, symmetric, no tenderness/mass/nodules  Lungs:  Scattered rhonchi B/L.  Chest wall: no tenderness  Heart: regular rate and rhythm, S1, S2 normal, no murmur, click, rub or gallop  Abdomen: soft, epigastric tenderness, non-distented; bowel sounds normal; no masses,  no organomegaly. PEG site okay.  Extremities: no cyanosis, clubbing or edema.   Pulses: 2+ and symmetric  Skin: Skin color, texture, turgor normal. No rashes or lesions.  Lymph nodes: Cervical, supraclavicular, and axillary nodes normal.  Neurologic: Minimally responsive    CBC:  Recent Labs   Lab 01/07/19  0305 01/08/19  0322 01/09/19 0318   WBC 16.00* 18.00* 22.30*   RBC 3.52* 3.46* 3.63*   HGB 10.2* 10.2* 10.7*   HCT 31.1* 30.6* 32.3*   * 441* 453*   MCV 89 88 89   MCH 29.0 29.4 29.5   MCHC 32.8 33.2 33.1   BMP  Recent Labs   Lab 01/07/19  0305 01/08/19  0322 01/09/19  0318    102 107    136 136   K 3.1* 3.1* 3.0*    105 105   CO2 21* 19* 19*   BUN 48* 45* 58*   CREATININE 1.1 1.1 1.2   CALCIUM 8.7 8.4* 8.8   MG 1.5* 2.1 1.7      Diagnostic Results:  Microbiology Results (last 7 days)     Procedure Component Value Units Date/Time    Culture, Respiratory with Gram Stain [[id number]] Collected:  01/02/19 1229    Order Status:  Canceled Specimen:  Respiratory from Sputum, Expectorated Updated:  01/02/19 1230    Culture, Respiratory with Gram Stain [[id number]]  (Susceptibility) Collected:  12/29/18 1325    Order Status:  Completed Specimen:  Sputum Updated:  01/02/19  1112     Respiratory Culture No Pseudomonas isolated.     Respiratory Culture --     METHICILLIN RESISTANT STAPHYLOCOCCUS AUREUS  Few  Normal respiratory ruddy also present       Gram Stain (Respiratory) <10 epithelial cells per low power field.     Gram Stain (Respiratory) Rare WBC's     Gram Stain (Respiratory) Rare Gram positive cocci         Chest X-Ray: T right basilar infiltrate.     CT abdomen and pelvis with contrast:  Enlarged prostate gland, air in the bladder likely secondary to catheterization, diffuse bladder wall thickening. Prominent amount of fluid within right colon.  Appendix not identified and note is made of limitation with motion artifact in the appendiceal region.    EGD:   - Moderately severe reflux esophagitis.                       - Medium-sized hiatal hernia.                       - Gastritis.                       - A single gastric polyp.                       - Normal examined duodenum.                       - No specimens collected.    CXR: New moderate left pleural effusion and left lung infiltrate.  Leftward mediastinal shift raising consideration for left lung atelectasis as contributory to the findings.  Close follow-up recommended.    CXR:   Unchanged bilateral lower lobe infiltrates.Heart size is now within normal limits.  Endotracheal tube and right central line in position.    CXR:   Increasing left lower lobe infiltrate and possible left pleural effusion.  Decreasing right base infiltrate.  Probable mild cardiomegaly obscured by the infiltrates.  Endotracheal tube and central line in position.    CXR:   Stable, appropriate positioning of support devices.  Stable appearance of the chest demonstrating right basilar atelectasis or infiltrate and retrocardiac consolidation and small left pleural effusion.    CXR: No significant change in the appearance of the chest compared to the prior exam.    EGD:   - Normal esophagus.                       - Z-line regular.                        - Hematin (altered blood/coffee-ground-like                        material) in the gastric fundus and in the gastric                        body.                       - Intact gastrostomy with a patent G-tube present                        characterized by congestion, edema, erosion and                        erythema.                       - Oozing gastric ulcer with adherent clot. Injected.                        Clips were placed.                       - Normal antrum.                       - Normal examined duodenum.                       - No specimens collected.    ECHO:  · Mildly decreased left ventricular systolic function. The estimated ejection fraction is 49%  · Grade I (mild) left ventricular diastolic dysfunction consistent with impaired relaxation.  · Normal left atrial pressure.  · Normal right ventricular systolic function.  · Elevated central venous pressure (15 mm Hg).  · The estimated PA systolic pressure is 27 mm Hg  · No evidence of endocarditis.  · Patient is in on CPAP.    CXR: Appropriately positioned right central line.  Interval extubation.  Unchanged bibasilar airspace disease.    CXR: Bibasilar airspace disease unchanged.  Similar and appropriate positioning of right central line.    Assessment/Plan:      Acute hypoxic respiratory failure   Yes    Aspiration pneumonia, RLL with MRSA sp [J69.0]  Swallow dysfunction s/p PEG placement  On NIPPV  Continue Vanc  Duonebs  On Solumedrol IV 40mg  Daily  Again getting septic, consult ID specilist.     Hypernatremia - resolved  Follow BMP. On TPN.    Atrial fibrillation with RVR  Controlled, NSR noted. Dr. Reyna is following.     Hyperglycemia  Continue Levemir 16 units BID.    Hypokalemia   Follow BMP. Replete KCl.     Yes    Upper GI bleeding [K92.2]  Acute GI bleeding loss anemia  Protonix BID      Yes    Muscular dystrophy [G71.00]  Supportive care, fall precautions.     Severe malnutrition  Continue IV TPN.    JUSTA  Resolved  Started  diuresis    Hypomagnesemia  Replete Mag. Follow Mag level.    Abnormal liver tests  Mixed pattern  Monitor and no synthetic liver dysfunction  Possibly related to TPN?      Yes    Chronic obstructive pulmonary disease [J44.9]  Patient's COPD is controlled currently. Continue scheduled inhalers and monitor respiratory status closely.   Yes      DVT prophylaxis: Use SCD and TEDs. No anticoagulation due to GI bleeding.     Discussed with CM, no family to discuss case with. Prognosis is poor, not improving.      Stephanie Tena MD  Department of Hospital Medicine   [facility]

## 2019-01-09 NOTE — PROGRESS NOTES
INPATIENT NEPHROLOGY CONSULT   [facility] NEPHROLOGY    Patient Name: Enmanuel Armenta  Date: 01/08/2019    Reason for consultation: JUSTA    History of Present Illness:    62 y/o M with hx of COPD and muscle dystrophy who resides at [facility], was brought in for abdominal pain, nausea and vomiting, fever 101.5F upon arrival. He is undergoing treatment for UGIB 2/2 esophagitis/gastritis, aspiration PNA (on vent, with PEG), and AF with RVR (not in NSR). We have been consulted for JUSTA.    Change in Cr from 1 --> 1.6 on 12/29 correlated with time he required NS IVFs and levophed for hypotension.    Change in Cr from 1.8 --> 2s correlated with AF with RVR requiring diltiazem.    Got lisinopril 12/25-12/28, lasix 12/30-12/31.   Highest vanc level 28.    Renal u/s neg for obstruction.  Last contrast exposure 12/17.    1/5  Extubated.  No distress. Not interactive.  1/6  About the same today.  No distress  1/7 VSS, no distress.  1/8 VSS, no distress.    Plan of Care:    Assessment:  JUSTA 2/2 ATN- baseline Cr < 1  Hypernatremia--worse  Hypokalemia  Anemia    Plan:    - Suspect JUSTA is prerenal vs ischemic ATN 2/2 hemodynamic changes (septic shock requiring pressors, AF with RVR). He is now hemodynamically stable (off pressors, in NSR). Renal function is improving and he is nonoliguric. Keep armas (BPH noted on ultrasound). Dose antbx for CrCl >60. May see lag in improvement of BUN due to IV steroids.   - Continue free water flushes. Volume limited by aspiration. May need to add free h20 to the TPN.  - K is better, replete K and Mg as needed.   - Anemia is stable- will transfuse PRN (suspect acute illness related BM suppression).   --No non-essential nephrotoxic agents.  --Keep sbp greater than 90.    Renal will sign off a this time, call with questions.    Thank you for allowing us to participate in this patient's care. We will continue to follow.    Vital Signs:  Temp Readings from Last 3 Encounters:    01/08/19 97.6 °F (36.4 °C) (Axillary)   03/02/18 97.4 °F (36.3 °C)   11/21/17 98 °F (36.7 °C) (Oral)       Pulse Readings from Last 3 Encounters:   01/08/19 85   08/03/18 72   03/02/18 61       BP Readings from Last 3 Encounters:   01/08/19 131/62   08/03/18 94/64   03/02/18 97/63       Weight:  Wt Readings from Last 3 Encounters:   01/07/19 74.5 kg (164 lb 3.9 oz)   08/03/18 68 kg (150 lb)   03/02/18 68.5 kg (151 lb 0.2 oz)       Past Medical & Surgical History:  Past Medical History:   Diagnosis Date    Anticoagulant long-term use     BPH (benign prostatic hyperplasia)     COPD (chronic obstructive pulmonary disease)     Elevated PSA     Fx     RIGHT WRIST, LEFT FOOT    Hypokalemia     Muscular dystrophy     Urine retention     Wears dentures     upper       Past Surgical History:   Procedure Laterality Date    APPENDECTOMY      cataracts      bilateral     CHOLECYSTECTOMY      CYST REMOVAL      from spine     CYSTOLITHOLOPAXY (REMOVE BLADDER STONE)  3/6/2017    Performed by Hernan Lake [facility] at [facility] OR    CYSTOSCOPY      EGD (ESOPHAGOGASTRODUODENOSCOPY) N/A 1/3/2019    Performed by Evgeny Martin MD at [facility] ENDO    EGD (ESOPHAGOGASTRODUODENOSCOPY) N/A 12/24/2018    Performed by [facility]geny Martin [facility] at [facility] ENDO    EGD (ESOPHAGOGASTRODUODENOSCOPY) N/A 12/17/2018    Performed by Evgeny Martin [facility] at [facility] ENDO    EYE SURGERY      cataract surgery bilateral    INSERTION, PEG TUBE N/A 12/24/2018    Performed by [facility]geny Martin [facility] at [facility] ENDO    PROSTATE BIOPSY      PROSTATE SURGERY      PROSTATECTOMY-TRANSURETHRAL N/A 3/6/2017    Performed by Hernan Lake [facility] at [facility] OR    PROSTATECTOMY-TRANSURETHRAL WITH GREENLIGHT LASER N/A 3/6/2017    Performed by Hernan Lake [facility] at [facility] OR    TONSILLECTOMY      TRANSRECTAL ULTRASOUND GUIDED PROSTATE BIOPSY N/A 3/28/2016    Performed by Hernan Lake [facility] at [facility] OR    VASECTOMY         Past Social History:  Social History      Socioeconomic History    Marital status: Single     Spouse name: None    Number of children: None    Years of education: None    Highest education level: None   Social Needs    Financial resource strain: None    Food insecurity - worry: None    Food insecurity - inability: None    Transportation needs - medical: None    Transportation needs - non-medical: None   Occupational History    None   Tobacco Use    Smoking status: Current Every Day Smoker     Packs/day: 1.00     Years: 48.00     Pack years: 48.00     Types: Cigarettes    Smokeless tobacco: Never Used   Substance and Sexual Activity    Alcohol use: No     Alcohol/week: 0.0 oz    Drug use: No    Sexual activity: None   Other Topics Concern    None   Social History Narrative    None       Medications:  No current facility-administered medications on file prior to encounter.      Current Outpatient Medications on File Prior to Encounter   Medication Sig Dispense Refill    aspirin (ECOTRIN) 81 MG EC tablet Take one tablet daily  2    atorvastatin (LIPITOR) 80 MG tablet Take 80 mg by mouth every evening.       benzonatate (TESSALON) 100 MG capsule Take 100 mg by mouth 3 (three) times daily as needed for Cough.      clopidogrel (PLAVIX) 75 mg tablet Take one tablet daily  3    dextromethorphan-guaifenesin  mg (MUCINEX DM)  mg per 12 hr tablet Take 2 tablets by mouth every 12 (twelve) hours.      lisinopril (PRINIVIL,ZESTRIL) 2.5 MG tablet Take 2.5 mg by mouth once daily.       metoprolol succinate (TOPROL-XL) 25 MG 24 hr tablet Take 25 mg by mouth once daily.       polyethylene glycol (GLYCOLAX) 17 gram PwPk Take 17 g by mouth once daily.        Scheduled Meds:   albuterol-ipratropium  3 mL Nebulization Q4H    atorvastatin  80 mg Oral QHS    EPINEPHrine        furosemide  40 mg Intravenous BID    insulin detemir U-100  16 Units Subcutaneous BID    methylPREDNISolone sodium succinate  40 mg Intravenous Daily     "nystatin  500,000 Units Mouth/Throat Q6H    pantoprazole  40 mg Intravenous BID    polyethylene glycol  17 g Oral Daily    vancomycin (VANCOCIN) IVPB (custom)  750 mg Intravenous Q24H     Continuous Infusions:   Amino acid 4.25% - dextrose 10% (CLINIMIX) solution with additives (1L  provides 340 kcal/L dextrose, with 42.5 gm AA, 100 gm CHO, and no electrolytes except acetate 37 mEq/Cl- 17 mEq)      dextrose 5 % 50 mL/hr at 01/08/19 1801     PRN Meds:.sodium chloride, acetaminophen, dextrose 50%, dextrose 50%, glucagon (human recombinant), glucose, glucose, glycopyrrolate, hydrALAZINE, insulin aspart U-100, midazolam, ondansetron    Allergies:  Patient has no known allergies.    Past Family History:  Unable to obtain due to intubation    Review of Systems:  Unable to obtain due to intubation    Physical Exam:  /62   Pulse 85   Temp 97.6 °F (36.4 °C) (Axillary)   Resp 17   Ht 5' 7" (1.702 m) Comment: Office 7/13/18  Wt 74.5 kg (164 lb 3.9 oz)   SpO2 (!) 94%   BMI 25.72 kg/m²     INS/OUTS:  I/O last 3 completed shifts:  In: 9254.5 [I.V.:1800.8; NG/GT:3700; IV Piggyback:750]  Out: 3850 [Urine:3850]  No intake/output data recorded.    General Appearance:    NAD, intubated, sedated   Head:    Normocephalic, atraumatic   Throat:   Moist mucus membraness   Lungs:     Coarse to auscultation bilaterally, no wheeze, crackles, rales   or rhonchi, symmetric air movement, respirations unlabored   Heart:    Regular rate and rhythm, S1 and S2 normal, no murmur, rub   or gallop   Abdomen:     Soft, non-tender, non-distended, bowel sounds active all four   quadrants, no RT or guarding, no masses, no organomegaly   Extremities:   Warm and well perfused, distal pulses intact, no cyanosis,     trace UE peripheral edema   MSK:   No joint or muscle swelling, tenderness or deformity   Skin:   Skin color, texture, turgor normal, no rashes or lesions   Neurologic/Psychiatric:   Unable to obtain due to intubation/sedation "     Results:  Lab Results   Component Value Date     01/08/2019    K 3.1 (L) 01/08/2019     01/08/2019    CO2 19 (L) 01/08/2019    BUN 45 (H) 01/08/2019    CREATININE 1.1 01/08/2019    CALCIUM 8.4 (L) 01/08/2019    ANIONGAP 12 01/08/2019    ESTGFRAFRICA >60 01/08/2019    EGFRNONAA >60 01/08/2019       Lab Results   Component Value Date    CALCIUM 8.4 (L) 01/08/2019    PHOS 2.9 01/08/2019       Recent Labs   Lab 01/08/19  0322   WBC 18.00*   RBC 3.46*   HGB 10.2*   HCT 30.6*   *   MCV 88   MCH 29.4   MCHC 33.2       I have personally reviewed pertinent radiological imaging and reports.    Obie Malave MD  Nephrology  [facility] [place]  [phone]

## 2019-01-09 NOTE — NURSING
MD Barlow alerted of Pt low output for the last three hours.  MD reiterated the need for fluid boluses to be given Q4hrs and placed no new orders.  Will administer flushes as ordered and continue to monitor closely.

## 2019-01-09 NOTE — PROGRESS NOTES
Progress Note  PULMONARY    Admit Date: 12/17/2018 01/09/2019      SUBJECTIVE:     Dec 29, sedate on vent.  Left side down.  Dec 30, sedated, 40% now  Dec 31 sedate, not arousing.  Jan 1, sedate,   1/2 - a fib with decreased sedation after promising progress weaning when sedate.  Calm now  1/7, extubated, needing bipap, calm now  1/8 - not arousing.  1/9 on niv/bipap continuous, not arousing.        PFSH and Allergies reviewed.    OBJECTIVE:     Vitals (Most recent):  Vitals:    01/09/19 0734   BP:    Pulse: 90   Resp: (!) 24   Temp:        Vitals (24 hour range):  Temp:  [95.9 °F (35.5 °C)-98.6 °F (37 °C)]   Pulse:  []   Resp:  [16-37]   BP: (109-155)/(55-84)   SpO2:  [90 %-98 %]       Intake/Output Summary (Last 24 hours) at 1/9/2019 0754  Last data filed at 1/9/2019 0746  Gross per 24 hour   Intake 3962.08 ml   Output 1325 ml   Net 2637.08 ml          Physical Exam:  The patient's neuro status (alertness,orientation,cognitive function,motor skills,), pharyngeal exam (oral lesions, hygiene, abn dentition,), Neck (jvd,mass,thyroid,nodes in neck and above/below clavicle),RESPIRATORY(symmetry,effort,fremitus,percussion,auscultation),  Cor(rhythm,heart tones including gallops,perfusion,edema)ABD(distention,hepatic&splenomegaly,tenderness,masses), Skin(rash,cyanosis),Psyc(affect,judgement,).  Exam negative except for these pertinent findings:    Extubated, bipap No distress, nl percussion, symmetric, no edema.  Good bs,     Radiographs reviewed: view by direct vision  1/9 minimal densities bilat,  Min increase on right      Results for orders placed during the hospital encounter of 12/17/18   X-Ray Chest 1 View    Narrative EXAMINATION:  XR CHEST 1 VIEW    CLINICAL HISTORY:  resp failure;    TECHNIQUE:  Single frontal view of the chest was performed.    COMPARISON:  Chest portable of December 28, 2018.    FINDINGS:  An endotracheal tube and right central lines remain in position.  The cardiac size is decreased  to normal.  Bilateral lower lobe infiltrates are unchanged.  No pneumothorax is seen.      Impression Unchanged bilateral lower lobe infiltrates.Heart size is now within normal limits.  Endotracheal tube and right central line in position.      Electronically signed by: Wilman Casanova MD  Date:    12/29/2018  Time:    08:37   ]    Labs     Recent Labs   Lab 01/09/19 0318   WBC 22.30*   HGB 10.7*   HCT 32.3*   *   BAND 4.0     Recent Labs   Lab 01/09/19  0318      K 3.0*      CO2 19*   BUN 58*   CREATININE 1.2      CALCIUM 8.8   MG 1.7   PHOS 2.7   AST 38   ALT 59*   ALKPHOS 367*   BILITOT 0.3   PROT 6.1   ALBUMIN 2.0*     No results for input(s): PH, PCO2, PO2, HCO3 in the last 24 hours.  Microbiology Results (last 7 days)     Procedure Component Value Units Date/Time    Culture, Respiratory with Gram Stain [[id number]] Collected:  01/02/19 1229    Order Status:  Canceled Specimen:  Respiratory from Sputum, Expectorated Updated:  01/02/19 1230    Culture, Respiratory with Gram Stain [[id number]]  (Susceptibility) Collected:  12/29/18 1325    Order Status:  Completed Specimen:  Sputum Updated:  01/02/19 1112     Respiratory Culture No Pseudomonas isolated.     Respiratory Culture --     METHICILLIN RESISTANT STAPHYLOCOCCUS AUREUS  Few  Normal respiratory ruddy also present       Gram Stain (Respiratory) <10 epithelial cells per low power field.     Gram Stain (Respiratory) Rare WBC's     Gram Stain (Respiratory) Rare Gram positive cocci          Impression:  Active Hospital Problems    Diagnosis  POA    *Aspiration pneumonia of both lower lobes [J69.0]  Yes    Septic shock [A41.9, R65.21]  No    Acute respiratory failure with hypoxia and hypercarbia [J96.01, J96.02]  No    Smoker [F17.200]  Yes    Episodic confusion [R41.0]  Yes    Hematemesis [K92.0]  Yes    Dysphagia [R13.10]  Yes    Malnutrition of moderate degree [E44.0]  Yes    Elevated LFTs [R94.5]  Yes    Upper GI bleeding  [K92.2]  Yes    Muscular dystrophy [G71.00]  Yes    Chronic obstructive pulmonary disease with acute lower respiratory infection [J44.0]  Yes      Resolved Hospital Problems    Diagnosis Date Resolved POA    Feeding difficulty [R63.3] 12/23/2018 Yes    UTI (urinary tract infection) [N39.0] 12/23/2018 Yes    Hematemesis [K92.0] 12/23/2018 Yes    SOB (shortness of breath) [R06.02] 12/18/2018 Yes               Plan:   Dec 29- gas exchange is better but has very severe aspiration lung injury.  cxr no ards pattern.  Need sputum cultured - expect will take awhile to clear out lung abn.  Empire would seem poor given course.    Dec 30 40% 02 and cxr sl better.  Start wean trials.    Dec 31, right lung clear. Should wean and extubate.    Jan 1, 2018- bp/pulse up with wean,   cxr much improved, has abn neruo state chronically .  wean    1/2- mrsa found with clearing cxr.  Suspect not significant wrt to pneumonia but colonizer.  Will rx anyhow given global picture.  Course vanc.  Empire remains very poor given his course.  Wean as able.  1/7 pt marginal.  Encephalopathy.  resp failure lingering, poor outlook  Likely try high flow am    1/8/2019-wbc 18, pt not progressing, f/u cxr am.    Pt poor brain function is cause of failure to improve.  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  Illness is terminal and care is expected to be futile.  Treatment looks to be prolonging dying with no hope of improving.  No family available.  Given clear futility of prolongation, recommend dnr status.      1/9 - cxr reasonable but pt not thriving. Wbc 22!  I do not see respiratory reason for poor poor loc.  Looks like severe advanced dementia.    Will do nt suction for sputum - not thriving- expect death soon no matter support with dementia and course/picture.  Hard to recommend intubation given global picture.            .

## 2019-01-09 NOTE — PLAN OF CARE
Per Abhijeet with [facility], they are able to accept the pt . I explained that the pt may not be medically cleared for discharge and has an ID consult pending. He asked me to please pass along that he has reviewed the case medically with his director and they are prepared and able to care for the pts complicated needs at LTAC.  Ivy Hernandez LMSW      01/09/19 1037   Post-Acute Status   Post-Acute Authorization Placement   Post-Acute Placement Status Authorization Obtained

## 2019-01-09 NOTE — NURSING
Patient had minimal urine output through the night. This am patient has put out 800ml within the last hour. Patients K+ 3.0,  notified and new orders received. Will continue to monitor patient closely.

## 2019-01-09 NOTE — PLAN OF CARE
01/08/19 1915   Patient Assessment/Suction   Level of Consciousness (AVPU) responds to voice   Respiratory Effort Normal;Unlabored   Expansion/Accessory Muscles/Retractions no use of accessory muscles   All Lung Fields Breath Sounds rhonchi   Rhythm/Pattern, Respiratory assisted mechanically;depth regular;pattern regular   Cough Frequency infrequent   PRE-TX-O2-ETCO2   O2 Device (Oxygen Therapy) BiPAP   $ Is the patient on Low Flow Oxygen? Yes   Oxygen Concentration (%) 50   SpO2 (!) 94 %   Pulse Oximetry Type Continuous   $ Pulse Oximetry - Multiple Charge Pulse Oximetry - Multiple   Pulse 85   Resp 17   Aerosol Therapy   $ Aerosol Therapy Charges Aerosol Treatment   Daily Review of Necessity (SVN) completed   Respiratory Treatment Status (SVN) given   Treatment Route (SVN) in-line   Patient Position (SVN) HOB elevated   Post Treatment Assessment (SVN) breath sounds improved   Signs of Intolerance (SVN) none   Breath Sounds Post-Respiratory Treatment   Post-treatment Heart Rate (beats/min) 90   Post-treatment Resp Rate (breaths/min) 23   Ready to Wean/Extubation Screen   FIO2<=50 (chart decimal) 0.5   Preset CPAP/BiPAP Settings   Mode Of Delivery BiPAP   $ CPAP/BiPAP Daily Charge BiPAP/CPAP Daily   $ Initial CPAP/BiPAP Setup? No   $ Is patient using? Yes   Size of Mask Medium/Large   Sized Appropriately? Yes   Equipment Type V60   Airway Device Type medium full face mask   Humidifier not applicable   Ipap 18   EPAP (cm H2O) 10   Pressure Support (cm H2O) 8   Set Rate (Breaths/Min) 20   ITime (sec) 1   Rise Time (sec) 3   Patient CPAP/BiPAP Settings   RR Total (Breaths/Min) 23   Tidal Volume (mL) 668   VE Minute Ventilation (L/min) 15 L/min   Peak Inspiratory Pressure (cm H2O) 19   TiTOT (%) 29   Patient Trigger - ST Mode Only (%) 77

## 2019-01-10 LAB
ALBUMIN SERPL BCP-MCNC: 1.8 G/DL
ALP SERPL-CCNC: 350 U/L
ALT SERPL W/O P-5'-P-CCNC: 55 U/L
ANION GAP SERPL CALC-SCNC: 12 MMOL/L
ANION GAP SERPL CALC-SCNC: 9 MMOL/L
AST SERPL-CCNC: 38 U/L
BACTERIA #/AREA URNS HPF: ABNORMAL /HPF
BASOPHILS # BLD AUTO: 0 K/UL
BASOPHILS NFR BLD: 0 %
BILIRUB SERPL-MCNC: 0.2 MG/DL
BILIRUB UR QL STRIP: NEGATIVE
BUN SERPL-MCNC: 71 MG/DL
BUN SERPL-MCNC: 76 MG/DL
CALCIUM SERPL-MCNC: 8.9 MG/DL
CALCIUM SERPL-MCNC: 9.2 MG/DL
CHLORIDE SERPL-SCNC: 110 MMOL/L
CHLORIDE SERPL-SCNC: 112 MMOL/L
CLARITY UR: ABNORMAL
CO2 SERPL-SCNC: 19 MMOL/L
CO2 SERPL-SCNC: 19 MMOL/L
COLOR UR: YELLOW
CREAT SERPL-MCNC: 1.2 MG/DL
CREAT SERPL-MCNC: 1.3 MG/DL
DIFFERENTIAL METHOD: ABNORMAL
EOSINOPHIL # BLD AUTO: 0 K/UL
EOSINOPHIL NFR BLD: 0 %
ERYTHROCYTE [DISTWIDTH] IN BLOOD BY AUTOMATED COUNT: 15 %
EST. GFR  (AFRICAN AMERICAN): >60 ML/MIN/1.73 M^2
EST. GFR  (AFRICAN AMERICAN): >60 ML/MIN/1.73 M^2
EST. GFR  (NON AFRICAN AMERICAN): 57 ML/MIN/1.73 M^2
EST. GFR  (NON AFRICAN AMERICAN): >60 ML/MIN/1.73 M^2
GLUCOSE SERPL-MCNC: 57 MG/DL
GLUCOSE SERPL-MCNC: 95 MG/DL
GLUCOSE UR QL STRIP: NEGATIVE
HCT VFR BLD AUTO: 28.2 %
HGB BLD-MCNC: 9.3 G/DL
HGB UR QL STRIP: ABNORMAL
KETONES UR QL STRIP: NEGATIVE
LEUKOCYTE ESTERASE UR QL STRIP: ABNORMAL
LYMPHOCYTES # BLD AUTO: 0.8 K/UL
LYMPHOCYTES NFR BLD: 3.7 %
MAGNESIUM SERPL-MCNC: 1.7 MG/DL
MCH RBC QN AUTO: 29 PG
MCHC RBC AUTO-ENTMCNC: 32.8 G/DL
MCV RBC AUTO: 89 FL
MICROSCOPIC COMMENT: ABNORMAL
MONOCYTES # BLD AUTO: 0.8 K/UL
MONOCYTES NFR BLD: 4.1 %
NEUTROPHILS # BLD AUTO: 18.7 K/UL
NEUTROPHILS NFR BLD: 92.2 %
NITRITE UR QL STRIP: NEGATIVE
PH UR STRIP: 5 [PH] (ref 5–8)
PHOSPHATE SERPL-MCNC: 2.9 MG/DL
PLATELET # BLD AUTO: 442 K/UL
PMV BLD AUTO: 9.5 FL
POCT GLUCOSE: 59 MG/DL (ref 70–110)
POCT GLUCOSE: 78 MG/DL (ref 70–110)
POCT GLUCOSE: 99 MG/DL (ref 70–110)
POTASSIUM SERPL-SCNC: 3.4 MMOL/L
POTASSIUM SERPL-SCNC: 3.6 MMOL/L
PROT SERPL-MCNC: 5.7 G/DL
PROT UR QL STRIP: NEGATIVE
RBC # BLD AUTO: 3.19 M/UL
RBC #/AREA URNS HPF: 8 /HPF (ref 0–4)
SODIUM SERPL-SCNC: 138 MMOL/L
SODIUM SERPL-SCNC: 143 MMOL/L
SP GR UR STRIP: <=1.005 (ref 1–1.03)
URN SPEC COLLECT METH UR: ABNORMAL
UROBILINOGEN UR STRIP-ACNC: NEGATIVE EU/DL
VANCOMYCIN TROUGH SERPL-MCNC: 17.3 UG/ML
WBC # BLD AUTO: 20.3 K/UL
WBC #/AREA URNS HPF: 6 /HPF (ref 0–5)
YEAST URNS QL MICRO: ABNORMAL

## 2019-01-10 PROCEDURE — 25000003 PHARM REV CODE 250: Performed by: INTERNAL MEDICINE

## 2019-01-10 PROCEDURE — 87088 URINE BACTERIA CULTURE: CPT

## 2019-01-10 PROCEDURE — 31720 CLEARANCE OF AIRWAYS: CPT

## 2019-01-10 PROCEDURE — 76937 US GUIDE VASCULAR ACCESS: CPT

## 2019-01-10 PROCEDURE — 25000003 PHARM REV CODE 250: Performed by: NURSE PRACTITIONER

## 2019-01-10 PROCEDURE — 99232 SBSQ HOSP IP/OBS MODERATE 35: CPT | Mod: ,,, | Performed by: INTERNAL MEDICINE

## 2019-01-10 PROCEDURE — 80202 ASSAY OF VANCOMYCIN: CPT

## 2019-01-10 PROCEDURE — 85025 COMPLETE CBC W/AUTO DIFF WBC: CPT

## 2019-01-10 PROCEDURE — 83735 ASSAY OF MAGNESIUM: CPT

## 2019-01-10 PROCEDURE — 63600175 PHARM REV CODE 636 W HCPCS: Performed by: INTERNAL MEDICINE

## 2019-01-10 PROCEDURE — 94660 CPAP INITIATION&MGMT: CPT

## 2019-01-10 PROCEDURE — 87106 FUNGI IDENTIFICATION YEAST: CPT

## 2019-01-10 PROCEDURE — 84100 ASSAY OF PHOSPHORUS: CPT

## 2019-01-10 PROCEDURE — C9113 INJ PANTOPRAZOLE SODIUM, VIA: HCPCS | Performed by: INTERNAL MEDICINE

## 2019-01-10 PROCEDURE — 94761 N-INVAS EAR/PLS OXIMETRY MLT: CPT

## 2019-01-10 PROCEDURE — 25000242 PHARM REV CODE 250 ALT 637 W/ HCPCS: Performed by: INTERNAL MEDICINE

## 2019-01-10 PROCEDURE — 36573 INSJ PICC RS&I 5 YR+: CPT

## 2019-01-10 PROCEDURE — C1751 CATH, INF, PER/CENT/MIDLINE: HCPCS

## 2019-01-10 PROCEDURE — 81000 URINALYSIS NONAUTO W/SCOPE: CPT

## 2019-01-10 PROCEDURE — 87086 URINE CULTURE/COLONY COUNT: CPT

## 2019-01-10 PROCEDURE — 27000221 HC OXYGEN, UP TO 24 HOURS

## 2019-01-10 PROCEDURE — 87070 CULTURE OTHR SPECIMN AEROBIC: CPT

## 2019-01-10 PROCEDURE — 80048 BASIC METABOLIC PNL TOTAL CA: CPT

## 2019-01-10 PROCEDURE — 36569 INSJ PICC 5 YR+ W/O IMAGING: CPT

## 2019-01-10 PROCEDURE — 80053 COMPREHEN METABOLIC PANEL: CPT

## 2019-01-10 PROCEDURE — 94640 AIRWAY INHALATION TREATMENT: CPT

## 2019-01-10 PROCEDURE — 20000000 HC ICU ROOM

## 2019-01-10 PROCEDURE — 99232 PR SUBSEQUENT HOSPITAL CARE,LEVL II: ICD-10-PCS | Mod: ,,, | Performed by: INTERNAL MEDICINE

## 2019-01-10 PROCEDURE — 36415 COLL VENOUS BLD VENIPUNCTURE: CPT

## 2019-01-10 PROCEDURE — 99900035 HC TECH TIME PER 15 MIN (STAT)

## 2019-01-10 RX ORDER — POTASSIUM CHLORIDE 29.8 MG/ML
40 INJECTION INTRAVENOUS ONCE
Status: COMPLETED | OUTPATIENT
Start: 2019-01-10 | End: 2019-01-10

## 2019-01-10 RX ORDER — SODIUM CHLORIDE 0.9 % (FLUSH) 0.9 %
10 SYRINGE (ML) INJECTION EVERY 6 HOURS
Status: DISCONTINUED | OUTPATIENT
Start: 2019-01-10 | End: 2019-01-11 | Stop reason: HOSPADM

## 2019-01-10 RX ORDER — SODIUM CHLORIDE 0.9 % (FLUSH) 0.9 %
10 SYRINGE (ML) INJECTION
Status: DISCONTINUED | OUTPATIENT
Start: 2019-01-10 | End: 2019-01-11 | Stop reason: HOSPADM

## 2019-01-10 RX ADMIN — PANTOPRAZOLE SODIUM 40 MG: 40 INJECTION, POWDER, LYOPHILIZED, FOR SOLUTION INTRAVENOUS at 10:01

## 2019-01-10 RX ADMIN — ATORVASTATIN CALCIUM 80 MG: 40 TABLET, FILM COATED ORAL at 10:01

## 2019-01-10 RX ADMIN — POTASSIUM CHLORIDE 40 MEQ: 400 INJECTION, SOLUTION INTRAVENOUS at 09:01

## 2019-01-10 RX ADMIN — NYSTATIN 500000 UNITS: 500000 SUSPENSION ORAL at 12:01

## 2019-01-10 RX ADMIN — IPRATROPIUM BROMIDE AND ALBUTEROL SULFATE 3 ML: .5; 3 SOLUTION RESPIRATORY (INHALATION) at 12:01

## 2019-01-10 RX ADMIN — POLYETHYLENE GLYCOL 3350 17 G: 17 POWDER, FOR SOLUTION ORAL at 09:01

## 2019-01-10 RX ADMIN — DEXTROSE MONOHYDRATE 12.5 G: 25 INJECTION, SOLUTION INTRAVENOUS at 04:01

## 2019-01-10 RX ADMIN — GLYCOPYRROLATE 1 MG: 1 TABLET ORAL at 09:01

## 2019-01-10 RX ADMIN — NYSTATIN 500000 UNITS: 500000 SUSPENSION ORAL at 05:01

## 2019-01-10 RX ADMIN — INSULIN DETEMIR 16 UNITS: 100 INJECTION, SOLUTION SUBCUTANEOUS at 09:01

## 2019-01-10 RX ADMIN — FUROSEMIDE 20 MG: 10 INJECTION, SOLUTION INTRAVENOUS at 09:01

## 2019-01-10 RX ADMIN — IPRATROPIUM BROMIDE AND ALBUTEROL SULFATE 3 ML: .5; 3 SOLUTION RESPIRATORY (INHALATION) at 08:01

## 2019-01-10 RX ADMIN — FLUCONAZOLE, SODIUM CHLORIDE 400 MG: 2 INJECTION INTRAVENOUS at 07:01

## 2019-01-10 RX ADMIN — IPRATROPIUM BROMIDE AND ALBUTEROL SULFATE 3 ML: .5; 3 SOLUTION RESPIRATORY (INHALATION) at 07:01

## 2019-01-10 RX ADMIN — IPRATROPIUM BROMIDE AND ALBUTEROL SULFATE 3 ML: .5; 3 SOLUTION RESPIRATORY (INHALATION) at 11:01

## 2019-01-10 RX ADMIN — FUROSEMIDE 20 MG: 10 INJECTION, SOLUTION INTRAVENOUS at 05:01

## 2019-01-10 RX ADMIN — LEUCINE, PHENYLALANINE, LYSINE, METHIONINE, ISOLEUCINE, VALINE, HISTIDINE, THREONINE, TRYPTOPHAN, ALANINE, GLYCINE, ARGININE, PROLINE, SERINE, TYROSINE, DEXTROSE: 311; 238; 247; 170; 255; 247; 204; 179; 77; 880; 438; 489; 289; 213; 17; 10 INJECTION INTRAVENOUS at 05:01

## 2019-01-10 RX ADMIN — IPRATROPIUM BROMIDE AND ALBUTEROL SULFATE 3 ML: .5; 3 SOLUTION RESPIRATORY (INHALATION) at 03:01

## 2019-01-10 RX ADMIN — ACETAMINOPHEN 650 MG: 325 TABLET, FILM COATED ORAL at 09:01

## 2019-01-10 RX ADMIN — PANTOPRAZOLE SODIUM 40 MG: 40 INJECTION, POWDER, LYOPHILIZED, FOR SOLUTION INTRAVENOUS at 09:01

## 2019-01-10 RX ADMIN — VANCOMYCIN HYDROCHLORIDE 500 MG: 500 INJECTION, POWDER, LYOPHILIZED, FOR SOLUTION INTRAVENOUS at 10:01

## 2019-01-10 NOTE — PROGRESS NOTES
Progress Note  Infectious Disease    Follow-up For:  leukocytosis    SUBJECTIVE:   ROS:  Unobtainable. CT head with fluid in mastoids and sphenoids. No acute findings, plenty of atrophy. picc inserted and tLC removed and sent for culture. UA without pyuria but lots of yeast, culture in progress. Receiving miralax which is likely why his stools are loose.     Antibiotics (From admission, onward)    Start     Stop Route Frequency Ordered    01/09/19 2130  vancomycin (VANCOCIN) 500 mg in sodium chloride 0.9% 100 mL IVPB      -- IV Every 24 hours (non-standard times) 01/09/19 2113          Pertinent Medications noted:    EXAM & DIAGNOSTICS REVIEWED:   Vitals:     Temp:  [97.9 °F (36.6 °C)-99 °F (37.2 °C)]   Temp: 99 °F (37.2 °C) (01/10/19 1600)  Pulse: 83 (01/10/19 1735)  Resp: (!) 22 (01/10/19 1735)  BP: 126/62 (01/10/19 1735)  SpO2: 100 % (01/10/19 1735)    Intake/Output Summary (Last 24 hours) at 1/10/2019 1740  Last data filed at 1/10/2019 1720  Gross per 24 hour   Intake 3664 ml   Output 3715 ml   Net -51 ml       General:  In NAD. Looks   Eyes:  Anicteric, PERRL, EOMI  ENT:  Mouth w/ pink MMM, tongue coated, extremely poor dentition with carious broken teeth and severe periodontal disease.  Neck:  Trachea midline, supple, no adenopathy appreciated  Lungs:  Coarse throughout, no consolidation. Copious oral secretions  Heart:  RRR, no gallop/murmur noted  Abd:  soft, NT, ND, normal BS, no masses/organomegaly appreciated.  :  Samson draining yellow urine, slightly cloudy/yeasty  Musc:  Joints without effusion, erythema, synovitis,   Skin:  Generally warm, dry, normal for color. No rashes  Wound:   Neuro: Level of consciousness is lighter, still does not attend. Agitated and moving all limbs  Extrem: No edema, erythema, phlebitis, cellulitis, synovitis  VAD:  New picc left arm 1/10    Lines/Tubes/Drains:    General Labs reviewed:  Recent Labs   Lab 01/10/19  0339   WBC 20.30*   RBC 3.19*   HGB 9.3*   HCT 28.2*   PLT  442*   MCV 89   MCH 29.0   MCHC 32.8     Recent Labs   Lab 01/10/19  0339 01/10/19  1627   CALCIUM 8.9 9.2   PROT 5.7*  --     143   K 3.4* 3.6   CO2 19* 19*    112*   BUN 76* 71*   CREATININE 1.3 1.2   ALKPHOS 350*  --    ALT 55*  --    AST 38  --    BILITOT 0.2  --        Micro: urine and line tip cultures are pending    Imaging Reviewed: CXRs, CT abd/pelvis from 12/17  Most recent CXR was improved over the last 10d, as is today's        ASSESSMENT & PLAN      Patient Active Problem List   Diagnosis    Prostate nodule    Benign non-nodular prostatic hyperplasia with lower urinary tract symptoms    Chronic obstructive pulmonary disease with acute lower respiratory infection    Elevated PSA    S/P TURP (status post transurethral resection of prostate)    Urine retention    Aspiration pneumonia of both lower lobes    Upper GI bleeding    Muscular dystrophy    Elevated LFTs    Malnutrition of moderate degree    Dysphagia    Hematemesis    Septic shock    Acute respiratory failure with hypoxia and hypercarbia    Smoker    Episodic confusion   1.         Recurrent aspiration pneumonia, respiratory failure. MRSA on culture. Sinusitis/sphenoid  2.         Baseline unclear to me, has muscle dystrophy, I am told he was ambulatory and could go outside to smoke, but has dementia at baseline  3.         At risk for recurrent aspiration, with no tolerance of tube feeds , now on TPN  4.         Recent UGIB, esophagitis,      Recommendation:      Continue vancomycin and diflucan  Consider reglan prior to tube feeds to reduce reflux/aspiration    No opposition to LTAC  Poor poor prognosis

## 2019-01-10 NOTE — PROGRESS NOTES
01/10/19 0718   Patient Assessment/Suction   Level of Consciousness (AVPU) responds to pain   All Lung Fields Breath Sounds coarse;diminished   Cough Frequency infrequent   PRE-TX-O2-ETCO2   O2 Device (Oxygen Therapy) BiPAP   Oxygen Concentration (%) 50   SpO2 98 %   Pulse Oximetry Type Continuous   $ Pulse Oximetry - Multiple Charge Pulse Oximetry - Multiple   Pulse 98   Resp (!) 22   Aerosol Therapy   $ Aerosol Therapy Charges Aerosol Treatment   Respiratory Treatment Status (SVN) given   Treatment Route (SVN) in-line   Patient Position (SVN) HOB elevated   Post Treatment Assessment (SVN) breath sounds unchanged   Signs of Intolerance (SVN) none   Breath Sounds Post-Respiratory Treatment   Post-treatment Heart Rate (beats/min) 98   Post-treatment Resp Rate (breaths/min) 22   Ready to Wean/Extubation Screen   FIO2<=50 (chart decimal) 0.5   Preset CPAP/BiPAP Settings   Mode Of Delivery BiPAP   $ CPAP/BiPAP Daily Charge BiPAP/CPAP Daily   $ Initial CPAP/BiPAP Setup? No   $ Is patient using? Yes   Sized Appropriately? Yes   Equipment Type V60   Airway Device Type medium full face mask   Ipap 18   EPAP (cm H2O) 10   Pressure Support (cm H2O) 8   Set Rate (Breaths/Min) 20   ITime (sec) 1   Rise Time (sec) 3   Patient CPAP/BiPAP Settings   RR Total (Breaths/Min) 22   Tidal Volume (mL) 715   VE Minute Ventilation (L/min) 18.7 L/min   Peak Inspiratory Pressure (cm H2O) 19   TiTOT (%) 27   Total Leak (L/Min) 21   Patient Trigger - ST Mode Only (%) 66   CPAP/BiPAP Backup Settings   Backup Rate 20 breaths per minute (bpm)   CPAP/BiPAP Alarms   High Pressure (cm H2O) 23   Low Pressure (cm H2O) 10   Minute Ventilation (L/Min) 3   High RR (breaths/min) 45   Low RR (breaths/min) 10

## 2019-01-10 NOTE — PLAN OF CARE
Problem: Adult Inpatient Plan of Care  Goal: Plan of Care Review  Outcome: Ongoing (interventions implemented as appropriate)  Patient remains unresponsive to verbal stimuli. Moves all extremities but not to command. Oxygen saturation adequate on 50% BiPAP. NT suctioned with copious brown secretions. Urine output adequate. Vital signs stable. Plan continue present therapy. Suction as needed

## 2019-01-10 NOTE — CONSULTS
Enmanuel Armenta [id number] is a 63 y.o. male who has been consulted for vancomycin dosing.    The patient has the following labs:     Date Creatinine (mg/dl)    BUN WBC Count   1/9/2019 Estimated Creatinine Clearance: 57.4 mL/min (based on SCr of 1.2 mg/dL). Lab Results   Component Value Date    BUN 73 (H) 01/09/2019     Lab Results   Component Value Date    WBC 22.30 (H) 01/09/2019        Current weight is 74.5 kg (164 lb 3.9 oz)    Pt is receiving vancomycin 750 mg every 24 hours.  Vancomycin trough from 01/09 at 1926 was 21.3 mg/dL.  Target trough range is 15-20 mg/dL.   Trough was drawn about 1.5 hours late and anticipate it is supra/therapeutic. Pharmacy will decrease current regimen to a vancomycin dose of 500 mg every 24 hours.  A vancomycin trough has been ordered prior to 3rd dose due 01/11 at 2100.      Patient will be followed by pharmacy for changes in renal function, toxicity, and efficacy.  Thank you for allowing us to participate in this patient's care.     Hernan Escobar, DelgadoD

## 2019-01-10 NOTE — PROGRESS NOTES
Progress Note  Hospital Medicine  Patient Name:Enmanuel Armenta  MRN:  [id number]  Patient Class: IP- Inpatient  Admit Date: 12/17/2018  Length of Stay: 24 days  Expected Discharge Date:   Attending Physician: Stephanie Tena MD  Primary Care Provider:  Primary Doctor No    SUBJECTIVE:     Principal Problem: Aspiration pneumonia of both lower lobes  Initial history of present illness: Patient is a 63 y.o. male admitted to Hospitalist Service from [facility] Room with complaint of abdominal pain. Patient is a resident of [facility] nursing home, [place]. Patient reportedly has past medical history significant for COPD, muscular dystrophy, l;imited mobility with walker and history of elevated PSA. Patient presented with 1 day history of abdominal pain associated with nausea and vomiting. The EMS reported, vomiting a brown sticky substance with pale red streaks throughout. EMS gave him 4 mg of Zofran in route to the ED. Patient denied chest pain, shortness of breath, headache, vision changes, focal neuro-deficits, cough. Patient noted to have 101.5F upon arrival.     PMH/PSH/SH/FH/Meds: reviewed.    Symptoms/Review of Systems: In ICU, little more responsive but still does not open eyes to noxious stimulus but moans. Patient is still encephalopathic. On BiPAP. Still only minimally responsive.   Diet: Trickle feeding + TPN  Activity level: Up with assistance    Pain:  Moans to verbal commands     OBJECTIVE:   Vital Signs (Most Recent):      Temp: 98.8 °F (37.1 °C) (01/10/19 0400)  Pulse: 98 (01/10/19 0718)  Resp: (!) 22 (01/10/19 0718)  BP: 110/60 (01/10/19 0600)  SpO2: 98 % (01/10/19 0718)       Vital Signs Range (Last 24H):  Temp:  [98.4 °F (36.9 °C)-99.2 °F (37.3 °C)]   Pulse:  []   Resp:  [14-31]   BP: (102-146)/(55-82)   SpO2:  [84 %-100 %]     Weight: 72.8 kg (160 lb 7.9 oz)  Body mass index is 25.14 kg/m².    Intake/Output Summary (Last 24 hours) at 1/10/2019 0751  Last data  filed at 1/10/2019 0600  Gross per 24 hour   Intake 2510 ml   Output 3615 ml   Net -1105 ml     Physical Examination:  General appearance: well developed, appears stated age, chronically ill-appearing, on BiPAP  Head: normocephalic, atraumatic  Eyes:  conjunctivae/corneas clear. PERRL.  Nose: Nares normal. Septum midline.  Throat: lips, mucosa, and tongue normal; teeth and gums normal, no throat erythema.  Neck: supple, symmetrical, trachea midline, no JVD and thyroid not enlarged, symmetric, no tenderness/mass/nodules  Lungs:  Scattered rhonchi B/L.  Chest wall: no tenderness  Heart: regular rate and rhythm, S1, S2 normal, no murmur, click, rub or gallop  Abdomen: soft, epigastric tenderness, non-distented; bowel sounds normal; no masses,  no organomegaly. PEG site okay.  Extremities: no cyanosis, clubbing or edema.   Pulses: 2+ and symmetric  Skin: Skin color, texture, turgor normal. No rashes or lesions.  Lymph nodes: Cervical, supraclavicular, and axillary nodes normal.  Neurologic: Minimally responsive    CBC:  Recent Labs   Lab 01/08/19  0322 01/09/19  0318 01/10/19  0339   WBC 18.00* 22.30* 20.30*   RBC 3.46* 3.63* 3.19*   HGB 10.2* 10.7* 9.3*   HCT 30.6* 32.3* 28.2*   * 453* 442*   MCV 88 89 89   MCH 29.4 29.5 29.0   MCHC 33.2 33.1 32.8   BMP  Recent Labs   Lab 01/09/19  0318 01/09/19  1233 01/10/19  0339    143* 95    134* 138   K 3.0* 3.8 3.4*    105 110   CO2 19* 19* 19*   BUN 58* 73* 76*   CREATININE 1.2 1.2 1.3   CALCIUM 8.8 8.7 8.9   MG 1.7 1.7 1.7      Diagnostic Results:  Microbiology Results (last 7 days)     Procedure Component Value Units Date/Time    Urine culture [[id number]] Collected:  01/10/19 0041    Order Status:  Sent Specimen:  Urine, Catheterized Updated:  01/10/19 0042    Blood culture [[id number]] Collected:  01/09/19 1926    Order Status:  Sent Specimen:  Blood from Line, Central Updated:  01/10/19 0005    IV catheter culture [[id number]]     Order Status:  No  result Specimen:  Catheter Tip, Intrajugular     Culture, Respiratory with Gram Stain [[id number]] Collected:  01/09/19 0939    Order Status:  Completed Specimen:  Respiratory from Tracheal Aspirate Updated:  01/09/19 1829     Gram Stain (Respiratory) <10 epithelial cells per low power field.     Gram Stain (Respiratory) Few WBC's     Gram Stain (Respiratory) Many Gram positive cocci         Chest X-Ray: T right basilar infiltrate.     CT abdomen and pelvis with contrast:  Enlarged prostate gland, air in the bladder likely secondary to catheterization, diffuse bladder wall thickening. Prominent amount of fluid within right colon.  Appendix not identified and note is made of limitation with motion artifact in the appendiceal region.    EGD:   - Moderately severe reflux esophagitis.                       - Medium-sized hiatal hernia.                       - Gastritis.                       - A single gastric polyp.                       - Normal examined duodenum.                       - No specimens collected.    CXR: New moderate left pleural effusion and left lung infiltrate.  Leftward mediastinal shift raising consideration for left lung atelectasis as contributory to the findings.  Close follow-up recommended.    CXR:   Unchanged bilateral lower lobe infiltrates.Heart size is now within normal limits.  Endotracheal tube and right central line in position.    CXR:   Increasing left lower lobe infiltrate and possible left pleural effusion.  Decreasing right base infiltrate.  Probable mild cardiomegaly obscured by the infiltrates.  Endotracheal tube and central line in position.    CXR:   Stable, appropriate positioning of support devices.  Stable appearance of the chest demonstrating right basilar atelectasis or infiltrate and retrocardiac consolidation and small left pleural effusion.    CXR: No significant change in the appearance of the chest compared to the prior exam.    EGD:   - Normal esophagus.                        - Z-line regular.                       - Hematin (altered blood/coffee-ground-like                        material) in the gastric fundus and in the gastric                        body.                       - Intact gastrostomy with a patent G-tube present                        characterized by congestion, edema, erosion and                        erythema.                       - Oozing gastric ulcer with adherent clot. Injected.                        Clips were placed.                       - Normal antrum.                       - Normal examined duodenum.                       - No specimens collected.    ECHO:  · Mildly decreased left ventricular systolic function. The estimated ejection fraction is 49%  · Grade I (mild) left ventricular diastolic dysfunction consistent with impaired relaxation.  · Normal left atrial pressure.  · Normal right ventricular systolic function.  · Elevated central venous pressure (15 mm Hg).  · The estimated PA systolic pressure is 27 mm Hg  · No evidence of endocarditis.  · Patient is in on CPAP.    CXR: Appropriately positioned right central line.  Interval extubation.  Unchanged bibasilar airspace disease.    CXR: Bibasilar airspace disease unchanged.  Similar and appropriate positioning of right central line.    CXR: Increasing right lower lobe infiltrate. Small infiltrate at the left base. Central line in good position without pneumothorax.    Assessment/Plan:      Acute hypoxic respiratory failure   Yes    Aspiration pneumonia, RLL with MRSA sp [J69.0]  Swallow dysfunction s/p PEG placement  Encephalopathy  On NIPPV  Continue Vanc, pharmacist is doing Vancomycin.   Angle  Discussed with Dr. Puente. Changing TLC to PICC line. Started on IV Diflucan.  Obtain CT head without contrast today.     Hypernatremia - resolved  Follow BMP. On TPN.    Atrial fibrillation with RVR  Controlled, NSR noted. Dr. Reyna is following.     Hyperglycemia  Continue Levemir 16 units  BID.    Hypokalemia   Follow BMP. Replete KCl.     Yes    Upper GI bleeding [K92.2]  Acute GI bleeding loss anemia  Protonix BID      Yes    Muscular dystrophy [G71.00]  Supportive care, fall precautions.     Severe malnutrition  Continue IV TPN.    JUSTA  Resolved  Started diuresis    Hypomagnesemia  Replete Mag. Follow Mag level.    Abnormal liver tests  Mixed pattern  Monitor and no synthetic liver dysfunction  Possibly related to TPN?      Yes    Chronic obstructive pulmonary disease [J44.9]  Patient's COPD is controlled currently. Continue scheduled inhalers and monitor respiratory status closely.   Yes      DVT prophylaxis: Use SCD and TEDs. No anticoagulation due to GI bleeding.     Discussed with CM, no family to discuss case with. Prognosis is poor, not improving. Consulting hospital legal department for legal advice and assignment of Nick.     Stephanie Tena MD  Department of Hospital Medicine   [facility]

## 2019-01-10 NOTE — PLAN OF CARE
Problem: Adult Inpatient Plan of Care  Goal: Plan of Care Review  Outcome: Ongoing (interventions implemented as appropriate)  No acute events overnight.  Pt continues to have thick, copious secretions in the back of throat that require suctioning.  Q4hr 200mL water bolus administered while upright to prevent aspiration.  Pt required no BG coverage and remained comfortable throughout shift.  Will continue to monitor closely.

## 2019-01-10 NOTE — PROCEDURES
"Enmanuel Armenta is a 63 y.o. male patient.    Temp: 99 °F (37.2 °C) (01/10/19 1600)  Pulse: 90 (01/10/19 1600)  Resp: 19 (01/10/19 1600)  BP: (!) 125/58 (01/10/19 1600)  SpO2: 100 % (01/10/19 1600)  Weight: 72.8 kg (160 lb 7.9 oz) (01/10/19 0600)  Height: 5' 7" (170.2 cm)(Office 7/13/18) (12/18/18 1311)    PICC  Date/Time: 1/10/2019 4:11 PM  Performed by: Nory Jones RN  Consent Done: Yes  Time out: Immediately prior to procedure a time out was called to verify the correct patient, procedure, equipment, support staff and site/side marked as required  Indications: med administration and vascular access  Anesthesia: local infiltration  Local anesthetic: lidocaine 1% without epinephrine  Anesthetic Total (mL): 3  Preparation: skin prepped with ChloraPrep  Skin prep agent dried: skin prep agent completely dried prior to procedure  Sterile barriers: all five maximum sterile barriers used - cap, mask, sterile gown, sterile gloves, and large sterile sheet  Hand hygiene: hand hygiene performed prior to central venous catheter insertion  Location details: left brachial  Catheter type: double lumen  Catheter size: 5 Fr  Catheter Length: 44cm    Ultrasound guidance: yes  Vessel Caliber: medium and patent, compressibility normal  Needle advanced into vessel with real time Ultrasound guidance.  Guidewire confirmed in vessel.  Image recorded and saved.  Sterile sheath used.  Number of attempts: 1  Post-procedure: blood return through all ports, chlorhexidine patch and sterile dressing applied  Significant surgical tasks conducted by the assistant(s): MST  Estimated blood loss (mL): 2    Assessment: placement verified by x-ray, no pneumothorax on x-ray, tip termination and successful placement  Tip Termination Explanation: svc  Complications: none          Nory Jones  1/10/2019  "

## 2019-01-10 NOTE — PLAN OF CARE
01/09/19 1923   Patient Assessment/Suction   Level of Consciousness (AVPU) responds to pain   Respiratory Effort Unlabored   All Lung Fields Breath Sounds coarse   MACKENZIE Breath Sounds coarse   LLL Breath Sounds coarse   PRE-TX-O2-ETCO2   O2 Device (Oxygen Therapy) BiPAP   Oxygen Concentration (%) 50   SpO2 (!) 94 %   Pulse Oximetry Type Continuous   $ Pulse Oximetry - Multiple Charge Pulse Oximetry - Multiple   Pulse 76   Resp (!) 25   Aerosol Therapy   $ Aerosol Therapy Charges Aerosol Treatment   Respiratory Treatment Status (SVN) given   Treatment Route (SVN) in-line   Patient Position (SVN) HOB elevated   Post Treatment Assessment (SVN) breath sounds unchanged   Signs of Intolerance (SVN) none   Breath Sounds Post-Respiratory Treatment   Throughout All Fields Post-Treatment All Fields   Throughout All Fields Post-Treatment no change   Post-treatment Heart Rate (beats/min) 76   Post-treatment Resp Rate (breaths/min) 23   Ready to Wean/Extubation Screen   FIO2<=50 (chart decimal) 0.5   Preset CPAP/BiPAP Settings   Mode Of Delivery BiPAP S/T   $ Initial CPAP/BiPAP Setup? No   $ Is patient using? Yes   Sized Appropriately? Yes   Equipment Type V60   Airway Device Type medium nasal mask   Ipap 18   EPAP (cm H2O) 10   Pressure Support (cm H2O) 8   Set Rate (Breaths/Min) 20   ITime (sec) 1   Rise Time (sec) 0.3   Patient CPAP/BiPAP Settings   Timed Inspiration (Sec) 1   IPAP Rise Time (sec) 0.3   RR Total (Breaths/Min) 23   Tidal Volume (mL) 529   VE Minute Ventilation (L/min) 14.3 L/min   Peak Inspiratory Pressure (cm H2O) 18   TiTOT (%) 30   Total Leak (L/Min) 33   Patient Trigger - ST Mode Only (%) 46   CPAP/BiPAP Alarms   High Pressure (cm H2O) 23   Low Pressure (cm H2O) 10   Minute Ventilation (L/Min) 3   High RR (breaths/min) 45   Low RR (breaths/min) 10   pt received aero tx's inline of bipap.

## 2019-01-10 NOTE — PLAN OF CARE
Contacted , Lala Reyez regarding hospital legal advice about appointed gisellekarinaman vs ethics consult as his quality of life is so poor and prognosis so bad that comfort care would be appropriate.   Will await follow up from her....KARLENE Mendoza       01/10/19 1153   Discharge Reassessment   Assessment Type Discharge Planning Reassessment

## 2019-01-10 NOTE — CONSULTS
Consult Note  Infectious Disease    Reason for Consult:  Sepsis?    HPI: Enmanuel Armenta is a chronically ill appearing 63 y.o. male who was admitted on 12/17 with UGIB and aspiration pneumonia LLL. He underwent upper endoscopy 3 times with findings of severe esophagitis and gastric ulcerations. He received vanc and zosyn with improvement in CXR. He suffered another episode of aspiration 12/28, with respiratory failure requiring intubation. He again received vanc and zosyn until MRSA was isolated from the sputum and vanc was discontinued. He has been on steroids since then as well. He was able to be extubated but requires non stop bipap. He is unable to tolerate tube feeds as he repeatedly aspirates, and is receiving TPN. He has no next of kin or legal decision maker. He is obtunded and cannot interact. WBC have gradually increased this week, but he has no fever (on steroids) nor is he hypotensive. He has had JUSTA as well, which improved. TLC RIJ 12/28. Samson changed last week. He underwent bedside extraction of 2 teeth by Dr. Rodriguez, oral surgeon on 1/4.    Review of patient's allergies indicates:  No Known Allergies  Past Medical History:   Diagnosis Date    Anticoagulant long-term use     BPH (benign prostatic hyperplasia)     COPD (chronic obstructive pulmonary disease)     Elevated PSA     Fx     RIGHT WRIST, LEFT FOOT    Hypokalemia     Muscular dystrophy     Urine retention     Wears dentures     upper   I am told he has dementia at baseline  Continued cigarette abuse at NH?    Past Surgical History:   Procedure Laterality Date    APPENDECTOMY      cataracts      bilateral     CHOLECYSTECTOMY      CYST REMOVAL      from spine     CYSTOLITHOLOPAXY (REMOVE BLADDER STONE)  3/6/2017    [facility]    CYSTOSCOPY      EGD (ESOPHAGOGASTRODUODENOSCOPY) N/A 1/3/2019    [facility]y [facility] at [facility] ENDO    EGD (ESOPHAGOGASTRODUODENOSCOPY) N/A  12/24/2018    [facility]O    EGD (ESOPHAGOGASTRODUODENOSCOPY) N/A 12/17/2018    [facility] ENDO    EYE SURGERY      cataract surgery bilateral    INSERTION, PEG TUBE N/A 12/24/2018    [facility] ENDO    PROSTATE BIOPSY      PROSTATE SURGERY      PROSTATECTOMY-TRANSURETHRAL N/A 3/6/2017    [facility] OR    PROSTATECTOMY-TRANSURETHRAL WITH GREENLIGHT LASER N/A 3/6/2017    [facility] OR    TONSILLECTOMY      TRANSRECTAL ULTRASOUND GUIDED PROSTATE BIOPSY N/A 3/28/2016    [facility] OR    VASECTOMY       Social History     Socioeconomic History    Marital status: Single     Spouse name: None    Number of children: None    Years of education: None    Highest education level: None   Social Needs    Financial resource strain: None    Food insecurity - worry: None    Food insecurity - inability: None    Transportation needs - medical: None    Transportation needs - non-medical: None   Occupational History    None   Tobacco Use    Smoking status: Current Every Day Smoker     Packs/day: 1.00     Years: 48.00     Pack years: 48.00     Types: Cigarettes    Smokeless tobacco: Never Used   Substance and Sexual Activity    Alcohol use: No     Alcohol/week: 0.0 oz    Drug use: No    Sexual activity: None   Other Topics Concern    None   Social History Narrative    None     History reviewed. No pertinent family history.    Pertinent medications noted:     Review of Systems: unobtainable. Non verbal, non responsive.    EXAM & DIAGNOSTICS REVIEWED:   Vitals:     Temp:  [95.9 °F (35.5 °C)-99.2 °F (37.3 °C)]   Temp: 99.2 °F (37.3 °C) (01/09/19 1600)  Pulse: 90 (01/09/19 1600)  Resp: (!) 26 (01/09/19 1600)  BP: (!) 113/57 (01/09/19 1600)  SpO2: 95 % (01/09/19 1600)    Intake/Output Summary (Last 24 hours) at 1/9/2019 1830  Last  data filed at 1/9/2019 1200  Gross per 24 hour   Intake 100 ml   Output 2475 ml   Net -2375 ml       General:  Looks uncomfortable, with bipap on, drooling, rhonchorous, requiring suctioning  Eyes:  Anicteric, PERRL, EOM cannot be tested  ENT:  Mouth w/ pink MMM,tongue has mild candidal coating and remaining teeth are severely carious, broken with severe periodontal disease  Neck:  Trachea midline, supple, no adenopathy appreciated  Lungs: Loose rhonchi throughout, no consolidation  Heart:  RRR, no gallop/murmur noted  Abd:  soft, NT, ND, normal BS, no masses/organomegaly appreciated. PEG in place  :  Samson draining yellow urine, with stigmata of candida  MSK:   No acute joint swelling, arthritis, synovitis, with poor muscle bulk,  Skin:  Generally warm, dry, normal for color. No rashes. No palmar or plantar  lesions. No subungual petechiae  Wound:   Neuro: Unresponsive, does not attend or interact  Extrem: Mild generalized edema, no erythema, phlebitis, cellulitis,   VAD:  RIJ TLC 12/28    Lines/Tubes/Drains:    General Labs reviewed:  Recent Labs   Lab 01/09/19  0318   WBC 22.30*   RBC 3.63*   HGB 10.7*   HCT 32.3*   *   MCV 89   MCH 29.5   MCHC 33.1     Recent Labs   Lab 01/09/19  1233   CALCIUM 8.7   PROT 5.8*   *   K 3.8   CO2 19*      BUN 73*   CREATININE 1.2   ALKPHOS 407*   ALT 60*   AST 50*   BILITOT 0.3           Micro:  Microbiology Results (last 7 days)     Procedure Component Value Units Date/Time    Culture, Respiratory with Gram Stain [[id number]] Collected:  01/09/19 0939    Order Status:  Completed Specimen:  Respiratory from Tracheal Aspirate Updated:  01/09/19 1820     Gram Stain (Respiratory) <10 epithelial cells per low power field.     Gram Stain (Respiratory) Few WBC's     Gram Stain (Respiratory) Many Gram positive cocci        Imaging Reviewed:   CXRs, CT abd/pelvis from 12/17  Most recent CXR was improved over the last 10d    IMPRESSION & PLAN   1. Recurrent aspiration  pneumonia, respiratory failure. MRSA on culture  2. Baseline unclear to me, has muscle dystrophy, I am told he was ambulatory and could go outside to smoke, but has dementia at baseline  3. At risk for recurrent aspiration, with no tolerance of tube feeds , now on TPN  4. Recent UGIB, esophagitis,     Recommendation:   Stop steroids  Check urinalysis and culture  Change TLC to picc, culture tip  Start antifungal   C. Difficile check  Consider CT head    Would pursue hospital legal advice about appointed kisha vs ethics consult as his quality of life is so poor and prognosis so bad that comfort care would be appropriate

## 2019-01-10 NOTE — PLAN OF CARE
Problem: Adult Inpatient Plan of Care  Goal: Plan of Care Review  Outcome: Ongoing (interventions implemented as appropriate)  Patient is moving more in the bed, does not follow commands but his movements appear purposeful. Long-acting insulin dc'd per  after patients bs was trending down. PICC line placed to Rt upper arm. Tip of central line sent for culture per . CT of head complete, consult to neurology placed per .  VSS. TPN infusing at 75ml/hr. Will continue to monitor patient closely.
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
## 2019-01-10 NOTE — NURSING
Patient placed in trendelenburg position, sutures removed and central line pulled per . Pressure held to site, no hematoma or bleeding noted on dsg. Central line tip cut and sent for culture.

## 2019-01-10 NOTE — NURSING
Patient status unchanged through out the day. Plan of care reviewed with patient but patient remains mentally altered. Urine output over 2liters for the day. TPN continued at 75ml/hr.  consulted per . Patient afebrile and VSS. Continuous Bipap remained in place through the day, sputum culture obtained per . Will continue to monitor patient closely.

## 2019-01-11 VITALS
SYSTOLIC BLOOD PRESSURE: 141 MMHG | HEART RATE: 90 BPM | RESPIRATION RATE: 16 BRPM | WEIGHT: 157.88 LBS | HEIGHT: 67 IN | DIASTOLIC BLOOD PRESSURE: 71 MMHG | TEMPERATURE: 97 F | OXYGEN SATURATION: 96 % | BODY MASS INDEX: 24.78 KG/M2

## 2019-01-11 LAB
ALBUMIN SERPL BCP-MCNC: 1.8 G/DL
ALP SERPL-CCNC: 447 U/L
ALT SERPL W/O P-5'-P-CCNC: 54 U/L
ANION GAP SERPL CALC-SCNC: 8 MMOL/L
AST SERPL-CCNC: 51 U/L
BACTERIA UR CULT: NORMAL
BASOPHILS # BLD AUTO: 0 K/UL
BASOPHILS NFR BLD: 0.2 %
BILIRUB SERPL-MCNC: 0.2 MG/DL
BUN SERPL-MCNC: 59 MG/DL
CALCIUM SERPL-MCNC: 9.4 MG/DL
CHLORIDE SERPL-SCNC: 114 MMOL/L
CO2 SERPL-SCNC: 19 MMOL/L
CREAT SERPL-MCNC: 1.2 MG/DL
DIFFERENTIAL METHOD: ABNORMAL
EOSINOPHIL # BLD AUTO: 0 K/UL
EOSINOPHIL NFR BLD: 0.1 %
ERYTHROCYTE [DISTWIDTH] IN BLOOD BY AUTOMATED COUNT: 15.1 %
EST. GFR  (AFRICAN AMERICAN): >60 ML/MIN/1.73 M^2
EST. GFR  (NON AFRICAN AMERICAN): >60 ML/MIN/1.73 M^2
GLUCOSE SERPL-MCNC: 126 MG/DL
HCT VFR BLD AUTO: 29.7 %
HGB BLD-MCNC: 9.6 G/DL
LYMPHOCYTES # BLD AUTO: 0.8 K/UL
LYMPHOCYTES NFR BLD: 5.1 %
MAGNESIUM SERPL-MCNC: 1.7 MG/DL
MCH RBC QN AUTO: 28.9 PG
MCHC RBC AUTO-ENTMCNC: 32.5 G/DL
MCV RBC AUTO: 89 FL
MONOCYTES # BLD AUTO: 0.6 K/UL
MONOCYTES NFR BLD: 4.1 %
NEUTROPHILS # BLD AUTO: 13.4 K/UL
NEUTROPHILS NFR BLD: 90.5 %
PHOSPHATE SERPL-MCNC: 2.8 MG/DL
PLATELET # BLD AUTO: 473 K/UL
PMV BLD AUTO: 9.1 FL
POCT GLUCOSE: 104 MG/DL (ref 70–110)
POCT GLUCOSE: 110 MG/DL (ref 70–110)
POCT GLUCOSE: 190 MG/DL (ref 70–110)
POTASSIUM SERPL-SCNC: 3.3 MMOL/L
PROT SERPL-MCNC: 5.8 G/DL
RBC # BLD AUTO: 3.34 M/UL
SODIUM SERPL-SCNC: 141 MMOL/L
WBC # BLD AUTO: 14.9 K/UL

## 2019-01-11 PROCEDURE — 36415 COLL VENOUS BLD VENIPUNCTURE: CPT

## 2019-01-11 PROCEDURE — 31720 CLEARANCE OF AIRWAYS: CPT

## 2019-01-11 PROCEDURE — 97803 MED NUTRITION INDIV SUBSEQ: CPT

## 2019-01-11 PROCEDURE — 84100 ASSAY OF PHOSPHORUS: CPT

## 2019-01-11 PROCEDURE — 83735 ASSAY OF MAGNESIUM: CPT

## 2019-01-11 PROCEDURE — 94761 N-INVAS EAR/PLS OXIMETRY MLT: CPT

## 2019-01-11 PROCEDURE — 94640 AIRWAY INHALATION TREATMENT: CPT

## 2019-01-11 PROCEDURE — 63600175 PHARM REV CODE 636 W HCPCS: Performed by: INTERNAL MEDICINE

## 2019-01-11 PROCEDURE — 80053 COMPREHEN METABOLIC PANEL: CPT

## 2019-01-11 PROCEDURE — 27000221 HC OXYGEN, UP TO 24 HOURS

## 2019-01-11 PROCEDURE — A4216 STERILE WATER/SALINE, 10 ML: HCPCS | Performed by: INTERNAL MEDICINE

## 2019-01-11 PROCEDURE — C9113 INJ PANTOPRAZOLE SODIUM, VIA: HCPCS | Performed by: INTERNAL MEDICINE

## 2019-01-11 PROCEDURE — 85025 COMPLETE CBC W/AUTO DIFF WBC: CPT

## 2019-01-11 PROCEDURE — 25000242 PHARM REV CODE 250 ALT 637 W/ HCPCS: Performed by: INTERNAL MEDICINE

## 2019-01-11 PROCEDURE — 25000003 PHARM REV CODE 250: Performed by: INTERNAL MEDICINE

## 2019-01-11 RX ORDER — PANTOPRAZOLE SODIUM 40 MG/10ML
40 INJECTION, POWDER, LYOPHILIZED, FOR SOLUTION INTRAVENOUS 2 TIMES DAILY
Qty: 2400 MG | Refills: 11 | Status: SHIPPED | OUTPATIENT
Start: 2019-01-11 | End: 2020-01-11

## 2019-01-11 RX ORDER — INSULIN ASPART 100 [IU]/ML
1-10 INJECTION, SOLUTION INTRAVENOUS; SUBCUTANEOUS
Refills: 0
Start: 2019-01-11 | End: 2020-01-11

## 2019-01-11 RX ORDER — METOCLOPRAMIDE HYDROCHLORIDE 5 MG/5ML
5 SOLUTION ORAL EVERY 8 HOURS
Start: 2019-01-11

## 2019-01-11 RX ORDER — NYSTATIN 100000 [USP'U]/ML
500000 SUSPENSION ORAL EVERY 6 HOURS
Qty: 200 ML | Refills: 0
Start: 2019-01-11 | End: 2019-01-21

## 2019-01-11 RX ORDER — ACETAMINOPHEN 325 MG/1
650 TABLET ORAL EVERY 6 HOURS PRN
Refills: 0 | COMMUNITY
Start: 2019-01-11

## 2019-01-11 RX ORDER — FLUCONAZOLE 2 MG/ML
400 INJECTION, SOLUTION INTRAVENOUS DAILY
Start: 2019-01-11

## 2019-01-11 RX ORDER — GLYCOPYRROLATE 1 MG/1
1 TABLET ORAL 3 TIMES DAILY PRN
Start: 2019-01-11 | End: 2019-02-10

## 2019-01-11 RX ORDER — FUROSEMIDE 10 MG/ML
20 INJECTION INTRAMUSCULAR; INTRAVENOUS 2 TIMES DAILY
Start: 2019-01-11

## 2019-01-11 RX ORDER — IPRATROPIUM BROMIDE AND ALBUTEROL SULFATE 2.5; .5 MG/3ML; MG/3ML
3 SOLUTION RESPIRATORY (INHALATION) EVERY 4 HOURS
Qty: 1 BOX | Refills: 0 | Status: SHIPPED | OUTPATIENT
Start: 2019-01-11 | End: 2020-01-11

## 2019-01-11 RX ORDER — POTASSIUM CHLORIDE 14.9 MG/ML
40 INJECTION INTRAVENOUS ONCE
Status: COMPLETED | OUTPATIENT
Start: 2019-01-11 | End: 2019-01-11

## 2019-01-11 RX ADMIN — NYSTATIN 500000 UNITS: 500000 SUSPENSION ORAL at 12:01

## 2019-01-11 RX ADMIN — IPRATROPIUM BROMIDE AND ALBUTEROL SULFATE 3 ML: .5; 3 SOLUTION RESPIRATORY (INHALATION) at 04:01

## 2019-01-11 RX ADMIN — POLYETHYLENE GLYCOL 3350 17 G: 17 POWDER, FOR SOLUTION ORAL at 08:01

## 2019-01-11 RX ADMIN — Medication 10 ML: at 05:01

## 2019-01-11 RX ADMIN — POTASSIUM CHLORIDE 40 MEQ: 14.9 INJECTION, SOLUTION INTRAVENOUS at 10:01

## 2019-01-11 RX ADMIN — NYSTATIN 500000 UNITS: 500000 SUSPENSION ORAL at 05:01

## 2019-01-11 RX ADMIN — INSULIN ASPART 2 UNITS: 100 INJECTION, SOLUTION INTRAVENOUS; SUBCUTANEOUS at 12:01

## 2019-01-11 RX ADMIN — Medication 10 ML: at 12:01

## 2019-01-11 RX ADMIN — PANTOPRAZOLE SODIUM 40 MG: 40 INJECTION, POWDER, LYOPHILIZED, FOR SOLUTION INTRAVENOUS at 08:01

## 2019-01-11 RX ADMIN — IPRATROPIUM BROMIDE AND ALBUTEROL SULFATE 3 ML: .5; 3 SOLUTION RESPIRATORY (INHALATION) at 12:01

## 2019-01-11 RX ADMIN — FUROSEMIDE 20 MG: 10 INJECTION, SOLUTION INTRAVENOUS at 08:01

## 2019-01-11 RX ADMIN — IPRATROPIUM BROMIDE AND ALBUTEROL SULFATE 3 ML: .5; 3 SOLUTION RESPIRATORY (INHALATION) at 11:01

## 2019-01-11 RX ADMIN — IPRATROPIUM BROMIDE AND ALBUTEROL SULFATE 3 ML: .5; 3 SOLUTION RESPIRATORY (INHALATION) at 07:01

## 2019-01-11 NOTE — PLAN OF CARE
Problem: Adult Inpatient Plan of Care  Goal: Plan of Care Review  Outcome: Ongoing (interventions implemented as appropriate)  Patient receiving aerosol tx via duoneb now and q4hr.  Hr 88 and 02 saturation 94% on nc at 5lpm.  Bipap on standby.

## 2019-01-11 NOTE — PROGRESS NOTES
Progress Note  PULMONARY    Admit Date: 12/17/2018   01/10/2019      SUBJECTIVE:     Dec 29, sedate on vent.  Left side down.  Dec 30, sedated, 40% now  Dec 31 sedate, not arousing.  Jan 1, sedate,   1/2 - a fib with decreased sedation after promising progress weaning when sedate.  Calm now  1/7, extubated, needing bipap, calm now  1/8 - not arousing.  1/9 on niv/bipap continuous, not arousing.        PFSH and Allergies reviewed.    OBJECTIVE:     Vitals (Most recent):  Vitals:    01/10/19 1735   BP: 126/62   Pulse: 83   Resp: (!) 22   Temp:        Vitals (24 hour range):  Temp:  [97.9 °F (36.6 °C)-99 °F (37.2 °C)]   Pulse:  []   Resp:  [14-27]   BP: (106-151)/(55-87)   SpO2:  [84 %-100 %]       Intake/Output Summary (Last 24 hours) at 1/10/2019 1901  Last data filed at 1/10/2019 1720  Gross per 24 hour   Intake 2764 ml   Output 3415 ml   Net -651 ml          Physical Exam:  The patient's neuro status (alertness,orientation,cognitive function,motor skills,), pharyngeal exam (oral lesions, hygiene, abn dentition,), Neck (jvd,mass,thyroid,nodes in neck and above/below clavicle),RESPIRATORY(symmetry,effort,fremitus,percussion,auscultation),  Cor(rhythm,heart tones including gallops,perfusion,edema)ABD(distention,hepatic&splenomegaly,tenderness,masses), Skin(rash,cyanosis),Psyc(affect,judgement,).  Exam negative except for these pertinent findings:    Extubated, bipap No distress, nl percussion, symmetric, no edema.  Good bs,     Radiographs reviewed: view by direct vision  1/9 minimal densities bilat,  Min increase on right      Results for orders placed during the hospital encounter of 12/17/18   X-Ray Chest 1 View    Narrative EXAMINATION:  XR CHEST 1 VIEW    CLINICAL HISTORY:  resp failure;    TECHNIQUE:  Single frontal view of the chest was performed.    COMPARISON:  Chest portable of December 28, 2018.    FINDINGS:  An endotracheal tube and right central lines remain in position.  The cardiac size is  decreased to normal.  Bilateral lower lobe infiltrates are unchanged.  No pneumothorax is seen.      Impression Unchanged bilateral lower lobe infiltrates.Heart size is now within normal limits.  Endotracheal tube and right central line in position.      Electronically signed by: Wilman Casanova MD  Date:    12/29/2018  Time:    08:37   ]    Labs     Recent Labs   Lab 01/10/19  0339   WBC 20.30*   HGB 9.3*   HCT 28.2*   *     Recent Labs   Lab 01/10/19  0339 01/10/19  1627    143   K 3.4* 3.6    112*   CO2 19* 19*   BUN 76* 71*   CREATININE 1.3 1.2   GLU 95 57*   CALCIUM 8.9 9.2   MG 1.7  --    PHOS 2.9  --    AST 38  --    ALT 55*  --    ALKPHOS 350*  --    BILITOT 0.2  --    PROT 5.7*  --    ALBUMIN 1.8*  --      No results for input(s): PH, PCO2, PO2, HCO3 in the last 24 hours.  Microbiology Results (last 7 days)     Procedure Component Value Units Date/Time    IV catheter culture [[id number]] Collected:  01/10/19 1735    Order Status:  Sent Specimen:  Catheter Tip, Intrajugular Updated:  01/10/19 1736    Culture, Respiratory with Gram Stain [[id number]] Collected:  01/09/19 0939    Order Status:  Completed Specimen:  Respiratory from Tracheal Aspirate Updated:  01/10/19 1052     Respiratory Culture Insufficient incubation, culture in progress     Gram Stain (Respiratory) <10 epithelial cells per low power field.     Gram Stain (Respiratory) Few WBC's     Gram Stain (Respiratory) Many Gram positive cocci    Urine culture [[id number]] Collected:  01/10/19 0100    Order Status:  Sent Specimen:  Urine, Catheterized Updated:  01/10/19 0947    Blood culture [[id number]] Collected:  01/09/19 1926    Order Status:  Completed Specimen:  Blood from Line, Central Updated:  01/10/19 0915     Blood Culture, Routine No Growth to date    Narrative:       TLC          Impression:  Active Hospital Problems    Diagnosis  POA    *Aspiration pneumonia of both lower lobes [J69.0]  Yes    Septic shock [A41.9,  R65.21]  No    Acute respiratory failure with hypoxia and hypercarbia [J96.01, J96.02]  No    Smoker [F17.200]  Yes    Episodic confusion [R41.0]  Yes    Hematemesis [K92.0]  Yes    Dysphagia [R13.10]  Yes    Malnutrition of moderate degree [E44.0]  Yes    Elevated LFTs [R94.5]  Yes    Upper GI bleeding [K92.2]  Yes    Muscular dystrophy [G71.00]  Yes    Chronic obstructive pulmonary disease with acute lower respiratory infection [J44.0]  Yes      Resolved Hospital Problems    Diagnosis Date Resolved POA    Feeding difficulty [R63.3] 12/23/2018 Yes    UTI (urinary tract infection) [N39.0] 12/23/2018 Yes    Hematemesis [K92.0] 12/23/2018 Yes    SOB (shortness of breath) [R06.02] 12/18/2018 Yes               Plan:   Dec 29- gas exchange is better but has very severe aspiration lung injury.  cxr no ards pattern.  Need sputum cultured - expect will take awhile to clear out lung abn.  [place] would seem poor given course.    Dec 30 40% 02 and cxr sl better.  Start wean trials.    Dec 31, right lung clear. Should wean and extubate.    Jan 1, 2018- bp/pulse up with wean,   cxr much improved, has abn neruo state chronically .  wean    1/2- mrsa found with clearing cxr.  Suspect not significant wrt to pneumonia but colonizer.  Will rx anyhow given global picture.  Course vanc.  [place] remains very poor given his course.  Wean as able.  1/7 pt marginal.  Encephalopathy.  resp failure lingering, poor outlook  Likely try high flow am    1/8/2019-wbc 18, pt not progressing, f/u cxr am.    Pt poor brain function is cause of failure to improve.  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  Illness is terminal and care is expected to be futile.  Treatment looks to be prolonging dying with no hope of improving.  No family available.  Given clear futility of prolongation, recommend dnr status.      1/9 - cxr reasonable but pt not thriving. Wbc 22!  I do not see respiratory reason for poor poor loc.  Looks like severe  advanced dementia.    Will do nt suction for sputum - not thriving- expect death soon no matter support with dementia and course/picture.  Hard to recommend intubation given global picture.    1/10 coming around minimally.  Not awake but moving.  High flow 02 and nt suction may support - terminal looking picture- abx per id.         .

## 2019-01-11 NOTE — PROGRESS NOTES
[facility] ambulance here to  pt.    Pt placed on stretcher.  Pt stable at time of transfer.

## 2019-01-11 NOTE — PROGRESS NOTES
Pt remained on HFNC @6lpm throughout the night with sats staying above 92%. Pt was NT sx'd every 4hours tonigt. Pt did get aero tx's Q4 as well. Pt does not respond other then moan, move around, and try to pull at you when suctioning. Aero tx's seem to bother pt with the mask being on his face, now since he was off Bipap. Pt has copious large amounts of secretions that are tan/blood tinged when suctioned.

## 2019-01-11 NOTE — PLAN OF CARE
Problem: Adult Inpatient Plan of Care  Goal: Plan of Care Review  Outcome: Ongoing (interventions implemented as appropriate)  Maintained on 5 L nasal cannula all shift with sats 92% or better. Still sounds very wet even after NT suctioning. Can not clear throat at all and has a very weak cough. Seems to want to try to say some words but still unable to make clear. Will not follow commands but does move purposefully. Good urine output (1.5 liters) this shift. No coverage needed for blood sugars. Safety and isolation maintained.

## 2019-01-11 NOTE — PLAN OF CARE
The pt discharged to [facility]. Ivy Hernandez LMSW     01/11/19 8325   Final Note   Assessment Type Final Discharge Note   Anticipated Discharge Disposition LTAC

## 2019-01-11 NOTE — PROGRESS NOTES
[facility]  Adult Nutrition  Progress Note    SUMMARY   Interventions: Parenteral Nutrition Therapy  Vs. Enteral nutrition therapy     Current intake:  D10 AA 4.25 @ 75 mls/hr without lipids  (Provides 918 calories (53%), 77 gm protein (100%), GIR 1.75)    Recommendation:   1)  Recommend custom TPN D15 AA5 @ 70 ml/hr + standard lipids to meet energy/protein needs, limit fluids and allow flexibility in adjusting lytes.   (Provides 1696 calories (97% estimated needs), 84 gms protein (100% estimated needs), 1680 mls fluid)    VS   Add lipids to current TPN.    (Provides 1418 calories (81% EEN), 77 gm protein (100% EPN), GIR 1.75)     2) If pt able to resume TF, rec. continuous and raise HOB 90 degrees   Isosource 1.5 @15 ml/hr increasing to goal of 45 ml/hr + 155 ml flush q 4 hr  (Provides 820 ml free water, 1620 kcal (92%EEN), 73 g protein (100%EPN))  - J-tube feedings would also decrease risk of aspiration     Goals: 1) TPN or TF to meet > 75% estimated needs by f/u 2) TF initiated in < 24 hours 3) Re-initiate nutrition support to meet > 75% estimated needs by f/u   Nutrition Goal Status: Met/ Met/ Met-now digressed  Communication of RD Recs: (POC,  reviewed with MD via secure chat )     Reason for Assessment     Reason For Assessment: Follow-up  Diagnosis: (SOB)  Relevant Medical History: COPD, muscular dystrophy, PSA, BPH, limited mobility with walker, NH resident  Interdisciplinary Rounds: Attended     General Information Comments: 62 y/o male with muscular dystrophy from nursing home, with SOB, UTI, and aspiration PNA. Per SLP, rec. to keep pt NPO for now. No GIB per GI, only GED, inflammation, and stomach polyp. At time of visit pt was very drowsy and hard to understand, confused per RN. Unable to get accurate diet history. Attempted diet education for GERD. NPO x 2 day. NFPE done, mild-moderate muscle/fat wasting seen.   12/21/18 Pt failed swallow eval today rec. longterm NPO. Pt now  "agreeable to PEG. Now qualifies for acute malnutrition and PPN initiated to meet 67% EEN.   12/24/18 Pt receiving PPN x 3 days but was not infusing at time of visit, sent second sign to add lipids to better meet pt's needs today until TF initiated. PEG placed today, per GI OK to initiate TF in 8 hours. No further bleeding.  12/27/18 Isosource started continuous but pt was curling up in a ball and causing himself to vomit so per discussion with RN, switched to bolus feedings yesterday. Pt tolerating x 24 hr.   1/1/18 Pt aspirated on bolus feedings 12/28 despite raising HOB (and had been vomiting with continuous feedings before this). Transferred to ICU. Pt RN/MD high aspiration risk and plan per MD to d/c TF and infuse TPN until pt off vent. Trickle feed was infusing at time of visit, not the recommended formula.  1/4/18 TPN formula changed but was infusing @ 100 ml/hr, overfed pt. Glucose was in the 400's this morning, of note pt on steroids. Corrected and TPN running @ 60 ml/hr. Now extubated. Keep NPO per GI. Rec. Initiate TF as soon as medically able.  1/8/19: Pt now on bi pap. TPN formula was changed to 4.25 AA 10 Dex. If D5 is discontinued, energy needs will not be met. To meet energy needs with TPN AA 4.25 D 10 would need 100 mls/hr and significantly exceed protein needs in renal compromised patient.    1/11/19: Pt continues on bi pap. TPN is D10, AA 4.25 @ 75 mls/hr without lipids.  Per social work note, discharge to LTAC is anticipated today.      Nutrition Discharge Planning: To be determined- PEG Isosource 1.5 @ 45 ml/hr + 155 ml flush q 4 hr     Nutrition Risk Screen     Nutrition Risk Screen: dysphagia or difficulty swallowing     Nutrition/Diet History     Spiritual, Cultural Beliefs, Oriental orthodox Practices, Values that Affect Care: no  Food Allergies: NKFA(or intolerances)  Factors Affecting Nutritional Intake: NPO, aspiration     Anthropometrics     Height Method: Measured  Height: 5' 7"(Office " 18)  Height (inches): 67 in  Weight Method: Bed Scale  Weight: 76.8 kg (18 71-77 kg fluctuation this admission)  Weight (lb): 169  Ideal Body Weight (IBW), Male: 148 lb  % Ideal Body Weight, Male (lb): 105.91 lb  BMI (Calculated): 24.6 Admission  BMI Grade: 25 - 29.9 - overweight  Usual Body Weight (UBW), k kg(3/21/17)        Lab/Procedures/Meds     Pertinent Labs Reviewed: reviewed  BMP  Lab Results   Component Value Date     2019    K 3.3 (L) 2019     (H) 2019    CO2 19 (L) 2019    BUN 59 (H) 2019    CREATININE 1.2 2019    CALCIUM 9.4 2019    ANIONGAP 8 2019    ESTGFRAFRICA >60 2019    EGFRNONAA >60 2019     Lab Results   Component Value Date    ALBUMIN 1.8 (L) 2019     POCT Glucose   Date Value Ref Range Status   2019 110 70 - 110 mg/dL Final   2019 104 70 - 110 mg/dL Final   01/10/2019 78 70 - 110 mg/dL Final   01/10/2019 59 (L) 70 - 110 mg/dL Final   01/10/2019 99 70 - 110 mg/dL Final   2019 116 (H) 70 - 110 mg/dL Final   2019 137 (H) 70 - 110 mg/dL Final   2019 96 70 - 110 mg/dL Final   2019 171 (H) 70 - 110 mg/dL Final   2019 249 (H) 70 - 110 mg/dL Final   2019 186 (H) 70 - 110 mg/dL Final     Lab Results   Component Value Date    CALCIUM 9.4 2019    PHOS 2.8 2019     Pertinent Medications Reviewed: reviewed  Pertinent Medications Comments: TPN, lipids, statin, insulin, methylprednisolone     Estimated/Assessed Needs   Admission  Weight Used For Calorie Calculations: 71.1 kg (156 lb 12 oz)  Energy Calorie Requirements (kcal): Dickey St Jeor x 1.2 = 1745 kcal  Energy Need Method: Dickey-St Jeor  Protein Requirements: 1.0-1.2 g protein (muscle loss)= 71-85 g protein  Weight Used For Protein Calculations: 71.1 kg (156 lb 12 oz)  Fluid Requirements (mL): 1750 ml  Estimated Fluid Requirement Method: RDA Method  CHO Requirement: N/A        Nutrition Prescription  Ordered     Current Diet Order: NPO +TPN as above     Evaluation of Received Nutrient/Fluid Intake     Energy Calories Required: Not Meeting needs  Protein Required: Not Meeting needs  Fluid Required: Exceeding needs    Intake/Output Summary (Last 24 hours) at 1/11/2019 1025  Last data filed at 1/11/2019 1000  Gross per 24 hour   Intake 3234 ml   Output 3405 ml   Net -171 ml     Tolerance: Tolerating  % Intake of Estimated Energy Needs: 53% TPN  % Meal Intake: TPN     Nutrition Risk     Level of Risk/Frequency of Follow-up: moderate - high 2 x weekly      Assessment and Plan     Malnutrition of moderate degree    Contributing Nutrition Diagnosis  Moderate acute illness related malnutrition    Related to (etiology):   Swallowing/Chewing Difficulty    Signs and Symptoms (as evidenced by):   1) PO intakes < 50% of estimated needs x 5 days 2) Moderate muscle/fat loss @ temples and pattellar area/mild muscle/fat loss @ orbital area, bicep, clavicle, and calves    Interventions/Recommendations (treatment strategy):  1) TF or TPN to meet > 75% estimated needs @ f/u     Nutrition Diagnosis Status:   Improving              Monitor and Evaluation     Food and Nutrient Intake: energy intake  Food and Nutrient Adminstration: diet order, enteral and parenteral nutrition administration  Physical Activity and Function: nutrition-related ADLs and IADLs  Anthropometric Measurements: weight  Biochemical Data, Medical Tests and Procedures: electrolyte and renal panel  Nutrition-Focused Physical Findings: overall appearance      Malnutrition Assessment  Skin (Micronutrient): (Man = 13, no PI noted, edema 1+ trace)  Nails (Micronutrient): none  Hair/Scalp (Micronutrient): none  Eyes (Micronutrient): none  Gums (Micronutrient): none  Lips/Mucous Membranes (Micronutrient): none  Teeth (Micronutrient): (Missing teeth, has dentures at home)  Tongue (Micronutrient): none  Neck/Chest (Micronutrient): none, other (see  comments)  Musculoskeletal/Lower Extremities: none   Micronutrient Evaluation: no deficiencies   Subcutaneous Fat (Malnutrition): mild depletion  Muscle Mass (Malnutrition): moderate depletion   Orbital Region (Subcutaneous Fat Loss): mild depletion  Upper Arm Region (Subcutaneous Fat Loss): mild depletion  Thoracic and Lumbar Region: well nourished   Congregational Region (Muscle Loss): moderate depletion  Clavicle Bone Region (Muscle Loss): mild depletion  Scapular Bone Region (Muscle Loss): well nourished  Patellar Region (Muscle Loss): moderate depletion  Posterior Calf Region (Muscle Loss): mild depletion   Edema (Fluid Accumulation): 0-->no edema present   Subcutaneous Fat Loss (Final Summary): mild protein-calorie malnutrition  Muscle Loss Evaluation (Final Summary): moderate protein-calorie malnutrition       Nutrition Follow-Up     RD Follow-up?: Yes

## 2019-01-11 NOTE — PLAN OF CARE
I updated Abhijeet with [facility] [phone]- that the pt will likely discharge today. He is going to work on getting the pt a room assignment. Ivy Hernandez LMSW     01/11/19 1018   Discharge Assessment   Assessment Type Discharge Planning Reassessment

## 2019-01-11 NOTE — PROGRESS NOTES
Dr. Tena called to check on pt.  Cancel neurology consult; will try to get pt to LTAC.  I notified Mel newman RN.

## 2019-01-11 NOTE — PROGRESS NOTES
Regine SOLER at [facility] called to ask when the last dose of Vanc and Diflucan were given.  I reported these to her and then we discussed the wounds on his face from where the bipap pressed on his forehead and the bridge of his nose.

## 2019-01-11 NOTE — PLAN OF CARE
Problem: Malnutrition  Goal: Improved Nutritional Intake    Intervention: Promote and Optimize Nutrition Support  Interventions: Parenteral Nutrition Therapy  Vs. Enteral nutrition therapy     Current intake:  D10 AA 4.25 @ 75 mls/hr without lipids  (Provides 918 calories (53%), 77 gm protein (100%), GIR 1.75)    Recommendation:   1)  Recommend custom TPN D15 AA5 @ 70 ml/hr + standard lipids to meet energy/protein needs, limit fluids and allow flexibility in adjusting lytes.   (Provides 1696 calories (97% estimated needs), 84 gms protein (100% estimated needs), 1680 mls fluid)    VS   Add lipids to current TPN.    (Provides 1418 calories (81% EEN), 77 gm protein (100% EPN), GIR 1.75)     2) If pt able to resume TF, rec. continuous and raise HOB 90 degrees   Isosource 1.5 @15 ml/hr increasing to goal of 45 ml/hr + 155 ml flush q 4 hr  (Provides 820 ml free water, 1620 kcal (92%EEN), 73 g protein (100%EPN))  - J-tube feedings would also decrease risk of aspiration     Goals: 1) TPN or TF to meet > 75% estimated needs by f/u 2) TF initiated in < 24 hours 3) Re-initiate nutrition support to meet > 75% estimated needs by f/u   Nutrition Goal Status: Met/ Met/ Met-now digressed  Communication of RD Recs: (POC,  reviewed with MD via secure chat )

## 2019-01-11 NOTE — PLAN OF CARE
01/10/19 2004   Patient Assessment/Suction   Level of Consciousness (AVPU) responds to pain   Respiratory Effort Labored   Expansion/Accessory Muscles/Retractions abdominal muscle use   All Lung Fields Breath Sounds crackles, coarse   MACKENZIE Breath Sounds crackles, coarse   LLL Breath Sounds crackles, coarse   RUL Breath Sounds crackles, coarse   RML Breath Sounds crackles, coarse   RLL Breath Sounds crackles, coarse   $ Suction Charges NT Suction Procedure   Secretions Amount copious   Secretions Color red-streaked;tan   Secretions Characteristics thick   PRE-TX-[id number]-[id number]   O2 Device (Oxygen Therapy) High Flow nasal Cannula   $ Is the patient on Low Flow Oxygen? Yes   Flow (L/min) 6   SpO2 96 %   Pulse Oximetry Type Continuous   $ Pulse Oximetry - Multiple Charge Pulse Oximetry - Multiple   Pulse 95   Resp (!) 30   Aerosol Therapy   $ Aerosol Therapy Charges Aerosol Treatment   Respiratory Treatment Status (SVN) given   Treatment Route (SVN) mask   Patient Position (SVN) HOB elevated   Post Treatment Assessment (SVN) breath sounds unchanged   Signs of Intolerance (SVN) none   Breath Sounds Post-Respiratory Treatment   Post-treatment Heart Rate (beats/min) 97   Post-treatment Resp Rate (breaths/min) 21   Preset CPAP/BiPAP Settings   Mode Of Delivery Standby  (per Dr. Marquez. )   pt gets aero tx's , bipap on standby per Dr. Marquez. Pt NT sx every 4hrs. Large tan/red secretions. Pt on 6Hfnc sats 96%

## 2019-01-11 NOTE — PLAN OF CARE
01/11/19 1529   Final Note   Assessment Type Final Discharge Note   Anticipated Discharge Disposition LTAC

## 2019-01-11 NOTE — PLAN OF CARE
I sent discharge orders to [facility] via [facility] and update Abhijeet at [phone]. Ivy Hernandez LMSW     The pt is going to room 207B and the nurse is Regine. Report can be called to [phone] and they can send the pt now. I updated the charge nurse, Mel.     Discharge destination booked in [employer]. Ivy Hernandez LMSW       01/11/19 1117   Post-Acute Status   Post-Acute Authorization Placement   Post-Acute Placement Status Pending Clinical Review

## 2019-01-11 NOTE — CONSULTS
Enmanuel Armenta [id number] is a 63 y.o. male who has been consulted for vancomycin dosing.    The patient has the following labs:     Date Creatinine (mg/dl)    BUN WBC Count   1/10/2019 Estimated Creatinine Clearance: 57.4 mL/min (based on SCr of 1.2 mg/dL). Lab Results   Component Value Date    BUN 71 (H) 01/10/2019     Lab Results   Component Value Date    WBC 20.30 (H) 01/10/2019        Current weight is 72.8 kg (160 lb 7.9 oz)    Pt is receiving vancomycin 500 mg every 24 hours.  Vancomycin trough from 1/10 at 2110 was 17.3 mg/dL.  Target trough range is 15-20 mg/dL.   Trough was drawn on time and anticipate it is therapeutic. Pharmacy will continue current dose.   The patient will be continued on a vancomycin dose of 500 mg every 24 hours. A vancomycin trough has been ordered prior to 3rd dose due 1/12 at 2100.      Patient will be followed by pharmacy for changes in renal function, toxicity, and efficacy.  Thank you for allowing us to participate in this patient's care.     Trisha Holman

## 2019-01-11 NOTE — DISCHARGE SUMMARY
Discharge Summary  Hospital Medicine    Admit Date: 12/17/2018    Date and Time: 1/11/201911:09 AM    Discharge Attending Physician: Stephanie Tena MD    Primary Care Physician: Primary Doctor No    Diagnoses:  Active Hospital Problems    Diagnosis  POA    *Aspiration pneumonia of both lower lobes [J69.0]  Yes    Septic shock [A41.9, R65.21]  No    Acute respiratory failure with hypoxia and hypercarbia [J96.01, J96.02]  No    Smoker [F17.200]  Yes    Episodic confusion [R41.0]  Yes    Hematemesis [K92.0]  Yes    Dysphagia [R13.10] s/p PEG placement  Yes    Malnutrition of severe degree   Yes    Elevated LFTs [R94.5]  Yes    Upper GI bleeding [K92.2]  Yes    Muscular dystrophy [G71.00]  Yes    Chronic obstructive pulmonary disease with acute lower respiratory infection [J44.0]  Encephalopathy  Hypernatremia  AFib with RVR  COPD  Abnormal liver enzymes  Yes      Resolved Hospital Problems    Diagnosis Date Resolved POA    Feeding difficulty [R63.3] 12/23/2018 Yes    UTI (urinary tract infection) [N39.0] 12/23/2018 Yes    Hematemesis [K92.0] 12/23/2018 Yes    SOB (shortness of breath) [R06.02] 12/18/2018 Yes     Discharged Condition: Good    Hospital Course:   Patient is a 63 y.o. male admitted to Hospitalist Service from [facility] Emergency Room with complaint of abdominal pain. Patient is a resident of [facility] nursing home, [place]. Patient reportedly has past medical history significant for COPD, muscular dystrophy, limited mobility with walker and history of elevated PSA. Patient presented with 1 day history of abdominal pain associated with nausea and vomiting. The EMS reported, vomiting a brown sticky substance with pale red streaks throughout. Patient denied chest pain, shortness of breath, headache, vision changes, focal neuro-deficits, cough. Patient noted to have 101.5F upon arrival. Patient was admitted to Hospitalist medicine service. Patient was evaluated by   [place]. Patient was evaluated by GI specialist. Hemoglobin and hematocrit closely monitored. Patient required transfusion of PRBC. Patient under went endoscopic evaluation, results below. Hemoglobin and hematocrit remained stable, diet slowly advanced, patient tolerated diet. Subsequently, patient noted to have swallowing dysfunction and was evaluated by St and failed repeated swallow testings. Patient was made NPO. Patient agreed for PEG placement which was placed by Dr. Martin. On the medical floor, patient had episode of aspiration leading to worsening respiratory status and required ET placement to protect his airways. Patient was managed by pulmonary medicine on the ventilator. Patient transiently developed AF with RVR and was evaluated by cardiologist. Upon extubation, patient is noted to have unspecified encephalopathy. Patient required a repeat EGD for UGI bleeding from  near PEG site. Respiratory status is slowly imropving. Patient has been receiving broad-spectrum antibiotics and is being followed by ID specialist. Patient needs to be weaned from oxygen as frequently requiring BiPAP therapy. Despite every effort by  and case management, no family of the patient is available. Patient likely has a poor prognosis. Patient is being discharged to LTAC in stable condition with following discharge plan of care. Total time with the patient was 30 minutes and greater than 50% was spent in coordination of care. The assessment and plan have been discussed at length. Physicians' notes reviewed. Labs and procedure reviewed.     Consults: Dr. KWADWO Reyna, Dr. Martin, Dr. Puente, Dr. Rodriguez, Dr. Marquez    Significant Diagnostic Studies:   Chest X-Ray: T right basilar infiltrate.     CT abdomen and pelvis with contrast:  Enlarged prostate gland, air in the bladder likely secondary to catheterization, diffuse bladder wall thickening. Prominent amount of fluid within right colon.  Appendix not identified  and note is made of limitation with motion artifact in the appendiceal region.     EGD:   - Moderately severe reflux esophagitis.                       - Medium-sized hiatal hernia.                       - Gastritis.                       - A single gastric polyp.                       - Normal examined duodenum.                       - No specimens collected.     CXR: New moderate left pleural effusion and left lung infiltrate.  Leftward mediastinal shift raising consideration for left lung atelectasis as contributory to the findings.  Close follow-up recommended.     CXR:   Unchanged bilateral lower lobe infiltrates.Heart size is now within normal limits.  Endotracheal tube and right central line in position.     CXR:   Increasing left lower lobe infiltrate and possible left pleural effusion.  Decreasing right base infiltrate.  Probable mild cardiomegaly obscured by the infiltrates.  Endotracheal tube and central line in position.     CXR:   Stable, appropriate positioning of support devices.  Stable appearance of the chest demonstrating right basilar atelectasis or infiltrate and retrocardiac consolidation and small left pleural effusion.     CXR: No significant change in the appearance of the chest compared to the prior exam.     EGD:   - Normal esophagus.                       - Z-line regular.                       - Hematin (altered blood/coffee-ground-like                        material) in the gastric fundus and in the gastric                        body.                       - Intact gastrostomy with a patent G-tube present                        characterized by congestion, edema, erosion and                        erythema.                       - Oozing gastric ulcer with adherent clot. Injected.                        Clips were placed.                       - Normal antrum.                       - Normal examined duodenum.                       - No specimens collected.     ECHO:  · Mildly decreased  left ventricular systolic function. The estimated ejection fraction is 49%  · Grade I (mild) left ventricular diastolic dysfunction consistent with impaired relaxation.  · Normal left atrial pressure.  · Normal right ventricular systolic function.  · Elevated central venous pressure (15 mm Hg).  · The estimated PA systolic pressure is 27 mm Hg  · No evidence of endocarditis.  · Patient is in on CPAP.     CXR: Appropriately positioned right central line.  Interval extubation.  Unchanged bibasilar airspace disease.     CXR: Bibasilar airspace disease unchanged.  Similar and appropriate positioning of right central line.     CXR: Increasing right lower lobe infiltrate. Small infiltrate at the left base. Central line in good position without pneumothorax.     CT head: 1. The study is motion degraded but there is no obvious acute abnormality.  There is no prior study for comparison.  There is mild volume loss.  There is a moderate to marked burden of white matter change.  There is also a provided history of multiple sclerosis.  The white matter changes would be consistent with the provided diagnosis of multiple sclerosis.  Alternatively, nonspecific and age indeterminate chronic small vessel disease could have a similar appearance.  2. There is no hemorrhage, mass effect or obvious acute infarction.  It should be noted that MRI is more sensitive in the detection of subtle or acute nonhemorrhagic ischemic disease..  3. There is opacification of bilateral mastoid air cells and middle ear cavities which likely represents effusions.  There is also a fluid level in the left sphenoid sinus.    Microbiology Results (last 7 days)     Procedure Component Value Units Date/Time    Blood culture [[id number]] Collected:  01/09/19 1926    Order Status:  Completed Specimen:  Blood from Line, Central Updated:  01/11/19 0612     Blood Culture, Routine No Growth to date     Blood Culture, Routine No Growth to date    Narrative:       TLC     IV catheter culture [[id number]] Collected:  01/10/19 1730    Order Status:  Sent Specimen:  Catheter Tip, Intrajugular Updated:  01/10/19 2350    Culture, Respiratory with Gram Stain [[id number]] Collected:  01/09/19 0939    Order Status:  Completed Specimen:  Respiratory from Tracheal Aspirate Updated:  01/10/19 1052     Respiratory Culture Insufficient incubation, culture in progress     Gram Stain (Respiratory) <10 epithelial cells per low power field.     Gram Stain (Respiratory) Few WBC's     Gram Stain (Respiratory) Many Gram positive cocci    Urine culture [[id number]] Collected:  01/10/19 0100    Order Status:  Sent Specimen:  Urine, Catheterized Updated:  01/10/19 0947        Special Treatments/Procedures: as above  Disposition: LTAC    Medications:  Reconciled Home Medications:   Current Discharge Medication List      START taking these medications    Details   acetaminophen (TYLENOL) 325 MG tablet Take 2 tablets (650 mg total) by mouth every 6 (six) hours as needed.  Refills: 0      albuterol-ipratropium (DUO-NEB) 2.5 mg-0.5 mg/3 mL nebulizer solution Take 3 mLs by nebulization every 4 (four) hours. Rescue  Qty: 1 Box, Refills: 0      fluconazole (DIFLUCAN) 400 mg/200 mL IVPB Inject 200 mLs (400 mg total) into the vein once daily.      furosemide (LASIX) 10 mg/mL injection Inject 2 mLs (20 mg total) into the vein 2 (two) times daily.      glycopyrrolate (ROBINUL) 1 mg Tab 1 tablet (1 mg total) by Per G Tube route 3 (three) times daily as needed (secretions).      insulin aspart U-100 (NOVOLOG) 100 unit/mL InPn pen Inject 1-10 Units into the skin before meals and at bedtime as needed (Hyperglycemia).  Refills: 0      metoclopramide HCl (REGLAN) 5 mg/5 mL Soln 5 mLs (5 mg total) by Per G Tube route every 8 (eight) hours.      nystatin (MYCOSTATIN) 100,000 unit/mL suspension Take 5 mLs (500,000 Units total) by mouth every 6 (six) hours. for 10 days  Qty: 200 mL, Refills: 0      pantoprazole (PROTONIX) 40 mg  injection Inject 40 mg into the vein 2 (two) times daily.  Qty: 2400 mg, Refills: 11      sodium chloride 0.9% SolP 100 mL with vancomycin 1,000 mg SolR 500 mg Inject 500 mg into the vein once daily.  Refills: 0         CONTINUE these medications which have NOT CHANGED    Details   aspirin (ECOTRIN) 81 MG EC tablet Take one tablet daily  Refills: 2      atorvastatin (LIPITOR) 80 MG tablet Take 80 mg by mouth every evening.       benzonatate (TESSALON) 100 MG capsule Take 100 mg by mouth 3 (three) times daily as needed for Cough.      clopidogrel (PLAVIX) 75 mg tablet Take one tablet daily  Refills: 3      dextromethorphan-guaifenesin  mg (MUCINEX DM)  mg per 12 hr tablet Take 2 tablets by mouth every 12 (twelve) hours.      lisinopril (PRINIVIL,ZESTRIL) 2.5 MG tablet Take 2.5 mg by mouth once daily.       metoprolol succinate (TOPROL-XL) 25 MG 24 hr tablet Take 25 mg by mouth once daily.       polyethylene glycol (GLYCOLAX) 17 gram PwPk Take 17 g by mouth once daily.            Discharge Procedure Orders   Other restrictions (specify):   Order Comments: PLEASE OBSERVE FALL PRECAUTIONS     Call MD for:   Order Comments: For worsening symptoms, chest pain, shortness of breath, increased abdominal pain, high grade fever, stroke or stroke like symptoms, immediately go to the nearest Emergency Room or call 911 as soon as possible.     Follow-up Information     [facility], Susan. \Hospital Has a New Name and Outlook.\"".    Specialty:  LTAC  Why:  LTAC  Contact information:  [address] [facility] [address]  [phone]             PCP.    Why:  upon discharge           Please follow up.    Contact information:  Keep head end of the bed elevated at 30 degrees.  Skin care - rotate patient q 2 hr; float heels.  PICC line care.  Samson catheter care.  Suctioning prn.  NT suctioning prn.  PEG Care.             Please follow up.    Contact information:  Consult Nutritionist for TPN and IV lipids order. (start  weaning down while increase PEG feeding).  Start Trickle PEG feeding.  Free water per  cc q 6 hrs.  Consult ID specialist for antimicrobial mgmt.  Check Vancomycin trough level before 3rd...           Please follow up.    Contact information:  Neuro-checks q 4 hrs.    Use SCD and TEDs for DVT prophylaxis. Consider anticoagulation soon if H/H remain stable.

## 2019-01-12 LAB
BACTERIA SPEC AEROBE CULT: NORMAL
GRAM STN SPEC: NORMAL

## 2019-01-14 LAB — BACTERIA CATH TIP CULT: NO GROWTH

## 2019-01-14 NOTE — PHYSICIAN QUERY
"PT Name: Enmanuel Armenta  MR #: [id number]    Physician Query Form - Pathology Findings Clarification     CDS/: Zainab Suh RN CDI            Contact information: [email or web address]  This form is a permanent document in the medical record.     Query Date: January 14, 2019      By submitting this query, we are merely seeking further clarification of documentation.  Please utilize your independent clinical judgment when addressing the question(s) below.      The medical record contains the following:     Findings Supporting Clinical Information Location in Medical Record   Chemical gastritis/reactive gastropathy GASTRIC ANTRUM AND BODY BIOPSIES:  - Chemical gastritis/reactive gastropathy. Surgical pathology report 12/24 1153 am     Please document the clinical significance of the Pathologists findings of "Chemical gastritis/reactive gastropathy".    [ x  ] I agree with the Pathology Findings   [   ] I do not agree with the Pathology Findings   [   ] Other/Clarification of Findings:   [   ] Clinically Insignificant   [  ] Clinically Undetermined       Please document in your progress notes daily for the duration of treatment until resolved and include in your discharge summary.                   "

## 2019-01-15 LAB — BACTERIA BLD CULT: NORMAL

## 2019-01-16 NOTE — PHYSICIAN QUERY
"PT Name: Enmanuel Armenta  MR #: [id number]    Physician Query Form - Cause and Effect Relationship Clarification      CDS/: Zainab Suh RN CDI           Contact information: [email or web address]    This form is a permanent document in the medical record.     Query Date: January 16, 2019    By submitting this query, we are merely seeking further clarification of documentation. Please utilize your independent clinical judgment when addressing the question(s) below.    The Medical record contains the following:  Supporting Clinical Findings   Location in record     -- Respiratory Culture 1/9   METHICILLIN RESISTANT STAPHYLOCOCCUS AUREUS -- Many                                                                                                                                Labs    Admit to ICU for sepsis and UGI bleed        Active problem list   Septic shock                                                                                                                                         ER provider note    Pulmonary note 12/28 301 pm         Provider, please clarify if there is any correlation between "Sepsis" and "Methicillin resistant staphylococcus aureus".           Are the conditions:      [ x ] Due to or associated with each other   [  ] Unrelated to each other   [  ] Other (Please Specify): _________________________   [  ] Clinically Undetermined                                                                                                                                                                                             "

## 2019-02-12 NOTE — PROGRESS NOTES
"Progress Note  Pulmonary/Critical Care      Admit Date: 12/17/2018      SUBJECTIVE:      Patient ID: Enmanuel Armenta is a 63 y.o. male.    HPI/Interval history (See H&P for complete P,F,SHx)   The patient aspirated after a water flush yesterday evening and was placed on Bipap.  He is not following commands.  He appears comfortable on Bipap.    ROS  Lethargic, and unresponsive to verbal commands.  OBJECTIVE:     Vital Signs Range (Last 24H):  Temp:  [97.5 °F (36.4 °C)-98.3 °F (36.8 °C)]   Pulse:  []   Resp:  [9-22]   BP: (112-181)/()   SpO2:  [89 %-100 %]   Arterial Line BP: (131-185)/(51-70)     I & O (Last 24H):    Intake/Output Summary (Last 24 hours) at 1/5/2019 0959  Last data filed at 1/5/2019 0800  Gross per 24 hour   Intake 3685.59 ml   Output 3855 ml   Net -169.41 ml        Estimated body mass index is 26.52 kg/m² as calculated from the following:    Height as of this encounter: 5' 7" (1.702 m).    Weight as of this encounter: 76.8 kg (169 lb 5 oz).    Physical Exam  GENERAL: Older patient in no distress, resting on Bipap.  HEENT: Pupils equal and reactive. Extraocular movements intact. Nose and pharynx covered with Bipap.  NECK: Supple. R IJ catheter.  HEART: Regular rate and rhythm. No murmur or gallop auscultated.  LUNGS: Crackles in the bases.There is an expiratory wheeze in the L base.. Lung excursion symmetrical. No change in fremitus.   ABDOMEN: Bowel sounds present. Non-tender, no masses palpated. Rectal tube in place.  : Normal anatomy.Samson in place with urine.  EXTREMITIES: Decreased muscle mass bilaterally.   LYMPHATICS: No adenopathy palpated, no edema.  SKIN: Dry, intact, no lesions.   NEURO: Moves purposefully, but not following commands.  PSYCH: Unable to assess.    Laboratory/Diagnostic Data:    Vent Settings- Vent Mode: Spont  Oxygen Concentration (%):  [40-50] 50  Resp Rate Total:  [9.4 br/min-14 br/min] 14 br/min  Vt Set:  [600 mL] 600 mL  PEEP/CPAP:  [5 cmH20] 5 " Letter faxed   cmH20  Pressure Support:  [10 cmH20] 10 cmH20  Mean Airway Pressure:  [8.4 cmH20-8.8 cmH20] 8.8 cmH20    ABG  Recent Labs   Lab 01/05/19  0435   PH 7.350   PO2 68*   PCO2 39.9   HCO3 22.0*   BE -4       Recent Labs   Lab 01/05/19 0439   WBC 17.20*   HGB 11.6*   HCT 35.2*   *   *   K 3.8   *   CO2 22*   BUN 59*   CREATININE 1.4   AST 58*   ALT 58*   PROT 6.1   ALBUMIN 2.0*   BILITOT 0.5   PHOS 3.0          Microbiology:    Microbiology Results (last 7 days)     Procedure Component Value Units Date/Time    Culture, Respiratory with Gram Stain [[id number]] Collected:  01/02/19 1229    Order Status:  Canceled Specimen:  Respiratory from Sputum, Expectorated Updated:  01/02/19 1230    Culture, Respiratory with Gram Stain [[id number]]  (Susceptibility) Collected:  12/29/18 1325    Order Status:  Completed Specimen:  Sputum Updated:  01/02/19 1112     Respiratory Culture No Pseudomonas isolated.     Respiratory Culture --     METHICILLIN RESISTANT STAPHYLOCOCCUS AUREUS  Few  Normal respiratory ruddy also present       Gram Stain (Respiratory) <10 epithelial cells per low power field.     Gram Stain (Respiratory) Rare WBC's     Gram Stain (Respiratory) Rare Gram positive cocci          Radiology:      ASSESSMENT/PLAN:     Active Problems:    Acute respiratory failure with mechanical ventilation  Aspiration pneumonia with MRSA  Septic shock, resolved  COPD  Anemia worse. Getting 2 units  Muscular dystrophy  PEG tube  Hypernatremic, getting free water    Try high flow nasal cannula  Continue to try and communicate

## 2021-08-10 NOTE — PROGRESS NOTES
[facility]  Adult Nutrition  Progress Note    SUMMARY   Interventions: Parenteral Nutrition Therapy  Vs. Enteral nutrition therapy     Currently :  D10 AA 4.25 @ 75 mls/hr + lipids  (Provides 1418 calories, 77 gms protein, 1800 mls fluid + lipids)  D5 @ 50 mls/hr  (Provides 204 calories)  Total:  1622 calories, (93% estimated needs), 77 gms protein (100% estimated needs).fluid from IVF and TPN 3000 mls/day. (I/O from yesterday +5232.4 mls)      Recommendation:   1)  Recommend custom TPN D15 AA5 @ 70 ml/hr + standard lipids to meet energy/protein needs, limit fluids and allow flexibility in adjusting lytes.   (Provides 1696 calories (97% estimated needs), 84 gms protein (100% estimated needs), 1680 mls fluid)       2) If pt able to resume TF, rec. continuous and raise HOB 90 degrees   Isosource 1.5 @15 ml/hr increasing to goal of 45 ml/hr + 155 ml flush q 4 hr  (Provides 820 ml free water, 1620 kcal (92%EEN), 73 g protein (100%EPN))  - J-tube feedings would also decrease risk of aspiration     Goals: 1) TPN or TF to meet > 75% estimated needs by f/u 2) TF initiated in < 24 hours 3) Re-initiate nutrition support to meet > 75% estimated needs by f/u   Nutrition Goal Status: Met/ Met/ Met  Communication of RD Recs: (POC,  reviewed with RN )     Reason for Assessment     Reason For Assessment: Follow-up  Diagnosis: (SOB)  Relevant Medical History: COPD, muscular dystrophy, PSA, BPH, limited mobility with walker, NH resident  Interdisciplinary Rounds: Attended     General Information Comments: 62 y/o male with muscular dystrophy from nursing home, with SOB, UTI, and aspiration PNA. Per SLP, rec. to keep pt NPO for now. No GIB per GI, only GED, inflammation, and stomach polyp. At time of visit pt was very drowsy and hard to understand, confused per RN. Unable to get accurate diet history. Attempted diet education for GERD. NPO x 2 day. NFPE done, mild-moderate muscle/fat wasting seen.   12/21/18 Pt  Weight improving up 10 pounds.    "failed swallow eval today rec. longterm NPO. Pt now agreeable to PEG. Now qualifies for acute malnutrition and PPN initiated to meet 67% EEN.   12/24/18 Pt receiving PPN x 3 days but was not infusing at time of visit, sent second sign to add lipids to better meet pt's needs today until TF initiated. PEG placed today, per GI OK to initiate TF in 8 hours. No further bleeding.  12/27/18 Isosource started continuous but pt was curling up in a ball and causing himself to vomit so per discussion with RN, switched to bolus feedings yesterday. Pt tolerating x 24 hr.   1/1/18 Pt aspirated on bolus feedings 12/28 despite raising HOB (and had been vomiting with continuous feedings before this). Transferred to ICU. Pt RN/MD high aspiration risk and plan per MD to d/c TF and infuse TPN until pt off vent. Trickle feed was infusing at time of visit, not the recommended formula.  1/4/18 TPN formula changed but was infusing @ 100 ml/hr, overfed pt. Glucose was in the 400's this morning, of note pt on steroids. Corrected and TPN running @ 60 ml/hr. Now extubated. Keep NPO per GI. Rec. Initiate TF as soon as medically able.  1/8/19: Pt now on bi pap. TPN formula was changed to 4.25 AA 10 Dex. If D5 is discontinued, energy needs will not be met. To meet energy needs with TPN AA 4.25 D 10 would need 100 mls/hr and significantly exceed protein needs in renal compromised patient.     Nutrition Discharge Planning: To be determined- PEG Isosource 1.5 @ 45 ml/hr + 155 ml flush q 4 hr     Nutrition Risk Screen     Nutrition Risk Screen: dysphagia or difficulty swallowing     Nutrition/Diet History     Spiritual, Cultural Beliefs, Restorationism Practices, Values that Affect Care: no  Food Allergies: NKFA(or intolerances)  Factors Affecting Nutritional Intake: NPO, aspiration     Anthropometrics     Height Method: Measured  Height: 5' 7"(Office 7/13/18)  Height (inches): 67 in  Weight Method: Bed Scale  Weight: 76.8 kg (1/2/18 71-77 kg fluctuation " this admission)  Weight (lb): 169  Ideal Body Weight (IBW), Male: 148 lb  % Ideal Body Weight, Male (lb): 105.91 lb  BMI (Calculated): 24.6 Admission  BMI Grade: 25 - 29.9 - overweight  Usual Body Weight (UBW), k kg(3/21/17)        Lab/Procedures/Meds     Pertinent Labs Reviewed: reviewed  BMP  Lab Results   Component Value Date     2019    K 3.1 (L) 2019     2019    CO2 19 (L) 2019    BUN 45 (H) 2019    CREATININE 1.1 2019    CALCIUM 8.4 (L) 2019    ANIONGAP 12 2019    ESTGFRAFRICA >60 2019    EGFRNONAA >60 2019     Lab Results   Component Value Date    ALBUMIN 1.9 (L) 2019     POCT Glucose   Date Value Ref Range Status   2019 186 (H) 70 - 110 mg/dL Final   2019 110 70 - 110 mg/dL Final   2019 279 (H) 70 - 110 mg/dL Final   2019 239 (H) 70 - 110 mg/dL Final   2019 125 (H) 70 - 110 mg/dL Final   2019 117 (H) 70 - 110 mg/dL Final   2019 237 (H) 70 - 110 mg/dL Final   2019 202 (H) 70 - 110 mg/dL Final   2019 144 (H) 70 - 110 mg/dL Final   2019 238 (H) 70 - 110 mg/dL Final     Lab Results   Component Value Date    CALCIUM 8.4 (L) 2019    PHOS 2.9 2019     Pertinent Medications Reviewed: reviewed  Pertinent Medications Comments: TPN, lipids, statin, insulin, methylprednisolone     Estimated/Assessed Needs   Admission  Weight Used For Calorie Calculations: 71.1 kg (156 lb 12 oz)  Energy Calorie Requirements (kcal): Garrard St Jeor x 1.2 = 1745 kcal  Energy Need Method: Garrard-St Jeor  Protein Requirements: 1.0-1.2 g protein (muscle loss)= 71-85 g protein  Weight Used For Protein Calculations: 71.1 kg (156 lb 12 oz)  Fluid Requirements (mL): 1750 ml  Estimated Fluid Requirement Method: RDA Method  CHO Requirement: N/A        Nutrition Prescription Ordered     Current Diet Order: NPO +TPN as above     Evaluation of Received Nutrient/Fluid Intake     Energy Calories  Required: Meeting needs  Protein Required: Meeting needs  Fluid Required: Exceeding needs    Intake/Output Summary (Last 24 hours) at 1/8/2019 1259  Last data filed at 1/8/2019 1140  Gross per 24 hour   Intake 6192.42 ml   Output 2600 ml   Net 3592.42 ml     Tolerance: Tolerating  % Intake of Estimated Energy Needs: 93% TPN  % Meal Intake: TPN     Nutrition Risk     Level of Risk/Frequency of Follow-up: moderate - high 2 x weekly      Assessment and Plan     Malnutrition of moderate degree    Contributing Nutrition Diagnosis  Moderate acute illness related malnutrition    Related to (etiology):   Swallowing/Chewing Difficulty    Signs and Symptoms (as evidenced by):   1) PO intakes < 50% of estimated needs x 5 days 2) Moderate muscle/fat loss @ temples and pattellar area/mild muscle/fat loss @ orbital area, bicep, clavicle, and calves    Interventions/Recommendations (treatment strategy):  1) TF or TPN to meet > 75% estimated needs @ f/u     Nutrition Diagnosis Status:   Improving              Monitor and Evaluation     Food and Nutrient Intake: energy intake  Food and Nutrient Adminstration: diet order, enteral and parenteral nutrition administration  Physical Activity and Function: nutrition-related ADLs and IADLs  Anthropometric Measurements: weight  Biochemical Data, Medical Tests and Procedures: electrolyte and renal panel  Nutrition-Focused Physical Findings: overall appearance      Malnutrition Assessment  Skin (Micronutrient): (Man = 13, no PI noted, edema 1+ trace)  Nails (Micronutrient): none  Hair/Scalp (Micronutrient): none  Eyes (Micronutrient): none  Gums (Micronutrient): none  Lips/Mucous Membranes (Micronutrient): none  Teeth (Micronutrient): (Missing teeth, has dentures at home)  Tongue (Micronutrient): none  Neck/Chest (Micronutrient): none, other (see comments)  Musculoskeletal/Lower Extremities: none   Micronutrient Evaluation: no deficiencies   Subcutaneous Fat (Malnutrition): mild  depletion  Muscle Mass (Malnutrition): moderate depletion   Orbital Region (Subcutaneous Fat Loss): mild depletion  Upper Arm Region (Subcutaneous Fat Loss): mild depletion  Thoracic and Lumbar Region: well nourished   [place] Region (Muscle Loss): moderate depletion  Clavicle Bone Region (Muscle Loss): mild depletion  Scapular Bone Region (Muscle Loss): well nourished  Patellar Region (Muscle Loss): moderate depletion  Posterior Calf Region (Muscle Loss): mild depletion   Edema (Fluid Accumulation): 0-->no edema present   Subcutaneous Fat Loss (Final Summary): mild protein-calorie malnutrition  Muscle Loss Evaluation (Final Summary): moderate protein-calorie malnutrition       Nutrition Follow-Up     RD Follow-up?: Yes

## 2024-02-01 NOTE — PROGRESS NOTES
[facility]    History and Physical - Hospitalist Service       Date of Admission:  1/31/2024    Assessment & Plan      Khang Bailon is a 76 year old male admitted on 1/31/2024. He presented to the ED for evaluation of chest pain and shortness of breath and found to have COPDE and UTI.      COPD exacerbation  Acute respiratory failure with hypoxia  Admit to medical floor  DuoNebs every 4 hourly  Prednisone 40 mg daily  Continue supplemental oxygen wean down as able  Adequate pain control    Complicated UTI  Urine culture  IV Zosyn    H/o CAD  S/p recent PCI to the circumflex with a drug-eluting stent   Continue PTA Coreg, losartan, Crestor, aspirin and Plavix    Hypertension  Continue PTA meds    Complete heart block  Status post pacemaker placement in 2012    Hyperlipidemia  Continue Crestor     RLL Lobulated pulmonary nodule concerning for neoplasm  Outpatient follow-up with pulmonology    Infrarenal abdominal aortic aneurysm, 3.9 cm.  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  No intervention change  Outpatient follow-up with GI    Diet:  Cardiac  DVT Prophylaxis: Enoxaparin (Lovenox) SQ  Hernandez Catheter: Not present  Lines: None     Cardiac Monitoring: None  Code Status:  Full code    Clinically Significant Risk Factors Present on Admission                # Drug Induced Platelet Defect: home medication list includes an antiplatelet medication   # Hypertension: Noted on problem list   # Acute Respiratory Failure: Documented O2 saturation < 91%.  Continue supplemental oxygen as needed                Disposition Plan      Expected Discharge Date: 02/01/2024                  Richard Pulido MD  Hospitalist Service  [facility]  Securely message with [employer] (more info)  Text page via [employer] P[employer]  [facility]  Adult Nutrition  Progress Note    SUMMARY   Interventions: Parenteral Nutrition Therapy  Vs. Enteral nutrition therapy      This morning Intake :   TPN D15 AA 5 @ 100 ml/hr -   ( provides 2400 ml, 360 g dextrose, 2204 kcal (126%EEN), and 120 g protein (170%EPN))     Currently @ 60 ml/hr + 250 l standard lipids    Recommendation:   1)  ContinueTPN to D15 AA 5 @ 60 ml/hr + standard lipids  (Provides 1692 kcal (96%EEN), and 84 g protein (100% EPN) and 1680 ml + lipid)     2) If pt able to resume TF, rec. continuous and raise HOB 90 degrees   Isosource 1.5 @15 ml/hr increasing to goal of 45 ml/hr + 155 ml flush q 4 hr  (Provides 820 ml free water, 1620 kcal (92%EEN), 73 g protein (100%EPN))  - J-tube feedings would also decrease risk of aspiration     Goals: 1) TPN or TF to meet > 75% estimated needs by f/u 2) TF initiated in < 24 hours 3) Re-initiate nutrition support to meet > 75% estimated needs by f/u   Nutrition Goal Status: Met/ Met/ Met  Communication of RD Recs: (POC, sticky note, second sign, reviewed with RN )     Reason for Assessment     Reason For Assessment: Follow-up  Diagnosis: (SOB)  Relevant Medical History: COPD, muscular dystrophy, PSA, BPH, limited mobility with walker, NH resident  Interdisciplinary Rounds: Attended     General Information Comments: 64 y/o male with muscular dystrophy from nursing home, with SOB, UTI, and aspiration PNA. Per SLP, rec. to keep pt NPO for now. No GIB per GI, only GED, inflammation, and stomach polyp. At time of visit pt was very drowsy and hard to understand, confused per RN. Unable to get accurate diet history. Attempted diet education for GERD. NPO x 2 day. NFPE done, mild-moderate muscle/fat wasting seen.   12/21/18 Pt failed swallow eval today rec. longterm NPO. Pt now agreeable to PEG. Now qualifies for acute malnutrition and PPN initiated to meet 67% EEN.   12/24/18 Pt receiving PPN x 3 days but was not infusing at time of visit,      ______________________________________________________________________    Chief Complaint   Chest pain, shortness of breath    History is obtained from the patient    History of Present Illness   Khang Bailon is a 76 year old male with h/o history of NSTEMI (severe ramus and circumflex disease distal apical LAD stenosis) s/p PCI to the circumflex with a drug-eluting stent, hypertension, complete heart block status post pacemaker in 2012, CVA in 2016, hyperlipidemia, hiatal hernia, former smoker with severe COPD, recent finding of lung nodule in the right lower lobe awaiting lung biopsy.  He presented to the ED today in the company of spouse complaining of increasing shortness of breath and chest pain.  Patient was discharged on 1/22/2024 from [facility] with a note from the doctor stating patient desaturated during the night and will need home oxygen.  However he has not received supply of home oxygen.  He was supposed to have pulmonary function test today when he was noted to be severely short of breath and was sent to the ED. Patient states he has been having increasing cough for about 2 weeks.  Cough is productive of whitish sputum.  He also reports increasing shortness of breath.  Shortness of breath is more with ambulation.  Chest pain is across the the whole chest and worse with cough.  He denies abdominal pain, nausea, vomiting.  No diarrhea.  He denies fever but notes chills.  He reports increased urinary frequency.  He denies dysuria, hematuria.    ED course.  He was tachypneic and was placed on 2 L.  Remainder of vitals within normal limits.  Labs significant for high-sensitivity troponin flat at 30 x2.  WBC 21.2.  Hemoglobin 11.9.  UA positive for nitrites and pyuria.  CT chest abdomen and pelvis was done and was negative for PE. Patient received IV vancomycin and Zosyn.    Past Medical History    Past Medical History:   Diagnosis Date    Alpha thalassemia (H24)     AV block 7/2012     "sent second sign to add lipids to better meet pt's needs today until TF initiated. PEG placed today, per GI OK to initiate TF in 8 hours. No further bleeding.  18 Isosource started continuous but pt was curling up in a ball and causing himself to vomit so per discussion with RN, switched to bolus feedings yesterday. Pt tolerating x 24 hr.   18 Pt aspirated on bolus feedings  despite raising HOB (and had been vomiting with continuous feedings before this). Transferred to ICU. Pt RN/MD high aspiration risk and plan per MD to d/c TF and infuse TPN until pt off vent. Trickle feed was infusing at time of visit, not the recommended formula.  18 TPN formula changed but was infusing @ 100 ml/hr, overfed pt. Glucose was in the 400's this morning, of note pt on steroids. Corrected and TPN running @ 60 ml/hr. Now extubated. Keep NPO per GI. Rec. Initiate TF as soon as medically able.     Nutrition Discharge Planning: To be determined- PEG Isosource 1.5 @ 45 ml/hr + 155 ml flush q 4 hr     Nutrition Risk Screen     Nutrition Risk Screen: dysphagia or difficulty swallowing     Nutrition/Diet History     Spiritual, Cultural Beliefs, Sikhism Practices, Values that Affect Care: no  Food Allergies: NKFA(or intolerances)  Factors Affecting Nutritional Intake: NPO, aspiration     Anthropometrics     Height Method: Measured  Height: 5' 7"(Office 18)  Height (inches): 67 in  Weight Method: Bed Scale  Weight: 76.8 kg (18 71-77 kg fluctuation this admission)  Weight (lb): 169  Ideal Body Weight (IBW), Male: 148 lb  % Ideal Body Weight, Male (lb): 105.91 lb  BMI (Calculated): 24.6 Admission  BMI Grade: 25 - 29.9 - overweight  Usual Body Weight (UBW), k kg(3/21/17)        Lab/Procedures/Meds     Pertinent Labs Reviewed: reviewed  BMP  Lab Results   Component Value Date     (H) 2019    K 3.9 2019     (H) 2019    CO2 21 (L) 2019    BUN 74 (H) 2019    CREATININE 2.0 " (H) 01/04/2019    CALCIUM 9.0 01/04/2019    ANIONGAP 9 01/04/2019    ESTGFRAFRICA 39 (A) 01/04/2019    EGFRNONAA 34 (A) 01/04/2019     Lab Results   Component Value Date    ALBUMIN 1.6 (L) 01/04/2019     POCT Glucose   Date Value Ref Range Status   01/04/2019 294 (H) 70 - 110 mg/dL Final   01/03/2019 425 (H) 70 - 110 mg/dL Final   01/03/2019 496 (HH) 70 - 110 mg/dL Final   01/03/2019 >500 (H) 70 - 110 mg/dL Final   01/03/2019 395 (H) 70 - 110 mg/dL Final   01/03/2019 345 (H) 70 - 110 mg/dL Final   01/02/2019 292 (H) 70 - 110 mg/dL Final   01/02/2019 362 (H) 70 - 110 mg/dL Final   01/02/2019 257 (H) 70 - 110 mg/dL Final   01/01/2019 230 (H) 70 - 110 mg/dL Final     Lab Results   Component Value Date    CALCIUM 9.0 01/04/2019    PHOS 3.8 01/04/2019     Pertinent Medications Reviewed: reviewed  Pertinent Medications Comments: TPN, lipids, statin, insulin, methylprednisolone     Estimated/Assessed Needs   Admission  Weight Used For Calorie Calculations: 71.1 kg (156 lb 12 oz)  Energy Calorie Requirements (kcal): [place] Je[place] x 1.2 = 1745 kcal  Energy Need Method: [place] Je[place]  Protein Requirements: 1.0-1.2 g protein (muscle loss)= 71-85 g protein  Weight Used For Protein Calculations: 71.1 kg (156 lb 12 oz)  Fluid Requirements (mL): 1750 ml  Estimated Fluid Requirement Method: RDA Method  CHO Requirement: N/A        Nutrition Prescription Ordered     Current Diet Order: NPO +TPN as above     Evaluation of Received Nutrient/Fluid Intake     Energy Calories Required: Meeting needs  Protein Required: Meeting needs  Fluid Required: Meeting needs  Tolerance: Tolerating  % Intake of Estimated Energy Needs: 96% TPN  % Meal Intake: TPN     Nutrition Risk     Level of Risk/Frequency of Follow-up: moderate - high 2 x weekly      Assessment and Plan     Malnutrition of moderate degree    Contributing Nutrition Diagnosis  Moderate acute illness related malnutrition    Related to (etiology):   Swallowing/Chewing  pacemaker at [facility]    Diverticulitis     Injury, other and unspecified, elbow, forearm, and wrist 1970s    Broke right wrist       Past Surgical History   Past Surgical History:   Procedure Laterality Date    COLONOSCOPY      COLONOSCOPY  12/10/2012    CYSTOSCOPY, LITHOLAPAXY, COMBINED N/A 11/10/2021    Procedure: Cystoscopy, Bladder Stone Removal;  Surgeon: Guanaco Stewart MD;  Location: [facility] OR    IMPLANT PACEMAKER      LASER KTP TRANSURETHRAL RESECTION (TUR) PROSTATE N/A 11/10/2021    Procedure: TRANSURETHRAL RESECTION (TUR) PROSTATE, USING KTP LASER;  Surgeon: Guanaco Stewart MD;  Location: [facility] OR       Prior to Admission Medications   Prior to Admission Medications   Prescriptions Last Dose Informant Patient Reported? Taking?   Ascorbic Acid (VITAMIN C PO) 1/30/2024 at am Self Yes Yes   Sig: Take 1 tablet by mouth daily (Isotonix)   B Complex Vitamins (B COMPLEX PO) 1/30/2024 at am Self Yes Yes   Sig: Take 1 tablet by mouth daily (Isotonix)   Cholecalciferol (VITAMIN D-3 PO) 1/30/2024 at am  Yes Yes   Sig: Take 5,000 Units by mouth   aspirin (ASA) 81 MG chewable tablet 1/30/2024 at am  Yes Yes   Sig: Take 81 mg by mouth   calcium carbonate-vitamin D (CALTRATE) 600-10 MG-MCG per tablet 1/30/2024 at am  Yes Yes   Sig: Take 1 tablet by mouth daily   carvedilol (COREG) 25 MG tablet 1/30/2024 at hs  Yes Yes   Sig: Take 12.5 mg by mouth 2 times daily (with meals)   clopidogrel (PLAVIX) 75 MG tablet Unknown at D/Cd?  Yes Yes   Sig: Take 75 mg by mouth   ferrous sulfate (FEROSUL) 325 (65 Fe) MG tablet 1/30/2024 at am  No Yes   Sig: Take 1 tablet (325 mg) by mouth daily (with breakfast)   ipratropium - albuterol 0.5 mg/2.5 mg/3 mL (DUONEB) 0.5-2.5 (3) MG/3ML neb solution 1/31/2024 at 0345  No Yes   Sig: USE 1 VIAL IN NEBULIZER EVERY 6 HOURS AS NEEDED FOR SHORTNESS OF BREATH, WHEEZING OR COUGH   Patient taking differently: Take 1 vial by nebulization every 6 hours as needed for shortness of breath, wheezing or  Difficulty    Signs and Symptoms (as evidenced by):   1) PO intakes < 50% of estimated needs x 5 days 2) Moderate muscle/fat loss @ temples and pattellar area/mild muscle/fat loss @ orbital area, bicep, clavicle, and calves    Interventions/Recommendations (treatment strategy):  1) TF or TPN to meet > 75% estimated needs @ f/u     Nutrition Diagnosis Status:   Improving              Monitor and Evaluation     Food and Nutrient Intake: energy intake  Food and Nutrient Adminstration: diet order, enteral and parenteral nutrition administration  Physical Activity and Function: nutrition-related ADLs and IADLs  Anthropometric Measurements: weight  Biochemical Data, Medical Tests and Procedures: electrolyte and renal panel  Nutrition-Focused Physical Findings: overall appearance      Malnutrition Assessment  Skin (Micronutrient): (Man = 13, no PI noted, edema 1+ trace)  Nails (Micronutrient): none  Hair/Scalp (Micronutrient): none  Eyes (Micronutrient): none  Gums (Micronutrient): none  Lips/Mucous Membranes (Micronutrient): none  Teeth (Micronutrient): (Missing teeth, has dentures at home)  Tongue (Micronutrient): none  Neck/Chest (Micronutrient): none, other (see comments)  Musculoskeletal/Lower Extremities: none   Micronutrient Evaluation: no deficiencies   Subcutaneous Fat (Malnutrition): mild depletion  Muscle Mass (Malnutrition): moderate depletion   Orbital Region (Subcutaneous Fat Loss): mild depletion  Upper Arm Region (Subcutaneous Fat Loss): mild depletion  Thoracic and Lumbar Region: well nourished   Faith Region (Muscle Loss): moderate depletion  Clavicle Bone Region (Muscle Loss): mild depletion  Scapular Bone Region (Muscle Loss): well nourished  Patellar Region (Muscle Loss): moderate depletion  Posterior Calf Region (Muscle Loss): mild depletion   Edema (Fluid Accumulation): 0-->no edema present   Subcutaneous Fat Loss (Final Summary): mild protein-calorie malnutrition  Muscle Loss Evaluation (Final  cough   losartan (COZAAR) 25 MG tablet 1/30/2024 at am  Yes Yes   Sig: Take 25 mg by mouth daily   order for DME 1/31/2024 at am Self No Yes   Sig: Equipment being ordered: Nebulizer hose   oxyCODONE (ROXICODONE) 5 MG tablet 1/30/2024 at pm  No Yes   Sig: Take 0.5-1 tablets (2.5-5 mg) by mouth every 6 hours as needed for severe pain   pantoprazole (PROTONIX) 40 MG EC tablet 1/30/2024 at am  Yes Yes   Sig: Take 1 tablet by mouth daily before breakfast   rosuvastatin (CRESTOR) 5 MG tablet 1/30/2024 at am  Yes Yes   Sig: Take 5 mg by mouth every morning      Facility-Administered Medications: None        Review of Systems    The 10 point Review of Systems is negative other than noted in the HPI or here.  Chest pain, shortness of breath     Physical Exam   Vital Signs: Temp: 100.3  F (37.9  C) Temp src: Temporal BP: 121/72 Pulse: 88   Resp: 24 SpO2: 95 % O2 Device: Nasal cannula Oxygen Delivery: 2 LPM  Weight: 180 lbs 0 oz  GENERAL: alert and no distress.  On 2 L via nasal cannula  EYES: Eyes grossly normal to inspection, PERRL and conjunctivae and sclerae normal  HENT: ear canals and TM's normal, nose and mouth without ulcers or lesions  NECK: no adenopathy, no asymmetry, masses, or scars  RESP: lungs clear to auscultation - no rales, rhonchi or wheezes  CV: regular rate and rhythm, no peripheral edema  ABDOMEN: soft, nontender, no hepatosplenomegaly, no masses and bowel sounds normal  MS: no gross musculoskeletal defects noted, no edema, thoracic paraspinal tenderness bilaterally.   NEURO: Normal strength and tone, mentation intact and speech normal  PSYCH: mentation appears normal, affect normal/bright  LYMPH: no cervical, supraclavicular       Medical Decision Making       75 MINUTES SPENT BY ME on the date of service doing chart review, history, exam, documentation & further activities per the note.  MANAGEMENT DISCUSSED with the following over the past 24 hours:         Data      Summary): moderate protein-calorie malnutrition       Nutrition Follow-Up     RD Follow-up?: Yes

## 2025-01-27 NOTE — PLAN OF CARE
Problem: Adult Inpatient Plan of Care  Goal: Plan of Care Review  Outcome: Ongoing (interventions implemented as appropriate)  Weaned levophed to 0.01 with decrease in MAP to low 60's. Levophed increased to .03mcgs/kg/min. Remains intubated with Proprofol infusing at 45mcg/kg/min. Less bloody secretions orally tonight. Scant secretions obtained from ETT. BBS coarse. Afebrile. Adequate urine output this shift. Started low with good response to lasix on day shift and IVF to KVO.       Yes

## 2025-07-12 NOTE — PLAN OF CARE
Problem: Adult Inpatient Plan of Care  Goal: Plan of Care Review  Outcome: Ongoing (interventions implemented as appropriate)  Tolerated weaning of levophed to 0.07mcg/kg/min with propofol at 50mics/kg/min. Fio2 decreased to 50% after ABGs drawn this am. Patient stayed on left side this shift except for chest xray and linen change. Suctioning mod amount bloody secretions from oral cavity. Minimal secretions obtained from ETT. Decrease in H&H noted 10.8/34.2 to 9.4/29.2. Left art line with good wave form. AM labs drawn from A line. Fingers cool. Hand elevated on pillow with edema noted to hands bilat. Good urine output this shift. 75cc output from PEG to gravity.        oral

## (undated) DEVICE — GLOVE SURG ULTRA TOUCH 7.5

## (undated) DEVICE — SYR 30CC LUER LOCK

## (undated) DEVICE — SEE L#95700

## (undated) DEVICE — SEE MEDLINE ITEM 157185

## (undated) DEVICE — SOL IRR NACL .9% 3000ML

## (undated) DEVICE — SLEEVE SCD EXPRESS CALF MEDIUM

## (undated) DEVICE — FIBER LASER GREENLIGHT

## (undated) DEVICE — CONTAINER SPECIMEN STRL 4OZ

## (undated) DEVICE — TUBING 4-LEAD ARTHOSCOPY

## (undated) DEVICE — GAUZE SPONGE BULKEE 6X6.75IN

## (undated) DEVICE — SYR 50ML CATH TIP

## (undated) DEVICE — SOL WATER STRL IRR 1000ML

## (undated) DEVICE — SEE MEDLINE ITEM 157116

## (undated) DEVICE — SEE MEDLINE ITEM 157117

## (undated) DEVICE — SET IV PRIMARY

## (undated) DEVICE — ELECTRODE REM PLYHSV RETURN 9

## (undated) DEVICE — ADHESIVE MASTISOL VIAL 48/BX

## (undated) DEVICE — SOL IRR GLY D RX 1.5%

## (undated) DEVICE — SCRUB HIBICLENS 4% CHG 4OZ

## (undated) DEVICE — SEE MEDLINE ITEM 152487

## (undated) DEVICE — Device

## (undated) DEVICE — LOOP CUT RESECTSCPE 30 DG 24F

## (undated) DEVICE — SOL NS 1000CC

## (undated) DEVICE — EVACUATOR BLADDER ELLIK

## (undated) DEVICE — TAPE SILK 3IN